# Patient Record
Sex: FEMALE | Race: BLACK OR AFRICAN AMERICAN | NOT HISPANIC OR LATINO
[De-identification: names, ages, dates, MRNs, and addresses within clinical notes are randomized per-mention and may not be internally consistent; named-entity substitution may affect disease eponyms.]

---

## 2017-01-24 ENCOUNTER — RECORD ABSTRACTING (OUTPATIENT)
Age: 72
End: 2017-01-24

## 2017-01-24 DIAGNOSIS — M17.9 OSTEOARTHRITIS OF KNEE, UNSPECIFIED: ICD-10-CM

## 2017-01-24 DIAGNOSIS — M17.0 BILATERAL PRIMARY OSTEOARTHRITIS OF KNEE: ICD-10-CM

## 2017-01-24 DIAGNOSIS — M25.469 EFFUSION, UNSPECIFIED KNEE: ICD-10-CM

## 2017-01-24 DIAGNOSIS — Z80.9 FAMILY HISTORY OF MALIGNANT NEOPLASM, UNSPECIFIED: ICD-10-CM

## 2017-01-24 DIAGNOSIS — Z96.653 PRESENCE OF ARTIFICIAL KNEE JOINT, BILATERAL: ICD-10-CM

## 2017-02-22 ENCOUNTER — INPATIENT (INPATIENT)
Facility: HOSPITAL | Age: 72
LOS: 40 days | Discharge: NURSING, REHAB OR LONG-TERM CARE | DRG: 463 | End: 2017-04-04
Attending: HOSPITALIST | Admitting: ORTHOPAEDIC SURGERY
Payer: MEDICARE

## 2017-02-22 VITALS
SYSTOLIC BLOOD PRESSURE: 143 MMHG | RESPIRATION RATE: 18 BRPM | HEART RATE: 73 BPM | DIASTOLIC BLOOD PRESSURE: 79 MMHG | OXYGEN SATURATION: 99 % | HEIGHT: 63 IN | WEIGHT: 139.99 LBS | TEMPERATURE: 98 F

## 2017-02-22 DIAGNOSIS — E66.9 OBESITY, UNSPECIFIED: ICD-10-CM

## 2017-02-22 DIAGNOSIS — E11.9 TYPE 2 DIABETES MELLITUS WITHOUT COMPLICATIONS: ICD-10-CM

## 2017-02-22 DIAGNOSIS — B99.9 UNSPECIFIED INFECTIOUS DISEASE: ICD-10-CM

## 2017-02-22 DIAGNOSIS — T81.4XXA INFECTION FOLLOWING A PROCEDURE, INITIAL ENCOUNTER: ICD-10-CM

## 2017-02-22 DIAGNOSIS — Z90.10 ACQUIRED ABSENCE OF UNSPECIFIED BREAST AND NIPPLE: Chronic | ICD-10-CM

## 2017-02-22 DIAGNOSIS — M00.9 PYOGENIC ARTHRITIS, UNSPECIFIED: ICD-10-CM

## 2017-02-22 DIAGNOSIS — Z96.653 PRESENCE OF ARTIFICIAL KNEE JOINT, BILATERAL: Chronic | ICD-10-CM

## 2017-02-22 DIAGNOSIS — T81.30XA DISRUPTION OF WOUND, UNSPECIFIED, INITIAL ENCOUNTER: ICD-10-CM

## 2017-02-22 DIAGNOSIS — Z98.890 OTHER SPECIFIED POSTPROCEDURAL STATES: Chronic | ICD-10-CM

## 2017-02-22 LAB
ALBUMIN SERPL ELPH-MCNC: 2.8 G/DL — LOW (ref 3.3–5.2)
ALP SERPL-CCNC: 79 U/L — SIGNIFICANT CHANGE UP (ref 40–120)
ALT FLD-CCNC: 9 U/L — SIGNIFICANT CHANGE UP
ANION GAP SERPL CALC-SCNC: 14 MMOL/L — SIGNIFICANT CHANGE UP (ref 5–17)
APTT BLD: 35.6 SEC — SIGNIFICANT CHANGE UP (ref 27.5–37.4)
AST SERPL-CCNC: 18 U/L — SIGNIFICANT CHANGE UP
B PERT IGG+IGM PNL SER: ABNORMAL
B PERT IGG+IGM PNL SER: ABNORMAL
BILIRUB SERPL-MCNC: 0.8 MG/DL — SIGNIFICANT CHANGE UP (ref 0.4–2)
BLD GP AB SCN SERPL QL: SIGNIFICANT CHANGE UP
BUN SERPL-MCNC: 73 MG/DL — HIGH (ref 8–20)
CALCIUM SERPL-MCNC: 9.1 MG/DL — SIGNIFICANT CHANGE UP (ref 8.6–10.2)
CHLORIDE SERPL-SCNC: 94 MMOL/L — LOW (ref 98–107)
CO2 SERPL-SCNC: 28 MMOL/L — SIGNIFICANT CHANGE UP (ref 22–29)
COLOR FLD: ABNORMAL
COLOR FLD: ABNORMAL
CREAT SERPL-MCNC: 4.14 MG/DL — HIGH (ref 0.5–1.3)
CRP SERPL-MCNC: 4.5 MG/DL — HIGH (ref 0–0.4)
CRYSTALS SNV QL MICRO: SIGNIFICANT CHANGE UP
EOSINOPHIL # FLD: 1 % — SIGNIFICANT CHANGE UP
ERYTHROCYTE [SEDIMENTATION RATE] IN BLOOD: 36 MM/HR — HIGH (ref 0–20)
FLUID INTAKE SUBSTANCE CLASS: SIGNIFICANT CHANGE UP
FLUID INTAKE SUBSTANCE CLASS: SIGNIFICANT CHANGE UP
FLUID SEGMENTED GRANULOCYTES: 86 % — SIGNIFICANT CHANGE UP
FLUID SEGMENTED GRANULOCYTES: 90 % — SIGNIFICANT CHANGE UP
GLUCOSE SERPL-MCNC: 100 MG/DL — SIGNIFICANT CHANGE UP (ref 70–115)
GRAM STN FLD: SIGNIFICANT CHANGE UP
GRAM STN FLD: SIGNIFICANT CHANGE UP
HCT VFR BLD CALC: 37.3 % — SIGNIFICANT CHANGE UP (ref 37–47)
HGB BLD-MCNC: 12 G/DL — SIGNIFICANT CHANGE UP (ref 12–16)
INR BLD: 1.37 RATIO — HIGH (ref 0.88–1.16)
LACTATE BLDV-MCNC: 1.3 MMOL/L — SIGNIFICANT CHANGE UP (ref 0.7–2)
LYMPHOCYTES # FLD: 2 % — SIGNIFICANT CHANGE UP
LYMPHOCYTES # FLD: 2 % — SIGNIFICANT CHANGE UP
MCHC RBC-ENTMCNC: 29.1 PG — SIGNIFICANT CHANGE UP (ref 27–31)
MCHC RBC-ENTMCNC: 32.2 G/DL — SIGNIFICANT CHANGE UP (ref 32–36)
MCV RBC AUTO: 90.5 FL — SIGNIFICANT CHANGE UP (ref 81–99)
MESOTHL CELL # FLD: 2 % — SIGNIFICANT CHANGE UP
MESOTHL CELL # FLD: 7 % — SIGNIFICANT CHANGE UP
MONOS+MACROS # FLD: 5 % — SIGNIFICANT CHANGE UP
MONOS+MACROS # FLD: 6 % — SIGNIFICANT CHANGE UP
PLATELET # BLD AUTO: 396 K/UL — SIGNIFICANT CHANGE UP (ref 150–400)
POTASSIUM SERPL-MCNC: 4.3 MMOL/L — SIGNIFICANT CHANGE UP (ref 3.5–5.3)
POTASSIUM SERPL-SCNC: 4.3 MMOL/L — SIGNIFICANT CHANGE UP (ref 3.5–5.3)
PROT SERPL-MCNC: 6.8 G/DL — SIGNIFICANT CHANGE UP (ref 6.6–8.7)
PROTHROM AB SERPL-ACNC: 15.1 SEC — HIGH (ref 10–13.1)
RBC # BLD: 4.12 M/UL — LOW (ref 4.4–5.2)
RBC # FLD: 20.8 % — HIGH (ref 11–15.6)
RCV VOL RI: HIGH /UL (ref 0–5)
RCV VOL RI: HIGH /UL (ref 0–5)
SODIUM SERPL-SCNC: 136 MMOL/L — SIGNIFICANT CHANGE UP (ref 135–145)
SPECIMEN SOURCE FLD: SIGNIFICANT CHANGE UP
SPECIMEN SOURCE: SIGNIFICANT CHANGE UP
SPECIMEN SOURCE: SIGNIFICANT CHANGE UP
TOTAL NUCLEATED CELL COUNT, BODY FLUID: HIGH /UL (ref 0–5)
TOTAL NUCLEATED CELL COUNT, BODY FLUID: HIGH /UL (ref 0–5)
TUBE TYPE: SIGNIFICANT CHANGE UP
TUBE TYPE: SIGNIFICANT CHANGE UP
TYPE + AB SCN PNL BLD: SIGNIFICANT CHANGE UP
WBC # BLD: 9 K/UL — SIGNIFICANT CHANGE UP (ref 4.8–10.8)
WBC # FLD AUTO: 9 K/UL — SIGNIFICANT CHANGE UP (ref 4.8–10.8)

## 2017-02-22 PROCEDURE — 93010 ELECTROCARDIOGRAM REPORT: CPT

## 2017-02-22 PROCEDURE — 71010: CPT | Mod: 26

## 2017-02-22 PROCEDURE — 99223 1ST HOSP IP/OBS HIGH 75: CPT

## 2017-02-22 PROCEDURE — 73562 X-RAY EXAM OF KNEE 3: CPT | Mod: 26,RT

## 2017-02-22 PROCEDURE — 99285 EMERGENCY DEPT VISIT HI MDM: CPT

## 2017-02-22 RX ORDER — ENOXAPARIN SODIUM 100 MG/ML
30 INJECTION SUBCUTANEOUS DAILY
Qty: 0 | Refills: 0 | Status: DISCONTINUED | OUTPATIENT
Start: 2017-02-22 | End: 2017-02-26

## 2017-02-22 RX ORDER — DEXTROSE 50 % IN WATER 50 %
25 SYRINGE (ML) INTRAVENOUS ONCE
Qty: 0 | Refills: 0 | Status: DISCONTINUED | OUTPATIENT
Start: 2017-02-22 | End: 2017-02-27

## 2017-02-22 RX ORDER — DEXTROSE 50 % IN WATER 50 %
12.5 SYRINGE (ML) INTRAVENOUS ONCE
Qty: 0 | Refills: 0 | Status: DISCONTINUED | OUTPATIENT
Start: 2017-02-22 | End: 2017-02-27

## 2017-02-22 RX ORDER — DOCUSATE SODIUM 100 MG
100 CAPSULE ORAL
Qty: 0 | Refills: 0 | Status: DISCONTINUED | OUTPATIENT
Start: 2017-02-22 | End: 2017-02-27

## 2017-02-22 RX ORDER — SODIUM CHLORIDE 9 MG/ML
1000 INJECTION, SOLUTION INTRAVENOUS
Qty: 0 | Refills: 0 | Status: DISCONTINUED | OUTPATIENT
Start: 2017-02-22 | End: 2017-02-26

## 2017-02-22 RX ORDER — METOPROLOL TARTRATE 50 MG
50 TABLET ORAL DAILY
Qty: 0 | Refills: 0 | Status: DISCONTINUED | OUTPATIENT
Start: 2017-02-22 | End: 2017-02-27

## 2017-02-22 RX ORDER — INSULIN LISPRO 100/ML
VIAL (ML) SUBCUTANEOUS
Qty: 0 | Refills: 0 | Status: DISCONTINUED | OUTPATIENT
Start: 2017-02-22 | End: 2017-02-27

## 2017-02-22 RX ORDER — DEXTROSE 50 % IN WATER 50 %
1 SYRINGE (ML) INTRAVENOUS ONCE
Qty: 0 | Refills: 0 | Status: DISCONTINUED | OUTPATIENT
Start: 2017-02-22 | End: 2017-02-27

## 2017-02-22 RX ORDER — MAGNESIUM OXIDE 400 MG ORAL TABLET 241.3 MG
400 TABLET ORAL
Qty: 0 | Refills: 0 | Status: DISCONTINUED | OUTPATIENT
Start: 2017-02-22 | End: 2017-02-27

## 2017-02-22 RX ORDER — SODIUM CHLORIDE 9 MG/ML
1000 INJECTION, SOLUTION INTRAVENOUS
Qty: 0 | Refills: 0 | Status: DISCONTINUED | OUTPATIENT
Start: 2017-02-22 | End: 2017-02-27

## 2017-02-22 RX ORDER — GLUCAGON INJECTION, SOLUTION 0.5 MG/.1ML
1 INJECTION, SOLUTION SUBCUTANEOUS ONCE
Qty: 0 | Refills: 0 | Status: DISCONTINUED | OUTPATIENT
Start: 2017-02-22 | End: 2017-02-27

## 2017-02-22 RX ORDER — SIMVASTATIN 20 MG/1
20 TABLET, FILM COATED ORAL AT BEDTIME
Qty: 0 | Refills: 0 | Status: DISCONTINUED | OUTPATIENT
Start: 2017-02-22 | End: 2017-02-27

## 2017-02-22 RX ORDER — HYDRALAZINE HCL 50 MG
50 TABLET ORAL EVERY 8 HOURS
Qty: 0 | Refills: 0 | Status: DISCONTINUED | OUTPATIENT
Start: 2017-02-22 | End: 2017-02-27

## 2017-02-22 RX ORDER — BUDESONIDE AND FORMOTEROL FUMARATE DIHYDRATE 160; 4.5 UG/1; UG/1
2 AEROSOL RESPIRATORY (INHALATION)
Qty: 0 | Refills: 0 | Status: DISCONTINUED | OUTPATIENT
Start: 2017-02-22 | End: 2017-02-27

## 2017-02-22 RX ADMIN — MAGNESIUM OXIDE 400 MG ORAL TABLET 400 MILLIGRAM(S): 241.3 TABLET ORAL at 18:45

## 2017-02-22 RX ADMIN — ENOXAPARIN SODIUM 30 MILLIGRAM(S): 100 INJECTION SUBCUTANEOUS at 18:52

## 2017-02-22 RX ADMIN — BUDESONIDE AND FORMOTEROL FUMARATE DIHYDRATE 2 PUFF(S): 160; 4.5 AEROSOL RESPIRATORY (INHALATION) at 20:53

## 2017-02-22 NOTE — ED PROVIDER NOTE - OBJECTIVE STATEMENT
70 y/o female states she is s/p rt knee replacement 9/17 and she noted a pustule last night that broke and drained pus today and she was sent by rehab for ortho eval no fever or chills

## 2017-02-22 NOTE — ED PROVIDER NOTE - PMH
Atrial fibrillation    Breast cancer    Chronic pain    Constipation    COPD (chronic obstructive pulmonary disease)    Deficiency of vitamin K    DM (diabetes mellitus)    GERD (gastroesophageal reflux disease)    HLD (hyperlipidemia)    HTN (hypertension)    Hypercholesterolemia    Obesity    LIN on CPAP    Pressure ulcer    Urine retention

## 2017-02-22 NOTE — ED ADULT NURSE NOTE - PSH
History of incision and drainage  rt knee  S/P hysterectomy    S/P knee replacement, right    S/P mastectomy  Left  S/p total knee replacement, bilateral

## 2017-02-22 NOTE — ED PROVIDER NOTE - MUSCULOSKELETAL, MLM
Spine appears normal, rt knee with anterior healed incision and in the middle small whole with small amt of drainage on a dressing

## 2017-02-22 NOTE — H&P ADULT. - ASSESSMENT
71 y F hx COPD, HTN, DM2, CKD, PAF, s/p R TKR adm for recurrent R knee wound dehiscence.     Wound dehiscence: s/p knee aspiration by ortho, gm stain pending. Per ID Dr Dela Cruz hold off abx for now, await cx results. Will need OR per ortho in next few days, f/u ortho recs. cardio eval for clearance  DM: monitor FS, sliding scale  PAF: currently in NSR, hold xarelto for now pending OR plans, cardio eval [facility].   HTN: cont home meds  COPD: stable, cont inhaler    Prophyl: lovenox renally dosed

## 2017-02-22 NOTE — ED ADULT NURSE REASSESSMENT NOTE - NS ED NURSE REASSESS COMMENT FT1
Care assumed from off-going RN at this time. Charting as noted. Pt is resting comfortably in stretcher and has been made aware of plan of care. Blood draw has been done and was just sent to lab. No further complaints at this time.

## 2017-02-22 NOTE — CONSULT NOTE ADULT - SUBJECTIVE AND OBJECTIVE BOX
70 y/o female presents to the emergency room today complaining of drainage to her right knee.  states she noticed a small "pimple" on her knee 1 day ago and when she woke this morning there was a hole in her knee and it was draining serosanguinous fluid.  denies any recent fevers, or pain.   non ambulator, resides in nursing facility.    over the past year she has had multiple surgical interventions regrding the right knee.    she was previously treated in December for  wound dehiscence,   Irrigation and wound closure by plastics.    PICC line & IVABX for previous infection was completed nov 24 2015

## 2017-02-22 NOTE — H&P ADULT. - HISTORY OF PRESENT ILLNESS
71 y F hx DM2, PAF, HTN, LIN, CKD, COPD, sent from rehab for R knee wound dehiscensce. Pt has had multiple interventions on the R knee following TKR last year, including hardware removal for infection, IV abx course and most recently adm for wound dehiscence in Dec req washout and wound closure. The patient is now transferred from rehab for persistent wound serosanguinous drainage and wound dehiscence. Pt denies CP, F/C, SOB, cough, N/V/D/C, dysuria. c/o L heel pain.

## 2017-02-22 NOTE — CONSULT NOTE ADULT - SUBJECTIVE AND OBJECTIVE BOX
[facility]  INFECTIOUS DISEASES AND INTERNAL MEDICINE OF [place]  =======================================================  Wilver Dela Cruz MD  Diplomates American Board of Internal Medicine and Infectious Diseases  =======================================================    [id number]  SHYAM LAL     Patient is a poor historian, information obtained from the chart and old records.   70y/o Female, with h/o HTN, DM, LIN, obesity, s/p Rt TKR 6/2013 s/p Rt knee explant 9/2016 and 6 weeks of Antibiotics with Vancomycin, Ceftriaxone, and Rifampin(end date 10/24/16). Per chart the infecting bacteria was E coli and CoNS. Patient returned on 11/2016 for non-healing surgical wound on the right knee and underwent a complex repair of the surgical wound by plastic surgery. Culture on 11/30/16 with Pseudomonas A, and VRE. Unknown if patient was ever treated for this. Patient now comes back with another open wound of the Rt knee surgical site. Patient does not recall any trauma, she reports no walking since september surgery. No reported fever or chills.     Past Medical & Surgical Hx:  Hypercholesterolemia  COPD (chronic obstructive pulmonary disease)  Urine retention  Pressure ulcer  Atrial fibrillation  Constipation  Breast cancer  HLD (hyperlipidemia)  HTN (hypertension)  GERD (gastroesophageal reflux disease)  DM (diabetes mellitus)  Obesity  LIN on CPAP  History of incision and drainage: rt knee  S/p total knee replacement, bilateral  S/P knee explant, right  S/P hysterectomy  S/P mastectomy: Left  Breast cancer    Social Hx:  Lives at [facility]    FAMILY HISTORY:  Family history of diabetes mellitus    Allergies  No Known Allergies    MEDICATIONS  (STANDING):  lactated ringers. 1000milliLiter(s) IV Continuous <Continuous>    MEDICATIONS  (PRN):  oxyCODONE  5 mG/acetaminophen 325 mG 1Tablet(s) Oral every 4 hours PRN Moderate Pain (4 - 6)    ANTIBIOTICS:   None     REVIEW OF SYSTEMS:  CONSTITUTIONAL:  No fevers or chills  HEENT:  No diplopia or blurred vision. No earache, sore throat or runny nose.  CARDIOVASCULAR:  No pressure, squeezing, strangling, tightness, heaviness or aching about the chest, neck, axilla or epigastrium.  RESPIRATORY:  No cough, shortness of breath  GASTROINTESTINAL:  No nausea, vomiting or diarrhea.  GENITOURINARY:  No dysuria, frequency or urgency.   MUSCULOSKELETAL:  no joint aches, no muscle pain  SKIN:  Rt Knee surgical site with open wound  NEUROLOGIC:  No paresthesias  PSYCHIATRIC:  No disorder of thought or mood.  ENDOCRINE:  No heat or cold intolerance, polyuria or polydipsia.  HEMATOLOGICAL:  No easy bruising or bleeding.     Physical Exam:  Vital Signs Last 24 Hrs  T(C): 36.7, Max: 36.7 (02-22 @ 08:49)  T(F): 98.1, Max: 98.1 (02-22 @ 08:49)  HR: 73 (73 - 73)  BP: 143/79 (143/79 - 143/79)  RR: 18 (18 - 18)  SpO2: 99% (99% - 99%)  Height (cm): 160 (02-22 @ 08:49)  Weight (kg): 63.5 (02-22 @ 08:49)  BMI (kg/m2): 24.8 (02-22 @ 08:49)  BSA (m2): 1.66 (02-22 @ 08:49)    GEN: NAD, pleasant  HEENT: normocephalic and atraumatic. EOMI. PERRL.    NECK: Supple.   LUNGS: Clear to auscultation.  HEART: Regular rate and rhythm   ABDOMEN: Soft, nontender, and nondistended.  Positive bowel sounds.    : No CVA tenderness  EXTREMITIES: Rt Knee in dressing, Open wound over patella, round circumferential - seen with orthopedics   NEUROLOGIC: forgetful   PSYCHIATRIC: Appropriate affect .  SKIN: Open wound over patella, round circumferential, + drainage - seen with orthopedics    Labs:                      12.0   9.0   )-----------( 396      ( 22 Feb 2017 11:20 )             37.3     RECENT CULTURES:  02-22 .Body Fluid Knee Fluid XXXX   Numerous White blood cells  No organisms seen XXXX    02-22 .Body Fluid Synovial Fluid XXXX   Numerous White blood cells  No organisms seen XXXX    Rt Knee Xray  Impression:   1. No discrete acute fracture-subluxation.  2. ?S/P prior withdrawal of portion of tibial prothesis,  3. Cylindrical cement densities in distal femur and proximal tibia as   above.  4. Productive bony changes in knee joint near tibial plateau as above -   of uncertain etiology.  5. Joint effusion and soft tissue swelling as above.

## 2017-02-22 NOTE — CONSULT NOTE ADULT - SUBJECTIVE AND OBJECTIVE BOX
Patient is a 71y old  Female who presents with a chief complaint of knee drainage (22 Feb 2017 16:00)      HPI:  71 y F hx DM2, PAF, HTN, LIN, CKD, COPD, sent from rehab for R knee wound dehiscensce. Pt has had multiple interventions on the R knee following TKR last year, including hardware removal for infection, IV abx course and most recently adm for wound dehiscence in Dec req washout and wound closure. The patient is now transferred from rehab for persistent wound serosanguinous drainage and wound dehiscence. Pt denies CP, F/C, SOB, cough, N/V/D/C, dysuria. c/o L heel pain. (22 Feb 2017 16:00). Patient denies current or recent chest pain. Had episodes of AF while inpatient in December.       PAST MEDICAL & SURGICAL HISTORY:  Hypercholesterolemia  Deficiency of vitamin K  Chronic pain  COPD (chronic obstructive pulmonary disease)  Urine retention  Pressure ulcer  Atrial fibrillation  Constipation  Breast cancer  HLD (hyperlipidemia)  HTN (hypertension)  GERD (gastroesophageal reflux disease)  DM (diabetes mellitus)  Obesity  LIN on CPAP  History of incision and drainage: rt knee  S/p total knee replacement, bilateral  S/P knee replacement, right  S/P hysterectomy  S/P mastectomy: Left  Breast cancer        Allergies    No Known Allergies          MEDICATIONS  (STANDING):  lactated ringers. 1000milliLiter(s) IV Continuous <Continuous>  insulin lispro (HumaLOG) corrective regimen sliding scale  SubCutaneous three times a day before meals  dextrose 5%. 1000milliLiter(s) IV Continuous <Continuous>  dextrose 50% Injectable 12.5Gram(s) IV Push once  dextrose 50% Injectable 25Gram(s) IV Push once  dextrose 50% Injectable 25Gram(s) IV Push once  simvastatin 20milliGRAM(s) Oral at bedtime  metoprolol succinate ER 50milliGRAM(s) Oral daily  buDESOnide 160 MICROgram(s)/formoterol 4.5 MICROgram(s) Inhaler 2Puff(s) Inhalation two times a day  torsemide 20milliGRAM(s) Oral daily  magnesium oxide 400milliGRAM(s) Oral two times a day with meals  hydrALAZINE 50milliGRAM(s) Oral every 8 hours  enoxaparin Injectable 30milliGRAM(s) SubCutaneous daily    MEDICATIONS  (PRN):  oxyCODONE  5 mG/acetaminophen 325 mG 1Tablet(s) Oral every 4 hours PRN Moderate Pain (4 - 6)  dextrose Gel 1Dose(s) Oral once PRN Blood Glucose LESS THAN 70 milliGRAM(s)/deciliter  glucagon  Injectable 1milliGRAM(s) IntraMuscular once PRN Glucose LESS THAN 70 milligrams/deciliter  docusate sodium 100milliGRAM(s) Oral two times a day PRN Constipation      FAMILY HISTORY:  Family history of diabetes mellitus (Mother)      SOCIAL HISTORY: denies tobacco use      REVIEW OF SYSTEMS:  Comprehensive and unremarkable except as in HPI	    Vital Signs Last 24 Hrs  T(C): 36.3, Max: 36.7 (02-22 @ 08:49)  T(F): 97.3, Max: 98.1 (02-22 @ 08:49)  HR: 66 (66 - 73)  BP: 124/64 (124/64 - 143/79)  BP(mean): --  RR: 18 (18 - 18)  SpO2: 91% (91% - 99%)    Daily Height in cm: 160.02 (22 Feb 2017 08:49)    Daily     I&O's Detail      PHYSICAL EXAM:  Appearance: Normal, obese	  HEENT:   Normal oral mucosa, PERRL, EOMI, sclera non-icteric	  Lymphatic: No cervical lymphadenopathy  Cardiovascular: Normal S1 S2, No JVD, No cardiac murmurs, No carotid bruits, No peripheral edema  Respiratory: Lungs clear to auscultation	  Psychiatry: A & O x 3, Mood & affect appropriate  Gastrointestinal:  Soft, Non-tender, + BS, no bruits	  Skin: No rashes, No ecchymoses, No cyanosis  Neurologic: Grossly non-focal with full strength in all four extremities  Extremities:  No clubbing, cyanosis or edema  Vascular: Peripheral pulses palpable 2+ bilaterally      LABS:                        12.0   9.0   )-----------( 396      ( 22 Feb 2017 11:20 )             37.3           I&O's Summary    BNP  RADIOLOGY & ADDITIONAL STUDIES:

## 2017-02-23 LAB
ANION GAP SERPL CALC-SCNC: 13 MMOL/L — SIGNIFICANT CHANGE UP (ref 5–17)
BUN SERPL-MCNC: 70 MG/DL — HIGH (ref 8–20)
CALCIUM SERPL-MCNC: 9.1 MG/DL — SIGNIFICANT CHANGE UP (ref 8.6–10.2)
CHLORIDE SERPL-SCNC: 98 MMOL/L — SIGNIFICANT CHANGE UP (ref 98–107)
CO2 SERPL-SCNC: 28 MMOL/L — SIGNIFICANT CHANGE UP (ref 22–29)
CREAT SERPL-MCNC: 4.23 MG/DL — HIGH (ref 0.5–1.3)
GLUCOSE SERPL-MCNC: 67 MG/DL — LOW (ref 70–115)
HCT VFR BLD CALC: 34.7 % — LOW (ref 37–47)
HGB BLD-MCNC: 11.1 G/DL — LOW (ref 12–16)
MCHC RBC-ENTMCNC: 29 PG — SIGNIFICANT CHANGE UP (ref 27–31)
MCHC RBC-ENTMCNC: 32 G/DL — SIGNIFICANT CHANGE UP (ref 32–36)
MCV RBC AUTO: 90.6 FL — SIGNIFICANT CHANGE UP (ref 81–99)
PLATELET # BLD AUTO: 318 K/UL — SIGNIFICANT CHANGE UP (ref 150–400)
POTASSIUM SERPL-MCNC: 4.1 MMOL/L — SIGNIFICANT CHANGE UP (ref 3.5–5.3)
POTASSIUM SERPL-SCNC: 4.1 MMOL/L — SIGNIFICANT CHANGE UP (ref 3.5–5.3)
RBC # BLD: 3.83 M/UL — LOW (ref 4.4–5.2)
RBC # FLD: 20.7 % — HIGH (ref 11–15.6)
SODIUM SERPL-SCNC: 139 MMOL/L — SIGNIFICANT CHANGE UP (ref 135–145)
WBC # BLD: 6.2 K/UL — SIGNIFICANT CHANGE UP (ref 4.8–10.8)
WBC # FLD AUTO: 6.2 K/UL — SIGNIFICANT CHANGE UP (ref 4.8–10.8)

## 2017-02-23 PROCEDURE — 99232 SBSQ HOSP IP/OBS MODERATE 35: CPT

## 2017-02-23 PROCEDURE — 99233 SBSQ HOSP IP/OBS HIGH 50: CPT

## 2017-02-23 RX ORDER — INSULIN LISPRO 100/ML
0 VIAL (ML) SUBCUTANEOUS
Qty: 0 | Refills: 0 | COMMUNITY

## 2017-02-23 RX ORDER — ONDANSETRON 8 MG/1
1 TABLET, FILM COATED ORAL
Qty: 0 | Refills: 0 | COMMUNITY

## 2017-02-23 RX ORDER — METOPROLOL TARTRATE 50 MG
1 TABLET ORAL
Qty: 0 | Refills: 0 | COMMUNITY

## 2017-02-23 RX ORDER — CASTOR OIL AND BALSAM, PERU 788; 87 MG/G; MG/G
1 OINTMENT TOPICAL
Qty: 0 | Refills: 0 | COMMUNITY

## 2017-02-23 RX ORDER — ONDANSETRON 8 MG/1
0 TABLET, FILM COATED ORAL
Qty: 0 | Refills: 0 | COMMUNITY

## 2017-02-23 RX ORDER — SODIUM CHLORIDE 9 MG/ML
1000 INJECTION INTRAMUSCULAR; INTRAVENOUS; SUBCUTANEOUS
Qty: 0 | Refills: 0 | Status: DISCONTINUED | OUTPATIENT
Start: 2017-02-23 | End: 2017-02-23

## 2017-02-23 RX ORDER — FONDAPARINUX SODIUM 2.5 MG/.5ML
1 INJECTION, SOLUTION SUBCUTANEOUS
Qty: 0 | Refills: 0 | COMMUNITY

## 2017-02-23 RX ORDER — ERYTHROPOIETIN 10000 [IU]/ML
0 INJECTION, SOLUTION INTRAVENOUS; SUBCUTANEOUS
Qty: 0 | Refills: 0 | COMMUNITY

## 2017-02-23 RX ORDER — ERYTHROPOIETIN 10000 [IU]/ML
20000 INJECTION, SOLUTION INTRAVENOUS; SUBCUTANEOUS
Qty: 0 | Refills: 0 | COMMUNITY

## 2017-02-23 RX ORDER — CEFEPIME 1 G/1
1000 INJECTION, POWDER, FOR SOLUTION INTRAMUSCULAR; INTRAVENOUS EVERY 24 HOURS
Qty: 0 | Refills: 0 | Status: DISCONTINUED | OUTPATIENT
Start: 2017-02-23 | End: 2017-02-25

## 2017-02-23 RX ORDER — OXYCODONE HYDROCHLORIDE 5 MG/1
0 TABLET ORAL
Qty: 0 | Refills: 0 | COMMUNITY

## 2017-02-23 RX ADMIN — ENOXAPARIN SODIUM 30 MILLIGRAM(S): 100 INJECTION SUBCUTANEOUS at 12:04

## 2017-02-23 RX ADMIN — BUDESONIDE AND FORMOTEROL FUMARATE DIHYDRATE 2 PUFF(S): 160; 4.5 AEROSOL RESPIRATORY (INHALATION) at 07:56

## 2017-02-23 RX ADMIN — Medication 50 MILLIGRAM(S): at 05:30

## 2017-02-23 RX ADMIN — SODIUM CHLORIDE 60 MILLILITER(S): 9 INJECTION, SOLUTION INTRAVENOUS at 21:37

## 2017-02-23 RX ADMIN — Medication 50 MILLIGRAM(S): at 17:51

## 2017-02-23 RX ADMIN — BUDESONIDE AND FORMOTEROL FUMARATE DIHYDRATE 2 PUFF(S): 160; 4.5 AEROSOL RESPIRATORY (INHALATION) at 20:36

## 2017-02-23 RX ADMIN — Medication 20 MILLIGRAM(S): at 05:30

## 2017-02-23 RX ADMIN — MAGNESIUM OXIDE 400 MG ORAL TABLET 400 MILLIGRAM(S): 241.3 TABLET ORAL at 17:51

## 2017-02-23 RX ADMIN — SODIUM CHLORIDE 60 MILLILITER(S): 9 INJECTION, SOLUTION INTRAVENOUS at 05:37

## 2017-02-23 RX ADMIN — SIMVASTATIN 20 MILLIGRAM(S): 20 TABLET, FILM COATED ORAL at 21:37

## 2017-02-23 RX ADMIN — CEFEPIME 100 MILLIGRAM(S): 1 INJECTION, POWDER, FOR SOLUTION INTRAMUSCULAR; INTRAVENOUS at 17:52

## 2017-02-23 RX ADMIN — MAGNESIUM OXIDE 400 MG ORAL TABLET 400 MILLIGRAM(S): 241.3 TABLET ORAL at 12:04

## 2017-02-23 NOTE — PROGRESS NOTE ADULT - SUBJECTIVE AND OBJECTIVE BOX
Patient is a 71y old  Female who presents with a chief complaint of knee drainage.        PAST MEDICAL & SURGICAL HISTORY:  Hypercholesterolemia  Deficiency of vitamin K  Chronic pain  COPD (chronic obstructive pulmonary disease)  Urine retention  Pressure ulcer  Atrial fibrillation  Constipation  Breast cancer  HLD (hyperlipidemia)  HTN (hypertension)  GERD (gastroesophageal reflux disease)  DM (diabetes mellitus)  Obesity  LIN on CPAP  History of incision and drainage: rt knee  S/p total knee replacement, bilateral  S/P knee replacement, right  S/P hysterectomy  S/P mastectomy: Left  Breast cancer      Summary of admission HPI:      71 y F hx DM2, PAF, HTN, LIN, CKD, COPD, sent from rehab for R knee wound dehiscensce. Pt has had multiple interventions on the R knee following TKR last year, including hardware removal for infection, IV abx course and most recently adm for wound dehiscence in Dec requiring washout and wound closure. The patient is now transferred from rehab for persistent wound serosanguinous drainage and wound dehiscence.           CHEST CT PULMONARY ANGIO with IV Contrast:  FINDINGS:  MEDICATIONS  (STANDING):  lactated ringers. 1000milliLiter(s) IV Continuous <Continuous>  insulin lispro (HumaLOG) corrective regimen sliding scale  SubCutaneous three times a day before meals  dextrose 5%. 1000milliLiter(s) IV Continuous <Continuous>  dextrose 50% Injectable 12.5Gram(s) IV Push once  dextrose 50% Injectable 25Gram(s) IV Push once  dextrose 50% Injectable 25Gram(s) IV Push once  simvastatin 20milliGRAM(s) Oral at bedtime  metoprolol succinate ER 50milliGRAM(s) Oral daily  buDESOnide 160 MICROgram(s)/formoterol 4.5 MICROgram(s) Inhaler 2Puff(s) Inhalation two times a day  torsemide 20milliGRAM(s) Oral daily  magnesium oxide 400milliGRAM(s) Oral two times a day with meals  hydrALAZINE 50milliGRAM(s) Oral every 8 hours  enoxaparin Injectable 30milliGRAM(s) SubCutaneous daily    MEDICATIONS  (PRN):  oxyCODONE  5 mG/acetaminophen 325 mG 1Tablet(s) Oral every 4 hours PRN Moderate Pain (4 - 6)  dextrose Gel 1Dose(s) Oral once PRN Blood Glucose LESS THAN 70 milliGRAM(s)/deciliter  glucagon  Injectable 1milliGRAM(s) IntraMuscular once PRN Glucose LESS THAN 70 milligrams/deciliter  docusate sodium 100milliGRAM(s) Oral two times a day PRN Constipation          Vital Signs Last 24 Hrs  T(C): 36.7, Max: 36.9 (02-23 @ 01:16)  T(F): 98.1, Max: 98.4 (02-23 @ 01:16)  HR: 68 (63 - 69)  BP: 135/68 (112/69 - 143/74)  BP(mean): --  RR: 18 (17 - 18)  SpO2: 97% (91% - 99%)    PHYSICAL EXAM:    Appearance: Normal, obese	  HEENT:   Normal oral mucosa, PERRL, EOMI, sclera non-icteric	  Lymphatic: No cervical lymphadenopathy  Cardiovascular: Normal S1 S2, No JVD, No cardiac murmurs, No carotid bruits, No peripheral edema  Respiratory: Lungs clear to auscultation	  Psychiatry: A & O x 3, Mood & affect appropriate  Gastrointestinal:  Soft, Non-tender, + BS, no bruits	  Skin: No rashes, No ecchymoses, No cyanosis  Neurologic: Grossly non-focal with full strength in all four extremities  Extremities:  No clubbing, cyanosis or edema  Vascular: Peripheral pulses palpable 2+ bilaterally          LABS:                        11.1   6.2   )-----------( 318      ( 23 Feb 2017 07:10 )             34.7     23 Feb 2017 07:10    139    |  98     |  70.0   ----------------------------<  67     4.1     |  28.0   |  4.23     Ca    9.1        23 Feb 2017 07:10    TPro  6.8    /  Alb  2.8    /  TBili  0.8    /  DBili  x      /  AST  18     /  ALT  9      /  AlkPhos  79     22 Feb 2017 20:10        PT/INR - ( 22 Feb 2017 20:11 )   PT: 15.1 sec;   INR: 1.37 ratio         PTT - ( 22 Feb 2017 20:11 )  PTT:35.6 sec    BNP  I&O's Detail    Daily     Daily     RADIOLOGY & ADDITIONAL STUDIES:

## 2017-02-23 NOTE — PROGRESS NOTE ADULT - SUBJECTIVE AND OBJECTIVE BOX
SHYAM WONGMORE    [id number]    History: 71y Female with Right knee wound s/p placement of antibiotic spacer for infected tka. She has no new complaints and pain is controlled. No fever, chills, CP or SOB.                          11.1   6.2   )-----------( 318      ( 23 Feb 2017 07:10 )             34.7     23 Feb 2017 07:10    139    |  98     |  70.0   ----------------------------<  67     4.1     |  28.0   |  4.23     Ca    9.1        23 Feb 2017 07:10    TPro  6.8    /  Alb  2.8    /  TBili  0.8    /  DBili  x      /  AST  18     /  ALT  9      /  AlkPhos  79     22 Feb 2017 20:10    Culture - Body Fluid with Gram Stain (02.22.17 @ 12:46)    Gram Stain:   Numerous White blood cells  No organisms seen    Specimen Source: .Body Fluid Knee Fluid    Culture - Body Fluid with Gram Stain (02.22.17 @ 12:44)    Gram Stain:   Numerous White blood cells  No organisms seen    Specimen Source: .Body Fluid Synovial Fluid    Culture Results:   Rare Gram Negative Rods Identification and susceptibility to follow.  Culture in progress      MEDICATIONS  (STANDING):  lactated ringers. 1000milliLiter(s) IV Continuous <Continuous>  insulin lispro (HumaLOG) corrective regimen sliding scale  SubCutaneous three times a day before meals  dextrose 5%. 1000milliLiter(s) IV Continuous <Continuous>  dextrose 50% Injectable 12.5Gram(s) IV Push once  dextrose 50% Injectable 25Gram(s) IV Push once  dextrose 50% Injectable 25Gram(s) IV Push once  simvastatin 20milliGRAM(s) Oral at bedtime  metoprolol succinate ER 50milliGRAM(s) Oral daily  buDESOnide 160 MICROgram(s)/formoterol 4.5 MICROgram(s) Inhaler 2Puff(s) Inhalation two times a day  torsemide 20milliGRAM(s) Oral daily  magnesium oxide 400milliGRAM(s) Oral two times a day with meals  hydrALAZINE 50milliGRAM(s) Oral every 8 hours  enoxaparin Injectable 30milliGRAM(s) SubCutaneous daily    MEDICATIONS  (PRN):  oxyCODONE  5 mG/acetaminophen 325 mG 1Tablet(s) Oral every 4 hours PRN Moderate Pain (4 - 6)  dextrose Gel 1Dose(s) Oral once PRN Blood Glucose LESS THAN 70 milliGRAM(s)/deciliter  glucagon  Injectable 1milliGRAM(s) IntraMuscular once PRN Glucose LESS THAN 70 milligrams/deciliter  docusate sodium 100milliGRAM(s) Oral two times a day PRN Constipation    Vital Signs Last 24 Hrs  T(C): 36.7, Max: 36.9 (02-23 @ 01:16)  T(F): 98.1, Max: 98.4 (02-23 @ 01:16)  HR: 68 (63 - 69)  BP: 135/68 (112/69 - 143/74)  BP(mean): --  RR: 18 (17 - 18)  SpO2: 97% (91% - 99%)    Physical exam: The right knee dressing is intact with bloody dc proximally. 2 small open wounds anterior knee in mid incision site. No erythema is noted. No blistering. No ecchymosis. The calf is supple nontender. Sensation to light touch is grossly intact distally. Motor function distally is 5/5. Extensor hallucis longus and flexor hallucis longus are intact. No foot drop. 2+ dorsalis pedis pulse. Capillary refill is less than 2 seconds. No cyanosis.    Primary Orthopedic Assessment:  -Right knee wound infection, s/p right knee abx apacer for infected tka     Secondary  Medical Assessment(s):   • DM, obesity    Plan:   • Dressing changed  -Medical clearance for OR- will need medical optimization with nutritional assessment due to poor wound healing  -OR Monday for I&D with removal of implant and abx spacer block with coordination with plastics Dr Rey availability   -F/U cultures  -D/W Dr Ritchie  -Will D/W medicine SHYAM WONGMORE    [id number]    History: 71y Female with Right knee wound s/p placement of antibiotic spacer for infected tka. She has no new complaints and pain is controlled. No fever, chills, CP or SOB.                          11.1   6.2   )-----------( 318      ( 23 Feb 2017 07:10 )             34.7     23 Feb 2017 07:10    139    |  98     |  70.0   ----------------------------<  67     4.1     |  28.0   |  4.23     Ca    9.1        23 Feb 2017 07:10    TPro  6.8    /  Alb  2.8    /  TBili  0.8    /  DBili  x      /  AST  18     /  ALT  9      /  AlkPhos  79     22 Feb 2017 20:10    Culture - Body Fluid with Gram Stain (02.22.17 @ 12:46)    Gram Stain:   Numerous White blood cells  No organisms seen    Specimen Source: .Body Fluid Knee Fluid    Culture - Body Fluid with Gram Stain (02.22.17 @ 12:44)    Gram Stain:   Numerous White blood cells  No organisms seen    Specimen Source: .Body Fluid Synovial Fluid    Culture Results:   Rare Gram Negative Rods Identification and susceptibility to follow.  Culture in progress      MEDICATIONS  (STANDING):  lactated ringers. 1000milliLiter(s) IV Continuous <Continuous>  insulin lispro (HumaLOG) corrective regimen sliding scale  SubCutaneous three times a day before meals  dextrose 5%. 1000milliLiter(s) IV Continuous <Continuous>  dextrose 50% Injectable 12.5Gram(s) IV Push once  dextrose 50% Injectable 25Gram(s) IV Push once  dextrose 50% Injectable 25Gram(s) IV Push once  simvastatin 20milliGRAM(s) Oral at bedtime  metoprolol succinate ER 50milliGRAM(s) Oral daily  buDESOnide 160 MICROgram(s)/formoterol 4.5 MICROgram(s) Inhaler 2Puff(s) Inhalation two times a day  torsemide 20milliGRAM(s) Oral daily  magnesium oxide 400milliGRAM(s) Oral two times a day with meals  hydrALAZINE 50milliGRAM(s) Oral every 8 hours  enoxaparin Injectable 30milliGRAM(s) SubCutaneous daily    MEDICATIONS  (PRN):  oxyCODONE  5 mG/acetaminophen 325 mG 1Tablet(s) Oral every 4 hours PRN Moderate Pain (4 - 6)  dextrose Gel 1Dose(s) Oral once PRN Blood Glucose LESS THAN 70 milliGRAM(s)/deciliter  glucagon  Injectable 1milliGRAM(s) IntraMuscular once PRN Glucose LESS THAN 70 milligrams/deciliter  docusate sodium 100milliGRAM(s) Oral two times a day PRN Constipation    Vital Signs Last 24 Hrs  T(C): 36.7, Max: 36.9 (02-23 @ 01:16)  T(F): 98.1, Max: 98.4 (02-23 @ 01:16)  HR: 68 (63 - 69)  BP: 135/68 (112/69 - 143/74)  BP(mean): --  RR: 18 (17 - 18)  SpO2: 97% (91% - 99%)    Physical exam: The right knee dressing is intact with bloody dc proximally. 2 small open wounds anterior knee in mid incision site. No erythema is noted. No blistering. No ecchymosis. The calf is supple nontender. Sensation to light touch is grossly intact distally. Motor function distally is 5/5. Extensor hallucis longus and flexor hallucis longus are intact. No foot drop. 2+ dorsalis pedis pulse. Capillary refill is less than 2 seconds. No cyanosis.    Primary Orthopedic Assessment:  -Right knee wound infection, s/p right knee abx apacer for infected tka     Secondary  Medical Assessment(s):   • DM, obesity    Plan:   • Dressing changed  -Medical clearance for OR- will need medical optimization with nutritional assessment due to poor wound healing  -OR Monday for I&D with removal of implant and abx spacer block with coordination with plastics Dr Rey availability   -F/U cultures  -DVTP- Lovenox  -D/W Dr Ritchie  -D/W medicine Dr Serra

## 2017-02-23 NOTE — PROGRESS NOTE ADULT - PROBLEM SELECTOR PLAN 1
Patient s/p 6 weeks IV abx in September 2016  Culture on 11/30/16 with Pseudomonas A, and VRE, d/w orthopedics this was a superficial culture and was not treated.   Patient has synovial joint aspiration by orthopedics with 19,000 nucleated cells  Gram stain with no organisms  Culture with GNR  Will start Cefepime 1gm [id number] (adjusted to renal function) and adjust with culture results

## 2017-02-23 NOTE — PROGRESS NOTE ADULT - ASSESSMENT
72 y/o F with Rt prosthetic knee infection s/p Explant 9/2016 s/p 6 weeks of abx now with new Rt knee infection

## 2017-02-23 NOTE — PROGRESS NOTE ADULT - ASSESSMENT
71 y F hx DM2, PAF, HTN, LIN, CKD, COPD, sent from rehab for R knee wound dehiscence and is s/p R knee arthrocentesis and drainage and is planned for operative intervention for wound dehiscence. No recent ACS or CHF symptoms. From a cardiovascular perspective, there is no contraindication to proceed with surgical treatment of right knee infection.

## 2017-02-23 NOTE — PROGRESS NOTE ADULT - SUBJECTIVE AND OBJECTIVE BOX
[facility]  INFECTIOUS DISEASES AND INTERNAL MEDICINE OF [place]  =======================================================  Wilver Dela Cruz MD  Diplomates American Board of Internal Medicine and Infectious Diseases  =======================================================    LAL, SHYAM     No complaints  No fevers or chills    Allergies:  No Known Allergies    REVIEW OF SYSTEMS:  CONSTITUTIONAL:  No fevers or chills  HEENT:  No diplopia or blurred vision. No earache, sore throat or runny nose.  CARDIOVASCULAR:  No pressure, squeezing, strangling, tightness, heaviness or aching about the chest, neck, axilla or epigastrium.  RESPIRATORY:  No cough, shortness of breath  GASTROINTESTINAL:  No nausea, vomiting or diarrhea.  GENITOURINARY:  No dysuria, frequency or urgency.   MUSCULOSKELETAL:  no muscle pain  SKIN:  Rt Knee surgical site with open wound  NEUROLOGIC:  No paresthesias  PSYCHIATRIC:  No disorder of thought or mood.  ENDOCRINE:  No heat or cold intolerance, polyuria or polydipsia.  HEMATOLOGICAL:  No easy bruising or bleeding.     Physical Exam:  Vital Signs Last 24 Hrs  T(C): 36.7, Max: 36.9 (02-23 @ 01:16)  T(F): 98.1, Max: 98.4 (02-23 @ 01:16)  HR: 68 (63 - 69)  BP: 135/68 (112/69 - 143/74)  RR: 18 (17 - 18)  SpO2: 97% (91% - 99%)    GEN: NAD, pleasant  HEENT: normocephalic and atraumatic. EOMI. PERRL.    NECK: Supple.   LUNGS: Clear to auscultation.  HEART: Regular rate and rhythm   ABDOMEN: Soft, nontender, and nondistended.  Positive bowel sounds.    : No CVA tenderness  EXTREMITIES: Rt Knee in dressing, Open wound over patella, round circumferential - seen with orthopedics   NEUROLOGIC: forgetful   PSYCHIATRIC: Appropriate affect .  SKIN: Open wound over patella, round circumferential    Labs:  23 Feb 2017 07:10    139    |  98     |  70.0   ----------------------------<  67     4.1     |  28.0   |  4.23     Ca    9.1        23 Feb 2017 07:10    TPro  6.8    /  Alb  2.8    /  TBili  0.8    /  DBili  x      /  AST  18     /  ALT  9      /  AlkPhos  79     22 Feb 2017 20:10                       11.1   6.2   )-----------( 318      ( 23 Feb 2017 07:10 )             34.7     PT/INR - ( 22 Feb 2017 20:11 )   PT: 15.1 sec;   INR: 1.37 ratio      PTT - ( 22 Feb 2017 20:11 )  PTT:35.6 sec    LIVER FUNCTIONS - ( 22 Feb 2017 20:10 )  Alb: 2.8 g/dL / Pro: 6.8 g/dL / ALK PHOS: 79 U/L / ALT: 9 U/L / AST: 18 U/L / GGT: x             RECENT CULTURES:  02-22 .Body Fluid Knee Fluid XXXX   Numerous White blood cells  No organisms seen   Rare Gram Negative Rods Identification and susceptibility to follow.  Culture in progress    02-22 .Body Fluid Synovial Fluid XXXX   Numerous White blood cells  No organisms seen   Rare Gram Negative Rods Identification and susceptibility to follow.  Culture in progress

## 2017-02-23 NOTE — PROGRESS NOTE ADULT - ASSESSMENT
71 y F hx COPD, HTN, DM2, CKD, PAF, s/p R TKR adm for recurrent R knee wound dehiscence.     Wound dehiscence: s/p knee aspiration by ortho, wound culture is growing GNR, Per ID Dr Dela Cruz started on cefepime OR per ortho on Monday seen by cardio for clearance, will continue monitor closely.   DM: monitor FS, sliding scale  PAF: currently in NSR, holding xarelto as surgery is planed on Monday. .   HTN: cont home meds  COPD: stable, cont inhaler  Acute on chronic renal failure: Patient creatinine is 4.23, will give gental hydration, will monitor BMP,I/Os, will get US of kidney, previous US showed medical renal disease.   Prophyl: lovenox renally dosed 71 y F hx COPD, HTN, DM2, CKD, PAF, s/p R TKR adm for recurrent R knee wound dehiscence.     Wound dehiscence: s/p knee aspiration by ortho, wound culture is growing GNR, Per ID Dr Dela Cruz started on cefepime OR per ortho on Monday seen by cardio for clearance, will continue monitor closely.   DM: monitor FS, sliding scale  PAF: currently in NSR, holding xarelto as surgery is planed on Monday. .   HTN: cont home meds  COPD: stable, cont inhaler  Acute on chronic renal failure stage 4: Patient creatinine is 4.23, will give gental hydration, will monitor BMP,I/Os, will get US of kidney, previous US showed medical renal disease, if kidney function is not improving will call nephrology.    Prophyl: lovenox renally dosed

## 2017-02-23 NOTE — PROGRESS NOTE ADULT - SUBJECTIVE AND OBJECTIVE BOX
SHYAM LAL    [id number]    71y      Female    Chief Complain:   Wound infection.     INTERVAL HPI/OVERNIGHT EVENTS:    Patient pain in the the knee is controlled with pain meds, she denies fever, chills, chest pain, sob, nausea and vomiting.     REVIEW OF SYSTEMS:    CONSTITUTIONAL: No fever, some fatigue  RESPIRATORY: No cough,  No shortness of breath  CARDIOVASCULAR: No chest pain, palpitations  GASTROINTESTINAL: No abdominal or epigastric pain. No nausea, vomiting  NEUROLOGICAL: No headaches, memory loss, loss of strength.  MISCELLANEOUS: right knee swelling and pain.       Vital Signs Last 24 Hrs  T(C): 37.2, Max: 37.2 (02-23 @ 15:10)  T(F): 98.9, Max: 98.9 (02-23 @ 15:10)  HR: 60 (60 - 69)  BP: 108/69 (108/69 - 143/74)  BP(mean): --  RR: 18 (17 - 18)  SpO2: 95% (95% - 99%)    PHYSICAL EXAM:    GENERAL: Obese elderly female lying on the bed not in acute distress.    HEENT: PERRL  NECK: soft, Supple, No JVD   CHEST/LUNG: Clear to auscultate bilaterally; No wheezing  HEART: S1S2+, Regular rate and rhythm; No murmurs.   ABDOMEN: Soft, Nontender, Nondistended; Bowel sounds present  EXTREMITIES:  2+ Peripheral Pulses, No clubbing, cyanosis, or edema  SKIN: No rashes or lesions  NEURO: AAOX3, no focal deficits, no motor r sensory loss  PSYCH: normal mood  Musculoskeletal: right knee is swelling and have some tenderness, dressing on with no soaking or bleeding.       LABS:                        11.1   6.2   )-----------( 318      ( 23 Feb 2017 07:10 )             34.7     23 Feb 2017 07:10    139    |  98     |  70.0   ----------------------------<  67     4.1     |  28.0   |  4.23     Ca    9.1        23 Feb 2017 07:10    TPro  6.8    /  Alb  2.8    /  TBili  0.8    /  DBili  x      /  AST  18     /  ALT  9      /  AlkPhos  79     22 Feb 2017 20:10    PT/INR - ( 22 Feb 2017 20:11 )   PT: 15.1 sec;   INR: 1.37 ratio         PTT - ( 22 Feb 2017 20:11 )  PTT:35.6 sec      MEDICATIONS  (STANDING):  lactated ringers. 1000milliLiter(s) IV Continuous <Continuous>  insulin lispro (HumaLOG) corrective regimen sliding scale  SubCutaneous three times a day before meals  dextrose 5%. 1000milliLiter(s) IV Continuous <Continuous>  dextrose 50% Injectable 12.5Gram(s) IV Push once  dextrose 50% Injectable 25Gram(s) IV Push once  dextrose 50% Injectable 25Gram(s) IV Push once  simvastatin 20milliGRAM(s) Oral at bedtime  metoprolol succinate ER 50milliGRAM(s) Oral daily  buDESOnide 160 MICROgram(s)/formoterol 4.5 MICROgram(s) Inhaler 2Puff(s) Inhalation two times a day  torsemide 20milliGRAM(s) Oral daily  magnesium oxide 400milliGRAM(s) Oral two times a day with meals  hydrALAZINE 50milliGRAM(s) Oral every 8 hours  enoxaparin Injectable 30milliGRAM(s) SubCutaneous daily  cefepime  IVPB 1000milliGRAM(s) IV Intermittent every 24 hours    MEDICATIONS  (PRN):  oxyCODONE  5 mG/acetaminophen 325 mG 1Tablet(s) Oral every 4 hours PRN Moderate Pain (4 - 6)  dextrose Gel 1Dose(s) Oral once PRN Blood Glucose LESS THAN 70 milliGRAM(s)/deciliter  glucagon  Injectable 1milliGRAM(s) IntraMuscular once PRN Glucose LESS THAN 70 milligrams/deciliter  docusate sodium 100milliGRAM(s) Oral two times a day PRN Constipation      RADIOLOGY & ADDITIONAL TESTS:    XR KNEE 3 VIEWS RT   1. No discrete acute fracture-subluxation.  2. ?S/P prior withdrawal of portion of tibial prothesis,  3. Cylindrical cement densities in distal femur and proximal tibia as   above.  4. Productive bony changes in knee joint near tibial plateau as above -   of uncertain etiology.  5. Joint effusion and soft tissue swelling

## 2017-02-24 LAB
-  AMIKACIN: SIGNIFICANT CHANGE UP
-  AMIKACIN: SIGNIFICANT CHANGE UP
-  AMPICILLIN: SIGNIFICANT CHANGE UP
-  AZTREONAM: SIGNIFICANT CHANGE UP
-  AZTREONAM: SIGNIFICANT CHANGE UP
-  CEFEPIME: SIGNIFICANT CHANGE UP
-  CEFEPIME: SIGNIFICANT CHANGE UP
-  CEFTAZIDIME: SIGNIFICANT CHANGE UP
-  CEFTAZIDIME: SIGNIFICANT CHANGE UP
-  CIPROFLOXACIN: SIGNIFICANT CHANGE UP
-  CIPROFLOXACIN: SIGNIFICANT CHANGE UP
-  ERYTHROMYCIN: SIGNIFICANT CHANGE UP
-  GENTAMICIN: SIGNIFICANT CHANGE UP
-  GENTAMICIN: SIGNIFICANT CHANGE UP
-  IMIPENEM: SIGNIFICANT CHANGE UP
-  IMIPENEM: SIGNIFICANT CHANGE UP
-  LEVOFLOXACIN: SIGNIFICANT CHANGE UP
-  LEVOFLOXACIN: SIGNIFICANT CHANGE UP
-  LINEZOLID: SIGNIFICANT CHANGE UP
-  MEROPENEM: SIGNIFICANT CHANGE UP
-  MEROPENEM: SIGNIFICANT CHANGE UP
-  PIPERACILLIN/TAZOBACTAM: SIGNIFICANT CHANGE UP
-  PIPERACILLIN/TAZOBACTAM: SIGNIFICANT CHANGE UP
-  TETRACYCLINE: SIGNIFICANT CHANGE UP
-  TOBRAMYCIN: SIGNIFICANT CHANGE UP
-  TOBRAMYCIN: SIGNIFICANT CHANGE UP
-  VANCOMYCIN: SIGNIFICANT CHANGE UP
ANION GAP SERPL CALC-SCNC: 10 MMOL/L — SIGNIFICANT CHANGE UP (ref 5–17)
BUN SERPL-MCNC: 65 MG/DL — HIGH (ref 8–20)
CALCIUM SERPL-MCNC: 8.7 MG/DL — SIGNIFICANT CHANGE UP (ref 8.6–10.2)
CHLORIDE SERPL-SCNC: 99 MMOL/L — SIGNIFICANT CHANGE UP (ref 98–107)
CO2 SERPL-SCNC: 31 MMOL/L — HIGH (ref 22–29)
CREAT SERPL-MCNC: 3.89 MG/DL — HIGH (ref 0.5–1.3)
GLUCOSE SERPL-MCNC: 85 MG/DL — SIGNIFICANT CHANGE UP (ref 70–115)
HCT VFR BLD CALC: 33.9 % — LOW (ref 37–47)
HGB BLD-MCNC: 11.1 G/DL — LOW (ref 12–16)
MCHC RBC-ENTMCNC: 29.4 PG — SIGNIFICANT CHANGE UP (ref 27–31)
MCHC RBC-ENTMCNC: 32.7 G/DL — SIGNIFICANT CHANGE UP (ref 32–36)
MCV RBC AUTO: 89.7 FL — SIGNIFICANT CHANGE UP (ref 81–99)
METHOD TYPE: SIGNIFICANT CHANGE UP
PLATELET # BLD AUTO: 310 K/UL — SIGNIFICANT CHANGE UP (ref 150–400)
POTASSIUM SERPL-MCNC: 3.8 MMOL/L — SIGNIFICANT CHANGE UP (ref 3.5–5.3)
POTASSIUM SERPL-SCNC: 3.8 MMOL/L — SIGNIFICANT CHANGE UP (ref 3.5–5.3)
RBC # BLD: 3.78 M/UL — LOW (ref 4.4–5.2)
RBC # FLD: 20.5 % — HIGH (ref 11–15.6)
SODIUM SERPL-SCNC: 140 MMOL/L — SIGNIFICANT CHANGE UP (ref 135–145)
WBC # BLD: 5.3 K/UL — SIGNIFICANT CHANGE UP (ref 4.8–10.8)
WBC # FLD AUTO: 5.3 K/UL — SIGNIFICANT CHANGE UP (ref 4.8–10.8)

## 2017-02-24 PROCEDURE — 99232 SBSQ HOSP IP/OBS MODERATE 35: CPT

## 2017-02-24 PROCEDURE — 76770 US EXAM ABDO BACK WALL COMP: CPT | Mod: 26

## 2017-02-24 RX ADMIN — Medication 50 MILLIGRAM(S): at 18:08

## 2017-02-24 RX ADMIN — BUDESONIDE AND FORMOTEROL FUMARATE DIHYDRATE 2 PUFF(S): 160; 4.5 AEROSOL RESPIRATORY (INHALATION) at 08:17

## 2017-02-24 RX ADMIN — MAGNESIUM OXIDE 400 MG ORAL TABLET 400 MILLIGRAM(S): 241.3 TABLET ORAL at 18:08

## 2017-02-24 RX ADMIN — BUDESONIDE AND FORMOTEROL FUMARATE DIHYDRATE 2 PUFF(S): 160; 4.5 AEROSOL RESPIRATORY (INHALATION) at 20:36

## 2017-02-24 RX ADMIN — MAGNESIUM OXIDE 400 MG ORAL TABLET 400 MILLIGRAM(S): 241.3 TABLET ORAL at 08:35

## 2017-02-24 RX ADMIN — Medication 50 MILLIGRAM(S): at 22:45

## 2017-02-24 RX ADMIN — CEFEPIME 100 MILLIGRAM(S): 1 INJECTION, POWDER, FOR SOLUTION INTRAMUSCULAR; INTRAVENOUS at 18:09

## 2017-02-24 RX ADMIN — ENOXAPARIN SODIUM 30 MILLIGRAM(S): 100 INJECTION SUBCUTANEOUS at 12:25

## 2017-02-24 RX ADMIN — Medication 20 MILLIGRAM(S): at 06:26

## 2017-02-24 RX ADMIN — SIMVASTATIN 20 MILLIGRAM(S): 20 TABLET, FILM COATED ORAL at 22:45

## 2017-02-24 NOTE — PROGRESS NOTE ADULT - SUBJECTIVE AND OBJECTIVE BOX
Patient is a 71y old  Female who presents with complaint of R knee pain.       PAST MEDICAL & SURGICAL HISTORY:  Hypercholesterolemia  Deficiency of vitamin K  Chronic pain  COPD (chronic obstructive pulmonary disease)  Urine retention  Pressure ulcer  Atrial fibrillation  Constipation  Breast cancer  HLD (hyperlipidemia)  HTN (hypertension)  GERD (gastroesophageal reflux disease)  DM (diabetes mellitus)  Obesity  LIN on CPAP  History of incision and drainage: rt knee  S/p total knee replacement, bilateral  S/P knee replacement, right  S/P hysterectomy  S/P mastectomy: Left  Breast cancer      Summary of admission HPI:        71 y F hx DM2, PAF, HTN, LIN, CKD, COPD, sent from rehab for R knee wound dehiscence and is s/p R knee arthrocentesis and drainage and is planned for operative intervention for wound dehiscence. No recent ACS or CHF symptoms. From a cardiovascular perspective, there is no contraindication to proceed with surgical treatment of right knee infection.           CHEST CT PULMONARY ANGIO with IV Contrast:  FINDINGS:  MEDICATIONS  (STANDING):  lactated ringers. 1000milliLiter(s) IV Continuous <Continuous>  insulin lispro (HumaLOG) corrective regimen sliding scale  SubCutaneous three times a day before meals  dextrose 5%. 1000milliLiter(s) IV Continuous <Continuous>  dextrose 50% Injectable 12.5Gram(s) IV Push once  dextrose 50% Injectable 25Gram(s) IV Push once  dextrose 50% Injectable 25Gram(s) IV Push once  simvastatin 20milliGRAM(s) Oral at bedtime  metoprolol succinate ER 50milliGRAM(s) Oral daily  buDESOnide 160 MICROgram(s)/formoterol 4.5 MICROgram(s) Inhaler 2Puff(s) Inhalation two times a day  torsemide 20milliGRAM(s) Oral daily  magnesium oxide 400milliGRAM(s) Oral two times a day with meals  hydrALAZINE 50milliGRAM(s) Oral every 8 hours  enoxaparin Injectable 30milliGRAM(s) SubCutaneous daily  cefepime  IVPB 1000milliGRAM(s) IV Intermittent every 24 hours    MEDICATIONS  (PRN):  oxyCODONE  5 mG/acetaminophen 325 mG 1Tablet(s) Oral every 4 hours PRN Moderate Pain (4 - 6)  dextrose Gel 1Dose(s) Oral once PRN Blood Glucose LESS THAN 70 milliGRAM(s)/deciliter  glucagon  Injectable 1milliGRAM(s) IntraMuscular once PRN Glucose LESS THAN 70 milligrams/deciliter  docusate sodium 100milliGRAM(s) Oral two times a day PRN Constipation        Vital Signs Last 24 Hrs  T(C): 37, Max: 37.2 (02-23 @ 15:10)  T(F): 98.6, Max: 98.9 (02-23 @ 15:10)  HR: 59 (59 - 69)  BP: 127/64 (108/69 - 127/64)  BP(mean): --  RR: 18 (18 - 18)  SpO2: 96% (95% - 98%)    PHYSICAL EXAM:      Appearance: Normal, obese	  HEENT:   Normal oral mucosa, PERRL, EOMI, sclera non-icteric	  Lymphatic: No cervical lymphadenopathy  Cardiovascular: Normal S1 S2, No JVD, No cardiac murmurs, No carotid bruits, No peripheral edema  Respiratory: Lungs clear to auscultation	  Psychiatry: A & O x 3, Mood & affect appropriate  Gastrointestinal:  Soft, Non-tender, + BS, no bruits	  Skin: No rashes, No ecchymoses, No cyanosis  Neurologic: Grossly non-focal with full strength in all four extremities  Extremities:  No clubbing, cyanosis or edema; + RLE wrapped in ACE bandage   Vascular: Peripheral pulses palpable 2+ bilaterally        I & Os for current day (as of 24 Feb 2017 10:49)  =============================================  IN:    lactated ringers.: 360 ml    Total IN: 360 ml  ---------------------------------------------  OUT:    Total OUT: 0 ml  ---------------------------------------------  Total NET: 360 ml      LABS:                        11.1   5.3   )-----------( 310      ( 24 Feb 2017 08:00 )             33.9     24 Feb 2017 08:00    140    |  99     |  65.0   ----------------------------<  85     3.8     |  31.0   |  3.89     Ca    8.7        24 Feb 2017 08:00    TPro  6.8    /  Alb  2.8    /  TBili  0.8    /  DBili  x      /  AST  18     /  ALT  9      /  AlkPhos  79     22 Feb 2017 20:10        PT/INR - ( 22 Feb 2017 20:11 )   PT: 15.1 sec;   INR: 1.37 ratio         PTT - ( 22 Feb 2017 20:11 )  PTT:35.6 sec    BNP  I&O's Detail    I & Os for current day (as of 24 Feb 2017 10:49)  =============================================  IN:    lactated ringers.: 360 ml    Total IN: 360 ml  ---------------------------------------------  OUT:    Total OUT: 0 ml  ---------------------------------------------  Total NET: 360 ml    Daily     Daily     RADIOLOGY & ADDITIONAL STUDIES:

## 2017-02-24 NOTE — PROGRESS NOTE ADULT - PROBLEM SELECTOR PLAN 1
Patient s/p 6 weeks IV abx in September 2016  Culture on 11/30/16 with Pseudomonas A, and VRE, d/w orthopedics this was a superficial culture and was not treated.   Patient has synovial joint aspiration by orthopedics with 19,000 nucleated cells  Gram stain with no organisms  Culture with GNR  Continue Cefepime 1gm [id number] (adjusted to renal function) and adjust with culture results

## 2017-02-24 NOTE — PROGRESS NOTE ADULT - ASSESSMENT
70 y/o F with Rt prosthetic knee infection s/p Explant 9/2016 s/p 6 weeks of abx now with new Rt knee infection

## 2017-02-24 NOTE — PROGRESS NOTE ADULT - SUBJECTIVE AND OBJECTIVE BOX
[facility]  INFECTIOUS DISEASES AND INTERNAL MEDICINE OF [place]  =======================================================  Wilver Dela Cruz MD  Diplomates American Board of Internal Medicine and Infectious Diseases  =======================================================    LAL, SHYAM     No complaints  No fevers or chills    Allergies:  No Known Allergies    Antibiotics:  cefepime  IVPB 1000milliGRAM(s) IV Intermittent every 24 hours    REVIEW OF SYSTEMS:  CONSTITUTIONAL:  No fevers or chills  HEENT:  No diplopia or blurred vision. No earache, sore throat or runny nose.  CARDIOVASCULAR:  No pressure, squeezing, strangling, tightness, heaviness or aching about the chest, neck, axilla or epigastrium.  RESPIRATORY:  No cough, shortness of breath  GASTROINTESTINAL:  No nausea, vomiting or diarrhea.  GENITOURINARY:  No dysuria, frequency or urgency.   MUSCULOSKELETAL:  no muscle pain  SKIN:  Rt Knee surgical site with open wound  NEUROLOGIC:  No paresthesias  PSYCHIATRIC:  No disorder of thought or mood.  ENDOCRINE:  No heat or cold intolerance, polyuria or polydipsia.  HEMATOLOGICAL:  No easy bruising or bleeding.     Physical Exam:  Vital Signs Last 24 Hrs  T(C): 37, Max: 37.2 (02-23 @ 15:10)  T(F): 98.6, Max: 98.9 (02-23 @ 15:10)  HR: 59 (59 - 69)  BP: 127/64 (108/69 - 127/64)  RR: 18 (18 - 18)  SpO2: 96% (95% - 98%)    GEN: NAD, pleasant  HEENT: normocephalic and atraumatic. EOMI. PERRL.    NECK: Supple.   LUNGS: Clear to auscultation.  HEART: Regular rate and rhythm   ABDOMEN: Soft, nontender, and nondistended.  Positive bowel sounds.    : No CVA tenderness  EXTREMITIES: Rt Knee in dressing, Open wound over patella, round circumferential - seen with orthopedics   NEUROLOGIC: forgetful   PSYCHIATRIC: Appropriate affect .  SKIN: Open wound over patella, round circumferential    Labs:   24 Feb 2017 08:00    140    |  99     |  65.0   ----------------------------<  85     3.8     |  31.0   |  3.89     Ca    8.7        24 Feb 2017 08:00    TPro  6.8    /  Alb  2.8    /  TBili  0.8    /  DBili  x      /  AST  18     /  ALT  9      /  AlkPhos  79     22 Feb 2017 20:10                   11.1   5.3   )-----------( 310      ( 24 Feb 2017 08:00 )             33.9     PT/INR - ( 22 Feb 2017 20:11 )   PT: 15.1 sec;   INR: 1.37 ratio      PTT - ( 22 Feb 2017 20:11 )  PTT:35.6 sec    LIVER FUNCTIONS - ( 22 Feb 2017 20:10 )  Alb: 2.8 g/dL / Pro: 6.8 g/dL / ALK PHOS: 79 U/L / ALT: 9 U/L / AST: 18 U/L / GGT: x           RECENT CULTURES:  02-22 .Body Fluid Knee Fluid XXXX   Numerous White blood cells  No organisms seen   Rare Gram Negative Rods Identification and susceptibility to follow.  Culture in progress    02-22 .Body Fluid Synovial Fluid XXXX   Numerous White blood cells  No organisms seen   Rare Gram Negative Rods Identification and susceptibility to follow.  Culture in progress

## 2017-02-24 NOTE — PROGRESS NOTE ADULT - ASSESSMENT
71 y F hx COPD, HTN, DM2, CKD, PAF, s/p R TKR adm for recurrent R knee wound dehiscence.     Wound dehiscence: s/p knee aspiration by ortho, superficial wound culture is growing Pseudomonas and VRE,  Per ID Dr Dela Cruz started on cefepime, OR per ortho on Monday seen by cardio for clearance, will continue monitor closely, will send cultures from the deep wound on moday.    DM: monitor FS, sliding scale  PAF: currently in NSR, holding xarelto as surgery is planed on Monday. .   HTN: cont home meds  COPD: stable, cont inhaler  Acute on chronic renal failure stage 4: Patient creatinine was 4.23, getting gentle hydration, will monitor BMP,I/Os, will get US of kidney, previous US showed medical renal disease, if kidney function is not improving will call nephrology.    Prophyl: lovenox renally dosed

## 2017-02-24 NOTE — PROGRESS NOTE ADULT - SUBJECTIVE AND OBJECTIVE BOX
Patient is a 71y old  Female who presents with a chief complaint of right knee drainage (2017 16:00)  of blood and pus in the early hours of Wednesday the .  On Monday the  she went to  her podiatrist who worked on a painful ingrown toenail to relieve the pain it caused in her left foot.    PAST MEDICAL HISTORY:  Hypercholesterolemia  Deficiency of vitamin K  Chronic pain  COPD   Urine retention  Pressure ulcer  Atrial fibrillation  Constipation  Left Breast cancer tx with chemo  Hyperlipidemia  Hypertension  GERD (gastroesophageal reflux disease)  Diabetes mellitus  LIN on CPAP    PAST SURGICAL HISTORY:  Left Hand surgery ? trigger finger release  S/p right total knee replacement on 2026  S/P left total knee replacement   S/P hysterectomy  S/P Left mastectomy -     MEDICATIONS  (STANDING):  lactated ringers. 1000milliLiter(s) IV Continuous <Continuous>  insulin lispro (HumaLOG) corrective regimen sliding scale  SubCutaneous three times a day before meals  dextrose 5%. 1000milliLiter(s) IV Continuous <Continuous>  dextrose 50% Injectable 12.5Gram(s) IV Push once  dextrose 50% Injectable 25Gram(s) IV Push once  dextrose 50% Injectable 25Gram(s) IV Push once  simvastatin 20milliGRAM(s) Oral at bedtime  metoprolol succinate ER 50milliGRAM(s) Oral daily  buDESOnide 160 MICROgram(s)/formoterol 4.5 MICROgram(s) Inhaler 2Puff(s) Inhalation two times a day  torsemide 20milliGRAM(s) Oral daily  magnesium oxide 400milliGRAM(s) Oral two times a day with meals  hydrALAZINE 50milliGRAM(s) Oral every 8 hours  enoxaparin Injectable 30milliGRAM(s) SubCutaneous daily  cefepime  IVPB 1000milliGRAM(s) IV Intermittent every 24 hours    MEDICATIONS  (PRN):  oxyCODONE  5 mG/acetaminophen 325 mG 1Tablet(s) Oral every 4 hours PRN Moderate Pain (4 - 6)  dextrose Gel 1Dose(s) Oral once PRN Blood Glucose LESS THAN 70 milliGRAM(s)/deciliter  glucagon  Injectable 1milliGRAM(s) IntraMuscular once PRN Glucose LESS THAN 70 milligrams/deciliter  docusate sodium 100milliGRAM(s) Oral two times a day PRN Constipation    Allergies:  No Known Drug Allergies    SOCIAL HISTORY:  The patient describes herself as a light drinker.  She quit smoking 17 years ago after                                smoking 1/2 ppd x 36 years and she never used illicit drugs.                     11.1   5.3   )-----------( 310      ( 2017 08:00 )             33.9     PT/INR - ( 2017 20:11 )   PT: 15.1 sec;   INR: 1.37 ratio         PTT - ( 2017 20:11 )  PTT:35.6 sec    2017 08:00    140    |  99     |  65.0   ----------------------------<  85     3.8     |  31.0   |  3.89     Ca    8.7        2017 08:00    TPro  6.8    /  Alb  2.8    /  TBili  0.8    /  DBili  x      /  AST  18     /  ALT  9      /  AlkPhos  79     2017 20:10      EK2017  Sinus rhythm with occasional Premature ventricular complexes  Left axis deviation  Nonspecific ST and T wave abnormality  Abnormal ECG         ECHO TRANSTHORACIC   Dec  6 2016    Summary:   1. Left ventricular ejection fraction, by visual estimation, is 55 to        60%.   2. Normal global left ventricular systolic function.   3. The mitral in-flow pattern reveals no discernable A-wave, therefore        no comment on diastolic function can be made.   4. Normal left ventricular internal cavity size.   5. There is mild concentric left ventricular hypertrophy.   6. The left atrium is normal in size.   7. Mildly dilated right atrium.   8. Mild to moderate mitral annular calcification.   9. Thickening and calcification of the anterior and posterior mitral         valve leaflets.  10. Mild mitral valve regurgitation.  11. Sclerotic aortic valve with normal opening.  12. Estimated pulmonary artery systolic pressure is 38.8 mmHg          which is consistent with borderline pulmonary hypertension.    CHEST SINGLE VIEW FRONTAL   2017    No radiographic evidence of active cardiopulmonary disease.    ASA # =  3    Mallampati # =  3  (She has no top teeth only teeth in the jaw.)

## 2017-02-24 NOTE — PROGRESS NOTE ADULT - SUBJECTIVE AND OBJECTIVE BOX
SHYAM LAL    [id number]    71y      Female    Chief Complain:   Wound infection.     INTERVAL HPI/OVERNIGHT EVENTS:    Patient's pain in the the knee is controlled with pain meds, she denies fever, chills, chest pain, sob, nausea and vomiting, seen by orthopedics and ID     REVIEW OF SYSTEMS:    CONSTITUTIONAL: No fever, some fatigue  RESPIRATORY: No cough,  No shortness of breath  CARDIOVASCULAR: No chest pain, palpitations  GASTROINTESTINAL: No abdominal or epigastric pain. No nausea, vomiting  NEUROLOGICAL: No headaches, memory loss, loss of strength.  MISCELLANEOUS: right knee swelling and pain.       Vital Signs Last 24 Hrs  T(C): 37, Max: 37.2 (02-23 @ 15:10)  T(F): 98.6, Max: 98.9 (02-23 @ 15:10)  HR: 59 (59 - 69)  BP: 127/64 (108/69 - 127/64)  BP(mean): --  RR: 18 (18 - 18)  SpO2: 96% (95% - 98%)    PHYSICAL EXAM:    GENERAL: Obese elderly female lying on the bed not in acute distress.    HEENT: PERRL  NECK: soft, Supple, No JVD   CHEST/LUNG: Clear to auscultate bilaterally; No wheezing  HEART: S1S2+, Regular rate and rhythm; No murmurs.   ABDOMEN: Soft, Nontender, Nondistended; Bowel sounds present  EXTREMITIES:  2+ Peripheral Pulses, No clubbing, cyanosis, or edema  SKIN: No rashes or lesions  NEURO: AAOX3, no focal deficits, no motor r sensory loss  PSYCH: normal mood  Musculoskeletal: right knee is swelling and have some tenderness, dressing on with no soaking or bleeding.      LABS:                        11.1   5.3   )-----------( 310      ( 24 Feb 2017 08:00 )             33.9     24 Feb 2017 08:00    140    |  99     |  65.0   ----------------------------<  85     3.8     |  31.0   |  3.89     Ca    8.7        24 Feb 2017 08:00    TPro  6.8    /  Alb  2.8    /  TBili  0.8    /  DBili  x      /  AST  18     /  ALT  9      /  AlkPhos  79     22 Feb 2017 20:10    PT/INR - ( 22 Feb 2017 20:11 )   PT: 15.1 sec;   INR: 1.37 ratio         PTT - ( 22 Feb 2017 20:11 )  PTT:35.6 sec        MEDICATIONS  (STANDING):  lactated ringers. 1000milliLiter(s) IV Continuous <Continuous>  insulin lispro (HumaLOG) corrective regimen sliding scale  SubCutaneous three times a day before meals  dextrose 5%. 1000milliLiter(s) IV Continuous <Continuous>  dextrose 50% Injectable 12.5Gram(s) IV Push once  dextrose 50% Injectable 25Gram(s) IV Push once  dextrose 50% Injectable 25Gram(s) IV Push once  simvastatin 20milliGRAM(s) Oral at bedtime  metoprolol succinate ER 50milliGRAM(s) Oral daily  buDESOnide 160 MICROgram(s)/formoterol 4.5 MICROgram(s) Inhaler 2Puff(s) Inhalation two times a day  torsemide 20milliGRAM(s) Oral daily  magnesium oxide 400milliGRAM(s) Oral two times a day with meals  hydrALAZINE 50milliGRAM(s) Oral every 8 hours  enoxaparin Injectable 30milliGRAM(s) SubCutaneous daily  cefepime  IVPB 1000milliGRAM(s) IV Intermittent every 24 hours    MEDICATIONS  (PRN):  oxyCODONE  5 mG/acetaminophen 325 mG 1Tablet(s) Oral every 4 hours PRN Moderate Pain (4 - 6)  dextrose Gel 1Dose(s) Oral once PRN Blood Glucose LESS THAN 70 milliGRAM(s)/deciliter  glucagon  Injectable 1milliGRAM(s) IntraMuscular once PRN Glucose LESS THAN 70 milligrams/deciliter  docusate sodium 100milliGRAM(s) Oral two times a day PRN Constipation

## 2017-02-24 NOTE — PROGRESS NOTE ADULT - SUBJECTIVE AND OBJECTIVE BOX
Patient being followed for Right Knee Infection with Wound Dehiscence.  Admitted to the hospital on 2/23/2017.  Scheduled for OR on Monday 2/27/2017.  Found patient asleep.  Easily awakable.  Presently comfortable and pain being controlled.  No issues overnight.  Infectious Disease Consult Appreciated.      ICU Vital Signs Last 24 Hrs  T(C): 37.1, Max: 37.2 (02-23 @ 15:10)  T(F): 98.7, Max: 98.9 (02-23 @ 15:10)  HR: 64 (60 - 69)  BP: 112/61 (108/69 - 135/68)  BP(mean): --  ABP: --  ABP(mean): --  RR: 18 (18 - 18)  SpO2: 98% (95% - 98%)    Physical Exam:  Right Lower Extremity:  ACE Bandage and Dressing C/D/I, no drainage noted  + ROM of toes  + Sensation  Brisk Capillary Refill  Calf Soft, non-tender                          11.1   6.2   )-----------( 318      ( 23 Feb 2017 07:10 )             34.7     Culture - Body Fluid with Gram Stain (02.22.17 @ 12:46)    Gram Stain:   Numerous White blood cells  No organisms seen    Specimen Source: .Body Fluid Knee Fluid    Culture Results:   Rare Gram Negative Rods Identification and susceptibility to follow.  Culture in progress    Culture - Body Fluid with Gram Stain (02.22.17 @ 12:44)    Gram Stain:   Numerous White blood cells  No organisms seen    Specimen Source: .Body Fluid Synovial Fluid    Culture Results:   Rare Gram Negative Rods Identification and susceptibility to follow.  Culture in progress    A/P Right Knee Infections with Wound Dishiscence  *  Continue NWB of Right Lower Extremity  *  Pain medication as needed for comfort  *  OR for Monday 2/27/2017 with Dr. Ritchie  *  DVT Prophylaxis (Lovenox), will hold prior to surgery on Sunday night  *  IV antibiotics per ID recommendations

## 2017-02-25 DIAGNOSIS — N17.9 ACUTE KIDNEY FAILURE, UNSPECIFIED: ICD-10-CM

## 2017-02-25 DIAGNOSIS — A49.8 OTHER BACTERIAL INFECTIONS OF UNSPECIFIED SITE: ICD-10-CM

## 2017-02-25 DIAGNOSIS — T84.59XS INFECTION AND INFLAMMATORY REACTION DUE TO OTHER INTERNAL JOINT PROSTHESIS, SEQUELA: ICD-10-CM

## 2017-02-25 DIAGNOSIS — A49.1 STREPTOCOCCAL INFECTION, UNSPECIFIED SITE: ICD-10-CM

## 2017-02-25 DIAGNOSIS — T81.4XXS INFECTION FOLLOWING A PROCEDURE, SEQUELA: ICD-10-CM

## 2017-02-25 LAB
ANION GAP SERPL CALC-SCNC: 11 MMOL/L — SIGNIFICANT CHANGE UP (ref 5–17)
BUN SERPL-MCNC: 55 MG/DL — HIGH (ref 8–20)
CALCIUM SERPL-MCNC: 9.1 MG/DL — SIGNIFICANT CHANGE UP (ref 8.6–10.2)
CHLORIDE SERPL-SCNC: 97 MMOL/L — LOW (ref 98–107)
CO2 SERPL-SCNC: 30 MMOL/L — HIGH (ref 22–29)
CREAT SERPL-MCNC: 3.24 MG/DL — HIGH (ref 0.5–1.3)
GLUCOSE SERPL-MCNC: 108 MG/DL — SIGNIFICANT CHANGE UP (ref 70–115)
POTASSIUM SERPL-MCNC: 4 MMOL/L — SIGNIFICANT CHANGE UP (ref 3.5–5.3)
POTASSIUM SERPL-SCNC: 4 MMOL/L — SIGNIFICANT CHANGE UP (ref 3.5–5.3)
SODIUM SERPL-SCNC: 138 MMOL/L — SIGNIFICANT CHANGE UP (ref 135–145)

## 2017-02-25 PROCEDURE — 99233 SBSQ HOSP IP/OBS HIGH 50: CPT

## 2017-02-25 PROCEDURE — 99232 SBSQ HOSP IP/OBS MODERATE 35: CPT

## 2017-02-25 RX ORDER — LINEZOLID 600 MG/300ML
600 INJECTION, SOLUTION INTRAVENOUS EVERY 12 HOURS
Qty: 0 | Refills: 0 | Status: DISCONTINUED | OUTPATIENT
Start: 2017-02-26 | End: 2017-02-27

## 2017-02-25 RX ORDER — ONDANSETRON 8 MG/1
4 TABLET, FILM COATED ORAL EVERY 8 HOURS
Qty: 0 | Refills: 0 | Status: DISCONTINUED | OUTPATIENT
Start: 2017-02-25 | End: 2017-02-27

## 2017-02-25 RX ORDER — MEROPENEM 1 G/30ML
500 INJECTION INTRAVENOUS ONCE
Qty: 0 | Refills: 0 | Status: COMPLETED | OUTPATIENT
Start: 2017-02-25 | End: 2017-02-25

## 2017-02-25 RX ORDER — MEROPENEM 1 G/30ML
INJECTION INTRAVENOUS
Qty: 0 | Refills: 0 | Status: DISCONTINUED | OUTPATIENT
Start: 2017-02-25 | End: 2017-02-27

## 2017-02-25 RX ORDER — ONDANSETRON 8 MG/1
4 TABLET, FILM COATED ORAL ONCE
Qty: 0 | Refills: 0 | Status: DISCONTINUED | OUTPATIENT
Start: 2017-02-25 | End: 2017-02-25

## 2017-02-25 RX ORDER — MEROPENEM 1 G/30ML
500 INJECTION INTRAVENOUS EVERY 12 HOURS
Qty: 0 | Refills: 0 | Status: DISCONTINUED | OUTPATIENT
Start: 2017-02-26 | End: 2017-02-27

## 2017-02-25 RX ORDER — LINEZOLID 600 MG/300ML
INJECTION, SOLUTION INTRAVENOUS
Qty: 0 | Refills: 0 | Status: DISCONTINUED | OUTPATIENT
Start: 2017-02-25 | End: 2017-02-27

## 2017-02-25 RX ORDER — LINEZOLID 600 MG/300ML
600 INJECTION, SOLUTION INTRAVENOUS ONCE
Qty: 0 | Refills: 0 | Status: COMPLETED | OUTPATIENT
Start: 2017-02-25 | End: 2017-02-25

## 2017-02-25 RX ADMIN — MAGNESIUM OXIDE 400 MG ORAL TABLET 400 MILLIGRAM(S): 241.3 TABLET ORAL at 08:35

## 2017-02-25 RX ADMIN — MEROPENEM 200 MILLIGRAM(S): 1 INJECTION INTRAVENOUS at 13:11

## 2017-02-25 RX ADMIN — ONDANSETRON 4 MILLIGRAM(S): 8 TABLET, FILM COATED ORAL at 17:56

## 2017-02-25 RX ADMIN — Medication 50 MILLIGRAM(S): at 21:45

## 2017-02-25 RX ADMIN — ENOXAPARIN SODIUM 30 MILLIGRAM(S): 100 INJECTION SUBCUTANEOUS at 11:26

## 2017-02-25 RX ADMIN — Medication 20 MILLIGRAM(S): at 05:48

## 2017-02-25 RX ADMIN — Medication 50 MILLIGRAM(S): at 05:48

## 2017-02-25 RX ADMIN — LINEZOLID 300 MILLIGRAM(S): 600 INJECTION, SOLUTION INTRAVENOUS at 14:22

## 2017-02-25 RX ADMIN — BUDESONIDE AND FORMOTEROL FUMARATE DIHYDRATE 2 PUFF(S): 160; 4.5 AEROSOL RESPIRATORY (INHALATION) at 20:47

## 2017-02-25 RX ADMIN — SODIUM CHLORIDE 60 MILLILITER(S): 9 INJECTION, SOLUTION INTRAVENOUS at 05:54

## 2017-02-25 RX ADMIN — BUDESONIDE AND FORMOTEROL FUMARATE DIHYDRATE 2 PUFF(S): 160; 4.5 AEROSOL RESPIRATORY (INHALATION) at 10:19

## 2017-02-25 RX ADMIN — MAGNESIUM OXIDE 400 MG ORAL TABLET 400 MILLIGRAM(S): 241.3 TABLET ORAL at 16:44

## 2017-02-25 RX ADMIN — SIMVASTATIN 20 MILLIGRAM(S): 20 TABLET, FILM COATED ORAL at 21:45

## 2017-02-25 RX ADMIN — Medication 50 MILLIGRAM(S): at 13:11

## 2017-02-25 NOTE — PROGRESS NOTE ADULT - SUBJECTIVE AND OBJECTIVE BOX
Patient seen and eval at bedside. Patient with right TKR infection. No active complaints other than right knee pain. Denies CP, SOB.    Vital Signs Last 24 Hrs  T(C): 36.9, Max: 37.2 (02-24 @ 15:50)  T(F): 98.4, Max: 98.9 (02-24 @ 15:50)  HR: 80 (70 - 80)  BP: 109/46 (94/56 - 126/74)  RR: 18 (16 - 18)  SpO2: 98% (97% - 98%)    PE: NAD, awake and alert  Right knee incisional dehiscence noted 2 areas anteriorly with drainage on dressing serosangenous with black tinge and foul odor.  No active drainage noted during dressing change. New dressing applied.  plantar/dorsiflexion intacT, DP pulse 1+, pedal edema noted  calf soft, NT.                          11.1   5.3   )-----------( 310      ( 24 Feb 2017 08:00 )             33.9     Right knee + psuedomonas a. cx     A/P: 72 yo F with right TKR infection    ·	OR monday 2/28 with Dr. Ritchie and Dr. Rey for I&D and removal of spacer, placement of abx spacer block, wound closure  ·	pain control   ·	bedrest, NWB right LE  ·	DVT propx: Lov  ·	Cont care as per primary team  ·	ID following, cont abx

## 2017-02-25 NOTE — PROGRESS NOTE ADULT - SUBJECTIVE AND OBJECTIVE BOX
SHYAM LAL    [id number]    71y      Female      Chief Complain:   Wound infection.     INTERVAL HPI/OVERNIGHT EVENTS:    Patient's pain in the knee is controlled with pain meds, she denies fever, chills, chest pain, sob, nausea and vomiting, has been seen by orthopedics and ID.     REVIEW OF SYSTEMS:    CONSTITUTIONAL: No fever, some fatigue  RESPIRATORY: No cough,  No shortness of breath  CARDIOVASCULAR: No chest pain, palpitations  GASTROINTESTINAL: No abdominal or epigastric pain. No nausea, vomiting  NEUROLOGICAL: No headaches, memory loss, loss of strength.  MISCELLANEOUS: right knee swelling and pain.       Vital Signs Last 24 Hrs  T(C): 36.9, Max: 37.2 (02-24 @ 15:50)  T(F): 98.4, Max: 98.9 (02-24 @ 15:50)  HR: 80 (70 - 80)  BP: 109/46 (94/56 - 126/74)  RR: 18 (16 - 18)  SpO2: 98% (97% - 98%)    PHYSICAL EXAM:    GENERAL: Obese elderly female lying on the bed not in acute distress.    HEENT: PERRL  NECK: soft, Supple, No JVD   CHEST/LUNG: Clear to auscultate bilaterally; No wheezing  HEART: S1S2+, Regular rate and rhythm; No murmurs.   ABDOMEN: Soft, Nontender, Nondistended; Bowel sounds present  EXTREMITIES:  2+ Peripheral Pulses, No clubbing, cyanosis, or edema  SKIN: No rashes or lesions  NEURO: AAOX3, no focal deficits, no motor r sensory loss  PSYCH: normal mood  Musculoskeletal: right knee is swelling and have some tenderness, dressing on with no soaking or bleeding.    LABS:                        11.1   5.3   )-----------( 310      ( 24 Feb 2017 08:00 )             33.9     25 Feb 2017 10:00    138    |  97     |  55.0   ----------------------------<  108    4.0     |  30.0   |  3.24     Ca    9.1        25 Feb 2017 10:00      MEDICATIONS  (STANDING):  lactated ringers. 1000milliLiter(s) IV Continuous <Continuous>  insulin lispro (HumaLOG) corrective regimen sliding scale  SubCutaneous three times a day before meals  dextrose 5%. 1000milliLiter(s) IV Continuous <Continuous>  dextrose 50% Injectable 12.5Gram(s) IV Push once  dextrose 50% Injectable 25Gram(s) IV Push once  dextrose 50% Injectable 25Gram(s) IV Push once  simvastatin 20milliGRAM(s) Oral at bedtime  metoprolol succinate ER 50milliGRAM(s) Oral daily  buDESOnide 160 MICROgram(s)/formoterol 4.5 MICROgram(s) Inhaler 2Puff(s) Inhalation two times a day  torsemide 20milliGRAM(s) Oral daily  magnesium oxide 400milliGRAM(s) Oral two times a day with meals  hydrALAZINE 50milliGRAM(s) Oral every 8 hours  enoxaparin Injectable 30milliGRAM(s) SubCutaneous daily  cefepime  IVPB 1000milliGRAM(s) IV Intermittent every 24 hours    MEDICATIONS  (PRN):  oxyCODONE  5 mG/acetaminophen 325 mG 1Tablet(s) Oral every 4 hours PRN Moderate Pain (4 - 6)  dextrose Gel 1Dose(s) Oral once PRN Blood Glucose LESS THAN 70 milliGRAM(s)/deciliter  glucagon  Injectable 1milliGRAM(s) IntraMuscular once PRN Glucose LESS THAN 70 milligrams/deciliter  docusate sodium 100milliGRAM(s) Oral two times a day PRN Constipation.

## 2017-02-25 NOTE — PROGRESS NOTE ADULT - SUBJECTIVE AND OBJECTIVE BOX
INTERVAL HISTORY:  Feels well and denies any chest pain or shortness of breath  	  MEDICATIONS:  metoprolol succinate ER 50milliGRAM(s) Oral daily  hydrALAZINE 50milliGRAM(s) Oral every 8 hours    meropenem IVPB  IV Intermittent   linezolid  IVPB  IV Intermittent     buDESOnide 160 MICROgram(s)/formoterol 4.5 MICROgram(s) Inhaler 2Puff(s) Inhalation two times a day    oxyCODONE  5 mG/acetaminophen 325 mG 1Tablet(s) Oral every 4 hours PRN    docusate sodium 100milliGRAM(s) Oral two times a day PRN    insulin lispro (HumaLOG) corrective regimen sliding scale  SubCutaneous three times a day before meals  dextrose Gel 1Dose(s) Oral once PRN  dextrose 50% Injectable 12.5Gram(s) IV Push once  dextrose 50% Injectable 25Gram(s) IV Push once  dextrose 50% Injectable 25Gram(s) IV Push once  glucagon  Injectable 1milliGRAM(s) IntraMuscular once PRN  simvastatin 20milliGRAM(s) Oral at bedtime    lactated ringers. 1000milliLiter(s) IV Continuous <Continuous>  dextrose 5%. 1000milliLiter(s) IV Continuous <Continuous>  magnesium oxide 400milliGRAM(s) Oral two times a day with meals  enoxaparin Injectable 30milliGRAM(s) SubCutaneous daily        PHYSICAL EXAM:    T(C): 36.9, Max: 37.2 (02-24 @ 15:50)  HR: 76 (70 - 80)  BP: 137/51 (94/56 - 137/51)  RR: 18 (16 - 18)  SpO2: 96% (96% - 98%)  Wt(kg): --    I&O's Summary    I & Os for current day (as of 25 Feb 2017 15:46)  =============================================  IN: 480 ml / OUT: 0 ml / NET: 480 ml      Daily     Daily     Appearance: Normal	  HEENT:   Normal oral mucosa, PERRL, EOMI	  Lymphatic: No lymphadenopathy  Cardiovascular: Normal S1 S2, No JVD, No murmurs, No edema  Respiratory: Lungs clear to auscultation	  Psychiatry: A & O x 3, Mood & affect appropriate  Gastrointestinal:  Soft, Non-tender, + BS	  Skin: No rashes, No ecchymoses, No cyanosis  Neurologic: Non-focal  Extremities: Normal range of motion, No clubbing, cyanosis or edema  Vascular: Peripheral pulses palpable 2+ bilaterally                          11.1   5.3   )-----------( 310      ( 24 Feb 2017 08:00 )             33.9     25 Feb 2017 10:00    138    |  97     |  55.0   ----------------------------<  108    4.0     |  30.0   |  3.24     Ca    9.1        25 Feb 2017 10:00        ASSESSMENT/PLAN: 	    Patient is scheduled for surgery for wound dehiscence  Cardiac stats is stable and no cardiac contraindications to proceed.

## 2017-02-25 NOTE — PROGRESS NOTE ADULT - ASSESSMENT
71 y F hx COPD, HTN, DM2, CKD, PAF, s/p R TKR adm for recurrent R knee wound dehiscence.     Wound dehiscence: s/p knee aspiration by ortho, superficial wound culture is growing Pseudomonas and VRE,  Per ID Dr Dela Cruz started on cefepime, OR per ortho on Monday seen by cardio for clearance, will continue monitor closely, will send cultures from the deep wound on moday.    DM: monitor FS, sliding scale  PAF: currently in NSR, holding xarelto as surgery is planed on Monday.  HTN: cont home meds  COPD: stable, cont inhaler  Acute on chronic renal failure stage 4: Patient creatinine was 4.23, now is 3.24, baseline is around 2, getting gentle hydration, will monitor BMP,I/Os, US of kidney showed No hydronephrosis, unchanged appearance of echogenic kidneys which may be related to medical   renal disease, if kidney function is not improving will call nephrology.    Prophyl: lovenox renally dosed.

## 2017-02-25 NOTE — PROGRESS NOTE ADULT - SUBJECTIVE AND OBJECTIVE BOX
[facility] [place] CARMENZA HARRIS MD FACP   DESIRE METZGER MD  Diplomates American Board of Internal Medicine and Infectious Diseases      SHYAM LAL  [id number]  71y    INTERVAL HPI/OVERNIGHT EVENTS:  PT KNOWN TO ME FROM PRIOR INFECTION  ASPIRATE NOW WITH MDR PSEUDOMONAS  FOR OR  ALSO KARMEN ON TOP OF CRF - RENAL CALLED    Vital Signs Last 24 Hrs  T(C): 36.9, Max: 37.2 (02-24 @ 15:50)  T(F): 98.4, Max: 98.9 (02-24 @ 15:50)  HR: 80 (70 - 80)  BP: 109/46 (94/56 - 126/74)  BP(mean): --  RR: 18 (16 - 18)  SpO2: 98% (97% - 98%)    ANTIBIOTICS  meropenem IVPB  IV Intermittent 500 Q12      Allergies    No Known Allergies    Intolerances        REVIEW OF SYSTEMS:PAIN     PHYSICAL EXAM:  Vital Signs Last 24 Hrs  T(C): 36.9, Max: 37.2 (02-24 @ 15:50)  T(F): 98.4, Max: 98.9 (02-24 @ 15:50)  HR: 80 (70 - 80)  BP: 109/46 (94/56 - 126/74)  BP(mean): --  RR: 18 (16 - 18)  SpO2: 98% (97% - 98%)      GEN: NAD, pleasant  HEENT: normocephalic and atraumatic. EOMI. CHIDI. Moist mucosa. Clear Posterior pharynx.  NECK: Supple. No carotid bruits.  No lymphadenopathy or thyromegaly.  LUNGS: Clear to auscultation.  HEART: Regular rate and rhythm without murmur.  ABDOMEN: Soft, nontender, and nondistended.  Positive bowel sounds.  No hepatosplenomegaly was noted.  EXTREMITIES: Without any cyanosis, clubbing, rash, lesions or edema.  NEUROLOGIC: Cranial nerves II through XII are grossly intact.  MUSCULOSKELETAL:KNEE RIGHT ASPIRATE SITE - PAINFUL KNEE - SWOLLEN  SKIN: No ulceration or induration present    LABS:                        11.1   5.3   )-----------( 310      ( 24 Feb 2017 08:00 )             33.9       25 Feb 2017 10:00    138    |  97     |  55.0   ----------------------------<  108    4.0     |  30.0   |  3.24     Ca    9.1        25 Feb 2017 10:00          02-24 @ 08:00  hct 33.9 % hgb 11.1 g/dL    02-24 @ 08:00  plat 310 K/uL wbc 5.3 K/uL    02-23 @ 07:10  hct 34.7 % hgb 11.1 g/dL    02-23 @ 07:10  plat 318 K/uL wbc 6.2 K/uL      02-25-17  creat 3.24 mg/dL gfr 16 mL/min/1.73M2 bun 55.0 mg/dL k 4.0 mmol/L  02-24-17  creat 3.89 mg/dL gfr 13 mL/min/1.73M2 bun 65.0 mg/dL k 3.8 mmol/L  02-23-17  creat 4.23 mg/dL gfr 11 mL/min/1.73M2 bun 70.0 mg/dL k 4.1 mmol/L  02-22-17  creat 4.14 mg/dL gfr 12 mL/min/1.73M2 bun 73.0 mg/dL k 4.3 mmol/L      MICROBIOLOGY:  Culture Results:   Rare Pseudomonas aeruginosa  Culture in progress (02-22 @ 12:46)  Culture Results:   Rare Pseudomonas aeruginosa  Rare Enterococcus faecalis (vancomycin resistant)  Culture in progress  .  TYPE: (C=Critical, N=Notification, A=Abnormal) C  TESTS:  _ VRE  DATE/TIME CALLED: _ 02/24/2017 10:22:02  CALLED TO: _ Baljeet Oates RN  READ BACK (02-22 @ 12:44)  Culture Results:   No growth at 48 hours (02-22 @ 11:38)        RADIOLOGY & ADDITIONAL STUDIES:      ASSESSMENT AND PLAN:      RAÚL HARRIS MD FACP

## 2017-02-26 ENCOUNTER — RESULT REVIEW (OUTPATIENT)
Age: 72
End: 2017-02-26

## 2017-02-26 DIAGNOSIS — I10 ESSENTIAL (PRIMARY) HYPERTENSION: ICD-10-CM

## 2017-02-26 DIAGNOSIS — I48.91 UNSPECIFIED ATRIAL FIBRILLATION: ICD-10-CM

## 2017-02-26 LAB
ANION GAP SERPL CALC-SCNC: 12 MMOL/L — SIGNIFICANT CHANGE UP (ref 5–17)
BUN SERPL-MCNC: 51 MG/DL — HIGH (ref 8–20)
CALCIUM SERPL-MCNC: 9.2 MG/DL — SIGNIFICANT CHANGE UP (ref 8.6–10.2)
CHLORIDE SERPL-SCNC: 97 MMOL/L — LOW (ref 98–107)
CO2 SERPL-SCNC: 30 MMOL/L — HIGH (ref 22–29)
CREAT SERPL-MCNC: 3.18 MG/DL — HIGH (ref 0.5–1.3)
GLUCOSE SERPL-MCNC: 91 MG/DL — SIGNIFICANT CHANGE UP (ref 70–115)
HCT VFR BLD CALC: 36.4 % — LOW (ref 37–47)
HGB BLD-MCNC: 12.3 G/DL — SIGNIFICANT CHANGE UP (ref 12–16)
MCHC RBC-ENTMCNC: 29.9 PG — SIGNIFICANT CHANGE UP (ref 27–31)
MCHC RBC-ENTMCNC: 33.8 G/DL — SIGNIFICANT CHANGE UP (ref 32–36)
MCV RBC AUTO: 88.6 FL — SIGNIFICANT CHANGE UP (ref 81–99)
PLATELET # BLD AUTO: 321 K/UL — SIGNIFICANT CHANGE UP (ref 150–400)
POTASSIUM SERPL-MCNC: 4.6 MMOL/L — SIGNIFICANT CHANGE UP (ref 3.5–5.3)
POTASSIUM SERPL-SCNC: 4.6 MMOL/L — SIGNIFICANT CHANGE UP (ref 3.5–5.3)
RBC # BLD: 4.11 M/UL — LOW (ref 4.4–5.2)
RBC # FLD: 21.1 % — HIGH (ref 11–15.6)
SODIUM SERPL-SCNC: 139 MMOL/L — SIGNIFICANT CHANGE UP (ref 135–145)
WBC # BLD: 5.9 K/UL — SIGNIFICANT CHANGE UP (ref 4.8–10.8)
WBC # FLD AUTO: 5.9 K/UL — SIGNIFICANT CHANGE UP (ref 4.8–10.8)

## 2017-02-26 PROCEDURE — 99233 SBSQ HOSP IP/OBS HIGH 50: CPT

## 2017-02-26 RX ORDER — SODIUM CHLORIDE 9 MG/ML
1000 INJECTION INTRAMUSCULAR; INTRAVENOUS; SUBCUTANEOUS
Qty: 0 | Refills: 0 | Status: DISCONTINUED | OUTPATIENT
Start: 2017-02-26 | End: 2017-02-27

## 2017-02-26 RX ORDER — SODIUM CHLORIDE 9 MG/ML
1000 INJECTION INTRAMUSCULAR; INTRAVENOUS; SUBCUTANEOUS
Qty: 0 | Refills: 0 | Status: DISCONTINUED | OUTPATIENT
Start: 2017-02-26 | End: 2017-02-26

## 2017-02-26 RX ADMIN — Medication 50 MILLIGRAM(S): at 16:58

## 2017-02-26 RX ADMIN — BUDESONIDE AND FORMOTEROL FUMARATE DIHYDRATE 2 PUFF(S): 160; 4.5 AEROSOL RESPIRATORY (INHALATION) at 07:45

## 2017-02-26 RX ADMIN — Medication 50 MILLIGRAM(S): at 22:04

## 2017-02-26 RX ADMIN — MAGNESIUM OXIDE 400 MG ORAL TABLET 400 MILLIGRAM(S): 241.3 TABLET ORAL at 09:23

## 2017-02-26 RX ADMIN — LINEZOLID 300 MILLIGRAM(S): 600 INJECTION, SOLUTION INTRAVENOUS at 17:01

## 2017-02-26 RX ADMIN — BUDESONIDE AND FORMOTEROL FUMARATE DIHYDRATE 2 PUFF(S): 160; 4.5 AEROSOL RESPIRATORY (INHALATION) at 21:53

## 2017-02-26 RX ADMIN — MAGNESIUM OXIDE 400 MG ORAL TABLET 400 MILLIGRAM(S): 241.3 TABLET ORAL at 16:58

## 2017-02-26 RX ADMIN — MEROPENEM 200 MILLIGRAM(S): 1 INJECTION INTRAVENOUS at 17:01

## 2017-02-26 RX ADMIN — MEROPENEM 200 MILLIGRAM(S): 1 INJECTION INTRAVENOUS at 05:17

## 2017-02-26 RX ADMIN — SODIUM CHLORIDE 60 MILLILITER(S): 9 INJECTION, SOLUTION INTRAVENOUS at 03:14

## 2017-02-26 RX ADMIN — LINEZOLID 300 MILLIGRAM(S): 600 INJECTION, SOLUTION INTRAVENOUS at 05:52

## 2017-02-26 RX ADMIN — ENOXAPARIN SODIUM 30 MILLIGRAM(S): 100 INJECTION SUBCUTANEOUS at 12:06

## 2017-02-26 RX ADMIN — SIMVASTATIN 20 MILLIGRAM(S): 20 TABLET, FILM COATED ORAL at 22:04

## 2017-02-26 NOTE — PROVIDER CONTACT NOTE (OTHER) - SITUATION
Pt pending PICC insertion, as per WILLIAMS Escobedo, PICC's don't get placed non emergently on the weekends.

## 2017-02-26 NOTE — PROGRESS NOTE ADULT - ASSESSMENT
71 yr old female with hypertension, hyperlipidemia, LIN, CKD, COPD, paroxysmal A fib sent from rehab for right knee wound dehiscence. She was seen by orthopedics, ID. 71 yr old female with hypertension, hyperlipidemia, LIN, CKD, COPD, paroxysmal A fib sent from rehab for right knee wound dehiscence. She was seen by orthopedics, ID. Local wound cultures growing resistant and Pseudomonas and VRE. She was started on Meropenem and Zyvox. Her renal function was slightly worse from baseline and hence started on IVF. No obstructive disease on renal ultrasound. Cardiology consulted for clearance. She is scheduled for OR I&D on 2/26/17.

## 2017-02-26 NOTE — PROGRESS NOTE ADULT - SUBJECTIVE AND OBJECTIVE BOX
[facility]  INFECTIOUS DISEASES AND INTERNAL MEDICINE OF [place]  =======================================================  Wilver Dela Cruz MD  Diplomates American Board of Internal Medicine and Infectious Diseases  =======================================================    LAL, SHYAM     No complaints  No fevers or chills    Allergies:  No Known Allergies    Antibiotics:  MERREM/ZYVOX    REVIEW OF SYSTEMS:  CONSTITUTIONAL:  No fevers or chills  HEENT:  No diplopia or blurred vision. No earache, sore throat or runny nose.  CARDIOVASCULAR:  No pressure, squeezing, strangling, tightness, heaviness or aching about the chest, neck, axilla or epigastrium.  RESPIRATORY:  No cough, shortness of breath  GASTROINTESTINAL:  No nausea, vomiting or diarrhea.  GENITOURINARY:  No dysuria, frequency or urgency.   MUSCULOSKELETAL:  no muscle pain  SKIN:  Rt Knee surgical site with open wound  NEUROLOGIC:  No paresthesias  PSYCHIATRIC:  No disorder of thought or mood.  ENDOCRINE:  No heat or cold intolerance, polyuria or polydipsia.  HEMATOLOGICAL:  No easy bruising or bleeding.     Physical Exam:  Vital Signs Last 24 Hrs  T(C): 37, Max: 37.2 (02-23 @ 15:10)  T(F): 98.6, Max: 98.9 (02-23 @ 15:10)  HR: 59 (59 - 69)  BP: 127/64 (108/69 - 127/64)  RR: 18 (18 - 18)  SpO2: 96% (95% - 98%)    GEN: NAD, pleasant  HEENT: normocephalic and atraumatic. EOMI. PERRL.    NECK: Supple.   LUNGS: Clear to auscultation.  HEART: Regular rate and rhythm   ABDOMEN: Soft, nontender, and nondistended.  Positive bowel sounds.    : No CVA tenderness  EXTREMITIES: RT KNEE CLEAN DRESSING, NO CELLULITIS  NEUROLOGIC: forgetful   PSYCHIATRIC: Appropriate affect .  SKIN: Open wound over patella, round circumferential    Labs:   24 Feb 2017 08:00    140    |  99     |  65.0   ----------------------------<  85     3.8     |  31.0   |  3.89     Ca    8.7        24 Feb 2017 08:00    TPro  6.8    /  Alb  2.8    /  TBili  0.8    /  DBili  x      /  AST  18     /  ALT  9      /  AlkPhos  79     22 Feb 2017 20:10                   11.1   5.3   )-----------( 310      ( 24 Feb 2017 08:00 )             33.9     PT/INR - ( 22 Feb 2017 20:11 )   PT: 15.1 sec;   INR: 1.37 ratio      PTT - ( 22 Feb 2017 20:11 )  PTT:35.6 sec    LIVER FUNCTIONS - ( 22 Feb 2017 20:10 )  Alb: 2.8 g/dL / Pro: 6.8 g/dL / ALK PHOS: 79 U/L / ALT: 9 U/L / AST: 18 U/L / GGT: x           RECENT CULTURES:  Culture - Body Fluid with Gram Stain (02.22.17 @ 12:46)    -  Ceftazidime: R >16    -  Gentamicin: S <=4    -  Piperacillin/Tazobactam: R >64    -  Tobramycin: S <=4    -  Amikacin: S <=16    -  Cefepime: R >16    -  Imipenem: S <=1    -  Levofloxacin: S <=2    Gram Stain:   Numerous White blood cells  No organisms seen    -  Aztreonam: R >16    -  Ciprofloxacin: S <=1    -  Meropenem: S <=1    Specimen Source: .Body Fluid Knee Fluid    Culture Results:   Rare Pseudomonas aeruginosa  Culture in progress    Organism Identification: Pseudomonas aeruginosa    Organism: Pseudomonas aeruginosa    Method Type: MIGUEL    02-22 .Body Fluid Knee Fluid XXXX   Numerous White blood cells  No organisms seen   Rare Gram Negative Rods Identification and susceptibility to follow.  Culture in progress    02-22 .Body Fluid Synovial Fluid XXXX   Numerous White blood cells  No organisms seen   Rare Gram Negative Rods Identification and susceptibility to follow.  Culture in progress

## 2017-02-26 NOTE — CONSULT NOTE ADULT - SUBJECTIVE AND OBJECTIVE BOX
Patient is a 71y old  Female who presents with a chief complaint of knee drainage (22 Feb 2017 16:00)   Acute  renal failure.    HPI:  71 y F hx DM2, PAF, HTN, LIN, CKD, COPD, sent from rehab for R knee wound dehiscensce. Pt has had multiple interventions on the R knee following TKR last year, including hardware removal for infection, IV abx course and most recently adm for wound dehiscence in Dec req washout and wound closure. The patient is now transferred from rehab for persistent wound serosanguinous drainage and wound dehiscence. Pt denies CP, F/C, SOB, cough, N/V/D/C, dysuria. c/o L heel pain. (22 Feb 2017 16:00)   Hx renal stones x1 not aware of type, CRF  by HX; elevated creatinine on admission.    PAST MEDICAL & SURGICAL HISTORY:  Hypercholesterolemia  Deficiency of vitamin K  Chronic pain  COPD (chronic obstructive pulmonary disease)  Urine retention  Pressure ulcer  Atrial fibrillation  Constipation  Breast cancer  HLD (hyperlipidemia)  HTN (hypertension)  GERD (gastroesophageal reflux disease)  DM (diabetes mellitus)  Obesity  LIN on CPAP  History of incision and drainage: rt knee  S/p total knee replacement, bilateral  S/P knee replacement, right  S/P hysterectomy  S/P mastectomy: Left  Breast cancer      FAMILY HISTORY:  Family history of diabetes mellitus (Mother)  NC    Social History:Non smoker    MEDICATIONS  (STANDING):  insulin lispro (HumaLOG) corrective regimen sliding scale  SubCutaneous three times a day before meals  dextrose 5%. 1000milliLiter(s) IV Continuous <Continuous>  dextrose 50% Injectable 12.5Gram(s) IV Push once  dextrose 50% Injectable 25Gram(s) IV Push once  dextrose 50% Injectable 25Gram(s) IV Push once  simvastatin 20milliGRAM(s) Oral at bedtime  metoprolol succinate ER 50milliGRAM(s) Oral daily  buDESOnide 160 MICROgram(s)/formoterol 4.5 MICROgram(s) Inhaler 2Puff(s) Inhalation two times a day  magnesium oxide 400milliGRAM(s) Oral two times a day with meals  hydrALAZINE 50milliGRAM(s) Oral every 8 hours  enoxaparin Injectable 30milliGRAM(s) SubCutaneous daily  meropenem IVPB  IV Intermittent   linezolid  IVPB  IV Intermittent   linezolid  IVPB 600milliGRAM(s) IV Intermittent every 12 hours  meropenem IVPB 500milliGRAM(s) IV Intermittent every 12 hours  sodium chloride 0.9%. 1000milliLiter(s) IV Continuous <Continuous>    MEDICATIONS  (PRN):  oxyCODONE  5 mG/acetaminophen 325 mG 1Tablet(s) Oral every 4 hours PRN Moderate Pain (4 - 6)  dextrose Gel 1Dose(s) Oral once PRN Blood Glucose LESS THAN 70 milliGRAM(s)/deciliter  glucagon  Injectable 1milliGRAM(s) IntraMuscular once PRN Glucose LESS THAN 70 milligrams/deciliter  docusate sodium 100milliGRAM(s) Oral two times a day PRN Constipation  ondansetron Injectable 4milliGRAM(s) IV Push every 8 hours PRN Nausea and/or Vomiting  bismuth subsalicylate Liquid 30milliLiter(s) Oral three times a day PRN nausea   Meds reviewed    Allergies    No Known Allergies    Intolerances       REVIEW OF SYSTEMS:    CONSTITUTIONAL:  sob, weight gain, feels weak  EYES: No eye pain, visual disturbances, or discharge  ENMT:  No difficulty hearing, tinnitus, vertigo; No sinus or throat pain  NECK: No pain or stiffness  BREASTS: No pain, masses, or nipple discharge  RESPIRATORY: sob  CARDIOVASCULAR: No chest pain, palpitations, dizziness,   GASTROINTESTINAL: No abdominal or epigastric pain. No nausea, vomiting, or hematemesis; No diarrhea or constipation. No melena or hematochezia.Trunckal obesity  GENITOURINARY: No dysuria, frequency, hematuria, or incontinence  NEUROLOGICAL: No headaches, memory loss, loss of strength, numbness, or tremors  SKIN: Diffuse erythema, no blisters  LYMPH NODES: No enlarged glands  ENDOCRINE: No heat or cold intolerance; No hair loss  MUSCULOSKELETAL: Chronic lymphedema legs with scars  PSYCHIATRIC: No depression, anxiety, mood swings, or difficulty sleeping  HEME/LYMPH: No easy bruising, or bleeding gums  ALLERY AND IMMUNOLOGIC: No hives or eczema      Vital Signs Last 24 Hrs  T(C): 36.5, Max: 36.9 (02-26 @ 01:09)  T(F): 97.7, Max: 98.5 (02-26 @ 01:09)  HR: 72 (72 - 72)  BP: 143/67 (100/56 - 143/67)  BP(mean): --  RR: 20 (20 - 20)  SpO2: 99% (99% - 99%)  Daily     Daily     PHYSICAL EXAM:    GENERAL: appears chronically ill, oriented.  HEAD:  Atraumatic, Normocephalic  EYES: EOMI, PERRLA,   ENMT: No tonsillar erythema, exudates, or enlargement;   NECK: Supple, neck  veins full  NERVOUS SYSTEM:  Alert & Oriented X3, Good concentration; Motor Strength wnl  HEART: Regular rate and rhythm; No murmurs, rubs, or gallops, mastectomy scar   ABDOMEN: Soft, Nontender, Nondistended; Bowel sounds present, body wall and flank edema  EXTREMITIES:  leg edema trace, right knee with large dressing, left knee scar  LYMPH: No lymphadenopathy noted  SKIN: No rashes or lesions, pale      LABS:                        12.3   5.9   )-----------( 321      ( 26 Feb 2017 09:14 )             36.4     26 Feb 2017 09:14    139    |  97     |  51.0   ----------------------------<  91     4.6     |  30.0   |  3.18     Ca    9.2        26 Feb 2017 09:14                  RADIOLOGY & ADDITIONAL TESTS:

## 2017-02-26 NOTE — PROGRESS NOTE ADULT - SUBJECTIVE AND OBJECTIVE BOX
INTERVAL HPI/OVERNIGHT EVENTS:    CC: right knee wound dehiscencse and infection Pseudomonas and VRE, CKD, diabetes, hypertension    Chart and course reviewed. No overnight events. Denies pain.     Vital Signs Last 24 Hrs  T(C): 36.5, Max: 36.9 (02-26 @ 01:09)  T(F): 97.7, Max: 98.5 (02-26 @ 01:09)  HR: 72 (72 - 80)  BP: 143/67 (100/56 - 143/67)  BP(mean): --  RR: 20 (18 - 20)  SpO2: 99% (96% - 99%)    PHYSICAL EXAM:    GENERAL: Not in distress  CHEST/LUNG: b/l air entry  HEART: Regular   ABDOMEN: Soft, BS+  EXTREMITIES:  2+ edema    MEDICATIONS  (STANDING):  lactated ringers. 1000milliLiter(s) IV Continuous <Continuous>  insulin lispro (HumaLOG) corrective regimen sliding scale  SubCutaneous three times a day before meals  dextrose 5%. 1000milliLiter(s) IV Continuous <Continuous>  dextrose 50% Injectable 12.5Gram(s) IV Push once  dextrose 50% Injectable 25Gram(s) IV Push once  dextrose 50% Injectable 25Gram(s) IV Push once  simvastatin 20milliGRAM(s) Oral at bedtime  metoprolol succinate ER 50milliGRAM(s) Oral daily  buDESOnide 160 MICROgram(s)/formoterol 4.5 MICROgram(s) Inhaler 2Puff(s) Inhalation two times a day  magnesium oxide 400milliGRAM(s) Oral two times a day with meals  hydrALAZINE 50milliGRAM(s) Oral every 8 hours  enoxaparin Injectable 30milliGRAM(s) SubCutaneous daily  meropenem IVPB  IV Intermittent   linezolid  IVPB  IV Intermittent   linezolid  IVPB 600milliGRAM(s) IV Intermittent every 12 hours  meropenem IVPB 500milliGRAM(s) IV Intermittent every 12 hours    MEDICATIONS  (PRN):  oxyCODONE  5 mG/acetaminophen 325 mG 1Tablet(s) Oral every 4 hours PRN Moderate Pain (4 - 6)  dextrose Gel 1Dose(s) Oral once PRN Blood Glucose LESS THAN 70 milliGRAM(s)/deciliter  glucagon  Injectable 1milliGRAM(s) IntraMuscular once PRN Glucose LESS THAN 70 milligrams/deciliter  docusate sodium 100milliGRAM(s) Oral two times a day PRN Constipation  ondansetron Injectable 4milliGRAM(s) IV Push every 8 hours PRN Nausea and/or Vomiting  bismuth subsalicylate Liquid 30milliLiter(s) Oral three times a day PRN nausea      Allergies    No Known Allergies    Intolerances          LABS:      25 Feb 2017 10:00    138    |  97     |  55.0   ----------------------------<  108    4.0     |  30.0   |  3.24     Ca    9.1        25 Feb 2017 10:00            RADIOLOGY & ADDITIONAL TESTS:

## 2017-02-26 NOTE — PROVIDER CONTACT NOTE (OTHER) - BACKGROUND
PA aware pt is refusing peripheral access as to she has stated " I am not a pin cushion" & ill wait for my PICC. Patient educated she needs peripheral access for OR, patient continues to refuse.

## 2017-02-26 NOTE — PROGRESS NOTE ADULT - PROBLEM SELECTOR PLAN 1
For OR tomorrow, ortho following. For OR tomorrow, ortho following. Cardiology eval noted. Patient is at moderate risk for cardiovascular complications. No additional testing needed prior to procedure. Will need to monitor renal function closely post procedure. Nephrology consulted.

## 2017-02-26 NOTE — PROGRESS NOTE ADULT - ASSESSMENT
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

## 2017-02-26 NOTE — PROGRESS NOTE ADULT - SUBJECTIVE AND OBJECTIVE BOX
Orthopedic Pa Follow up.    Pt scheduled for Operative Intervention tomorrow.    Medical optimized and cardiology cleared.    -NPO at midnight  -Hold [facility].

## 2017-02-27 ENCOUNTER — APPOINTMENT (OUTPATIENT)
Dept: ORTHOPEDIC SURGERY | Facility: HOSPITAL | Age: 72
End: 2017-02-27

## 2017-02-27 LAB
ALBUMIN SERPL ELPH-MCNC: 2.3 G/DL — LOW (ref 3.3–5.2)
ALP SERPL-CCNC: 60 U/L — SIGNIFICANT CHANGE UP (ref 40–120)
ALT FLD-CCNC: 7 U/L — SIGNIFICANT CHANGE UP
ANION GAP SERPL CALC-SCNC: 10 MMOL/L — SIGNIFICANT CHANGE UP (ref 5–17)
AST SERPL-CCNC: 15 U/L — SIGNIFICANT CHANGE UP
BILIRUB SERPL-MCNC: 0.5 MG/DL — SIGNIFICANT CHANGE UP (ref 0.4–2)
BLD GP AB SCN SERPL QL: SIGNIFICANT CHANGE UP
BUN SERPL-MCNC: 46 MG/DL — HIGH (ref 8–20)
CALCIUM SERPL-MCNC: 8.5 MG/DL — LOW (ref 8.6–10.2)
CHLORIDE SERPL-SCNC: 96 MMOL/L — LOW (ref 98–107)
CO2 SERPL-SCNC: 31 MMOL/L — HIGH (ref 22–29)
CREAT SERPL-MCNC: 3.04 MG/DL — HIGH (ref 0.5–1.3)
CULTURE RESULTS: SIGNIFICANT CHANGE UP
GLUCOSE SERPL-MCNC: 97 MG/DL — SIGNIFICANT CHANGE UP (ref 70–115)
GRAM STN FLD: SIGNIFICANT CHANGE UP
HCT VFR BLD CALC: 32.9 % — LOW (ref 37–47)
HGB BLD-MCNC: 10.6 G/DL — LOW (ref 12–16)
MAGNESIUM SERPL-MCNC: 2.9 MG/DL — HIGH (ref 1.8–2.5)
MCHC RBC-ENTMCNC: 28.7 PG — SIGNIFICANT CHANGE UP (ref 27–31)
MCHC RBC-ENTMCNC: 32.2 G/DL — SIGNIFICANT CHANGE UP (ref 32–36)
MCV RBC AUTO: 89.2 FL — SIGNIFICANT CHANGE UP (ref 81–99)
ORGANISM # SPEC MICROSCOPIC CNT: SIGNIFICANT CHANGE UP
PHOSPHATE SERPL-MCNC: 3.1 MG/DL — SIGNIFICANT CHANGE UP (ref 2.4–4.7)
PLATELET # BLD AUTO: 312 K/UL — SIGNIFICANT CHANGE UP (ref 150–400)
POTASSIUM SERPL-MCNC: 3.2 MMOL/L — LOW (ref 3.5–5.3)
POTASSIUM SERPL-SCNC: 3.2 MMOL/L — LOW (ref 3.5–5.3)
PROT SERPL-MCNC: 5.4 G/DL — LOW (ref 6.6–8.7)
RBC # BLD: 3.69 M/UL — LOW (ref 4.4–5.2)
RBC # FLD: 20.5 % — HIGH (ref 11–15.6)
SODIUM SERPL-SCNC: 137 MMOL/L — SIGNIFICANT CHANGE UP (ref 135–145)
SPECIMEN SOURCE: SIGNIFICANT CHANGE UP
TYPE + AB SCN PNL BLD: SIGNIFICANT CHANGE UP
WBC # BLD: 5.3 K/UL — SIGNIFICANT CHANGE UP (ref 4.8–10.8)
WBC # FLD AUTO: 5.3 K/UL — SIGNIFICANT CHANGE UP (ref 4.8–10.8)

## 2017-02-27 PROCEDURE — 99233 SBSQ HOSP IP/OBS HIGH 50: CPT

## 2017-02-27 PROCEDURE — 99232 SBSQ HOSP IP/OBS MODERATE 35: CPT

## 2017-02-27 PROCEDURE — 88302 TISSUE EXAM BY PATHOLOGIST: CPT | Mod: 26

## 2017-02-27 RX ORDER — INSULIN LISPRO 100/ML
VIAL (ML) SUBCUTANEOUS
Qty: 0 | Refills: 0 | Status: DISCONTINUED | OUTPATIENT
Start: 2017-02-28 | End: 2017-03-25

## 2017-02-27 RX ORDER — DEXTROSE 50 % IN WATER 50 %
12.5 SYRINGE (ML) INTRAVENOUS ONCE
Qty: 0 | Refills: 0 | Status: DISCONTINUED | OUTPATIENT
Start: 2017-02-28 | End: 2017-04-04

## 2017-02-27 RX ORDER — DEXTROSE MONOHYDRATE, SODIUM CHLORIDE, AND POTASSIUM CHLORIDE 50; .745; 4.5 G/1000ML; G/1000ML; G/1000ML
1000 INJECTION, SOLUTION INTRAVENOUS
Qty: 0 | Refills: 0 | Status: DISCONTINUED | OUTPATIENT
Start: 2017-02-27 | End: 2017-02-27

## 2017-02-27 RX ORDER — FOLIC ACID 0.8 MG
1 TABLET ORAL DAILY
Qty: 0 | Refills: 0 | Status: DISCONTINUED | OUTPATIENT
Start: 2017-02-28 | End: 2017-04-04

## 2017-02-27 RX ORDER — ONDANSETRON 8 MG/1
4 TABLET, FILM COATED ORAL ONCE
Qty: 0 | Refills: 0 | Status: DISCONTINUED | OUTPATIENT
Start: 2017-02-27 | End: 2017-02-27

## 2017-02-27 RX ORDER — POTASSIUM CHLORIDE 20 MEQ
10 PACKET (EA) ORAL
Qty: 0 | Refills: 0 | Status: COMPLETED | OUTPATIENT
Start: 2017-02-27 | End: 2017-02-27

## 2017-02-27 RX ORDER — SODIUM CHLORIDE 9 MG/ML
1000 INJECTION, SOLUTION INTRAVENOUS
Qty: 0 | Refills: 0 | Status: DISCONTINUED | OUTPATIENT
Start: 2017-02-28 | End: 2017-03-02

## 2017-02-27 RX ORDER — OXYCODONE HYDROCHLORIDE 5 MG/1
10 TABLET ORAL EVERY 4 HOURS
Qty: 0 | Refills: 0 | Status: DISCONTINUED | OUTPATIENT
Start: 2017-02-28 | End: 2017-03-02

## 2017-02-27 RX ORDER — OXYCODONE HYDROCHLORIDE 5 MG/1
10 TABLET ORAL EVERY 12 HOURS
Qty: 0 | Refills: 0 | Status: DISCONTINUED | OUTPATIENT
Start: 2017-02-28 | End: 2017-03-02

## 2017-02-27 RX ORDER — DOCUSATE SODIUM 100 MG
100 CAPSULE ORAL THREE TIMES A DAY
Qty: 0 | Refills: 0 | Status: DISCONTINUED | OUTPATIENT
Start: 2017-02-28 | End: 2017-03-29

## 2017-02-27 RX ORDER — LINEZOLID 600 MG/300ML
600 INJECTION, SOLUTION INTRAVENOUS EVERY 12 HOURS
Qty: 0 | Refills: 0 | Status: DISCONTINUED | OUTPATIENT
Start: 2017-02-27 | End: 2017-02-27

## 2017-02-27 RX ORDER — SODIUM CHLORIDE 9 MG/ML
1000 INJECTION, SOLUTION INTRAVENOUS
Qty: 0 | Refills: 0 | Status: DISCONTINUED | OUTPATIENT
Start: 2017-02-27 | End: 2017-02-27

## 2017-02-27 RX ORDER — DEXTROSE 50 % IN WATER 50 %
25 SYRINGE (ML) INTRAVENOUS ONCE
Qty: 0 | Refills: 0 | Status: DISCONTINUED | OUTPATIENT
Start: 2017-02-28 | End: 2017-04-04

## 2017-02-27 RX ORDER — HYDROMORPHONE HYDROCHLORIDE 2 MG/ML
0.5 INJECTION INTRAMUSCULAR; INTRAVENOUS; SUBCUTANEOUS EVERY 4 HOURS
Qty: 0 | Refills: 0 | Status: DISCONTINUED | OUTPATIENT
Start: 2017-02-28 | End: 2017-03-02

## 2017-02-27 RX ORDER — GLUCAGON INJECTION, SOLUTION 0.5 MG/.1ML
1 INJECTION, SOLUTION SUBCUTANEOUS ONCE
Qty: 0 | Refills: 0 | Status: DISCONTINUED | OUTPATIENT
Start: 2017-02-28 | End: 2017-04-04

## 2017-02-27 RX ORDER — ONDANSETRON 8 MG/1
4 TABLET, FILM COATED ORAL EVERY 6 HOURS
Qty: 0 | Refills: 0 | Status: DISCONTINUED | OUTPATIENT
Start: 2017-02-28 | End: 2017-04-04

## 2017-02-27 RX ORDER — POTASSIUM CHLORIDE 20 MEQ
10 PACKET (EA) ORAL
Qty: 0 | Refills: 0 | Status: DISCONTINUED | OUTPATIENT
Start: 2017-02-27 | End: 2017-02-27

## 2017-02-27 RX ORDER — MAGNESIUM HYDROXIDE 400 MG/1
30 TABLET, CHEWABLE ORAL DAILY
Qty: 0 | Refills: 0 | Status: DISCONTINUED | OUTPATIENT
Start: 2017-02-28 | End: 2017-03-02

## 2017-02-27 RX ORDER — HYDROMORPHONE HYDROCHLORIDE 2 MG/ML
0.5 INJECTION INTRAMUSCULAR; INTRAVENOUS; SUBCUTANEOUS
Qty: 0 | Refills: 0 | Status: DISCONTINUED | OUTPATIENT
Start: 2017-02-27 | End: 2017-02-27

## 2017-02-27 RX ORDER — LINEZOLID 600 MG/300ML
600 INJECTION, SOLUTION INTRAVENOUS EVERY 12 HOURS
Qty: 0 | Refills: 0 | Status: DISCONTINUED | OUTPATIENT
Start: 2017-02-27 | End: 2017-03-02

## 2017-02-27 RX ORDER — SODIUM CHLORIDE 9 MG/ML
1000 INJECTION, SOLUTION INTRAVENOUS
Qty: 0 | Refills: 0 | Status: DISCONTINUED | OUTPATIENT
Start: 2017-02-28 | End: 2017-03-01

## 2017-02-27 RX ORDER — MEROPENEM 1 G/30ML
500 INJECTION INTRAVENOUS EVERY 12 HOURS
Qty: 0 | Refills: 0 | Status: DISCONTINUED | OUTPATIENT
Start: 2017-02-27 | End: 2017-04-04

## 2017-02-27 RX ORDER — OXYCODONE HYDROCHLORIDE 5 MG/1
5 TABLET ORAL EVERY 4 HOURS
Qty: 0 | Refills: 0 | Status: DISCONTINUED | OUTPATIENT
Start: 2017-02-28 | End: 2017-03-02

## 2017-02-27 RX ORDER — SENNA PLUS 8.6 MG/1
2 TABLET ORAL AT BEDTIME
Qty: 0 | Refills: 0 | Status: DISCONTINUED | OUTPATIENT
Start: 2017-02-28 | End: 2017-03-02

## 2017-02-27 RX ORDER — HYDRALAZINE HCL 50 MG
25 TABLET ORAL EVERY 8 HOURS
Qty: 0 | Refills: 0 | Status: DISCONTINUED | OUTPATIENT
Start: 2017-02-27 | End: 2017-02-27

## 2017-02-27 RX ORDER — FERROUS SULFATE 325(65) MG
325 TABLET ORAL
Qty: 0 | Refills: 0 | Status: DISCONTINUED | OUTPATIENT
Start: 2017-02-28 | End: 2017-04-04

## 2017-02-27 RX ORDER — FENTANYL CITRATE 50 UG/ML
50 INJECTION INTRAVENOUS
Qty: 0 | Refills: 0 | Status: DISCONTINUED | OUTPATIENT
Start: 2017-02-27 | End: 2017-02-27

## 2017-02-27 RX ORDER — DEXTROSE 50 % IN WATER 50 %
1 SYRINGE (ML) INTRAVENOUS ONCE
Qty: 0 | Refills: 0 | Status: DISCONTINUED | OUTPATIENT
Start: 2017-02-28 | End: 2017-04-04

## 2017-02-27 RX ORDER — ACETAMINOPHEN 500 MG
650 TABLET ORAL EVERY 6 HOURS
Qty: 0 | Refills: 0 | Status: DISCONTINUED | OUTPATIENT
Start: 2017-02-28 | End: 2017-04-04

## 2017-02-27 RX ORDER — ENOXAPARIN SODIUM 100 MG/ML
30 INJECTION SUBCUTANEOUS EVERY 24 HOURS
Qty: 0 | Refills: 0 | Status: DISCONTINUED | OUTPATIENT
Start: 2017-02-28 | End: 2017-03-02

## 2017-02-27 RX ADMIN — FENTANYL CITRATE 50 MICROGRAM(S): 50 INJECTION INTRAVENOUS at 20:15

## 2017-02-27 RX ADMIN — FENTANYL CITRATE 50 MICROGRAM(S): 50 INJECTION INTRAVENOUS at 20:03

## 2017-02-27 RX ADMIN — Medication 100 MILLIEQUIVALENT(S): at 10:38

## 2017-02-27 RX ADMIN — Medication 50 MILLIGRAM(S): at 06:56

## 2017-02-27 RX ADMIN — FENTANYL CITRATE 50 MICROGRAM(S): 50 INJECTION INTRAVENOUS at 20:45

## 2017-02-27 RX ADMIN — MEROPENEM 200 MILLIGRAM(S): 1 INJECTION INTRAVENOUS at 16:10

## 2017-02-27 RX ADMIN — FENTANYL CITRATE 50 MICROGRAM(S): 50 INJECTION INTRAVENOUS at 22:04

## 2017-02-27 RX ADMIN — BUDESONIDE AND FORMOTEROL FUMARATE DIHYDRATE 2 PUFF(S): 160; 4.5 AEROSOL RESPIRATORY (INHALATION) at 08:33

## 2017-02-27 RX ADMIN — MEROPENEM 200 MILLIGRAM(S): 1 INJECTION INTRAVENOUS at 06:55

## 2017-02-27 RX ADMIN — Medication 50 MILLIGRAM(S): at 06:55

## 2017-02-27 RX ADMIN — FENTANYL CITRATE 50 MICROGRAM(S): 50 INJECTION INTRAVENOUS at 20:39

## 2017-02-27 RX ADMIN — FENTANYL CITRATE 50 MICROGRAM(S): 50 INJECTION INTRAVENOUS at 22:15

## 2017-02-27 RX ADMIN — FENTANYL CITRATE 50 MICROGRAM(S): 50 INJECTION INTRAVENOUS at 21:20

## 2017-02-27 RX ADMIN — LINEZOLID 300 MILLIGRAM(S): 600 INJECTION, SOLUTION INTRAVENOUS at 05:04

## 2017-02-27 RX ADMIN — Medication 100 MILLIEQUIVALENT(S): at 11:51

## 2017-02-27 NOTE — CHART NOTE - NSCHARTNOTEFT_GEN_A_CORE
s/w Dr. dyer and Dr. Gomes  Patient does not need PICC at this time.  Cancelled picc order, please request PICC when patient needs it,  please clarify reason for double lumen if that is what is needed  any questions please call [phone]

## 2017-02-27 NOTE — PROGRESS NOTE ADULT - PROBLEM SELECTOR PLAN 1
For OR today, ortho following. Cardiology eval noted. Patient is at moderate risk for cardiovascular complications. No additional testing needed prior to procedure. Will need to monitor renal function closely post procedure. Nephrology consult appreciated

## 2017-02-27 NOTE — PROGRESS NOTE ADULT - ASSESSMENT
71 yr old female with hypertension, hyperlipidemia, LIN, CKD, COPD, paroxysmal A fib sent from rehab for right knee wound dehiscence. She was seen by orthopedics, ID. Local wound cultures growing  Pseudomonas and VRE. She was started on Meropenem and Zyvox. Her renal function was slightly worse from baseline and hence started on IVF. No obstructive disease on renal ultrasound. Cardiology consulted for clearance. She is scheduled for OR today

## 2017-02-27 NOTE — PROGRESS NOTE ADULT - ASSESSMENT
A/P S/P right knee abx spacer   1. No ROM to knee  2. ATC ABX   3. Pain Control   4. DVTP lovenox 30 mg daily

## 2017-02-27 NOTE — PROGRESS NOTE ADULT - SUBJECTIVE AND OBJECTIVE BOX
NEPHROLOGY INTERVAL HPI/OVERNIGHT EVENTS:    MEDICATIONS  (STANDING):  insulin lispro (HumaLOG) corrective regimen sliding scale  SubCutaneous three times a day before meals  dextrose 5%. 1000milliLiter(s) IV Continuous <Continuous>  dextrose 50% Injectable 12.5Gram(s) IV Push once  dextrose 50% Injectable 25Gram(s) IV Push once  dextrose 50% Injectable 25Gram(s) IV Push once  simvastatin 20milliGRAM(s) Oral at bedtime  metoprolol succinate ER 50milliGRAM(s) Oral daily  buDESOnide 160 MICROgram(s)/formoterol 4.5 MICROgram(s) Inhaler 2Puff(s) Inhalation two times a day  magnesium oxide 400milliGRAM(s) Oral two times a day with meals  hydrALAZINE 50milliGRAM(s) Oral every 8 hours  meropenem IVPB  IV Intermittent   linezolid  IVPB  IV Intermittent   linezolid  IVPB 600milliGRAM(s) IV Intermittent every 12 hours  meropenem IVPB 500milliGRAM(s) IV Intermittent every 12 hours  sodium chloride 0.9% with potassium chloride 20 mEq/L 1000milliLiter(s) IV Continuous <Continuous>  potassium chloride  10 mEq/100 mL IVPB 10milliEquivalent(s) IV Intermittent every 1 hour  potassium chloride  10 mEq/100 mL IVPB 10milliEquivalent(s) IV Intermittent every 2 hours    MEDICATIONS  (PRN):  oxyCODONE  5 mG/acetaminophen 325 mG 1Tablet(s) Oral every 4 hours PRN Moderate Pain (4 - 6)  dextrose Gel 1Dose(s) Oral once PRN Blood Glucose LESS THAN 70 milliGRAM(s)/deciliter  glucagon  Injectable 1milliGRAM(s) IntraMuscular once PRN Glucose LESS THAN 70 milligrams/deciliter  docusate sodium 100milliGRAM(s) Oral two times a day PRN Constipation  ondansetron Injectable 4milliGRAM(s) IV Push every 8 hours PRN Nausea and/or Vomiting  bismuth subsalicylate Liquid 30milliLiter(s) Oral three times a day PRN nausea      Allergies    No Known Allergies    Intolerances        Vital Signs Last 24 Hrs  T(C): 31.4, Max: 36.9 (02-26 @ 23:49)  T(F): 88.5, Max: 98.4 (02-26 @ 23:49)  HR: 69 (69 - 78)  BP: 106/55 (102/55 - 140/62)  BP(mean): --  RR: 18 (16 - 18)  SpO2: 100% (98% - 100%)  Daily     Daily     PHYSICAL EXAM:    GENERAL: same  HEAD:  wnl  EYES:   ENMT:   NECK: veins not full  NERVOUS SYSTEM:  same  CHEST/LUNG: no wheezes  HEART: no gallop  ABDOMEN: soft, not tender  EXTREMITIES:  right knee with large dressing  LYMPH:   SKIN: no rash    LABS:                        10.6   5.3   )-----------( 312      ( 27 Feb 2017 05:37 )             32.9     27 Feb 2017 05:37    137    |  96     |  46.0   ----------------------------<  97     3.2     |  31.0   |  3.04     Ca    8.5        27 Feb 2017 05:37  Phos  3.1       27 Feb 2017 05:37  Mg     2.9       27 Feb 2017 05:37    TPro  5.4    /  Alb  2.3    /  TBili  0.5    /  DBili  x      /  AST  15     /  ALT  7      /  AlkPhos  60     27 Feb 2017 05:37        Magnesium, Serum: 2.9 mg/dL (02-27 @ 05:37)  Phosphorus Level, Serum: 3.1 mg/dL (02-27 @ 05:37)          RADIOLOGY & ADDITIONAL TESTS:

## 2017-02-27 NOTE — PROGRESS NOTE ADULT - SUBJECTIVE AND OBJECTIVE BOX
Internal Medicine Hospitalist - Dr. Gerardo LAL    [id number]    71y      Female    Patient is a 71y old  Female who presents with a chief complaint of knee drainage (22 Feb 2017 16:00)    HPI:  71 y F hx DM2, PAF, HTN, LIN, CKD, COPD, sent from rehab for R knee wound dehiscensce. Pt has had multiple interventions on the R knee following TKR last year, including hardware removal for infection, IV abx course and most recently adm for wound dehiscence in Dec req washout and wound closure. The patient is now transferred from rehab for persistent wound serosanguinous drainage and wound dehiscence. Pt denies CP, F/C, SOB, cough, N/V/D/C, dysuria. c/o L heel pain. (22 Feb 2017 16:00)      INTERVAL HPI/ OVERNIGHT EVENTS: Patient is seen and examined, pt denied chest pain, SOB, right knee pain, NPO schedule for OR today     REVIEW OF SYSTEMS:    Denied fever, chills, abd. pain, nausea, vomiting, chest pain, SOB, headache, dizziness    PHYSICAL EXAM:    Vital Signs Last 24 Hrs  T(C): 31.4, Max: 36.9 (02-26 @ 23:49)  T(F): 88.5, Max: 98.4 (02-26 @ 23:49)  HR: 69 (69 - 78)  BP: 106/55 (102/55 - 140/62)  BP(mean): --  RR: 18 (16 - 18)  SpO2: 100% (98% - 100%)    GENERAL: NAD  HEENT: EOMI  Neck: supple  CHEST/LUNG: Clear to percussion bilaterally; No wheezing  HEART: S1S2+ audible  ABDOMEN: Soft, Nontender, Nondistended; Bowel sounds present  EXTREMITIES:  dressing over right knee, swollen both extremities   CNS: AAO X 3  Psychiatry: normal mood    LABS:                        10.6   5.3   )-----------( 312      ( 27 Feb 2017 05:37 )             32.9     27 Feb 2017 05:37    137    |  96     |  46.0   ----------------------------<  97     3.2     |  31.0   |  3.04     Ca    8.5        27 Feb 2017 05:37  Phos  3.1       27 Feb 2017 05:37  Mg     2.9       27 Feb 2017 05:37    TPro  5.4    /  Alb  2.3    /  TBili  0.5    /  DBili  x      /  AST  15     /  ALT  7      /  AlkPhos  60     27 Feb 2017 05:37            MEDICATIONS  (STANDING):  insulin lispro (HumaLOG) corrective regimen sliding scale  SubCutaneous three times a day before meals  dextrose 5%. 1000milliLiter(s) IV Continuous <Continuous>  dextrose 50% Injectable 12.5Gram(s) IV Push once  dextrose 50% Injectable 25Gram(s) IV Push once  dextrose 50% Injectable 25Gram(s) IV Push once  simvastatin 20milliGRAM(s) Oral at bedtime  metoprolol succinate ER 50milliGRAM(s) Oral daily  buDESOnide 160 MICROgram(s)/formoterol 4.5 MICROgram(s) Inhaler 2Puff(s) Inhalation two times a day  magnesium oxide 400milliGRAM(s) Oral two times a day with meals  hydrALAZINE 50milliGRAM(s) Oral every 8 hours  meropenem IVPB  IV Intermittent   linezolid  IVPB  IV Intermittent   linezolid  IVPB 600milliGRAM(s) IV Intermittent every 12 hours  meropenem IVPB 500milliGRAM(s) IV Intermittent every 12 hours  sodium chloride 0.9%. 1000milliLiter(s) IV Continuous <Continuous>    MEDICATIONS  (PRN):  oxyCODONE  5 mG/acetaminophen 325 mG 1Tablet(s) Oral every 4 hours PRN Moderate Pain (4 - 6)  dextrose Gel 1Dose(s) Oral once PRN Blood Glucose LESS THAN 70 milliGRAM(s)/deciliter  glucagon  Injectable 1milliGRAM(s) IntraMuscular once PRN Glucose LESS THAN 70 milligrams/deciliter  docusate sodium 100milliGRAM(s) Oral two times a day PRN Constipation  ondansetron Injectable 4milliGRAM(s) IV Push every 8 hours PRN Nausea and/or Vomiting  bismuth subsalicylate Liquid 30milliLiter(s) Oral three times a day PRN nausea      RADIOLOGY & ADDITIONAL TEST

## 2017-02-27 NOTE — PROGRESS NOTE ADULT - PROBLEM SELECTOR PLAN 1
Patient s/p 6 weeks IV abx in September 2016  Patient has synovial joint aspiration by orthopedics with 19,000 nucleated cells  Gram stain with no organisms  Culture with Pseudomonas and VRE  Now on Meropenem and Linezolid based on sensitivities  Will not be able to treat long term with Linezolid due to side effects

## 2017-02-27 NOTE — PROGRESS NOTE ADULT - SUBJECTIVE AND OBJECTIVE BOX
Orthopedic PA Post op Note  Patient S/P Right knee removal of abx spacer, with reimplantation of spacer  Patient in bed comfortable  Knee immobilizer in place   Drains intact   Dorsi/Plantar flexion intact  pulse intact     Vital Signs Last 24 Hrs  T(C): 36, Max: 36.9 (02-26 @ 23:49)  T(F): 96.8, Max: 98.4 (02-26 @ 23:49)  HR: 65 (62 - 78)  BP: 82/37 (81/35 - 139/90)  BP(mean): --  RR: 12 (10 - 18)  SpO2: 100% (98% - 100%)

## 2017-02-27 NOTE — PROGRESS NOTE ADULT - ASSESSMENT
Pt is s/p a right tka infection.  Patient was seen in holding and once again the plan was reviewed with her family in attendance.  Pt understands that the mobile spacer placed previously will be removed and a static spacer placed.  I and D will also be performed as an attempt for patient to clear theinfection  Patient understands that despite i and d as well as iv antibiotics there is a chance that the infection will not be irradicated.  Patient also understands that infection needs to be cleared before considering a revision tka  Questions answered and risks and benefits discussed at length.

## 2017-02-27 NOTE — PROGRESS NOTE ADULT - SUBJECTIVE AND OBJECTIVE BOX
Pt admitted last week with acute drainage form right knee incision.  She presented with wound dehiscence 3 months ago and underwent debridement and closure.  She did well from that procedure and was discharged from plastic surgery care.  She decribes a sudden onset of drainage without pain or fever.  Culture from drainage and knee aspirate growing out pseudomonas.  Pt planned for washout and antibiotic spacer change today with Dr. Ritchie.    Pt is nonambulatory.  She is transferred with mechanical lift.   She stands supported ana limited fashion for transfers only.    Plastic surgery available for assistance today if needed.  Will d/w Dr. Ritchie.

## 2017-02-27 NOTE — PROGRESS NOTE ADULT - SUBJECTIVE AND OBJECTIVE BOX
[facility]  INFECTIOUS DISEASES AND INTERNAL MEDICINE OF [place]  =======================================================  Wilver Dela Cruz MD  Diplomates American Board of Internal Medicine and Infectious Diseases  =======================================================    SHYAM LAL     Plan for OR today    Allergies:  No Known Allergies    Antibiotics:  linezolid  IVPB 600milliGRAM(s) IV Intermittent every 12 hours  meropenem IVPB 500milliGRAM(s) IV Intermittent every 12 hours     REVIEW OF SYSTEMS:  CONSTITUTIONAL:  No fevers or chills  HEENT:  No diplopia or blurred vision. No earache, sore throat or runny nose.  CARDIOVASCULAR:  No pressure, squeezing, strangling, tightness, heaviness or aching about the chest, neck, axilla or epigastrium.  RESPIRATORY:  No cough, shortness of breath  GASTROINTESTINAL:  No nausea, vomiting or diarrhea.  GENITOURINARY:  No dysuria, frequency or urgency.   MUSCULOSKELETAL:  no muscle pain  SKIN:  Rt Knee surgical site with open wound  NEUROLOGIC:  No paresthesias  PSYCHIATRIC:  No disorder of thought or mood.  ENDOCRINE:  No heat or cold intolerance, polyuria or polydipsia.  HEMATOLOGICAL:  No easy bruising or bleeding.     Physical Exam:  Vital Signs Last 24 Hrs  T(C): 36.6, Max: 36.9 (02-26 @ 23:49)  T(F): 97.9, Max: 98.4 (02-26 @ 23:49)  HR: 73 (69 - 78)  BP: 137/70 (102/55 - 140/62)  RR: 18 (16 - 18)  SpO2: 98% (98% - 100%)    GEN: NAD, pleasant  HEENT: normocephalic and atraumatic. EOMI. PERRL.    NECK: Supple.   LUNGS: Clear to auscultation.  HEART: Regular rate and rhythm   ABDOMEN: Soft, nontender, and nondistended.  Positive bowel sounds.    : No CVA tenderness  EXTREMITIES: Rt Knee in dressing, Open wound over patella, round circumferential - seen with orthopedics   NEUROLOGIC: forgetful   PSYCHIATRIC: Appropriate affect .  SKIN: Open wound over patella, round circumferential    Labs:  27 Feb 2017 05:37    137    |  96     |  46.0   ----------------------------<  97     3.2     |  31.0   |  3.04     Ca    8.5        27 Feb 2017 05:37  Phos  3.1       27 Feb 2017 05:37  Mg     2.9       27 Feb 2017 05:37    TPro  5.4    /  Alb  2.3    /  TBili  0.5    /  DBili  x      /  AST  15     /  ALT  7      /  AlkPhos  60     27 Feb 2017 05:37                      10.6   5.3   )-----------( 312      ( 27 Feb 2017 05:37 )             32.9     LIVER FUNCTIONS - ( 27 Feb 2017 05:37 )  Alb: 2.3 g/dL / Pro: 5.4 g/dL / ALK PHOS: 60 U/L / ALT: 7 U/L / AST: 15 U/L / GGT: x           RECENT CULTURES:  02-22 .Body Fluid Knee Fluid Pseudomonas aeruginosa   Numerous White blood cells  No organisms seen   Rare Pseudomonas aeruginosa    02-22 .Body Fluid Synovial Fluid Pseudomonas aeruginosa  Enterococcus faecalis (vancomycin resistant)   Numerous White blood cells  No organisms seen   Rare Pseudomonas aeruginosa  Rare Enterococcus faecalis (vancomycin resistant)  .  TYPE: (C=Critical, N=Notification, A=Abnormal) C  TESTS:  _ VRE  DATE/TIME CALLED: _ 02/24/2017 10:22:02  CALLED TO: _ Baljeet Oates RN  READ BACK (2 Patient Identifier    02-22 .Blood Blood-Arterial XXXX XXXX   No growth at 5 days.

## 2017-02-28 ENCOUNTER — TRANSCRIPTION ENCOUNTER (OUTPATIENT)
Age: 72
End: 2017-02-28

## 2017-02-28 LAB
ALBUMIN SERPL ELPH-MCNC: 2.3 G/DL — LOW (ref 3.3–5.2)
ALP SERPL-CCNC: 58 U/L — SIGNIFICANT CHANGE UP (ref 40–120)
ALT FLD-CCNC: 8 U/L — SIGNIFICANT CHANGE UP
ANION GAP SERPL CALC-SCNC: 14 MMOL/L — SIGNIFICANT CHANGE UP (ref 5–17)
AST SERPL-CCNC: 20 U/L — SIGNIFICANT CHANGE UP
BILIRUB SERPL-MCNC: 0.7 MG/DL — SIGNIFICANT CHANGE UP (ref 0.4–2)
BUN SERPL-MCNC: 41 MG/DL — HIGH (ref 8–20)
C3 SERPL-MCNC: 59 MG/DL — LOW (ref 80–180)
CALCIUM SERPL-MCNC: 8.4 MG/DL — LOW (ref 8.6–10.2)
CHLORIDE SERPL-SCNC: 97 MMOL/L — LOW (ref 98–107)
CO2 SERPL-SCNC: 25 MMOL/L — SIGNIFICANT CHANGE UP (ref 22–29)
CREAT SERPL-MCNC: 2.78 MG/DL — HIGH (ref 0.5–1.3)
GLUCOSE SERPL-MCNC: 208 MG/DL — HIGH (ref 70–115)
HCT VFR BLD CALC: 32.9 % — LOW (ref 37–47)
HGB BLD-MCNC: 10.6 G/DL — LOW (ref 12–16)
MAGNESIUM SERPL-MCNC: 2.8 MG/DL — HIGH (ref 1.8–2.5)
MCHC RBC-ENTMCNC: 29.7 PG — SIGNIFICANT CHANGE UP (ref 27–31)
MCHC RBC-ENTMCNC: 32.2 G/DL — SIGNIFICANT CHANGE UP (ref 32–36)
MCV RBC AUTO: 92.2 FL — SIGNIFICANT CHANGE UP (ref 81–99)
PLATELET # BLD AUTO: 280 K/UL — SIGNIFICANT CHANGE UP (ref 150–400)
POTASSIUM SERPL-MCNC: 4.6 MMOL/L — SIGNIFICANT CHANGE UP (ref 3.5–5.3)
POTASSIUM SERPL-SCNC: 4.6 MMOL/L — SIGNIFICANT CHANGE UP (ref 3.5–5.3)
PROT SERPL-MCNC: 5.7 G/DL — LOW (ref 6.6–8.7)
RBC # BLD: 3.57 M/UL — LOW (ref 4.4–5.2)
RBC # FLD: 21 % — HIGH (ref 11–15.6)
SODIUM SERPL-SCNC: 136 MMOL/L — SIGNIFICANT CHANGE UP (ref 135–145)
WBC # BLD: 10.8 K/UL — SIGNIFICANT CHANGE UP (ref 4.8–10.8)
WBC # FLD AUTO: 10.8 K/UL — SIGNIFICANT CHANGE UP (ref 4.8–10.8)

## 2017-02-28 PROCEDURE — 99232 SBSQ HOSP IP/OBS MODERATE 35: CPT

## 2017-02-28 PROCEDURE — 99233 SBSQ HOSP IP/OBS HIGH 50: CPT

## 2017-02-28 RX ADMIN — OXYCODONE HYDROCHLORIDE 10 MILLIGRAM(S): 5 TABLET ORAL at 17:28

## 2017-02-28 RX ADMIN — SODIUM CHLORIDE 80 MILLILITER(S): 9 INJECTION, SOLUTION INTRAVENOUS at 22:01

## 2017-02-28 RX ADMIN — Medication 325 MILLIGRAM(S): at 09:01

## 2017-02-28 RX ADMIN — MEROPENEM 200 MILLIGRAM(S): 1 INJECTION INTRAVENOUS at 17:29

## 2017-02-28 RX ADMIN — Medication 2: at 11:17

## 2017-02-28 RX ADMIN — OXYCODONE HYDROCHLORIDE 10 MILLIGRAM(S): 5 TABLET ORAL at 22:30

## 2017-02-28 RX ADMIN — LINEZOLID 300 MILLIGRAM(S): 600 INJECTION, SOLUTION INTRAVENOUS at 17:30

## 2017-02-28 RX ADMIN — Medication 100 MILLIGRAM(S): at 05:17

## 2017-02-28 RX ADMIN — ONDANSETRON 4 MILLIGRAM(S): 8 TABLET, FILM COATED ORAL at 15:22

## 2017-02-28 RX ADMIN — Medication 100 MILLIGRAM(S): at 11:34

## 2017-02-28 RX ADMIN — Medication 325 MILLIGRAM(S): at 17:29

## 2017-02-28 RX ADMIN — Medication 1 MILLIGRAM(S): at 11:34

## 2017-02-28 RX ADMIN — ENOXAPARIN SODIUM 30 MILLIGRAM(S): 100 INJECTION SUBCUTANEOUS at 05:17

## 2017-02-28 RX ADMIN — Medication 4: at 22:00

## 2017-02-28 RX ADMIN — Medication 100 MILLIGRAM(S): at 22:00

## 2017-02-28 RX ADMIN — MEROPENEM 200 MILLIGRAM(S): 1 INJECTION INTRAVENOUS at 05:17

## 2017-02-28 RX ADMIN — OXYCODONE HYDROCHLORIDE 10 MILLIGRAM(S): 5 TABLET ORAL at 22:00

## 2017-02-28 RX ADMIN — OXYCODONE HYDROCHLORIDE 10 MILLIGRAM(S): 5 TABLET ORAL at 00:28

## 2017-02-28 RX ADMIN — LINEZOLID 300 MILLIGRAM(S): 600 INJECTION, SOLUTION INTRAVENOUS at 05:17

## 2017-02-28 RX ADMIN — OXYCODONE HYDROCHLORIDE 10 MILLIGRAM(S): 5 TABLET ORAL at 05:16

## 2017-02-28 RX ADMIN — OXYCODONE HYDROCHLORIDE 10 MILLIGRAM(S): 5 TABLET ORAL at 06:05

## 2017-02-28 RX ADMIN — Medication 1 TABLET(S): at 11:33

## 2017-02-28 RX ADMIN — OXYCODONE HYDROCHLORIDE 10 MILLIGRAM(S): 5 TABLET ORAL at 01:20

## 2017-02-28 RX ADMIN — Medication 325 MILLIGRAM(S): at 11:34

## 2017-02-28 NOTE — PROGRESS NOTE ADULT - PROBLEM SELECTOR PLAN 1
appreciate ortho input, s/p Insertion of antibiotic spacer in right knee joint  02/27/2017   cont. Abx as per ID  PT and DVT PPX as per ortho

## 2017-02-28 NOTE — PROGRESS NOTE ADULT - SUBJECTIVE AND OBJECTIVE BOX
SHYAM LAL    [id number]    71y Female is status post right total knee revision with application of antibiotic cement spacer on 2/27/17, POD # 1. Patient is doing well and is comfortable. The patient's pain is controlled using the prescribed pain medications. Denies nausea, vomiting, chest pain, shortness of breath, abdominal pain or fever. No new complaints.    MEDICATIONS  (STANDING):  lactated ringers. 1000milliLiter(s) IV Continuous <Continuous>  enoxaparin Injectable 30milliGRAM(s) SubCutaneous every 24 hours  docusate sodium 100milliGRAM(s) Oral three times a day  ferrous    sulfate 325milliGRAM(s) Oral three times a day with meals  folic acid 1milliGRAM(s) Oral daily  multivitamin 1Tablet(s) Oral daily  insulin lispro (HumaLOG) corrective regimen sliding scale  SubCutaneous Before meals and at bedtime  dextrose 5%. 1000milliLiter(s) IV Continuous <Continuous>  dextrose 50% Injectable 12.5Gram(s) IV Push once  dextrose 50% Injectable 25Gram(s) IV Push once  dextrose 50% Injectable 25Gram(s) IV Push once  oxyCODONE ER Tablet 10milliGRAM(s) Oral every 12 hours  meropenem IVPB 500milliGRAM(s) IV Intermittent every 12 hours  linezolid  IVPB 600milliGRAM(s) IV Intermittent every 12 hours    MEDICATIONS  (PRN):  acetaminophen   Tablet 650milliGRAM(s) Oral every 6 hours PRN For Temp over 38.3 C (100.94 F)  oxyCODONE IR 5milliGRAM(s) Oral every 4 hours PRN Mild Pain  oxyCODONE IR 10milliGRAM(s) Oral every 4 hours PRN Moderate Pain  HYDROmorphone  Injectable 0.5milliGRAM(s) IV Push every 4 hours PRN Severe Pain/breakthrough pain  aluminum hydroxide/magnesium hydroxide/simethicone Suspension 30milliLiter(s) Oral four times a day PRN Indigestion  ondansetron Injectable 4milliGRAM(s) IV Push every 6 hours PRN Nausea and/or Vomiting  magnesium hydroxide Suspension 30milliLiter(s) Oral daily PRN Constipation  senna 2Tablet(s) Oral at bedtime PRN Constipation  dextrose Gel 1Dose(s) Oral once PRN Blood Glucose LESS THAN 70 milliGRAM(s)/deciliter  glucagon  Injectable 1milliGRAM(s) IntraMuscular once PRN Glucose LESS THAN 70 milligrams/deciliter    Physical exam: The right knee dressing is clean, dry and intact. Knee immobilizer in place, SIGRID drain x 2 intact and functioning. Sensation to light touch is grossly intact distally. Plantar/dorsiflexion intact. Extensor hallucis longus and flexor hallucis longus are intact. No foot drop. 2+ dorsalis pedis pulse. Capillary refill is less than 2 seconds. No cyanosis.    Primary Orthopedic Assessment:  • s/p RIGHT total knee revision with application of antibiotic cement spacer    Plan:   • DVT prophylaxis as prescribed, including use of compression devices and ankle pumps  • Continuous IV Abx  • Weight bearing as tolerated in knee immobilizer of the right lower extremity, no ROM right knee  • F/U OR Cultures  • Pain control as clinically indicated

## 2017-02-28 NOTE — PROGRESS NOTE ADULT - ASSESSMENT
71 yr old female with hypertension, hyperlipidemia, LIN, CKD, COPD, paroxysmal A fib sent from rehab for right knee wound dehiscence. She was seen by orthopedics, ID. Local wound cultures growing  Pseudomonas and VRE. She was started on Meropenem and Zyvox. Her renal function was slightly worse from baseline and hence started on IVF. No obstructive disease on renal ultrasound. Cardiology consulted for clearance. She is s/p Insertion of antibiotic spacer in right knee joint  02/27/2017

## 2017-02-28 NOTE — DIETITIAN INITIAL EVALUATION ADULT. - OTHER INFO
Pt s/p closure of right knee, abx placement for infection. Pt tolerating consistent carbohydrate diet with varying intake % PO. Good glycemic control noted.

## 2017-02-28 NOTE — PROGRESS NOTE ADULT - SUBJECTIVE AND OBJECTIVE BOX
[facility]  INFECTIOUS DISEASES AND INTERNAL MEDICINE OF [place]  =======================================================  Wilver Dela Cruz MD  Diplomates American Board of Internal Medicine and Infectious Diseases  =======================================================    LUKASZ SHYAM     Patient reports no fevers or chills    s/p  right total knee revision with application of antibiotic cement spacer 2/27/17    Allergies:  No Known Allergies    Antibiotics:  linezolid  IVPB 600milliGRAM(s) IV Intermittent every 12 hours  meropenem IVPB 500milliGRAM(s) IV Intermittent every 12 hours     REVIEW OF SYSTEMS:  CONSTITUTIONAL:  No fevers or chills  HEENT:  No diplopia or blurred vision. No earache, sore throat or runny nose.  CARDIOVASCULAR:  No pressure, squeezing, strangling, tightness, heaviness or aching about the chest, neck, axilla or epigastrium.  RESPIRATORY:  No cough, shortness of breath  GASTROINTESTINAL:  No nausea, vomiting or diarrhea.  GENITOURINARY:  No dysuria, frequency or urgency.   MUSCULOSKELETAL:  no muscle pain  SKIN:  Rt Knee surgical bandage  NEUROLOGIC:  No paresthesias  PSYCHIATRIC:  No disorder of thought or mood.  ENDOCRINE:  No heat or cold intolerance, polyuria or polydipsia.  HEMATOLOGICAL:  No easy bruising or bleeding.     Physical Exam:  Vital Signs Last 24 Hrs  T(C): 36.4, Max: 36.6 (02-27 @ 11:59)  T(F): 97.5, Max: 97.9 (02-27 @ 11:59)  HR: 74 (62 - 74)  BP: 130/74 (81/35 - 139/90)  RR: 18 (10 - 18)  SpO2: 99% (98% - 100%)    GEN: NAD, pleasant  HEENT: normocephalic and atraumatic. EOMI. PERRL.    NECK: Supple.   LUNGS: Clear to auscultation.  HEART: Regular rate and rhythm   ABDOMEN: Soft, nontender, and nondistended.  Positive bowel sounds.    : No CVA tenderness  EXTREMITIES: Rt Knee in dressing, Open wound over patella, round circumferential - seen with orthopedics   NEUROLOGIC: forgetful   PSYCHIATRIC: Appropriate affect .  SKIN: Open wound over patella, round circumferential    Labs:  28 Feb 2017 06:35    136    |  97     |  41.0   ----------------------------<  208    4.6     |  25.0   |  2.78     Ca    8.4        28 Feb 2017 06:35  Phos  3.1       27 Feb 2017 05:37  Mg     2.8       28 Feb 2017 06:35    TPro  5.7    /  Alb  2.3    /  TBili  0.7    /  DBili  x      /  AST  20     /  ALT  8      /  AlkPhos  58     28 Feb 2017 06:35                      10.6   10.8  )-----------( 280      ( 28 Feb 2017 06:35 )             32.9     LIVER FUNCTIONS - ( 28 Feb 2017 06:35 )  Alb: 2.3 g/dL / Pro: 5.7 g/dL / ALK PHOS: 58 U/L / ALT: 8 U/L / AST: 20 U/L / GGT: x           RECENT CULTURES:  02-27 .Surgical Swab right tibia (swab) XXXX   no gram stain perform only one swab received XXXX    02-27 .Surgical Swab right femur (swabs) XXXX   no gram stain perform only one swab received XXXX    02-22 .Body Fluid Knee Fluid Pseudomonas aeruginosa   Numerous White blood cells  No organisms seen   Rare Pseudomonas aeruginosa    02-22 .Body Fluid Synovial Fluid Pseudomonas aeruginosa  Enterococcus faecalis (vancomycin resistant)   Numerous White blood cells  No organisms seen   Rare Pseudomonas aeruginosa  Rare Enterococcus faecalis (vancomycin resistant)  .  TYPE: (C=Critical, N=Notification, A=Abnormal) C  TESTS:  _ VRE  DATE/TIME CALLED: _ 02/24/2017 10:22:02  CALLED TO: Roberta Oates RN  READ BACK (2 Patient Identifier    02-22 .Blood Blood-Arterial XXXX XXXX   No growth at 5 days.

## 2017-02-28 NOTE — PROGRESS NOTE ADULT - PROBLEM SELECTOR PLAN 1
Patient s/p 6 weeks IV abx in September 2016  Patient has synovial joint aspiration by orthopedics with 19,000 nucleated cells  Culture with Pseudomonas and VRE  Now on Meropenem and Linezolid based on sensitivities  Will not be able to treat long term with Linezolid due to side effects  S/P OR 2/27  Will await OR cultures

## 2017-02-28 NOTE — PROGRESS NOTE ADULT - SUBJECTIVE AND OBJECTIVE BOX
Internal Medicine Hospitalist - Dr. Gerardo LAL    [id number]    71y      Female    Patient is a 71y old  Female who presents with a chief complaint of knee drainage (22 Feb 2017 16:00)    HPI:  71 y F hx DM2, PAF, HTN, LIN, CKD, COPD, sent from rehab for R knee wound dehiscensce. Pt has had multiple interventions on the R knee following TKR last year, including hardware removal for infection, IV abx course and most recently adm for wound dehiscence in Dec req washout and wound closure. The patient is now transferred from rehab for persistent wound serosanguinous drainage and wound dehiscence. Pt denies CP, F/C, SOB, cough, N/V/D/C, dysuria. c/o L heel pain. (22 Feb 2017 16:00)      INTERVAL HPI/ OVERNIGHT EVENTS: Patient is seen and examined, pt is s/p OR yesterday, denied knee pain, chest pain, SOB, sleepy    REVIEW OF SYSTEMS:    Denied fever, chills, abd. pain, nausea, vomiting, chest pain, SOB, headache, dizziness    PHYSICAL EXAM:    Vital Signs Last 24 Hrs  T(C): 36.4, Max: 36.6 (02-27 @ 11:59)  T(F): 97.5, Max: 97.9 (02-27 @ 11:59)  HR: 74 (62 - 74)  BP: 130/74 (81/35 - 139/90)  BP(mean): --  RR: 18 (10 - 18)  SpO2: 99% (98% - 100%)    GENERAL: NAD  HEENT: EOMI  Neck: supple  CHEST/LUNG: Clear to percussion bilaterally; No wheezing  HEART: S1S2+ audible  ABDOMEN: Soft, Nontender, Nondistended; Bowel sounds present  EXTREMITIES:  right knee - dressing with 2 SIGRID drain and wound vac   CNS: sleepy with arousable     LABS:                        10.6   10.8  )-----------( 280      ( 28 Feb 2017 06:35 )             32.9     28 Feb 2017 06:35    136    |  97     |  41.0   ----------------------------<  208    4.6     |  25.0   |  2.78     Ca    8.4        28 Feb 2017 06:35  Phos  3.1       27 Feb 2017 05:37  Mg     2.8       28 Feb 2017 06:35    TPro  5.7    /  Alb  2.3    /  TBili  0.7    /  DBili  x      /  AST  20     /  ALT  8      /  AlkPhos  58     28 Feb 2017 06:35            MEDICATIONS  (STANDING):  lactated ringers. 1000milliLiter(s) IV Continuous <Continuous>  enoxaparin Injectable 30milliGRAM(s) SubCutaneous every 24 hours  docusate sodium 100milliGRAM(s) Oral three times a day  ferrous    sulfate 325milliGRAM(s) Oral three times a day with meals  folic acid 1milliGRAM(s) Oral daily  multivitamin 1Tablet(s) Oral daily  insulin lispro (HumaLOG) corrective regimen sliding scale  SubCutaneous Before meals and at bedtime  dextrose 5%. 1000milliLiter(s) IV Continuous <Continuous>  dextrose 50% Injectable 12.5Gram(s) IV Push once  dextrose 50% Injectable 25Gram(s) IV Push once  dextrose 50% Injectable 25Gram(s) IV Push once  oxyCODONE ER Tablet 10milliGRAM(s) Oral every 12 hours  meropenem IVPB 500milliGRAM(s) IV Intermittent every 12 hours  linezolid  IVPB 600milliGRAM(s) IV Intermittent every 12 hours    MEDICATIONS  (PRN):  acetaminophen   Tablet 650milliGRAM(s) Oral every 6 hours PRN For Temp over 38.3 C (100.94 F)  oxyCODONE IR 5milliGRAM(s) Oral every 4 hours PRN Mild Pain  oxyCODONE IR 10milliGRAM(s) Oral every 4 hours PRN Moderate Pain  HYDROmorphone  Injectable 0.5milliGRAM(s) IV Push every 4 hours PRN Severe Pain/breakthrough pain  aluminum hydroxide/magnesium hydroxide/simethicone Suspension 30milliLiter(s) Oral four times a day PRN Indigestion  ondansetron Injectable 4milliGRAM(s) IV Push every 6 hours PRN Nausea and/or Vomiting  magnesium hydroxide Suspension 30milliLiter(s) Oral daily PRN Constipation  senna 2Tablet(s) Oral at bedtime PRN Constipation  dextrose Gel 1Dose(s) Oral once PRN Blood Glucose LESS THAN 70 milliGRAM(s)/deciliter  glucagon  Injectable 1milliGRAM(s) IntraMuscular once PRN Glucose LESS THAN 70 milligrams/deciliter      RADIOLOGY & ADDITIONAL TEST  Insertion of antibiotic spacer in right knee joint  02/27/2017    Active  CCOONS1.

## 2017-02-28 NOTE — PROGRESS NOTE ADULT - SUBJECTIVE AND OBJECTIVE BOX
NEPHROLOGY INTERVAL HPI/OVERNIGHT EVENTS:    MEDICATIONS  (STANDING):  lactated ringers. 1000milliLiter(s) IV Continuous <Continuous>  enoxaparin Injectable 30milliGRAM(s) SubCutaneous every 24 hours  docusate sodium 100milliGRAM(s) Oral three times a day  ferrous    sulfate 325milliGRAM(s) Oral three times a day with meals  folic acid 1milliGRAM(s) Oral daily  multivitamin 1Tablet(s) Oral daily  insulin lispro (HumaLOG) corrective regimen sliding scale  SubCutaneous Before meals and at bedtime  dextrose 5%. 1000milliLiter(s) IV Continuous <Continuous>  dextrose 50% Injectable 12.5Gram(s) IV Push once  dextrose 50% Injectable 25Gram(s) IV Push once  dextrose 50% Injectable 25Gram(s) IV Push once  oxyCODONE ER Tablet 10milliGRAM(s) Oral every 12 hours  meropenem IVPB 500milliGRAM(s) IV Intermittent every 12 hours  linezolid  IVPB 600milliGRAM(s) IV Intermittent every 12 hours    MEDICATIONS  (PRN):  acetaminophen   Tablet 650milliGRAM(s) Oral every 6 hours PRN For Temp over 38.3 C (100.94 F)  oxyCODONE IR 5milliGRAM(s) Oral every 4 hours PRN Mild Pain  oxyCODONE IR 10milliGRAM(s) Oral every 4 hours PRN Moderate Pain  HYDROmorphone  Injectable 0.5milliGRAM(s) IV Push every 4 hours PRN Severe Pain/breakthrough pain  aluminum hydroxide/magnesium hydroxide/simethicone Suspension 30milliLiter(s) Oral four times a day PRN Indigestion  ondansetron Injectable 4milliGRAM(s) IV Push every 6 hours PRN Nausea and/or Vomiting  magnesium hydroxide Suspension 30milliLiter(s) Oral daily PRN Constipation  senna 2Tablet(s) Oral at bedtime PRN Constipation  dextrose Gel 1Dose(s) Oral once PRN Blood Glucose LESS THAN 70 milliGRAM(s)/deciliter  glucagon  Injectable 1milliGRAM(s) IntraMuscular once PRN Glucose LESS THAN 70 milligrams/deciliter      Allergies    No Known Allergies    Intolerances        Vital Signs Last 24 Hrs  T(C): 36.4, Max: 36.4 (02-27 @ 22:51)  T(F): 97.5, Max: 97.6 (02-27 @ 22:51)  HR: 74 (62 - 74)  BP: 130/74 (81/35 - 139/90)  BP(mean): --  RR: 18 (10 - 18)  SpO2: 99% (99% - 100%)  Daily     Daily     PHYSICAL EXAM:    GENERAL: same  HEAD:    EYES: wnl  ENMT:   NECK: veins flat  NERVOUS SYSTEM:  same  CHEST/LUNG: decreased bs bases  HEART: no rub or gallop  ABDOMEN: soft, nottender  EXTREMITIES:  right knee with drains, vac. noted as well  LYMPH:   SKIN:    neg  LABS:                        10.6   10.8  )-----------( 280      ( 28 Feb 2017 06:35 )             32.9     28 Feb 2017 06:35    136    |  97     |  41.0   ----------------------------<  208    4.6     |  25.0   |  2.78     Ca    8.4        28 Feb 2017 06:35  Phos  3.1       27 Feb 2017 05:37  Mg     2.8       28 Feb 2017 06:35    TPro  5.7    /  Alb  2.3    /  TBili  0.7    /  DBili  x      /  AST  20     /  ALT  8      /  AlkPhos  58     28 Feb 2017 06:35        Magnesium, Serum: 2.8 mg/dL (02-28 @ 06:35)  C3 Complement, Serum: 59 mg/dL (02-27 @ 16:37)          RADIOLOGY & ADDITIONAL TESTS:

## 2017-02-28 NOTE — PROGRESS NOTE ADULT - PROBLEM SELECTOR PLAN 3
FS low likely due to poor oral intake, encourage po intake   Monitor fingersticks, sliding scale coverage.

## 2017-02-28 NOTE — PROGRESS NOTE ADULT - SUBJECTIVE AND OBJECTIVE BOX
pt pod 1 sp right TK revision under GETA. Tolerated well. Denies any A/c.   pt with no n/v @ this time. using pain meds as ordered/needed with some pain relief. vss. spont vent. no issues with anesthesia at this time. pt resting comfortably @ this time.

## 2017-03-01 ENCOUNTER — APPOINTMENT (OUTPATIENT)
Dept: ORTHOPEDIC SURGERY | Facility: CLINIC | Age: 72
End: 2017-03-01

## 2017-03-01 LAB
-  AMPICILLIN: SIGNIFICANT CHANGE UP
-  CIPROFLOXACIN: SIGNIFICANT CHANGE UP
-  CIPROFLOXACIN: SIGNIFICANT CHANGE UP
-  ERYTHROMYCIN: SIGNIFICANT CHANGE UP
-  LINEZOLID: SIGNIFICANT CHANGE UP
-  TETRACYCLINE: SIGNIFICANT CHANGE UP
-  VANCOMYCIN: SIGNIFICANT CHANGE UP
METHOD TYPE: SIGNIFICANT CHANGE UP
SURGICAL PATHOLOGY FINAL REPORT - CH: SIGNIFICANT CHANGE UP

## 2017-03-01 PROCEDURE — 99232 SBSQ HOSP IP/OBS MODERATE 35: CPT

## 2017-03-01 PROCEDURE — 99233 SBSQ HOSP IP/OBS HIGH 50: CPT

## 2017-03-01 RX ORDER — WARFARIN SODIUM 2.5 MG/1
5 TABLET ORAL ONCE
Qty: 0 | Refills: 0 | Status: COMPLETED | OUTPATIENT
Start: 2017-03-01 | End: 2017-03-01

## 2017-03-01 RX ORDER — SODIUM CHLORIDE 9 MG/ML
1000 INJECTION INTRAMUSCULAR; INTRAVENOUS; SUBCUTANEOUS
Qty: 0 | Refills: 0 | Status: DISCONTINUED | OUTPATIENT
Start: 2017-03-01 | End: 2017-03-02

## 2017-03-01 RX ORDER — SODIUM CHLORIDE 9 MG/ML
1000 INJECTION, SOLUTION INTRAVENOUS
Qty: 0 | Refills: 0 | Status: DISCONTINUED | OUTPATIENT
Start: 2017-03-01 | End: 2017-03-01

## 2017-03-01 RX ORDER — POLYETHYLENE GLYCOL 3350 17 G/17G
17 POWDER, FOR SOLUTION ORAL DAILY
Qty: 0 | Refills: 0 | Status: DISCONTINUED | OUTPATIENT
Start: 2017-03-01 | End: 2017-04-04

## 2017-03-01 RX ADMIN — MEROPENEM 200 MILLIGRAM(S): 1 INJECTION INTRAVENOUS at 18:40

## 2017-03-01 RX ADMIN — SODIUM CHLORIDE 80 MILLILITER(S): 9 INJECTION, SOLUTION INTRAVENOUS at 09:10

## 2017-03-01 RX ADMIN — ENOXAPARIN SODIUM 30 MILLIGRAM(S): 100 INJECTION SUBCUTANEOUS at 05:35

## 2017-03-01 RX ADMIN — OXYCODONE HYDROCHLORIDE 10 MILLIGRAM(S): 5 TABLET ORAL at 03:17

## 2017-03-01 RX ADMIN — LINEZOLID 300 MILLIGRAM(S): 600 INJECTION, SOLUTION INTRAVENOUS at 15:49

## 2017-03-01 RX ADMIN — LINEZOLID 300 MILLIGRAM(S): 600 INJECTION, SOLUTION INTRAVENOUS at 05:35

## 2017-03-01 RX ADMIN — Medication 325 MILLIGRAM(S): at 09:09

## 2017-03-01 RX ADMIN — OXYCODONE HYDROCHLORIDE 10 MILLIGRAM(S): 5 TABLET ORAL at 02:37

## 2017-03-01 RX ADMIN — SODIUM CHLORIDE 100 MILLILITER(S): 9 INJECTION INTRAMUSCULAR; INTRAVENOUS; SUBCUTANEOUS at 18:40

## 2017-03-01 RX ADMIN — OXYCODONE HYDROCHLORIDE 10 MILLIGRAM(S): 5 TABLET ORAL at 06:05

## 2017-03-01 RX ADMIN — WARFARIN SODIUM 5 MILLIGRAM(S): 2.5 TABLET ORAL at 21:40

## 2017-03-01 RX ADMIN — Medication 650 MILLIGRAM(S): at 21:40

## 2017-03-01 RX ADMIN — MEROPENEM 200 MILLIGRAM(S): 1 INJECTION INTRAVENOUS at 05:35

## 2017-03-01 RX ADMIN — SENNA PLUS 2 TABLET(S): 8.6 TABLET ORAL at 18:41

## 2017-03-01 RX ADMIN — Medication 100 MILLIGRAM(S): at 05:35

## 2017-03-01 RX ADMIN — OXYCODONE HYDROCHLORIDE 10 MILLIGRAM(S): 5 TABLET ORAL at 05:35

## 2017-03-01 NOTE — PROGRESS NOTE ADULT - SUBJECTIVE AND OBJECTIVE BOX
Internal Medicine Hospitalist - Dr. Gerardo LAL    [id number]    71y      Female    Patient is a 71y old  Female who presents with a chief complaint of knee drainage (22 Feb 2017 16:00)    HPI:  71 y F hx DM2, PAF, HTN, LIN, CKD, COPD, sent from rehab for R knee wound dehiscensce. Pt has had multiple interventions on the R knee following TKR last year, including hardware removal for infection, IV abx course and most recently adm for wound dehiscence in Dec req washout and wound closure. The patient is now transferred from rehab for persistent wound serosanguinous drainage and wound dehiscence. Pt denies CP, F/C, SOB, cough, N/V/D/C, dysuria. c/o L heel pain. (22 Feb 2017 16:00)        INTERVAL HPI/ OVERNIGHT EVENTS: Patient is seen and examined, report mild abd. pain, denied fever, chills, nausea, vomiting    REVIEW OF SYSTEMS:    Denied fever, chills, nausea, vomiting, chest pain, SOB, headache, dizziness    PHYSICAL EXAM:    Vital Signs Last 24 Hrs  T(C): 36.7, Max: 36.7 (03-01 @ 00:07)  T(F): 98.1, Max: 98.1 (03-01 @ 00:07)  HR: 75 (72 - 75)  BP: 133/66 (133/66 - 137/73)  BP(mean): --  RR: 18 (18 - 18)  SpO2: 98% (98% - 100%)    GENERAL: sleepy   HEENT: EOMI  Neck: supple  CHEST/LUNG: positive air entry   HEART: S1S2+ audible  ABDOMEN: Soft, Nontender, Nondistended; Bowel sounds present  EXTREMITIES:  R knee dressing with 2 SIGRID drain and wound vac   CNS: AAO X 3  Psychiatry: flat affect     LABS:                        10.6   10.8  )-----------( 280      ( 28 Feb 2017 06:35 )             32.9     28 Feb 2017 06:35    136    |  97     |  41.0   ----------------------------<  208    4.6     |  25.0   |  2.78     Ca    8.4        28 Feb 2017 06:35  Mg     2.8       28 Feb 2017 06:35    TPro  5.7    /  Alb  2.3    /  TBili  0.7    /  DBili  x      /  AST  20     /  ALT  8      /  AlkPhos  58     28 Feb 2017 06:35            MEDICATIONS  (STANDING):  lactated ringers. 1000milliLiter(s) IV Continuous <Continuous>  enoxaparin Injectable 30milliGRAM(s) SubCutaneous every 24 hours  docusate sodium 100milliGRAM(s) Oral three times a day  ferrous    sulfate 325milliGRAM(s) Oral three times a day with meals  folic acid 1milliGRAM(s) Oral daily  multivitamin 1Tablet(s) Oral daily  insulin lispro (HumaLOG) corrective regimen sliding scale  SubCutaneous Before meals and at bedtime  dextrose 5%. 1000milliLiter(s) IV Continuous <Continuous>  dextrose 50% Injectable 12.5Gram(s) IV Push once  dextrose 50% Injectable 25Gram(s) IV Push once  dextrose 50% Injectable 25Gram(s) IV Push once  oxyCODONE ER Tablet 10milliGRAM(s) Oral every 12 hours  meropenem IVPB 500milliGRAM(s) IV Intermittent every 12 hours  linezolid  IVPB 600milliGRAM(s) IV Intermittent every 12 hours    MEDICATIONS  (PRN):  acetaminophen   Tablet 650milliGRAM(s) Oral every 6 hours PRN For Temp over 38.3 C (100.94 F)  oxyCODONE IR 5milliGRAM(s) Oral every 4 hours PRN Mild Pain  oxyCODONE IR 10milliGRAM(s) Oral every 4 hours PRN Moderate Pain  HYDROmorphone  Injectable 0.5milliGRAM(s) IV Push every 4 hours PRN Severe Pain/breakthrough pain  aluminum hydroxide/magnesium hydroxide/simethicone Suspension 30milliLiter(s) Oral four times a day PRN Indigestion  ondansetron Injectable 4milliGRAM(s) IV Push every 6 hours PRN Nausea and/or Vomiting  magnesium hydroxide Suspension 30milliLiter(s) Oral daily PRN Constipation  senna 2Tablet(s) Oral at bedtime PRN Constipation  dextrose Gel 1Dose(s) Oral once PRN Blood Glucose LESS THAN 70 milliGRAM(s)/deciliter  glucagon  Injectable 1milliGRAM(s) IntraMuscular once PRN Glucose LESS THAN 70 milligrams/deciliter  polyethylene glycol 3350 17Gram(s) Oral daily PRN Constipation      RADIOLOGY & ADDITIONAL TEST

## 2017-03-01 NOTE — PROGRESS NOTE ADULT - SUBJECTIVE AND OBJECTIVE BOX
POD 2 s/p closure of right knee wound after TKA removal.  Pt c/o pain, afebrile.  SIGRID 110/35 serosang  VAC minimal    Right knee dressing c/d/i  Moderate edema, no sign of infection    A/P;  Continue knee immobilizer  Continue SIGRID drains  D/c incisional VAC Friday  Change outer dressing tomorrow  Plan as per orthopedic surgery

## 2017-03-01 NOTE — PROGRESS NOTE ADULT - PROBLEM SELECTOR PLAN 1
appreciate ortho input, s/p Insertion of antibiotic spacer in right knee joint  02/27/2017   cont. Abx as per ID  PT as per ortho

## 2017-03-01 NOTE — PROGRESS NOTE ADULT - SUBJECTIVE AND OBJECTIVE BOX
SHYAM LAL    [id number]    71y Female is status post right total knee revision with application of antibiotic cement spacer on 2/27/17, POD # 2. Patient is doing well and is comfortable. The patient's pain is controlled using the prescribed pain medications. No new complaints.    MEDICATIONS  (STANDING):  lactated ringers. 1000milliLiter(s) IV Continuous <Continuous>  enoxaparin Injectable 30milliGRAM(s) SubCutaneous every 24 hours  docusate sodium 100milliGRAM(s) Oral three times a day  ferrous    sulfate 325milliGRAM(s) Oral three times a day with meals  folic acid 1milliGRAM(s) Oral daily  multivitamin 1Tablet(s) Oral daily  insulin lispro (HumaLOG) corrective regimen sliding scale  SubCutaneous Before meals and at bedtime  dextrose 5%. 1000milliLiter(s) IV Continuous <Continuous>  dextrose 50% Injectable 12.5Gram(s) IV Push once  dextrose 50% Injectable 25Gram(s) IV Push once  dextrose 50% Injectable 25Gram(s) IV Push once  oxyCODONE ER Tablet 10milliGRAM(s) Oral every 12 hours  meropenem IVPB 500milliGRAM(s) IV Intermittent every 12 hours  linezolid  IVPB 600milliGRAM(s) IV Intermittent every 12 hours    MEDICATIONS  (PRN):  acetaminophen   Tablet 650milliGRAM(s) Oral every 6 hours PRN For Temp over 38.3 C (100.94 F)  oxyCODONE IR 5milliGRAM(s) Oral every 4 hours PRN Mild Pain  oxyCODONE IR 10milliGRAM(s) Oral every 4 hours PRN Moderate Pain  HYDROmorphone  Injectable 0.5milliGRAM(s) IV Push every 4 hours PRN Severe Pain/breakthrough pain  aluminum hydroxide/magnesium hydroxide/simethicone Suspension 30milliLiter(s) Oral four times a day PRN Indigestion  ondansetron Injectable 4milliGRAM(s) IV Push every 6 hours PRN Nausea and/or Vomiting  magnesium hydroxide Suspension 30milliLiter(s) Oral daily PRN Constipation  senna 2Tablet(s) Oral at bedtime PRN Constipation  dextrose Gel 1Dose(s) Oral once PRN Blood Glucose LESS THAN 70 milliGRAM(s)/deciliter  glucagon  Injectable 1milliGRAM(s) IntraMuscular once PRN Glucose LESS THAN 70 milligrams/deciliter    Physical exam: The right knee dressing is clean, dry and intact. Knee immobilizer in place, SIGRID drain x 2 intact and functioning. Sensation to light touch is grossly intact distally. Plantar/dorsiflexion intact. Extensor hallucis longus and flexor hallucis longus are intact. No foot drop. 2+ dorsalis pedis pulse. Capillary refill is less than 2 seconds. No cyanosis.    Primary Orthopedic Assessment:  • s/p RIGHT total knee revision with application of antibiotic cement spacer    Plan:   • DVT prophylaxis as prescribed, including use of compression devices and ankle pumps  • Continuous IV Abx  • F/U drain output  • Weight bearing as tolerated in knee immobilizer of the right lower extremity, no ROM right knee  • F/U OR Cultures- Enterococcus  • Pain control as clinically indicated  • Wound vac change friday

## 2017-03-01 NOTE — PROGRESS NOTE ADULT - SUBJECTIVE AND OBJECTIVE BOX
NEPHROLOGY INTERVAL HPI/OVERNIGHT EVENTS:    Examined earlier Lethargic  Oliguric    MEDICATIONS  (STANDING):  enoxaparin Injectable 30milliGRAM(s) SubCutaneous every 24 hours  docusate sodium 100milliGRAM(s) Oral three times a day  ferrous    sulfate 325milliGRAM(s) Oral three times a day with meals  folic acid 1milliGRAM(s) Oral daily  multivitamin 1Tablet(s) Oral daily  insulin lispro (HumaLOG) corrective regimen sliding scale  SubCutaneous Before meals and at bedtime  dextrose 5%. 1000milliLiter(s) IV Continuous <Continuous>  dextrose 50% Injectable 12.5Gram(s) IV Push once  dextrose 50% Injectable 25Gram(s) IV Push once  dextrose 50% Injectable 25Gram(s) IV Push once  oxyCODONE ER Tablet 10milliGRAM(s) Oral every 12 hours  meropenem IVPB 500milliGRAM(s) IV Intermittent every 12 hours  linezolid  IVPB 600milliGRAM(s) IV Intermittent every 12 hours  warfarin 5milliGRAM(s) Oral once  lactated ringers. 1000milliLiter(s) IV Continuous <Continuous>    MEDICATIONS  (PRN):  acetaminophen   Tablet 650milliGRAM(s) Oral every 6 hours PRN For Temp over 38.3 C (100.94 F)  oxyCODONE IR 5milliGRAM(s) Oral every 4 hours PRN Mild Pain  oxyCODONE IR 10milliGRAM(s) Oral every 4 hours PRN Moderate Pain  HYDROmorphone  Injectable 0.5milliGRAM(s) IV Push every 4 hours PRN Severe Pain/breakthrough pain  aluminum hydroxide/magnesium hydroxide/simethicone Suspension 30milliLiter(s) Oral four times a day PRN Indigestion  ondansetron Injectable 4milliGRAM(s) IV Push every 6 hours PRN Nausea and/or Vomiting  magnesium hydroxide Suspension 30milliLiter(s) Oral daily PRN Constipation  senna 2Tablet(s) Oral at bedtime PRN Constipation  dextrose Gel 1Dose(s) Oral once PRN Blood Glucose LESS THAN 70 milliGRAM(s)/deciliter  glucagon  Injectable 1milliGRAM(s) IntraMuscular once PRN Glucose LESS THAN 70 milligrams/deciliter  polyethylene glycol 3350 17Gram(s) Oral daily PRN Constipation      Allergies    No Known Allergies    Intolerances        Vital Signs Last 24 Hrs  T(C): 36.3, Max: 36.7 ( @ 00:07)  T(F): 97.3, Max: 98.1 ( @ 00:07)  HR: 76 (72 - 76)  BP: 133/68 (133/66 - 137/73)  BP(mean): --  RR: 18 (18 - 18)  SpO2: 100% (98% - 100%)  Daily     Daily Weight in k.3 (01 Mar 2017 07:35)    PHYSICAL EXAM:  HEENT: EOMI  Neck: supple  CHEST/LUNG: Clear to percussion bilaterally; No wheezing  HEART: S1S2+ audible  ABDOMEN: Soft, Nontender, Nondistended; Bowel sounds present  EXTREMITIES:  dressing over right knee, swollen both extremities             LABS:                        10.6   10.8  )-----------( 280      ( 2017 06:35 )             32.9     2017 06:35    136    |  97     |  41.0   ----------------------------<  208    4.6     |  25.0   |  2.78     Ca    8.4        2017 06:35  Mg     2.8       2017 06:35    TPro  5.7    /  Alb  2.3    /  TBili  0.7    /  DBili  x      /  AST  20     /  ALT  8      /  AlkPhos  58     2017 06:35                RADIOLOGY & ADDITIONAL TESTS:

## 2017-03-01 NOTE — PROGRESS NOTE ADULT - SUBJECTIVE AND OBJECTIVE BOX
[facility]  INFECTIOUS DISEASES AND INTERNAL MEDICINE OF [place]  =======================================================  Wilver Dela Cruz MD  Diplomates American Board of Internal Medicine and Infectious Diseases  =======================================================    LAL, SHYAM     Patient more lethargic, answering very simple questions    s/p  right total knee revision with application of antibiotic cement spacer 2/27/17    Allergies:  No Known Allergies    Antibiotics:  linezolid  IVPB 600milliGRAM(s) IV Intermittent every 12 hours  meropenem IVPB 500milliGRAM(s) IV Intermittent every 12 hours     REVIEW OF SYSTEMS:  CONSTITUTIONAL:  No fevers or chills  HEENT:  No diplopia or blurred vision. No earache, sore throat or runny nose.  CARDIOVASCULAR:  No pressure, squeezing, strangling, tightness, heaviness or aching about the chest, neck, axilla or epigastrium.  RESPIRATORY:  No cough, shortness of breath  GASTROINTESTINAL:  No nausea, vomiting or diarrhea.  GENITOURINARY:  No dysuria, frequency or urgency.   MUSCULOSKELETAL:  no muscle pain  SKIN:  Rt Knee surgical bandage  NEUROLOGIC:  No paresthesias  PSYCHIATRIC:  No disorder of thought or mood.  ENDOCRINE:  No heat or cold intolerance, polyuria or polydipsia.  HEMATOLOGICAL:  No easy bruising or bleeding.     Physical Exam:  Vital Signs Last 24 Hrs  T(C): 36.3, Max: 36.7 (03-01 @ 00:07)  T(F): 97.3, Max: 98.1 (03-01 @ 00:07)  HR: 76 (72 - 76)  BP: 133/68 (133/66 - 137/73)  RR: 18 (18 - 18)  SpO2: 100% (98% - 100%)    GEN: NAD, pleasant  HEENT: normocephalic and atraumatic. EOMI. PERRL.    NECK: Supple.   LUNGS: Clear to auscultation.  HEART: Regular rate and rhythm   ABDOMEN: Soft, nontender, and nondistended.  Positive bowel sounds.    : No CVA tenderness  EXTREMITIES: Rt Knee in surgical dressing, 2 SIGRID drains  NEUROLOGIC: forgetful   PSYCHIATRIC: Appropriate affect .  SKIN: Rt Knee in surgical dressing    Labs:  28 Feb 2017 06:35    136    |  97     |  41.0   ----------------------------<  208    4.6     |  25.0   |  2.78     Ca    8.4        28 Feb 2017 06:35  Mg     2.8       28 Feb 2017 06:35    TPro  5.7    /  Alb  2.3    /  TBili  0.7    /  DBili  x      /  AST  20     /  ALT  8      /  AlkPhos  58     28 Feb 2017 06:35                      10.6   10.8  )-----------( 280      ( 28 Feb 2017 06:35 )             32.9     LIVER FUNCTIONS - ( 28 Feb 2017 06:35 )  Alb: 2.3 g/dL / Pro: 5.7 g/dL / ALK PHOS: 58 U/L / ALT: 8 U/L / AST: 20 U/L / GGT: x           RECENT CULTURES:  02-27 .Surgical Swab right tibia (swab) XXXX   no gram stain perform only one swab received   Moderate Enterococcus species Identification and susceptibility to follow.  Culture in progress    02-27 .Surgical Swab right femur (swabs) XXXX   no gram stain perform only one swab received   Moderate Enterococcus species Identification and susceptibility to follow.  Culture in progress    02-22 .Body Fluid Knee Fluid Pseudomonas aeruginosa   Numerous White blood cells  No organisms seen   Rare Pseudomonas aeruginosa    02-22 .Body Fluid Synovial Fluid Pseudomonas aeruginosa  Enterococcus faecalis (vancomycin resistant)   Numerous White blood cells  No organisms seen   Rare Pseudomonas aeruginosa  Rare Enterococcus faecalis (vancomycin resistant)  .  TYPE: (C=Critical, N=Notification, A=Abnormal) C  TESTS:  _ VRE  DATE/TIME CALLED: _ 02/24/2017 10:22:02  CALLED TO: Roberta Oates RN  READ BACK (2 Patient Identifier    02-22 .Blood Blood-Arterial XXXX XXXX   No growth at 5 days.

## 2017-03-01 NOTE — PROGRESS NOTE ADULT - ASSESSMENT
KARMEN on CKD no hydro on recent renal US  Cr trending down but now UOP on low side oliguric  will change IVF to NS and inc rate to 100 cc/hr  [place]ley for strict I&Os

## 2017-03-01 NOTE — PROGRESS NOTE ADULT - PROBLEM SELECTOR PLAN 1
Patient s/p 6 weeks IV abx in September 2016  Patient has synovial joint aspiration by orthopedics with 19,000 nucleated cells  Culture with Pseudomonas and VRE  Now on Meropenem and Linezolid based on sensitivities  Will not be able to treat long term with Linezolid due to side effects  S/P OR 2/27  Will await OR cultures - to be finalized

## 2017-03-01 NOTE — PROGRESS NOTE ADULT - PROBLEM SELECTOR PLAN 5
Continue Metoprolol. discussed with Ortho, ok to restart AC Continue Metoprolol. discussed with Ortho, ok to restart AC, pt was on Xarelto, due to KARMEN, will switch her to coumdin for now, start coumadin 5mg, F/U INR

## 2017-03-01 NOTE — PROGRESS NOTE ADULT - ASSESSMENT
71 yr old female with hypertension, hyperlipidemia, LIN, CKD, COPD, paroxysmal A fib sent from rehab for right knee wound dehiscence. She was seen by orthopedics, ID. Local wound cultures growing  Pseudomonas and VRE. She was started on Meropenem and Zyvox. Her renal function was slightly worse from baseline and hence started on IVF. No obstructive disease on renal ultrasound. She is s/p Insertion of antibiotic spacer in right knee joint  02/27/2017

## 2017-03-02 DIAGNOSIS — I95.9 HYPOTENSION, UNSPECIFIED: ICD-10-CM

## 2017-03-02 DIAGNOSIS — R71.0 PRECIPITOUS DROP IN HEMATOCRIT: ICD-10-CM

## 2017-03-02 DIAGNOSIS — E11.9 TYPE 2 DIABETES MELLITUS WITHOUT COMPLICATIONS: ICD-10-CM

## 2017-03-02 DIAGNOSIS — I10 ESSENTIAL (PRIMARY) HYPERTENSION: ICD-10-CM

## 2017-03-02 DIAGNOSIS — I48.0 PAROXYSMAL ATRIAL FIBRILLATION: ICD-10-CM

## 2017-03-02 LAB
-  DAPTOMYCIN: SIGNIFICANT CHANGE UP
ALBUMIN SERPL ELPH-MCNC: 2.4 G/DL — LOW (ref 3.3–5.2)
ALP SERPL-CCNC: 77 U/L — SIGNIFICANT CHANGE UP (ref 40–120)
ALT FLD-CCNC: 10 U/L — SIGNIFICANT CHANGE UP
ANION GAP SERPL CALC-SCNC: 10 MMOL/L — SIGNIFICANT CHANGE UP (ref 5–17)
ANION GAP SERPL CALC-SCNC: 15 MMOL/L — SIGNIFICANT CHANGE UP (ref 5–17)
APPEARANCE UR: ABNORMAL
AST SERPL-CCNC: 26 U/L — SIGNIFICANT CHANGE UP
BACTERIA # UR AUTO: ABNORMAL
BILIRUB DIRECT SERPL-MCNC: 0.4 MG/DL — HIGH (ref 0–0.3)
BILIRUB INDIRECT FLD-MCNC: 0.4 MG/DL — SIGNIFICANT CHANGE UP (ref 0.2–1)
BILIRUB SERPL-MCNC: 0.8 MG/DL — SIGNIFICANT CHANGE UP (ref 0.4–2)
BILIRUB UR-MCNC: NEGATIVE — SIGNIFICANT CHANGE UP
BLD GP AB SCN SERPL QL: SIGNIFICANT CHANGE UP
BUN SERPL-MCNC: 47 MG/DL — HIGH (ref 8–20)
BUN SERPL-MCNC: 47 MG/DL — HIGH (ref 8–20)
CALCIUM SERPL-MCNC: 7.8 MG/DL — LOW (ref 8.6–10.2)
CALCIUM SERPL-MCNC: 7.9 MG/DL — LOW (ref 8.6–10.2)
CHLORIDE SERPL-SCNC: 96 MMOL/L — LOW (ref 98–107)
CHLORIDE SERPL-SCNC: 97 MMOL/L — LOW (ref 98–107)
CO2 SERPL-SCNC: 24 MMOL/L — SIGNIFICANT CHANGE UP (ref 22–29)
CO2 SERPL-SCNC: 25 MMOL/L — SIGNIFICANT CHANGE UP (ref 22–29)
COLOR SPEC: YELLOW — SIGNIFICANT CHANGE UP
CREAT SERPL-MCNC: 3.67 MG/DL — HIGH (ref 0.5–1.3)
CREAT SERPL-MCNC: 3.98 MG/DL — HIGH (ref 0.5–1.3)
DIFF PNL FLD: ABNORMAL
EPI CELLS # UR: SIGNIFICANT CHANGE UP
GLUCOSE SERPL-MCNC: 112 MG/DL — SIGNIFICANT CHANGE UP (ref 70–115)
GLUCOSE SERPL-MCNC: 157 MG/DL — HIGH (ref 70–115)
GLUCOSE UR QL: NEGATIVE MG/DL — SIGNIFICANT CHANGE UP
HCT VFR BLD CALC: 19.2 % — CRITICAL LOW (ref 37–47)
HCT VFR BLD CALC: 20.4 % — CRITICAL LOW (ref 37–47)
HCT VFR BLD CALC: 26.1 % — LOW (ref 37–47)
HGB BLD-MCNC: 6.5 G/DL — CRITICAL LOW (ref 12–16)
HGB BLD-MCNC: 6.9 G/DL — CRITICAL LOW (ref 12–16)
HGB BLD-MCNC: 8.8 G/DL — LOW (ref 12–16)
INR BLD: 1.04 RATIO — SIGNIFICANT CHANGE UP (ref 0.88–1.16)
KETONES UR-MCNC: ABNORMAL
LACTATE BLDV-MCNC: 1.2 MMOL/L — SIGNIFICANT CHANGE UP (ref 0.5–1.6)
LACTATE BLDV-MCNC: 3 MMOL/L — HIGH (ref 0.5–1.6)
LDH SERPL L TO P-CCNC: 273 U/L — HIGH (ref 98–192)
LDH SERPL L TO P-CCNC: 568 U/L — HIGH (ref 98–192)
LEUKOCYTE ESTERASE UR-ACNC: ABNORMAL
MCHC RBC-ENTMCNC: 29.4 PG — SIGNIFICANT CHANGE UP (ref 27–31)
MCHC RBC-ENTMCNC: 29.7 PG — SIGNIFICANT CHANGE UP (ref 27–31)
MCHC RBC-ENTMCNC: 30.4 PG — SIGNIFICANT CHANGE UP (ref 27–31)
MCHC RBC-ENTMCNC: 33.7 G/DL — SIGNIFICANT CHANGE UP (ref 32–36)
MCHC RBC-ENTMCNC: 33.8 G/DL — SIGNIFICANT CHANGE UP (ref 32–36)
MCHC RBC-ENTMCNC: 33.9 G/DL — SIGNIFICANT CHANGE UP (ref 32–36)
MCV RBC AUTO: 87.3 FL — SIGNIFICANT CHANGE UP (ref 81–99)
MCV RBC AUTO: 87.9 FL — SIGNIFICANT CHANGE UP (ref 81–99)
MCV RBC AUTO: 89.7 FL — SIGNIFICANT CHANGE UP (ref 81–99)
NITRITE UR-MCNC: NEGATIVE — SIGNIFICANT CHANGE UP
PH UR: 5 — SIGNIFICANT CHANGE UP (ref 4.8–8)
PLATELET # BLD AUTO: 214 K/UL — SIGNIFICANT CHANGE UP (ref 150–400)
PLATELET # BLD AUTO: 235 K/UL — SIGNIFICANT CHANGE UP (ref 150–400)
PLATELET # BLD AUTO: 245 K/UL — SIGNIFICANT CHANGE UP (ref 150–400)
POTASSIUM SERPL-MCNC: 4.1 MMOL/L — SIGNIFICANT CHANGE UP (ref 3.5–5.3)
POTASSIUM SERPL-MCNC: 4.4 MMOL/L — SIGNIFICANT CHANGE UP (ref 3.5–5.3)
POTASSIUM SERPL-MCNC: 6.4 MMOL/L — CRITICAL HIGH (ref 3.5–5.3)
POTASSIUM SERPL-SCNC: 4.1 MMOL/L — SIGNIFICANT CHANGE UP (ref 3.5–5.3)
POTASSIUM SERPL-SCNC: 4.4 MMOL/L — SIGNIFICANT CHANGE UP (ref 3.5–5.3)
POTASSIUM SERPL-SCNC: 6.4 MMOL/L — CRITICAL HIGH (ref 3.5–5.3)
PROT SERPL-MCNC: 5.3 G/DL — LOW (ref 6.6–8.7)
PROT UR-MCNC: 100 MG/DL
PROTHROM AB SERPL-ACNC: 11.4 SEC — SIGNIFICANT CHANGE UP (ref 10–13.1)
RBC # BLD: 2.14 M/UL — LOW (ref 4.4–5.2)
RBC # BLD: 2.32 M/UL — LOW (ref 4.4–5.2)
RBC # BLD: 2.35 M/UL — LOW (ref 4.4–5.2)
RBC # BLD: 2.99 M/UL — LOW (ref 4.4–5.2)
RBC # BLD: 2.99 M/UL — LOW (ref 4.4–5.2)
RBC # FLD: 19.6 % — HIGH (ref 11–15.6)
RBC # FLD: 21.1 % — HIGH (ref 11–15.6)
RBC # FLD: 21.5 % — HIGH (ref 11–15.6)
RBC CASTS # UR COMP ASSIST: ABNORMAL /HPF (ref 0–4)
RETICS #: 82.5 K/UL — SIGNIFICANT CHANGE UP (ref 25–125)
RETICS #: 95.4 K/UL — SIGNIFICANT CHANGE UP (ref 25–125)
RETICS/RBC NFR: 2.8 % — HIGH (ref 0.5–2.6)
RETICS/RBC NFR: 4.1 % — HIGH (ref 0.5–2.6)
SODIUM SERPL-SCNC: 132 MMOL/L — LOW (ref 135–145)
SODIUM SERPL-SCNC: 135 MMOL/L — SIGNIFICANT CHANGE UP (ref 135–145)
SP GR SPEC: 1.01 — SIGNIFICANT CHANGE UP (ref 1.01–1.02)
TYPE + AB SCN PNL BLD: SIGNIFICANT CHANGE UP
UROBILINOGEN FLD QL: 1 MG/DL
WBC # BLD: 10.6 K/UL — SIGNIFICANT CHANGE UP (ref 4.8–10.8)
WBC # BLD: 10.9 K/UL — HIGH (ref 4.8–10.8)
WBC # BLD: 14.5 K/UL — HIGH (ref 4.8–10.8)
WBC # FLD AUTO: 10.6 K/UL — SIGNIFICANT CHANGE UP (ref 4.8–10.8)
WBC # FLD AUTO: 10.9 K/UL — HIGH (ref 4.8–10.8)
WBC # FLD AUTO: 14.5 K/UL — HIGH (ref 4.8–10.8)
WBC UR QL: >50

## 2017-03-02 PROCEDURE — 74176 CT ABD & PELVIS W/O CONTRAST: CPT | Mod: 26

## 2017-03-02 PROCEDURE — 99233 SBSQ HOSP IP/OBS HIGH 50: CPT

## 2017-03-02 RX ORDER — SODIUM CHLORIDE 9 MG/ML
1000 INJECTION, SOLUTION INTRAVENOUS ONCE
Qty: 0 | Refills: 0 | Status: COMPLETED | OUTPATIENT
Start: 2017-03-02 | End: 2017-03-02

## 2017-03-02 RX ORDER — NALOXONE HYDROCHLORIDE 4 MG/.1ML
0.4 SPRAY NASAL ONCE
Qty: 0 | Refills: 0 | Status: COMPLETED | OUTPATIENT
Start: 2017-03-02 | End: 2017-03-02

## 2017-03-02 RX ORDER — HEPARIN SODIUM 5000 [USP'U]/ML
5000 INJECTION INTRAVENOUS; SUBCUTANEOUS EVERY 8 HOURS
Qty: 0 | Refills: 0 | Status: DISCONTINUED | OUTPATIENT
Start: 2017-03-02 | End: 2017-03-02

## 2017-03-02 RX ORDER — DAPTOMYCIN 500 MG/10ML
750 INJECTION, POWDER, LYOPHILIZED, FOR SOLUTION INTRAVENOUS
Qty: 0 | Refills: 0 | Status: DISCONTINUED | OUTPATIENT
Start: 2017-03-02 | End: 2017-03-06

## 2017-03-02 RX ORDER — HYDROMORPHONE HYDROCHLORIDE 2 MG/ML
0.5 INJECTION INTRAMUSCULAR; INTRAVENOUS; SUBCUTANEOUS EVERY 6 HOURS
Qty: 0 | Refills: 0 | Status: DISCONTINUED | OUTPATIENT
Start: 2017-03-02 | End: 2017-03-06

## 2017-03-02 RX ORDER — SODIUM CHLORIDE 9 MG/ML
500 INJECTION, SOLUTION INTRAVENOUS
Qty: 0 | Refills: 0 | Status: DISCONTINUED | OUTPATIENT
Start: 2017-03-02 | End: 2017-03-04

## 2017-03-02 RX ORDER — SENNA PLUS 8.6 MG/1
2 TABLET ORAL AT BEDTIME
Qty: 0 | Refills: 0 | Status: DISCONTINUED | OUTPATIENT
Start: 2017-03-02 | End: 2017-03-29

## 2017-03-02 RX ORDER — SODIUM CHLORIDE 9 MG/ML
1000 INJECTION, SOLUTION INTRAVENOUS
Qty: 0 | Refills: 0 | Status: DISCONTINUED | OUTPATIENT
Start: 2017-03-02 | End: 2017-03-04

## 2017-03-02 RX ORDER — SODIUM CHLORIDE 9 MG/ML
1000 INJECTION, SOLUTION INTRAVENOUS
Qty: 0 | Refills: 0 | Status: DISCONTINUED | OUTPATIENT
Start: 2017-03-02 | End: 2017-03-02

## 2017-03-02 RX ORDER — SODIUM CHLORIDE 9 MG/ML
1000 INJECTION INTRAMUSCULAR; INTRAVENOUS; SUBCUTANEOUS ONCE
Qty: 0 | Refills: 0 | Status: COMPLETED | OUTPATIENT
Start: 2017-03-02 | End: 2017-03-02

## 2017-03-02 RX ADMIN — Medication 1 TABLET(S): at 11:47

## 2017-03-02 RX ADMIN — SODIUM CHLORIDE 1000 MILLILITER(S): 9 INJECTION INTRAMUSCULAR; INTRAVENOUS; SUBCUTANEOUS at 01:43

## 2017-03-02 RX ADMIN — HEPARIN SODIUM 5000 UNIT(S): 5000 INJECTION INTRAVENOUS; SUBCUTANEOUS at 06:46

## 2017-03-02 RX ADMIN — NALOXONE HYDROCHLORIDE 0.4 MILLIGRAM(S): 4 SPRAY NASAL at 01:43

## 2017-03-02 RX ADMIN — LINEZOLID 300 MILLIGRAM(S): 600 INJECTION, SOLUTION INTRAVENOUS at 06:46

## 2017-03-02 RX ADMIN — Medication 325 MILLIGRAM(S): at 08:04

## 2017-03-02 RX ADMIN — MEROPENEM 200 MILLIGRAM(S): 1 INJECTION INTRAVENOUS at 18:15

## 2017-03-02 RX ADMIN — SODIUM CHLORIDE 2000 MILLILITER(S): 9 INJECTION, SOLUTION INTRAVENOUS at 03:46

## 2017-03-02 RX ADMIN — Medication 325 MILLIGRAM(S): at 17:00

## 2017-03-02 RX ADMIN — Medication 100 MILLIGRAM(S): at 14:54

## 2017-03-02 RX ADMIN — DAPTOMYCIN 130 MILLIGRAM(S): 500 INJECTION, POWDER, LYOPHILIZED, FOR SOLUTION INTRAVENOUS at 14:53

## 2017-03-02 RX ADMIN — SODIUM CHLORIDE 60 MILLILITER(S): 9 INJECTION, SOLUTION INTRAVENOUS at 06:47

## 2017-03-02 RX ADMIN — MEROPENEM 200 MILLIGRAM(S): 1 INJECTION INTRAVENOUS at 06:46

## 2017-03-02 RX ADMIN — SODIUM CHLORIDE 500 MILLILITER(S): 9 INJECTION, SOLUTION INTRAVENOUS at 11:05

## 2017-03-02 RX ADMIN — Medication 100 MILLIGRAM(S): at 23:23

## 2017-03-02 RX ADMIN — Medication 2: at 08:14

## 2017-03-02 RX ADMIN — Medication 325 MILLIGRAM(S): at 11:47

## 2017-03-02 RX ADMIN — Medication 1 MILLIGRAM(S): at 11:47

## 2017-03-02 RX ADMIN — SODIUM CHLORIDE 125 MILLILITER(S): 9 INJECTION, SOLUTION INTRAVENOUS at 11:05

## 2017-03-02 RX ADMIN — SENNA PLUS 2 TABLET(S): 8.6 TABLET ORAL at 23:23

## 2017-03-02 RX ADMIN — Medication 100 MILLIGRAM(S): at 07:01

## 2017-03-02 NOTE — PROGRESS NOTE ADULT - ASSESSMENT
Pt is a 70 y/o F with PMHx of DM, HTN, LIN, KARMEN on CKD, COPD, PAF, and breast CA with diagnosis of septic arthritis s/p ABX cement spacer insertion on 2/27/17. Rapid response was called last night 3/1/17 for increasing lethargy and hypotension. Lactate level 3.0 resulted in pt transfer to ICU. Pt remains lethargic and weak with minimal appetite. H&H: 6.9/20.4. Decreased urine output. Decreased appetite with no recent bowel movement.

## 2017-03-02 NOTE — CHART NOTE - NSCHARTNOTEFT_GEN_A_CORE
71 y F hx DM2, PAF, HTN, LIN, CKD, COPD, sent from rehab for R knee wound dehiscensce. Pt has had multiple interventions on the R knee following TKR last year, including hardware removal for infection, IV abx course and most recently adm for wound dehiscence in Dec req washout and wound closure. The patient is now transferred from rehab for persistent wound serosanguinous drainage and wound dehiscence. Pt denies CP, F/C, SOB, cough, N/V/D/C, dysuria. c/o L heel pain.     s/p  right total knee revision with application of antibiotic cement spacer 2/27/17  on Broad ABX.    Has developed KARMEN and was started on IVF anna placed.    Rapid response called unable to get a BP  (massively large arms, and legs even with obesity large cuffs unable to fit around thigh and arm.  temp 96F     Patient has been lethargic all day.  I was able to get a palp BP with forearm cuff and it was in mid 60's???  She had 2mm pupils and narcan was given with clear effect her last dose was 23hrs ago or long acting narcotic in setting of KARMEN .  BP repeated and was 110 by palp, and more awake.      Lactate and cultures were sent.    She has VRE and pseudomonas in wound  on linezolid and meropenum.     As she is more awake and HD stable and not tachy will ask primary team to follow lactate and vital signs.    Addendum as I write this note, I am informed of a lactate of 3.  Moving patient to ICU  Will reasses with POCUS for volume assess and ?? need for pressor if BP drops again.   Dr Cool asked to contact ID to see if  they want to enhance ABX.  Will discuss with E-ICU.

## 2017-03-02 NOTE — CHART NOTE - NSCHARTNOTEFT_GEN_A_CORE
CCM: Pt endorsed to hospitalist earlier this morning by CCPA. Alerted to SBP by RN, and noted Hbg. Pt with no change in mental status, warm, cap refill < 2, and tolerating being out of bed and eating lunch; No acute signs of bleeding, did receive IV fluids over night which could account for some change in H&H; may require blood; will defer to hospitalist service.

## 2017-03-02 NOTE — PROGRESS NOTE ADULT - SUBJECTIVE AND OBJECTIVE BOX
NEPHROLOGY INTERVAL HPI/OVERNIGHT EVENTS:    Examined earlier Lethargic  Narcan yest more alert today   HyperKalemia severe hemolysis   ordered repeat BMP      MEDICATIONS  (STANDING):  docusate sodium 100milliGRAM(s) Oral three times a day  ferrous    sulfate 325milliGRAM(s) Oral three times a day with meals  folic acid 1milliGRAM(s) Oral daily  multivitamin 1Tablet(s) Oral daily  insulin lispro (HumaLOG) corrective regimen sliding scale  SubCutaneous Before meals and at bedtime  dextrose 50% Injectable 12.5Gram(s) IV Push once  dextrose 50% Injectable 25Gram(s) IV Push once  dextrose 50% Injectable 25Gram(s) IV Push once  meropenem IVPB 500milliGRAM(s) IV Intermittent every 12 hours  senna 2Tablet(s) Oral at bedtime  multiple electrolytes Injection Type 1 500milliLiter(s) IV Continuous <Continuous>  multiple electrolytes Injection Type 1 1000milliLiter(s) IV Continuous <Continuous>  DAPTOmycin IVPB 750milliGRAM(s) IV Intermittent every 48 hours    MEDICATIONS  (PRN):  acetaminophen   Tablet 650milliGRAM(s) Oral every 6 hours PRN For Temp over 38.3 C (100.94 F)  aluminum hydroxide/magnesium hydroxide/simethicone Suspension 30milliLiter(s) Oral four times a day PRN Indigestion  ondansetron Injectable 4milliGRAM(s) IV Push every 6 hours PRN Nausea and/or Vomiting  dextrose Gel 1Dose(s) Oral once PRN Blood Glucose LESS THAN 70 milliGRAM(s)/deciliter  glucagon  Injectable 1milliGRAM(s) IntraMuscular once PRN Glucose LESS THAN 70 milligrams/deciliter  polyethylene glycol 3350 17Gram(s) Oral daily PRN Constipation  HYDROmorphone  Injectable 0.5milliGRAM(s) IV Push every 6 hours PRN Severe Pain (7 - 10)      Allergies    No Known Allergies    Intolerances        Vital Signs Last 24 Hrs  T(C): 36.4, Max: 36.6 (-02 @ 03:00)  T(F): 97.6, Max: 97.9 (- @ 03:00)  HR: 60 (58 - 91)  BP: 122/84 (81/53 - 122/84)  BP(mean): 74 (62 - 87)  RR: 16 (12 - 22)  SpO2: 96% (96% - 100%)  Daily     Daily Weight in k (02 Mar 2017 08:00)    PHYSICAL EXAM:  HEENT: EOMI  Neck: supple  CHEST/LUNG: Clear to percussion bilaterally; No wheezing  HEART: S1S2+ audible  ABDOMEN: Soft, Nontender, Nondistended; Bowel sounds present  EXTREMITIES:  dressing over right knee, swollen both extremities           LABS:                        6.5    10.6  )-----------( 214      ( 02 Mar 2017 11:13 )             19.2     02 Mar 2017 08:23    x      |  x      |  x      ----------------------------<  x      4.1     |  x      |  x        Ca    7.9        02 Mar 2017 06:52      PT/INR - ( 02 Mar 2017 06:52 )   PT: 11.4 sec;   INR: 1.04 ratio           Urinalysis Basic - ( 02 Mar 2017 08:31 )    Color: Yellow / Appearance: Slightly Turbid / S.015 / pH: x  Gluc: x / Ketone: Trace  / Bili: Negative / Urobili: 1 mg/dL   Blood: x / Protein: 100 mg/dL / Nitrite: Negative   Leuk Esterase: Moderate / RBC: 11-25 /HPF / WBC >50   Sq Epi: x / Non Sq Epi: Occasional / Bacteria: Occasional              RADIOLOGY & ADDITIONAL TESTS:

## 2017-03-02 NOTE — PROGRESS NOTE ADULT - SUBJECTIVE AND OBJECTIVE BOX
[facility]  INFECTIOUS DISEASES AND INTERNAL MEDICINE OF [place]  =======================================================  Wilver Dela Cruz MD  Diplomates American Board of Internal Medicine and Infectious Diseases  =======================================================    SHYAM LAL     Patient became more lethargic last night and was a rapid response  She was given Narcan and improved rapidly     s/p  right total knee revision with application of antibiotic cement spacer 17    Allergies:  No Known Allergies    Antibiotics:  linezolid  IVPB 600milliGRAM(s) IV Intermittent every 12 hours  meropenem IVPB 500milliGRAM(s) IV Intermittent every 12 hours     REVIEW OF SYSTEMS:  CONSTITUTIONAL:  No fevers or chills  HEENT:  No diplopia or blurred vision. No earache, sore throat or runny nose.  CARDIOVASCULAR:  No pressure, squeezing, strangling, tightness, heaviness or aching about the chest, neck, axilla or epigastrium.  RESPIRATORY:  No cough, shortness of breath  GASTROINTESTINAL:  No nausea, vomiting or diarrhea.  GENITOURINARY:  No dysuria, frequency or urgency.   MUSCULOSKELETAL:  no muscle pain  SKIN:  Rt Knee surgical bandage  NEUROLOGIC:  No paresthesias  PSYCHIATRIC:  No disorder of thought or mood.  ENDOCRINE:  No heat or cold intolerance, polyuria or polydipsia.  HEMATOLOGICAL:  No easy bruising or bleeding.     Physical Exam:  Vital Signs Last 24 Hrs  T(C): 36.4, Max: 36.6 (- @ 03:00)  T(F): 97.6, Max: 97.9 (- @ 03:00)  HR: 85 (76 - 91)  BP: 81/53 (81/53 - 128/64)  BP(mean): 62 (62 - 87)  RR: 19 (13 - 22)  SpO2: 100% (98% - 100%)    GEN: NAD, pleasant  HEENT: normocephalic and atraumatic. EOMI. PERRL.    NECK: Supple.   LUNGS: Clear to auscultation.  HEART: Regular rate and rhythm   ABDOMEN: Soft, nontender, and nondistended.  Positive bowel sounds.    : No CVA tenderness  EXTREMITIES: Rt Knee in surgical dressing, 2 SIGRID drains  NEUROLOGIC: forgetful   PSYCHIATRIC: Appropriate affect .  SKIN: Rt Knee in surgical dressing    Labs:  02 Mar 2017 08:23    x      |  x      |  x      ----------------------------<  x      4.1     |  x      |  x        Ca    7.9        02 Mar 2017 06:52                            6.9    10.9  )-----------( 245      ( 02 Mar 2017 06:52 )             20.4       PT/INR - ( 02 Mar 2017 06:52 )   PT: 11.4 sec;   INR: 1.04 ratio           Urinalysis Basic - ( 02 Mar 2017 08:31 )    Color: Yellow / Appearance: Slightly Turbid / S.015 / pH: x  Gluc: x / Ketone: Trace  / Bili: Negative / Urobili: 1 mg/dL   Blood: x / Protein: 100 mg/dL / Nitrite: Negative   Leuk Esterase: Moderate / RBC: 11-25 /HPF / WBC >50   Sq Epi: x / Non Sq Epi: Occasional / Bacteria: Occasional    RECENT CULTURES:   .Surgical Swab right tibia (swab) Enterococcus faecalis (vancomycin resistant)   no gram stain perform only one swab received   Moderate Enterococcus faecalis (vancomycin resistant)  .  TYPE: (C=Critical, N=Notification, A=Abnormal) c  TESTS:  _ VRE  DATE/TIME CALLED: _ 2017 16:54:36  CALLED TO: Roberta segura  READ BACK (2 Patient Identifiers)(Y/N): _ y  READ DAMION     .Surgical Swab right femur (swabs) Enterococcus faecalis (vancomycin resistant)   no gram stain perform only one swab received   Moderate Enterococcus faecalis (vancomycin resistant)  Culture in progress  .  TYPE: (C=Critical, N=Notification, A=Abnormal) c  TESTS:  _ VRE  DATE/TIME CALLED: _ 2017 16:40:40  CALLED TO: Roberta griggs  READ BACK (2 Patient Identifiers)     .Body Fluid Knee Fluid Pseudomonas aeruginosa   Numerous White blood cells  No organisms seen   Rare Pseudomonas aeruginosa     .Body Fluid Synovial Fluid Pseudomonas aeruginosa  Enterococcus faecalis (vancomycin resistant)   Numerous White blood cells  No organisms seen   Rare Pseudomonas aeruginosa  Rare Enterococcus faecalis (vancomycin resistant)  .  TYPE: (C=Critical, N=Notification, A=Abnormal) C  TESTS:  _ VRE  DATE/TIME CALLED: _ 2017 10:22:02  CALLED TO: _ Baljeet Oates RN  READ BACK (2 Patient Identifier     .Blood Blood-Arterial XXXX XXXX   No growth at 5 days.

## 2017-03-02 NOTE — PROGRESS NOTE ADULT - PROBLEM SELECTOR PLAN 3
Continue IV ABX Meropenem and Zyvox. Consult with ID for antibiotic choice. Continue consult with Ortho for progress.

## 2017-03-02 NOTE — PHARMACY COMMUNICATION NOTE - COMMENTS
Spoke with PA regarding coumadin being given and no INR listed. PA said he is going to order an INR for the morning but wants to keep the heparin on for now.

## 2017-03-02 NOTE — CHART NOTE - NSCHARTNOTEFT_GEN_A_CORE
Rapid Response / Code Sepsis    71 y F hx DM2, PAF, HTN, LIN, CKD, COPD, sent from rehab for R knee wound dehiscence. She is s/p insertion of antibiotic spacer in right knee joint  02/27/2017. Rapid Response called for hypotension. As per patients nurse, patient has been increasingly lethargic. Patient seen and examined by rapid response team. Chart and labs reviewed. Manual BP via doppler of right forearm - SBP of 60 noted. Rapid Response / Code Sepsis    71 y F hx DM2, PAF, HTN, LIN, CKD, COPD, sent from rehab for R knee wound dehiscence. She is s/p insertion of antibiotic spacer in right knee joint  02/27/2017. Rapid Response called for hypotension. As per patients nurse, patient has been increasingly lethargic. Patient seen and examined by rapid response team and ICU PA. Chart and labs reviewed. She was given Oxycodone IR and ER on 2/28AM.  Patient is arousable and is AAO x 3. Denies any discomfort. Manual BP via doppler of right forearm - SBP of 60 noted. Narcan 0.4mg IV x 1 push and 1L bolus given. Repeat SBP via doppler of right forearm noted to be 110.     Vitals:  General - Lethargic but arousable  HEENT - NC/AT, PERRLA, EOMI  Cardio - RRR, +S1S2  Resp - CTABL  Abdomen - + BS, ND, NT   - Gaytan in place - 30ml noted  Extremities - right knee immobilizer noted         A/P 70 y/o female w/  1) Hypotension possibly secondary to sepsis   - 1L NS bolus given  - cbc, cmp, mg, phos, lactate, blood cultures x 2  - Continue Zyvox and Meropenem  - will consider transfer to ICU if lactate is elevated     2) KARMEN on CKD  - Continue IVF NS at 100ml/hr    PGY 3 Pablo Rapid Response / Code Sepsis    71 y F hx DM2, PAF, HTN, LIN, CKD, COPD, sent from rehab for R knee wound dehiscence. She is s/p insertion of antibiotic spacer in right knee joint  02/27/2017. Rapid Response called for hypotension. As per patients nurse, patient has been increasingly lethargic. Patient seen and examined by rapid response team and ICU PA. Chart and labs reviewed. She was given Oxycodone IR and ER on 2/28AM.  Patient is arousable and is AAO x 3. Denies any discomfort. Manual BP via doppler of right forearm - SBP of 60 noted. Narcan 0.4mg IV x 1 push and 1L bolus given. Repeat SBP via doppler of right forearm noted to be 110.     Vitals: T: 96F (rectal) BP: Approx 60SBP (doppler) HR: 88 RR: 17 O2sat: 98%  General - Lethargic but arousable  HEENT - NC/AT, PERRLA, EOMI  Cardio - RRR, +S1S2  Resp - CTABL  Abdomen - + BS, ND, NT   - Gaytan in place - 30ml noted  Extremities - right knee immobilizer noted         A/P 72 y/o female w/  1) Hypotension possibly secondary to sepsis   - 1L NS bolus given  - cbc, cmp, mg, phos, lactate, blood cultures x 2  - Continue Zyvox and Meropenem  - will consider transfer to ICU if lactate is elevated     2) KARMEN on CKD  - Continue IVF NS at 100ml/hr    PGY 3 Pablo    Addendum - Lactate is 3.0. ICU PA made aware of results. Patient to be transferred to the ICU. Discussed above plan with Dr Cool. Contacted ID Dr Dela Cruz - for further recommendations about ABX - awaiting call back.

## 2017-03-02 NOTE — PROGRESS NOTE ADULT - SUBJECTIVE AND OBJECTIVE BOX
SHYAM LAL    [id number]    History: 71y Female is status post rright Knee revision with abx spacer placement POD # 3. being tx in MICU, likeley being transferred to T today. Patient is doing well and is comfortable. The patient's pain is controlled using the prescribed pain medications. Denies constitutional sx, nausea, vomiting, chest pain, shortness of breath, abdominal pain, LE N/T. No new complaints.   Afebrile. vac with minimal output. SIGRID #1: 80CC/12HRS,45CC/12HRS.   SIGRID#2: 140CC/24HRS,30CC/12HRS    Vital Signs Last 24 Hrs  T(C): 36.4, Max: 36.6 (03-02 @ 03:00)  T(F): 97.6, Max: 97.9 (03-02 @ 03:00)  HR: 85 (76 - 91)  BP: 106/51 (90/54 - 128/64)  BP(mean): 74 (68 - 87)  RR: 18 (13 - 20)  SpO2: 100% (98% - 100%)          02 Mar 2017 08:23    x      |  x      |  x      ----------------------------<  x      4.1     |  x      |  x        Ca    7.9        02 Mar 2017 06:52        MEDICATIONS  (STANDING):  docusate sodium 100milliGRAM(s) Oral three times a day  ferrous    sulfate 325milliGRAM(s) Oral three times a day with meals  folic acid 1milliGRAM(s) Oral daily  multivitamin 1Tablet(s) Oral daily  insulin lispro (HumaLOG) corrective regimen sliding scale  SubCutaneous Before meals and at bedtime  dextrose 50% Injectable 12.5Gram(s) IV Push once  dextrose 50% Injectable 25Gram(s) IV Push once  dextrose 50% Injectable 25Gram(s) IV Push once  meropenem IVPB 500milliGRAM(s) IV Intermittent every 12 hours  linezolid  IVPB 600milliGRAM(s) IV Intermittent every 12 hours  heparin  Injectable 5000Unit(s) SubCutaneous every 8 hours  multiple electrolytes Injection Type 1 1000milliLiter(s) IV Continuous <Continuous>  senna 2Tablet(s) Oral at bedtime    MEDICATIONS  (PRN):  acetaminophen   Tablet 650milliGRAM(s) Oral every 6 hours PRN For Temp over 38.3 C (100.94 F)  aluminum hydroxide/magnesium hydroxide/simethicone Suspension 30milliLiter(s) Oral four times a day PRN Indigestion  ondansetron Injectable 4milliGRAM(s) IV Push every 6 hours PRN Nausea and/or Vomiting  dextrose Gel 1Dose(s) Oral once PRN Blood Glucose LESS THAN 70 milliGRAM(s)/deciliter  glucagon  Injectable 1milliGRAM(s) IntraMuscular once PRN Glucose LESS THAN 70 milligrams/deciliter  polyethylene glycol 3350 17Gram(s) Oral daily PRN Constipation  HYDROmorphone  Injectable 0.5milliGRAM(s) IV Push every 6 hours PRN Severe Pain (7 - 10)      Physical exam:   ace/webril changed. wound vac in place, functioning well, no surrounding erythema or discharge. drain sites C/D/I.  No drainage or discharge. No skin blistering .positive swelling. No ecchymosis. compartments compressible, non tender.  Sensation to light touch is grossly intact distally. Gross motor intact.: postive dorsi/plantar. Pulses appreciated. Capillary refill is less than 2 seconds. No cyanosis.    Primary Orthopedic Assessment:  • s/p RIGHT total knee revision with placement of abx spacer, closure by plastics    Secondary  Orthopedic Assessment(s):   •     Secondary  Medical Assessment(s):   •     Plan:   • DVT prophylaxis as prescribed, including use of compression devices and ankle pumps  • PT/OT    Continue IV abx as per ID  • Weightbearing as tolerated of the right lower extremity in Knee immobilizer with assistance of a walker  • Incentive spirometry encouraged  • Pain control as clinically indicated   remove vac dressing tomorrow   maintain drains, monitor output  • ortho to follow  continue care w primary team

## 2017-03-02 NOTE — PROGRESS NOTE ADULT - PROBLEM SELECTOR PLAN 7
Discontinue Metoprolol until BP stable. Discontinue Coumadin until H&H improve. Discontinue Metoprolol until BP stable. Discontinue Coumadin due to severe anemia

## 2017-03-02 NOTE — CHART NOTE - NSCHARTNOTEFT_GEN_A_CORE
Rapid Response    71 y F hx DM2, PAF, HTN, LIN, CKD, COPD, sent from rehab for R knee wound dehiscence. She is s/p insertion of antibiotic spacer in right knee joint  02/27/2017. Rapid Response called for hypotension. As per patients nurse, aide performed BP on the right forearm with a manual BP cuff and SBP was noted to be approx 80. Patient seen and examined by rapid response team. Chart and labs reviewed. Hb/Hct noted to be 6.5/19.2 this morning. Three units of PRBCs ordered. Patient has received one unit so far. Source of bleeding unknown. CT abdomen was inconclusive. Nurse denies any episodes of dark or bloody stools. Repeat BP on right forearm via doppler noted to be 102/72. Patient denies any complaints.    Vitals:  General Rapid Response    71 y F hx DM2, PAF, HTN, LIN, CKD, COPD, sent from rehab for R knee wound dehiscence. She is s/p insertion of antibiotic spacer in right knee joint  02/27/2017. Rapid Response called for hypotension. As per patients nurse, aide performed BP on the right forearm with a manual BP cuff and SBP was noted to be approx 80. Patient seen and examined by rapid response team. Chart and labs reviewed. Hb/Hct noted to be 6.5/19.2 this morning. Three units of PRBCs ordered. Patient has received one unit so far. Source of bleeding unknown. CT abdomen was inconclusive. Nurse denies any episodes of dark or bloody stools. Repeat BP on right forearm via doppler noted to be 102/72. Patient denies any complaints.    Vitals: T: 98.1 BP : 102/72 HR: 56 RR:16 O2Sat - 96% on 3l NC  General: NAD  HEENT: NC/AT, PERRLA, EOMI  Resp : CTABL  Cardio: RRR+S1S2  Abdomen: +BS, Soft, ND,NT  Extremities - right knee immobilizer noted  Neuro - AAO x 3, CN [id number]      A/P 72 y/o with  1) Hypotension likely secondary to Anemia  - improving  - CBC, BMP, Fecal Occult to be sent  - 1 Unit of PRBC given, 2 more units to be given, f/u up CBC  - CT Abdomen - Neg    2) Infection of Prosthetic Knee (right)  - Continue Daptomycin and Meropenem    Case discussed with Dr Raya. Agrees with current plan and management.  PGY3 Pablo

## 2017-03-02 NOTE — PROGRESS NOTE ADULT - PROBLEM SELECTOR PLAN 2
Repeat CBC. Discontinue Coumadin. Plan for Blood transfusion if persistently decreased. Discontinue Coumadin. will transfuse 2 units of PRBC, f/u H&H  occult blood  CT abdomen to r/o RAP  f/u cbc

## 2017-03-02 NOTE — CHART NOTE - NSCHARTNOTEFT_GEN_A_CORE
3/2/17 07:20hrs:  case signed out to dr. dyer after correction  of altered mental status secondary to long acting narcotic.  short acting narcotic added only for severe pain.  medical floor team to follow up on lactate trend, repeat labs being sent   now.

## 2017-03-02 NOTE — PROGRESS NOTE ADULT - PROBLEM SELECTOR PLAN 1
Patient s/p 6 weeks IV abx in September 2016  Patient has synovial joint aspiration by orthopedics with 19,000 nucleated cells  Culture with Pseudomonas and VRE  OR from 2/27 culture with VRE  Will D/C Linezolid and start Daptomycin 750mg q 48hours  Continue Meropenem based on sensitivities

## 2017-03-02 NOTE — PROGRESS NOTE ADULT - PROBLEM SELECTOR PLAN 1
Monitor Blood pressure. Assess lactate trend. Discontinue Oxycodone. likely due to dehydration and narcotics, d/c'd oxycontin   increase IVD, monitor BP   d/c'd antihypertensive   transfuse 2 units of blood, CT abdomen to r/o RPB

## 2017-03-02 NOTE — PROGRESS NOTE ADULT - ASSESSMENT
KARMEN on CKD no hydro on recent renal US  Cr trending down but now UOP on low side oliguric  Cont IVF plasmalyte 125 cc/hr  Folley for strict I&Os  Anemia severe agree w blood transfusion  will check iron studies, should check stool for occult blood  No retroperitoneal hematoma on CT abd pelvis

## 2017-03-02 NOTE — PROGRESS NOTE ADULT - SUBJECTIVE AND OBJECTIVE BOX
Internal Medicine Hospitalist - Dr. Gerardo LAL    [id number]    71y      Female    Patient is a 71y old  Female who presents with a chief complaint of knee drainage (2017 16:00)    HPI:  71 y F hx DM2, PAF, HTN, LIN, CKD, COPD, sent from rehab for R knee wound dehiscensce. Pt has had multiple interventions on the R knee following TKR last year, including hardware removal for infection, IV abx course and most recently adm for wound dehiscence in Dec req washout and wound closure. The patient is now transferred from rehab for persistent wound serosanguinous drainage and wound dehiscence. Pt denies CP, F/C, SOB, cough, N/V/D/C, dysuria. c/o L heel pain. (2017 16:00) Pt was called for rapid response (3/1/17) for hypotension and worsening lethargy, likely due to Oxycodone IR and ER. Pt was administered Narcan 0.4 IV x 1 push with 1 L bolus. Pt was transferred to ICU for Lactate 3.0. (3/2/17 @1:52 AM). Pt placed on contact precautions for sepsis.         INTERVAL HPI/ OVERNIGHT EVENTS: Patient is seen and examined, lethargy is persistent with slight improvement, patient remains hypotensive.   Pt has no appetite for breakfast, but requests water due to dry mouth and thirst. Denies recent bowel movement.          REVIEW OF SYSTEMS:  Pt reports 6/10 pain in the L foot located in the plantar region   Denied fever, chills, abd. pain, nausea, vomiting, chest pain, SOB, headache, dizziness    PHYSICAL EXAM:    Vital Signs Last 24 Hrs  T(C): 36.4, Max: 36.6 (- @ 03:00)  T(F): 97.6, Max: 97.9 (- @ 03:00)  HR: 85 (76 - 91)  BP: 81/53 (81/53 - 128/64)  BP(mean): 62 (62 - 87)  RR: 19 (13 - 22)  SpO2: 100% (98% - 100%)    GENERAL: NAD, appears weak  HEENT: EOMI  Neck: supple, no lymphadenopathy  CHEST/LUNG: Clear to percussion bilaterally; No wheezing, rales, or ronchi  HEART: S1S2+ audible, no murmurs, rubs, gallops  ABDOMEN: Soft, Nontender, Nondistended; Bowel sounds present  EXTREMITIES: Edema of b/l LE. Strength 1/5 LLE, 0/5 RLE 3/3 b/l UE   CNS: Normal memory, patient significantly lethargic, trouble remaining awake.   Psychiatry: normal mood    LABS:                        6.9    10.9  )-----------( 245      ( 02 Mar 2017 06:52 )             20.4     02 Mar 2017 08:23    132      |  97      |  47      ----------------------------<     157      4.1     |  25.0      |  3.67        Ca    7.9        02 Mar 2017 06:52      PT/INR - ( 02 Mar 2017 06:52 )   PT: 11.4 sec;   INR: 1.04 ratio    Lactate: 1.2 ( 02 Mar 2017 6:52 )        Urinalysis Basic - ( 02 Mar 2017 08:31 )    Color: Yellow / Appearance: Slightly Turbid / S.015 / pH: x  Gluc: x / Ketone: Trace  / Bili: Negative / Urobili: 1 mg/dL   Blood: x / Protein: 100 mg/dL / Nitrite: Negative   Leuk Esterase: Moderate / RBC: 11-25 /HPF / WBC >50   Sq Epi: x / Non Sq Epi: Occasional / Bacteria: Occasional          MEDICATIONS  (STANDING):  docusate sodium 100milliGRAM(s) Oral three times a day  ferrous    sulfate 325milliGRAM(s) Oral three times a day with meals  folic acid 1milliGRAM(s) Oral daily  multivitamin 1Tablet(s) Oral daily  insulin lispro (HumaLOG) corrective regimen sliding scale  SubCutaneous Before meals and at bedtime  dextrose 50% Injectable 12.5Gram(s) IV Push once  dextrose 50% Injectable 25Gram(s) IV Push once  dextrose 50% Injectable 25Gram(s) IV Push once  meropenem IVPB 500milliGRAM(s) IV Intermittent every 12 hours  linezolid  IVPB 600milliGRAM(s) IV Intermittent every 12 hours  heparin  Injectable 5000Unit(s) SubCutaneous every 8 hours  senna 2Tablet(s) Oral at bedtime  multiple electrolytes Injection Type 1 500milliLiter(s) IV Continuous <Continuous>  multiple electrolytes Injection Type 1 1000milliLiter(s) IV Continuous <Continuous>    MEDICATIONS  (PRN):  acetaminophen   Tablet 650milliGRAM(s) Oral every 6 hours PRN For Temp over 38.3 C (100.94 F)  aluminum hydroxide/magnesium hydroxide/simethicone Suspension 30milliLiter(s) Oral four times a day PRN Indigestion  ondansetron Injectable 4milliGRAM(s) IV Push every 6 hours PRN Nausea and/or Vomiting  dextrose Gel 1Dose(s) Oral once PRN Blood Glucose LESS THAN 70 milliGRAM(s)/deciliter  glucagon  Injectable 1milliGRAM(s) IntraMuscular once PRN Glucose LESS THAN 70 milligrams/deciliter  polyethylene glycol 3350 17Gram(s) Oral daily PRN Constipation  HYDROmorphone  Injectable 0.5milliGRAM(s) IV Push every 6 hours PRN Severe Pain (7 - 10)      RADIOLOGY & ADDITIONAL TEST Internal Medicine Hospitalist - Dr. Gerardo LAL    [id number]    71y      Female    Patient is a 71y old  Female who presents with a chief complaint of knee drainage (2017 16:00)    HPI:  71 y F hx DM2, PAF, HTN, LIN, CKD, COPD, sent from rehab for R knee wound dehiscensce. Pt has had multiple interventions on the R knee following TKR last year, including hardware removal for infection, IV abx course and most recently adm for wound dehiscence in Dec req washout and wound closure. The patient is now transferred from rehab for persistent wound serosanguinous drainage and wound dehiscence. Pt denies CP, F/C, SOB, cough, N/V/D/C, dysuria. c/o L heel pain. (2017 16:00)     INTERVAL HPI/ OVERNIGHT EVENTS: Patient is seen and examined, Pt was called for rapid response (3/1/17) for hypotension and worsening lethargy, likely due to Oxycodone IR and ER. Pt was administered Narcan 0.4 IV x 1 push with 1 L bolus. Pt was transferred to ICU. Pt is lethargy but responsive, able answer question, Pt has no appetite for breakfast, but requests water due to dry mouth and thirst. Denies recent bowel movement.        REVIEW OF SYSTEMS:  Pt reports 6/10 pain in the L foot located in the plantar region   Denied fever, chills, abd. pain, nausea, vomiting, chest pain, SOB, headache, dizziness    PHYSICAL EXAM:    Vital Signs Last 24 Hrs  T(C): 36.4, Max: 36.6 (- @ 03:00)  T(F): 97.6, Max: 97.9 (- @ 03:00)  HR: 85 (76 - 91)  BP: 81/53 (81/53 - 128/64)  BP(mean): 62 (62 - 87)  RR: 19 (13 - 22)  SpO2: 100% (98% - 100%)    GENERAL: NAD, appears weak  HEENT: EOMI  Neck: supple, no lymphadenopathy  CHEST/LUNG: positive air entry, decrease breath sounds over bases   HEART: S1S2+ audible,   ABDOMEN: Soft, Nontender, Nondistended; Bowel sounds present  EXTREMITIES: Edema of b/l LE. Strength 1/5 LLE, 0/5 RLE 3/3 b/l UE   CNS: Normal memory, lethragic but responsive, able to answer questions    Psychiatry: normal mood    LABS:                        6.9    10.9  )-----------( 245      ( 02 Mar 2017 06:52 )             20.4     02 Mar 2017 08:23    132      |  97      |  47      ----------------------------<     157      4.1     |  25.0      |  3.67        Ca    7.9        02 Mar 2017 06:52      PT/INR - ( 02 Mar 2017 06:52 )   PT: 11.4 sec;   INR: 1.04 ratio    Lactate: 1.2 ( 02 Mar 2017 6:52 )        Urinalysis Basic - ( 02 Mar 2017 08:31 )    Color: Yellow / Appearance: Slightly Turbid / S.015 / pH: x  Gluc: x / Ketone: Trace  / Bili: Negative / Urobili: 1 mg/dL   Blood: x / Protein: 100 mg/dL / Nitrite: Negative   Leuk Esterase: Moderate / RBC: 11-25 /HPF / WBC >50   Sq Epi: x / Non Sq Epi: Occasional / Bacteria: Occasional          MEDICATIONS  (STANDING):  docusate sodium 100milliGRAM(s) Oral three times a day  ferrous    sulfate 325milliGRAM(s) Oral three times a day with meals  folic acid 1milliGRAM(s) Oral daily  multivitamin 1Tablet(s) Oral daily  insulin lispro (HumaLOG) corrective regimen sliding scale  SubCutaneous Before meals and at bedtime  dextrose 50% Injectable 12.5Gram(s) IV Push once  dextrose 50% Injectable 25Gram(s) IV Push once  dextrose 50% Injectable 25Gram(s) IV Push once  meropenem IVPB 500milliGRAM(s) IV Intermittent every 12 hours  linezolid  IVPB 600milliGRAM(s) IV Intermittent every 12 hours  heparin  Injectable 5000Unit(s) SubCutaneous every 8 hours  senna 2Tablet(s) Oral at bedtime  multiple electrolytes Injection Type 1 500milliLiter(s) IV Continuous <Continuous>  multiple electrolytes Injection Type 1 1000milliLiter(s) IV Continuous <Continuous>    MEDICATIONS  (PRN):  acetaminophen   Tablet 650milliGRAM(s) Oral every 6 hours PRN For Temp over 38.3 C (100.94 F)  aluminum hydroxide/magnesium hydroxide/simethicone Suspension 30milliLiter(s) Oral four times a day PRN Indigestion  ondansetron Injectable 4milliGRAM(s) IV Push every 6 hours PRN Nausea and/or Vomiting  dextrose Gel 1Dose(s) Oral once PRN Blood Glucose LESS THAN 70 milliGRAM(s)/deciliter  glucagon  Injectable 1milliGRAM(s) IntraMuscular once PRN Glucose LESS THAN 70 milligrams/deciliter  polyethylene glycol 3350 17Gram(s) Oral daily PRN Constipation  HYDROmorphone  Injectable 0.5milliGRAM(s) IV Push every 6 hours PRN Severe Pain (7 - 10)      RADIOLOGY & ADDITIONAL TEST

## 2017-03-03 DIAGNOSIS — N18.9 CHRONIC KIDNEY DISEASE, UNSPECIFIED: ICD-10-CM

## 2017-03-03 LAB
HAPTOGLOB SERPL-MCNC: 128 MG/DL — SIGNIFICANT CHANGE UP (ref 34–200)
HCT VFR BLD CALC: 30.4 % — LOW (ref 37–47)
HGB BLD-MCNC: 10.7 G/DL — LOW (ref 12–16)
IRON SATN MFR SERPL: 101 UG/DL — SIGNIFICANT CHANGE UP (ref 37–145)
IRON SATN MFR SERPL: 88 % — HIGH (ref 14–50)
MAGNESIUM SERPL-MCNC: 3 MG/DL — HIGH (ref 1.8–2.5)
MCHC RBC-ENTMCNC: 30.4 PG — SIGNIFICANT CHANGE UP (ref 27–31)
MCHC RBC-ENTMCNC: 35.2 G/DL — SIGNIFICANT CHANGE UP (ref 32–36)
MCV RBC AUTO: 86.4 FL — SIGNIFICANT CHANGE UP (ref 81–99)
PHOSPHATE SERPL-MCNC: 4.9 MG/DL — HIGH (ref 2.4–4.7)
PLATELET # BLD AUTO: 208 K/UL — SIGNIFICANT CHANGE UP (ref 150–400)
RBC # BLD: 3.52 M/UL — LOW (ref 4.4–5.2)
RBC # FLD: 19.1 % — HIGH (ref 11–15.6)
TIBC SERPL-MCNC: 114 UG/DL — LOW (ref 220–430)
WBC # BLD: 14.1 K/UL — HIGH (ref 4.8–10.8)
WBC # FLD AUTO: 14.1 K/UL — HIGH (ref 4.8–10.8)

## 2017-03-03 PROCEDURE — 99233 SBSQ HOSP IP/OBS HIGH 50: CPT

## 2017-03-03 PROCEDURE — 99232 SBSQ HOSP IP/OBS MODERATE 35: CPT

## 2017-03-03 RX ORDER — PANTOPRAZOLE SODIUM 20 MG/1
40 TABLET, DELAYED RELEASE ORAL
Qty: 0 | Refills: 0 | Status: DISCONTINUED | OUTPATIENT
Start: 2017-03-03 | End: 2017-03-06

## 2017-03-03 RX ADMIN — Medication 1 MILLIGRAM(S): at 11:16

## 2017-03-03 RX ADMIN — SODIUM CHLORIDE 500 MILLILITER(S): 9 INJECTION, SOLUTION INTRAVENOUS at 09:17

## 2017-03-03 RX ADMIN — PANTOPRAZOLE SODIUM 40 MILLIGRAM(S): 20 TABLET, DELAYED RELEASE ORAL at 18:10

## 2017-03-03 RX ADMIN — Medication 1 TABLET(S): at 11:16

## 2017-03-03 RX ADMIN — Medication 325 MILLIGRAM(S): at 11:17

## 2017-03-03 RX ADMIN — Medication 100 MILLIGRAM(S): at 23:20

## 2017-03-03 RX ADMIN — SENNA PLUS 2 TABLET(S): 8.6 TABLET ORAL at 23:20

## 2017-03-03 RX ADMIN — SODIUM CHLORIDE 125 MILLILITER(S): 9 INJECTION, SOLUTION INTRAVENOUS at 16:51

## 2017-03-03 RX ADMIN — Medication 325 MILLIGRAM(S): at 08:54

## 2017-03-03 RX ADMIN — MEROPENEM 200 MILLIGRAM(S): 1 INJECTION INTRAVENOUS at 18:12

## 2017-03-03 RX ADMIN — Medication 325 MILLIGRAM(S): at 18:08

## 2017-03-03 RX ADMIN — Medication 100 MILLIGRAM(S): at 06:03

## 2017-03-03 RX ADMIN — MEROPENEM 200 MILLIGRAM(S): 1 INJECTION INTRAVENOUS at 06:03

## 2017-03-03 NOTE — PROGRESS NOTE ADULT - PROBLEM SELECTOR PLAN 4
Continue IVF. Encourage dehydration. Monitor BMP. Continue Gaytan catheter, monitor urine output. Continue IVF. Encourage po intake. Monitor BMP. Continue Gaytan catheter, monitor urine output.

## 2017-03-03 NOTE — PROGRESS NOTE ADULT - ASSESSMENT
Pt is a 72 y/o F with PMHx of DM2, HTN, LIN, KARMEN on CKD, COPD, PAF, and breast CA who presented from rehab for R knee wound dehiscence. Pt is s/p R knee antibiotic cement spacer on 2/27/17 for septic arthritis of R knee. Pt was transferred from ICU onto floor where rapid response was called during the night (3/2/17) for hypotension. Pt had received 3 units of PRBCs for anemia. Hgb/Hct: 6.9/19.2. CT of abdomen ordered to r/o RPB, findings inconclusive. Denies recent BM. Pt is A&Ox3 this morning, lethargy is improving. 71 yr old female with hypertension, hyperlipidemia, LIN, CKD, COPD, paroxysmal A fib sent from rehab for right knee wound dehiscence. She was seen by orthopedics, ID. Local wound cultures growing  Pseudomonas and VRE. She was started on Meropenem and Zyvox. Her renal function was slightly worse from baseline and hence started on IVF. No obstructive disease on renal ultrasound. She is s/p Insertion of antibiotic spacer in right knee joint  02/27/2017 . Pt was s/p  Rapid response on 3/1/17 for increasing lethargy and hypotension. Lactate level 3.0, pt transfer to ICU,  noted to have H&H: 6.9/20.4, no obvious source of bleeding, CT scan negative for RPB, s/p total of 3 units of PRBC, h&h improved, GI consult requested.         Pt  Pt was transferred from ICU onto floor where rapid response was called during the night (3/2/17) for hypotension. Pt had received 3 units of PRBCs for anemia. Hgb/Hct: 6.9/19.2. CT of abdomen negative for RPB. Denies recent BM. Pt is A&Ox3 this morning, lethargy is improving.

## 2017-03-03 NOTE — PROGRESS NOTE ADULT - SUBJECTIVE AND OBJECTIVE BOX
Internal Medicine Hospitalist - Dr. Gerardo LAL    [id number]    71y      Female    Patient is a 71y old  Female who presents with a chief complaint of knee drainage (2017 16:00)    HPI:  71 y F hx DM2, PAF, HTN, LIN, CKD, COPD, sent from rehab for R knee wound dehiscensce. Pt has had multiple interventions on the R knee following TKR last year, including hardware removal for infection, IV abx course and most recently adm for wound dehiscence in Dec req washout and wound closure. The patient is now transferred from rehab for persistent wound serosanguinous drainage and wound dehiscence. Pt denies CP, F/C, SOB, cough, N/V/D/C, dysuria. (2017 16:00) Pt is s/p antibiotic cement spacer in R knee. (2017)        INTERVAL HPI/ OVERNIGHT EVENTS: Rapid response was called overnight on floor for evaluation of hypotension. Hgb/Hct noted to be 6.5/19.2 as of yesterday. 3 units of PRBCs were ordered and administered to patient. CT scan of abdomen was done to r/o RPB, WNL. Source of bleeding remains unknown. Pt has not had a recent BM. Patient is seen and examined, she is pleasant, alert, and oriented x3 this morning and has no current complaints.     REVIEW OF SYSTEMS:    Denied knee or leg pain, fever, chills, abd. pain, nausea, vomiting, diarrhea, chest pain, SOB, palpitations, headache, dizziness    PHYSICAL EXAM:    Vital Signs Last 24 Hrs  T(C): 36.6, Max: 36.9 (- @ 23:59)  T(F): 97.8, Max: 98.5 (03- @ 23:59)  HR: 89 (56 - 89)  BP: 122/74 (81/53 - 123/63)  BP(mean): 74 (62 - 74)  RR: 18 (12 - 22)  SpO2: 100% (96% - 100%)    GENERAL: NAD  HEENT: EOMI  Neck: supple  CHEST/LUNG: Clear to percussion bilaterally; CTA, No wheezing or rales  HEART: S1S2+ audible  ABDOMEN: Soft, Nontender, Nondistended; Bowel sounds present  EXTREMITIES:  mild 2+ nonpitting edema of Lower extremities, Knee immobilizer placement on R leg with 2 SIGRID drains.   CNS: AAO X 3  Psychiatry: normal mood    LABS:                        8.8    14.5  )-----------( 235      ( 02 Mar 2017 20:17 )             26.1     02 Mar 2017 20:17    135    |  96     |  47.0   ----------------------------<  112    4.4     |  24.0   |  3.98     Ca    7.8        02 Mar 2017 20:17    TPro  5.3    /  Alb  2.4    /  TBili  0.8    /  DBili  0.4    /  AST  26     /  ALT  10     /  AlkPhos  77     02 Mar 2017 20:17    PT/INR - ( 02 Mar 2017 06:52 )   PT: 11.4 sec;   INR: 1.04 ratio           Urinalysis Basic - ( 02 Mar 2017 08:31 )    Color: Yellow / Appearance: Slightly Turbid / S.015 / pH: x  Gluc: x / Ketone: Trace  / Bili: Negative / Urobili: 1 mg/dL   Blood: x / Protein: 100 mg/dL / Nitrite: Negative   Leuk Esterase: Moderate / RBC: 11-25 /HPF / WBC >50   Sq Epi: x / Non Sq Epi: Occasional / Bacteria: Occasional          MEDICATIONS  (STANDING):  docusate sodium 100milliGRAM(s) Oral three times a day  ferrous    sulfate 325milliGRAM(s) Oral three times a day with meals  folic acid 1milliGRAM(s) Oral daily  multivitamin 1Tablet(s) Oral daily  insulin lispro (HumaLOG) corrective regimen sliding scale  SubCutaneous Before meals and at bedtime  dextrose 50% Injectable 12.5Gram(s) IV Push once  dextrose 50% Injectable 25Gram(s) IV Push once  dextrose 50% Injectable 25Gram(s) IV Push once  meropenem IVPB 500milliGRAM(s) IV Intermittent every 12 hours  senna 2Tablet(s) Oral at bedtime  multiple electrolytes Injection Type 1 500milliLiter(s) IV Continuous <Continuous>  multiple electrolytes Injection Type 1 1000milliLiter(s) IV Continuous <Continuous>  DAPTOmycin IVPB 750milliGRAM(s) IV Intermittent every 48 hours    MEDICATIONS  (PRN):  acetaminophen   Tablet 650milliGRAM(s) Oral every 6 hours PRN For Temp over 38.3 C (100.94 F)  aluminum hydroxide/magnesium hydroxide/simethicone Suspension 30milliLiter(s) Oral four times a day PRN Indigestion  ondansetron Injectable 4milliGRAM(s) IV Push every 6 hours PRN Nausea and/or Vomiting  dextrose Gel 1Dose(s) Oral once PRN Blood Glucose LESS THAN 70 milliGRAM(s)/deciliter  glucagon  Injectable 1milliGRAM(s) IntraMuscular once PRN Glucose LESS THAN 70 milligrams/deciliter  polyethylene glycol 3350 17Gram(s) Oral daily PRN Constipation  HYDROmorphone  Injectable 0.5milliGRAM(s) IV Push every 6 hours PRN Severe Pain (7 - 10)      RADIOLOGY & ADDITIONAL TEST  EXAM:  CT ABDOMEN AND PELVIS                        PROCEDURE DATE:  2017      IMPRESSION: No retroperitoneal hematoma. Internal Medicine Hospitalist - Dr. Gerardo LAL    [id number]    71y      Female    Patient is a 71y old  Female who presents with a chief complaint of knee drainage (2017 16:00)    HPI:  71 y F hx DM2, PAF, HTN, LIN, CKD, COPD, sent from rehab for R knee wound dehiscensce. Pt has had multiple interventions on the R knee following TKR last year, including hardware removal for infection, IV abx course and most recently adm for wound dehiscence in Dec req washout and wound closure. The patient is now transferred from rehab for persistent wound serosanguinous drainage and wound dehiscence. Pt denies CP, F/C, SOB, cough, N/V/D/C, dysuria. (2017 16:00) Pt is s/p antibiotic cement spacer in R knee. (2017)        INTERVAL HPI/ OVERNIGHT EVENTS: Rapid response was called overnight on floor for evaluation of hypotension. Hgb/Hct noted to be 6.5/19.2 as of yesterday. 3 units of PRBCs were ordered and administered to patient. CT scan of abdomen was done to r/o RPB, WNL. Source of bleeding remains unknown. Pt has not had a recent BM. Patient is seen and examined, she is pleasant, alert, and oriented x3 this morning and has no current complaints.     REVIEW OF SYSTEMS:    Denied knee or leg pain, fever, chills, abd. pain, nausea, vomiting, diarrhea, chest pain, SOB, palpitations, headache, dizziness    PHYSICAL EXAM:    Vital Signs Last 24 Hrs  T(C): 36.6, Max: 36.9 (- @ 23:59)  T(F): 97.8, Max: 98.5 (03- @ 23:59)  HR: 89 (56 - 89)  BP: 122/74 (81/53 - 123/63)  BP(mean): 74 (62 - 74)  RR: 18 (12 - 22)  SpO2: 100% (96% - 100%)    GENERAL: NAD, more awake today   HEENT: EOMI  Neck: supple  CHEST/LUNG: positive air entry   HEART: S1S2+ audible  ABDOMEN: Soft, Nontender, Nondistended; Bowel sounds present  EXTREMITIES:  mild 2+ nonpitting edema of Lower extremities, Knee immobilizer placement on R leg with 2 SIGRID drains.   CNS: AAO X 3  Psychiatry: normal mood    LABS:                        8.8    14.5  )-----------( 235      ( 02 Mar 2017 20:17 )             26.1     02 Mar 2017 20:17    135    |  96     |  47.0   ----------------------------<  112    4.4     |  24.0   |  3.98     Ca    7.8        02 Mar 2017 20:17    TPro  5.3    /  Alb  2.4    /  TBili  0.8    /  DBili  0.4    /  AST  26     /  ALT  10     /  AlkPhos  77     02 Mar 2017 20:17    PT/INR - ( 02 Mar 2017 06:52 )   PT: 11.4 sec;   INR: 1.04 ratio           Urinalysis Basic - ( 02 Mar 2017 08:31 )    Color: Yellow / Appearance: Slightly Turbid / S.015 / pH: x  Gluc: x / Ketone: Trace  / Bili: Negative / Urobili: 1 mg/dL   Blood: x / Protein: 100 mg/dL / Nitrite: Negative   Leuk Esterase: Moderate / RBC: 11-25 /HPF / WBC >50   Sq Epi: x / Non Sq Epi: Occasional / Bacteria: Occasional          MEDICATIONS  (STANDING):  docusate sodium 100milliGRAM(s) Oral three times a day  ferrous    sulfate 325milliGRAM(s) Oral three times a day with meals  folic acid 1milliGRAM(s) Oral daily  multivitamin 1Tablet(s) Oral daily  insulin lispro (HumaLOG) corrective regimen sliding scale  SubCutaneous Before meals and at bedtime  dextrose 50% Injectable 12.5Gram(s) IV Push once  dextrose 50% Injectable 25Gram(s) IV Push once  dextrose 50% Injectable 25Gram(s) IV Push once  meropenem IVPB 500milliGRAM(s) IV Intermittent every 12 hours  senna 2Tablet(s) Oral at bedtime  multiple electrolytes Injection Type 1 500milliLiter(s) IV Continuous <Continuous>  multiple electrolytes Injection Type 1 1000milliLiter(s) IV Continuous <Continuous>  DAPTOmycin IVPB 750milliGRAM(s) IV Intermittent every 48 hours    MEDICATIONS  (PRN):  acetaminophen   Tablet 650milliGRAM(s) Oral every 6 hours PRN For Temp over 38.3 C (100.94 F)  aluminum hydroxide/magnesium hydroxide/simethicone Suspension 30milliLiter(s) Oral four times a day PRN Indigestion  ondansetron Injectable 4milliGRAM(s) IV Push every 6 hours PRN Nausea and/or Vomiting  dextrose Gel 1Dose(s) Oral once PRN Blood Glucose LESS THAN 70 milliGRAM(s)/deciliter  glucagon  Injectable 1milliGRAM(s) IntraMuscular once PRN Glucose LESS THAN 70 milligrams/deciliter  polyethylene glycol 3350 17Gram(s) Oral daily PRN Constipation  HYDROmorphone  Injectable 0.5milliGRAM(s) IV Push every 6 hours PRN Severe Pain (7 - 10)      RADIOLOGY & ADDITIONAL TEST  EXAM:  CT ABDOMEN AND PELVIS                        PROCEDURE DATE:  2017      IMPRESSION: No retroperitoneal hematoma.

## 2017-03-03 NOTE — PROGRESS NOTE ADULT - SUBJECTIVE AND OBJECTIVE BOX
NEPHROLOGY INTERVAL HPI/OVERNIGHT EVENTS:    Interim noted   Remains on IVF   ID follow up noted     MEDICATIONS  (STANDING):  docusate sodium 100milliGRAM(s) Oral three times a day  ferrous    sulfate 325milliGRAM(s) Oral three times a day with meals  folic acid 1milliGRAM(s) Oral daily  multivitamin 1Tablet(s) Oral daily  insulin lispro (HumaLOG) corrective regimen sliding scale  SubCutaneous Before meals and at bedtime  dextrose 50% Injectable 12.5Gram(s) IV Push once  dextrose 50% Injectable 25Gram(s) IV Push once  dextrose 50% Injectable 25Gram(s) IV Push once  meropenem IVPB 500milliGRAM(s) IV Intermittent every 12 hours  senna 2Tablet(s) Oral at bedtime  multiple electrolytes Injection Type 1 500milliLiter(s) IV Continuous <Continuous>  multiple electrolytes Injection Type 1 1000milliLiter(s) IV Continuous <Continuous>  DAPTOmycin IVPB 750milliGRAM(s) IV Intermittent every 48 hours  pantoprazole  Injectable 40milliGRAM(s) IV Push two times a day    MEDICATIONS  (PRN):  acetaminophen   Tablet 650milliGRAM(s) Oral every 6 hours PRN For Temp over 38.3 C (100.94 F)  aluminum hydroxide/magnesium hydroxide/simethicone Suspension 30milliLiter(s) Oral four times a day PRN Indigestion  ondansetron Injectable 4milliGRAM(s) IV Push every 6 hours PRN Nausea and/or Vomiting  dextrose Gel 1Dose(s) Oral once PRN Blood Glucose LESS THAN 70 milliGRAM(s)/deciliter  glucagon  Injectable 1milliGRAM(s) IntraMuscular once PRN Glucose LESS THAN 70 milligrams/deciliter  polyethylene glycol 3350 17Gram(s) Oral daily PRN Constipation  HYDROmorphone  Injectable 0.5milliGRAM(s) IV Push every 6 hours PRN Severe Pain (7 - 10)      Allergies    No Known Allergies    Intolerances          Vital Signs Last 24 Hrs  T(C): 36.6, Max: 36.9 (03-02 @ 23:59)  T(F): 97.8, Max: 98.5 (03-02 @ 23:59)  HR: 89 (56 - 89)  BP: 95/61 (86/42 - 123/63)  BP(mean): --  RR: 18 (16 - 18)  SpO2: 100% (96% - 100%)  Daily     Daily   I&O's Detail    I & Os for current day (as of 03 Mar 2017 15:18)  =============================================  IN:    multiple electrolytes Injection Type 1: 750 ml    Packed Red Blood Cells: 598 ml    Solution: 500 ml    Oral Fluid: 320 ml    multiple electrolytes Injection Type 1multiple electrolytes Injection Type 1: 120 ml    Solution: 100 ml    Solution: 50 ml    Total IN: 2438 ml  ---------------------------------------------  OUT:    Bulb: 290 ml    Indwelling Catheter - Urethral: 165 ml    Bulb: 60 ml    Total OUT: 515 ml  ---------------------------------------------  Total NET: 1923 ml    I&O's Summary    I & Os for current day (as of 03 Mar 2017 15:18)  =============================================  IN: 2438 ml / OUT: 515 ml / NET: 1923 ml      PHYSICAL EXAM:    PHYSICAL EXAM:  HEENT: EOMI  Neck: supple  CHEST/LUNG: Clear to percussion bilaterally; No wheezing  HEART: S1S2+ audible  ABDOMEN: Soft, Nontender, Nondistended; Bowel sounds present  EXTREMITIES:  dressing over right knee, swollen both extremities       LABS:                        10.7   14.1  )-----------( 208      ( 03 Mar 2017 10:03 )             30.4     02 Mar 2017 20:17    135    |  96     |  47.0   ----------------------------<  112    4.4     |  24.0   |  3.98     Ca    7.8        02 Mar 2017 20:17  Phos  4.9       03 Mar 2017 10:03  Mg     3.0       03 Mar 2017 10:03    TPro  5.3    /  Alb  2.4    /  TBili  0.8    /  DBili  0.4    /  AST  26     /  ALT  10     /  AlkPhos  77     02 Mar 2017 20:17    PT/INR - ( 02 Mar 2017 06:52 )   PT: 11.4 sec;   INR: 1.04 ratio           Urinalysis Basic - ( 02 Mar 2017 08:31 )    Color: Yellow / Appearance: Slightly Turbid / S.015 / pH: x  Gluc: x / Ketone: Trace  / Bili: Negative / Urobili: 1 mg/dL   Blood: x / Protein: 100 mg/dL / Nitrite: Negative   Leuk Esterase: Moderate / RBC: 11-25 /HPF / WBC >50   Sq Epi: x / Non Sq Epi: Occasional / Bacteria: Occasional      Magnesium, Serum: 3.0 mg/dL ( @ 10:03)  Phosphorus Level, Serum: 4.9 mg/dL ( @ 10:03)          RADIOLOGY & ADDITIONAL TESTS:

## 2017-03-03 NOTE — PROGRESS NOTE ADULT - PROBLEM SELECTOR PLAN 2
3 units of PRBCs transfused.   Follow up CBC, BMP.   CT abdomen inconclusive: no RPB.   Occult blood ordered.  Consult with ortho to r/o bleed from wound no obvious source of bleeding, CT abdomen negative for RP bleeding   s/p total of 3 units of PRBCs, H&H improved .   Follow up CBC, stool for occult blood   Gi consult appreciated   Consult with ortho to r/o bleed from wound

## 2017-03-03 NOTE — CONSULT NOTE ADULT - SUBJECTIVE AND OBJECTIVE BOX
HPI:  71 y F hx DM2, PAF, HTN, LIN, CKD, COPD, sent from rehab for R knee wound dehiscensce. Pt has had multiple interventions on the R knee following TKR last year, including hardware removal for infection, IV abx course and most recently adm for wound dehiscence in Dec req washout and wound closure. The patient is now transferred from rehab for persistent wound serosanguinous drainage and wound dehiscence. Pt denies CP, F/C, SOB, cough, N/V/D/C, dysuria. c/o L heel pain. (2017 16:00)    She  is status post right Knee revision with abx spacer placement POD # 4. GI consult for drop in h/h from 10 to 6. CT abdomen/pelvis showed no RP bleed. no gross gi bleed noted. s/p 2 units PRBC transfusion. Hb back to 10 now. hemodynamically stable.      PAST MEDICAL & SURGICAL HISTORY:  Hypercholesterolemia  Deficiency of vitamin K  Chronic pain  COPD (chronic obstructive pulmonary disease)  Urine retention  Pressure ulcer  Atrial fibrillation  Constipation  Breast cancer  HLD (hyperlipidemia)  HTN (hypertension)  GERD (gastroesophageal reflux disease)  DM (diabetes mellitus)  Obesity  LIN on CPAP  History of incision and drainage: rt knee  S/p total knee replacement, bilateral  S/P knee replacement, right  S/P hysterectomy  S/P mastectomy: Left  Breast cancer      REVIEW OF SYSTEMS    General: unremarkable, no fever    Skin/Breast: unremarkable  	  Ophthalmologic: unremarkable  	  ENMT:	unremarkable    Respiratory and Thorax: unremarkable  	  Cardiovascular:	unremarkable    Gastrointestinal:	 as above    Genitourinary:	unremarkable    Musculoskeletal:	unremarkable    Neurological:	unremarkable    Psychiatric:	unremarkable    Hematology/Lymphatics:	unremarkable    Endocrine:	unremarkable    Allergic/Immunologic:	unremarkable      MEDICATIONS  (STANDING):  docusate sodium 100milliGRAM(s) Oral three times a day  ferrous    sulfate 325milliGRAM(s) Oral three times a day with meals  folic acid 1milliGRAM(s) Oral daily  multivitamin 1Tablet(s) Oral daily  insulin lispro (HumaLOG) corrective regimen sliding scale  SubCutaneous Before meals and at bedtime  dextrose 50% Injectable 12.5Gram(s) IV Push once  dextrose 50% Injectable 25Gram(s) IV Push once  dextrose 50% Injectable 25Gram(s) IV Push once  meropenem IVPB 500milliGRAM(s) IV Intermittent every 12 hours  senna 2Tablet(s) Oral at bedtime  multiple electrolytes Injection Type 1 500milliLiter(s) IV Continuous <Continuous>  multiple electrolytes Injection Type 1 1000milliLiter(s) IV Continuous <Continuous>  DAPTOmycin IVPB 750milliGRAM(s) IV Intermittent every 48 hours    MEDICATIONS  (PRN):  acetaminophen   Tablet 650milliGRAM(s) Oral every 6 hours PRN For Temp over 38.3 C (100.94 F)  aluminum hydroxide/magnesium hydroxide/simethicone Suspension 30milliLiter(s) Oral four times a day PRN Indigestion  ondansetron Injectable 4milliGRAM(s) IV Push every 6 hours PRN Nausea and/or Vomiting  dextrose Gel 1Dose(s) Oral once PRN Blood Glucose LESS THAN 70 milliGRAM(s)/deciliter  glucagon  Injectable 1milliGRAM(s) IntraMuscular once PRN Glucose LESS THAN 70 milligrams/deciliter  polyethylene glycol 3350 17Gram(s) Oral daily PRN Constipation  HYDROmorphone  Injectable 0.5milliGRAM(s) IV Push every 6 hours PRN Severe Pain (7 - 10)      Allergies    No Known Allergies    Intolerances        SOCIAL HISTORY:    FAMILY HISTORY:  Family history of diabetes mellitus (Mother)      Vital Signs Last 24 Hrs  T(C): 36.6, Max: 36.9 ( @ 23:59)  T(F): 97.8, Max: 98.5 ( @ 23:59)  HR: 89 (56 - 89)  BP: 95/61 (86/42 - 123/63)  BP(mean): 74 (67 - 74)  RR: 18 (16 - 18)  SpO2: 100% (96% - 100%)      PHYSICAL EXAM:    Constitutional: no fever, hemodynamically stable    Eyes: unremarkable    ENMT: unremarkable    Neck: unremarkable    Breasts: deferred    Back: unremarkable    Respiratory: unremarkable    Cardiovascular: unremarkable    Gastrointestinal: bowel sounds present, soft, non-tender, no organomegaly    Genitourinary: deferred    Rectal: deferred    Extremities: unremarkable    Vascular: unremarkable    Neurological: Awake, alert, oriented x3, no focal neurological deficit    Skin: unremarkable    Lymph Nodes: deferred    Musculoskeletal: unremarkable    Psychiatric: unremarkable    LABS:                        10.7   14.1  )-----------( 208      ( 03 Mar 2017 10:03 )             30.4     02 Mar 2017 20:17    135    |  96     |  47.0   ----------------------------<  112    4.4     |  24.0   |  3.98     Ca    7.8        02 Mar 2017 20:17  Phos  4.9       03 Mar 2017 10:03  Mg     3.0       03 Mar 2017 10:03    TPro  5.3    /  Alb  2.4    /  TBili  0.8    /  DBili  0.4    /  AST  26     /  ALT  10     /  AlkPhos  77     02 Mar 2017 20:17    PT/INR - ( 02 Mar 2017 06:52 )   PT: 11.4 sec;   INR: 1.04 ratio           Urinalysis Basic - ( 02 Mar 2017 08:31 )    Color: Yellow / Appearance: Slightly Turbid / S.015 / pH: x  Gluc: x / Ketone: Trace  / Bili: Negative / Urobili: 1 mg/dL   Blood: x / Protein: 100 mg/dL / Nitrite: Negative   Leuk Esterase: Moderate / RBC: 11-25 /HPF / WBC >50   Sq Epi: x / Non Sq Epi: Occasional / Bacteria: Occasional        RADIOLOGY & ADDITIONAL STUDIES:

## 2017-03-03 NOTE — PROGRESS NOTE ADULT - ASSESSMENT
KARMEN on CKD , DM ---> baseline creat 1.8   Normal kidneys on NCCT   Debridement / dehis ---> Abx as per ID   Ortho follow up noted   Continue IVF   follow labs   ? Antecedent AIN , ATN ?     Anemia - S/P Transfusion , CBC in am

## 2017-03-03 NOTE — PROGRESS NOTE ADULT - PROBLEM SELECTOR PLAN 1
Probable due to anemia and dehydration. Monitor BP, IVF increased.  3 units PRBCs administered. CT abdomen inconclusive: No retroperitoneal hematoma Probable due to anemia and dehydration, BP is luis fernando today, cont. IVF,   s/p total of 3 units PRBCs administered. CT abdomen inconclusive: No retroperitoneal hematoma  d/c'd antihypertensive medication

## 2017-03-03 NOTE — PROGRESS NOTE ADULT - SUBJECTIVE AND OBJECTIVE BOX
71y Female is status post right Knee revision with abx spacer placement POD # 4. Was transferred to  from MICU yesterday.. Patient is doing well and is comfortable. The patient's pain is controlled using the prescribed pain medications. Denies constitutional sx, nausea, vomiting, chest pain, shortness of breath, abdominal pain, LE N/T. No new complaints.     Afebrile. vac with with very minimal output. SIGRID #1: 75CC/12HRS,290CC/24HRS.   SIGRID#2: 60CC/24HRS    Vital Signs Last 24 Hrs  T(C): 36.6, Max: 36.9 (03-02 @ 23:59)  T(F): 97.8, Max: 98.5 (03-02 @ 23:59)  HR: 89 (56 - 89)  BP: 122/74 (81/53 - 123/63)  BP(mean): 74 (62 - 74)  RR: 18 (12 - 19)  SpO2: 100% (96% - 100%)      Physical exam:   Dressing changed, Vac removed, incision is C/D/I. Skin intact, no breakdown or blistering noted. No erythema drains functioning well, sites are C/D/I.  positive swelling/edema. No ecchymosis. compartments compressible, non tender.  Sensation to light touch is grossly intact distally. Gross motor intact.: postive dorsi/plantar. Pulses appreciated. No cyanosis.    Primary Orthopedic Assessment:  • 4 days s/p RIGHT total knee revision with placement of abx spacer, closure by plastics    Secondary  Orthopedic Assessment(s):   •     Secondary  Medical Assessment(s):   •     Plan:   • DVT prophylaxis: Lovenox, use of compression devices and ankle pumps  • PT/OT: WBAT in knee immobilizer    Continue IV abx as per ID  • Weightbearing as tolerated of the right lower extremity in Knee immobilizer with assistance of a walker  • Incentive spirometry encouraged  • Pain control as clinically indicated   DC vac today   maintain drains, monitor output  • ortho to follow  continue care w primary team

## 2017-03-03 NOTE — PROGRESS NOTE ADULT - PROBLEM SELECTOR PLAN 3
Continue IV ABX Meropenem and Zyvox. Continue consult with ortho for progress. Continue IV ABX Meropenem and Zyvox, wound vac and SIGRID drain

## 2017-03-03 NOTE — PROGRESS NOTE ADULT - SUBJECTIVE AND OBJECTIVE BOX
[facility]  INFECTIOUS DISEASES AND INTERNAL MEDICINE OF [place]  =======================================================  Wilver Dela Cruz MD  Diplomates American Board of Internal Medicine and Infectious Diseases  =======================================================    LAL, SHYAM     Patient clinically improved. No fevers or chills  No pain    s/p  right total knee revision with application of antibiotic cement spacer 17    Allergies:  No Known Allergies    Antibiotics:  meropenem IVPB 500milliGRAM(s) IV Intermittent every 12 hours  DAPTOmycin IVPB 750milliGRAM(s) IV Intermittent every 48 hours     REVIEW OF SYSTEMS:  CONSTITUTIONAL:  No fevers or chills  HEENT:  No diplopia or blurred vision. No earache, sore throat or runny nose.  CARDIOVASCULAR:  No pressure, squeezing, strangling, tightness, heaviness or aching about the chest, neck, axilla or epigastrium.  RESPIRATORY:  No cough, shortness of breath  GASTROINTESTINAL:  No nausea, vomiting or diarrhea.  GENITOURINARY:  No dysuria, frequency or urgency.   MUSCULOSKELETAL:  no muscle pain  SKIN:  Rt Knee surgical bandage  NEUROLOGIC:  No paresthesias  PSYCHIATRIC:  No disorder of thought or mood.  ENDOCRINE:  No heat or cold intolerance, polyuria or polydipsia.  HEMATOLOGICAL:  No easy bruising or bleeding.     Physical Exam:  Vital Signs Last 24 Hrs  T(C): 36.6, Max: 36.9 (03- @ 23:59)  T(F): 97.8, Max: 98.5 (03- @ 23:59)  HR: 89 (56 - 89)  BP: 122/74 (81/53 - 123/63)  BP(mean): 74 (62 - 74)  RR: 18 (12 - 19)  SpO2: 100% (96% - 100%)    GEN: NAD, pleasant  HEENT: normocephalic and atraumatic. EOMI. PERRL.    NECK: Supple.   LUNGS: Clear to auscultation.  HEART: Regular rate and rhythm   ABDOMEN: Soft, nontender, and nondistended.  Positive bowel sounds.    : No CVA tenderness  EXTREMITIES: Rt Knee in surgical dressing, 2 SIGRID drains  NEUROLOGIC: forgetful   PSYCHIATRIC: Appropriate affect .  SKIN: Rt Knee in surgical dressing    Labs:  02 Mar 2017 20:17    135    |  96     |  47.0   ----------------------------<  112    4.4     |  24.0   |  3.98     Ca    7.8        02 Mar 2017 20:17    TPro  5.3    /  Alb  2.4    /  TBili  0.8    /  DBili  0.4    /  AST  26     /  ALT  10     /  AlkPhos  77     02 Mar 2017 20:17                  8.8    14.5  )-----------( 235      ( 02 Mar 2017 20:17 )             26.1     PT/INR - ( 02 Mar 2017 06:52 )   PT: 11.4 sec;   INR: 1.04 ratio      Urinalysis Basic - ( 02 Mar 2017 08:31 )    Color: Yellow / Appearance: Slightly Turbid / S.015 / pH: x  Gluc: x / Ketone: Trace  / Bili: Negative / Urobili: 1 mg/dL   Blood: x / Protein: 100 mg/dL / Nitrite: Negative   Leuk Esterase: Moderate / RBC: 11-25 /HPF / WBC >50   Sq Epi: x / Non Sq Epi: Occasional / Bacteria: Occasional    LIVER FUNCTIONS - ( 02 Mar 2017 20:17 )  Alb: 2.4 g/dL / Pro: 5.3 g/dL / ALK PHOS: 77 U/L / ALT: 10 U/L / AST: 26 U/L / GGT: x             RECENT CULTURES:   .Surgical Swab right tibia (swab) Enterococcus faecalis (vancomycin resistant)   no gram stain perform only one swab received   Moderate Enterococcus faecalis (vancomycin resistant)  .  TYPE: (C=Critical, N=Notification, A=Abnormal) c  TESTS:  _ VRE  DATE/TIME CALLED: _ 2017 16:54:36  CALLED TO: _ cesilia segura  READ BACK (2 Patient Identifiers)(Y/N): _ y  READ DAMION     .Surgical Swab right femur (swabs) Enterococcus faecalis (vancomycin resistant)   no gram stain perform only one swab received   Moderate Enterococcus faecalis (vancomycin resistant)  Culture in progress  .  TYPE: (C=Critical, N=Notification, A=Abnormal) c  TESTS:  _ VRE  DATE/TIME CALLED: _ 2017 16:40:40  CALLED TO: _ cesilia griggs  READ BACK (2 Patient Identifiers)     .Body Fluid Knee Fluid Pseudomonas aeruginosa   Numerous White blood cells  No organisms seen   Rare Pseudomonas aeruginosa     .Body Fluid Synovial Fluid Pseudomonas aeruginosa  Enterococcus faecalis (vancomycin resistant)   Numerous White blood cells  No organisms seen   Rare Pseudomonas aeruginosa  Rare Enterococcus faecalis (vancomycin resistant)  .  TYPE: (C=Critical, N=Notification, A=Abnormal) C  TESTS:  _ VRE  DATE/TIME CALLED: _ 2017 10:22:02  CALLED TO: _ Baljeet Oates RN  READ BACK (2 Patient Identifier     .Blood Blood-Arterial XXXX XXXX   No growth at 5 days.

## 2017-03-04 DIAGNOSIS — D64.9 ANEMIA, UNSPECIFIED: ICD-10-CM

## 2017-03-04 LAB
ANION GAP SERPL CALC-SCNC: 14 MMOL/L — SIGNIFICANT CHANGE UP (ref 5–17)
BUN SERPL-MCNC: 42 MG/DL — HIGH (ref 8–20)
CALCIUM SERPL-MCNC: 7.4 MG/DL — LOW (ref 8.6–10.2)
CHLORIDE SERPL-SCNC: 97 MMOL/L — LOW (ref 98–107)
CK SERPL-CCNC: 191 U/L — HIGH (ref 25–170)
CO2 SERPL-SCNC: 23 MMOL/L — SIGNIFICANT CHANGE UP (ref 22–29)
CREAT SERPL-MCNC: 3.55 MG/DL — HIGH (ref 0.5–1.3)
CULTURE RESULTS: SIGNIFICANT CHANGE UP
GLUCOSE SERPL-MCNC: 79 MG/DL — SIGNIFICANT CHANGE UP (ref 70–115)
HCT VFR BLD CALC: 25.9 % — LOW (ref 37–47)
HGB BLD-MCNC: 8.9 G/DL — LOW (ref 12–16)
MCHC RBC-ENTMCNC: 30.3 PG — SIGNIFICANT CHANGE UP (ref 27–31)
MCHC RBC-ENTMCNC: 34.4 G/DL — SIGNIFICANT CHANGE UP (ref 32–36)
MCV RBC AUTO: 88.1 FL — SIGNIFICANT CHANGE UP (ref 81–99)
ORGANISM # SPEC MICROSCOPIC CNT: SIGNIFICANT CHANGE UP
PLATELET # BLD AUTO: 172 K/UL — SIGNIFICANT CHANGE UP (ref 150–400)
POTASSIUM SERPL-MCNC: 4.4 MMOL/L — SIGNIFICANT CHANGE UP (ref 3.5–5.3)
POTASSIUM SERPL-SCNC: 4.4 MMOL/L — SIGNIFICANT CHANGE UP (ref 3.5–5.3)
RBC # BLD: 2.94 M/UL — LOW (ref 4.4–5.2)
RBC # FLD: 19 % — HIGH (ref 11–15.6)
SODIUM SERPL-SCNC: 134 MMOL/L — LOW (ref 135–145)
SPECIMEN SOURCE: SIGNIFICANT CHANGE UP
WBC # BLD: 11.6 K/UL — HIGH (ref 4.8–10.8)
WBC # FLD AUTO: 11.6 K/UL — HIGH (ref 4.8–10.8)

## 2017-03-04 PROCEDURE — 99233 SBSQ HOSP IP/OBS HIGH 50: CPT

## 2017-03-04 RX ORDER — HEPARIN SODIUM 5000 [USP'U]/ML
5000 INJECTION INTRAVENOUS; SUBCUTANEOUS EVERY 8 HOURS
Qty: 0 | Refills: 0 | Status: DISCONTINUED | OUTPATIENT
Start: 2017-03-04 | End: 2017-03-06

## 2017-03-04 RX ORDER — SODIUM CHLORIDE 9 MG/ML
1000 INJECTION, SOLUTION INTRAVENOUS
Qty: 0 | Refills: 0 | Status: DISCONTINUED | OUTPATIENT
Start: 2017-03-04 | End: 2017-03-11

## 2017-03-04 RX ADMIN — SODIUM CHLORIDE 100 MILLILITER(S): 9 INJECTION, SOLUTION INTRAVENOUS at 22:41

## 2017-03-04 RX ADMIN — SENNA PLUS 2 TABLET(S): 8.6 TABLET ORAL at 22:32

## 2017-03-04 RX ADMIN — HEPARIN SODIUM 5000 UNIT(S): 5000 INJECTION INTRAVENOUS; SUBCUTANEOUS at 22:32

## 2017-03-04 RX ADMIN — Medication 100 MILLIGRAM(S): at 05:48

## 2017-03-04 RX ADMIN — SODIUM CHLORIDE 125 MILLILITER(S): 9 INJECTION, SOLUTION INTRAVENOUS at 07:39

## 2017-03-04 RX ADMIN — MEROPENEM 200 MILLIGRAM(S): 1 INJECTION INTRAVENOUS at 05:48

## 2017-03-04 RX ADMIN — Medication 325 MILLIGRAM(S): at 11:49

## 2017-03-04 RX ADMIN — SODIUM CHLORIDE 100 MILLILITER(S): 9 INJECTION, SOLUTION INTRAVENOUS at 09:08

## 2017-03-04 RX ADMIN — Medication 100 MILLIGRAM(S): at 22:32

## 2017-03-04 RX ADMIN — PANTOPRAZOLE SODIUM 40 MILLIGRAM(S): 20 TABLET, DELAYED RELEASE ORAL at 05:48

## 2017-03-04 RX ADMIN — DAPTOMYCIN 130 MILLIGRAM(S): 500 INJECTION, POWDER, LYOPHILIZED, FOR SOLUTION INTRAVENOUS at 14:37

## 2017-03-04 RX ADMIN — HEPARIN SODIUM 5000 UNIT(S): 5000 INJECTION INTRAVENOUS; SUBCUTANEOUS at 14:37

## 2017-03-04 RX ADMIN — SODIUM CHLORIDE 125 MILLILITER(S): 9 INJECTION, SOLUTION INTRAVENOUS at 01:57

## 2017-03-04 RX ADMIN — Medication 325 MILLIGRAM(S): at 17:47

## 2017-03-04 RX ADMIN — Medication 325 MILLIGRAM(S): at 07:58

## 2017-03-04 RX ADMIN — MEROPENEM 200 MILLIGRAM(S): 1 INJECTION INTRAVENOUS at 17:47

## 2017-03-04 RX ADMIN — Medication 100 MILLIGRAM(S): at 11:49

## 2017-03-04 RX ADMIN — Medication 1 MILLIGRAM(S): at 11:49

## 2017-03-04 RX ADMIN — Medication 1 TABLET(S): at 11:49

## 2017-03-04 RX ADMIN — PANTOPRAZOLE SODIUM 40 MILLIGRAM(S): 20 TABLET, DELAYED RELEASE ORAL at 17:47

## 2017-03-04 NOTE — PROGRESS NOTE ADULT - PROBLEM SELECTOR PLAN 1
Probable due to anemia and dehydration and Narcotics, resolved.   s/p total of 3 units PRBCs administered. CT abdomen inconclusive: No retroperitoneal hematoma  d/c'd antihypertensive medication  cont. IVF, monitor BP

## 2017-03-04 NOTE — PROGRESS NOTE ADULT - PROBLEM SELECTOR PLAN 3
Continue IV ABX Meropenem and daptomycin an per ID   wound vac and SIRGID drain  local wound care as per ortho

## 2017-03-04 NOTE — PROGRESS NOTE ADULT - PROBLEM SELECTOR PLAN 2
no obvious source of bleeding, CT abdomen negative for RP bleeding   s/p total of 3 units of PRBCs, H&H improved .   Follow up CBC- pending,  stool for occult blood pending   Gi consult appreciated

## 2017-03-04 NOTE — PROGRESS NOTE ADULT - SUBJECTIVE AND OBJECTIVE BOX
Ortho Post Op Check    Name: SHYAM LAL    MR #: [id number]    Procedure: dynamic antibiotic knee spacer conversion to a static antibiotic knee spacer on 2/27/2017  Surgeon: Erma / Candido    Pt comfortable without complaints, pain controlled. Denies CP, SOB, N/V, numbness/tingling. Continues to be on bedrest.     MEDICATIONS  (STANDING):  docusate sodium 100milliGRAM(s) Oral three times a day  ferrous    sulfate 325milliGRAM(s) Oral three times a day with meals  folic acid 1milliGRAM(s) Oral daily  multivitamin 1Tablet(s) Oral daily  insulin lispro (HumaLOG) corrective regimen sliding scale  SubCutaneous Before meals and at bedtime  dextrose 50% Injectable 12.5Gram(s) IV Push once  dextrose 50% Injectable 25Gram(s) IV Push once  dextrose 50% Injectable 25Gram(s) IV Push once  meropenem IVPB 500milliGRAM(s) IV Intermittent every 12 hours  senna 2Tablet(s) Oral at bedtime  DAPTOmycin IVPB 750milliGRAM(s) IV Intermittent every 48 hours  pantoprazole  Injectable 40milliGRAM(s) IV Push two times a day  multiple electrolytes Injection Type 1 1000milliLiter(s) IV Continuous <Continuous>  heparin  Injectable 5000Unit(s) SubCutaneous every 8 hours    MEDICATIONS  (PRN):  acetaminophen   Tablet 650milliGRAM(s) Oral every 6 hours PRN For Temp over 38.3 C (100.94 F)  aluminum hydroxide/magnesium hydroxide/simethicone Suspension 30milliLiter(s) Oral four times a day PRN Indigestion  ondansetron Injectable 4milliGRAM(s) IV Push every 6 hours PRN Nausea and/or Vomiting  dextrose Gel 1Dose(s) Oral once PRN Blood Glucose LESS THAN 70 milliGRAM(s)/deciliter  glucagon  Injectable 1milliGRAM(s) IntraMuscular once PRN Glucose LESS THAN 70 milligrams/deciliter  polyethylene glycol 3350 17Gram(s) Oral daily PRN Constipation  HYDROmorphone  Injectable 0.5milliGRAM(s) IV Push every 6 hours PRN Severe Pain (7 - 10)      General Exam:  Vital Signs Last 24 Hrs  T(C): 37.3, Max: 37.3 (03-04 @ 08:04)  T(F): 99.2, Max: 99.2 (03-04 @ 08:04)  HR: 89 (89 - 89)  BP: 123/69 (123/69 - 123/69)  BP(mean): --  RR: 17 (17 - 17)  SpO2: 100% (100% - 100%)    I&O's Detail    I & Os for current day (as of 04 Mar 2017 11:49)  =============================================  IN:    multiple electrolytes Injection Type 1multiple electrolytes Injection Type 1: 1950 ml    Solution: 100 ml    Total IN: 2050 ml  ---------------------------------------------  OUT:    Indwelling Catheter - Urethral: 800 ml    Bulb: 110 ml    Bulb: 70 ml    Total OUT: 980 ml  ---------------------------------------------  Total NET: 1070 ml        General: Pt Alert and oriented, NAD, controlled pain.  Dressings C/D/I. Minimal staining of distal bandage No active bleeding. 2 drains in placed  Pulses: 2+ dorsalis pedis pulse. Cap refill < 2 sec.  Sensation: Grossly intact to light touch without deficit.  Motor: + intact motor at 3/5 of ankle and toes (hestitation due to pain).                          8.9    11.6  )-----------( 172      ( 04 Mar 2017 08:16 )             25.9   04 Mar 2017 08:16    134    |  97     |  42.0   ----------------------------<  79     4.4     |  23.0   |  3.55     Ca    7.4        04 Mar 2017 08:16  Phos  4.9       03 Mar 2017 10:03  Mg     3.0       03 Mar 2017 10:03    TPro  5.3    /  Alb  2.4    /  TBili  0.8    /  DBili  0.4    /  AST  26     /  ALT  10     /  AlkPhos  77     02 Mar 2017 20:17      A/P: 71yFemale POD#0 s/p   - Stable  - Pain Control  - DVT ppx: will resume heparin with improved H&H  - ABX: Daptomycin  - continue drains  - New bulky dressing applied  - Weight bearing status: Full in knee immobilizer Ortho Post Op Check    Name: SHYAM LAL    MR #: [id number]    Procedure: dynamic antibiotic knee spacer conversion to a static antibiotic knee spacer on 2/27/2017  Surgeon: Erma / Candido    Pt comfortable without complaints, pain controlled. Denies CP, SOB, N/V, numbness/tingling. Continues to be on bedrest.     MEDICATIONS  (STANDING):  docusate sodium 100milliGRAM(s) Oral three times a day  ferrous    sulfate 325milliGRAM(s) Oral three times a day with meals  folic acid 1milliGRAM(s) Oral daily  multivitamin 1Tablet(s) Oral daily  insulin lispro (HumaLOG) corrective regimen sliding scale  SubCutaneous Before meals and at bedtime  dextrose 50% Injectable 12.5Gram(s) IV Push once  dextrose 50% Injectable 25Gram(s) IV Push once  dextrose 50% Injectable 25Gram(s) IV Push once  meropenem IVPB 500milliGRAM(s) IV Intermittent every 12 hours  senna 2Tablet(s) Oral at bedtime  DAPTOmycin IVPB 750milliGRAM(s) IV Intermittent every 48 hours  pantoprazole  Injectable 40milliGRAM(s) IV Push two times a day  multiple electrolytes Injection Type 1 1000milliLiter(s) IV Continuous <Continuous>  heparin  Injectable 5000Unit(s) SubCutaneous every 8 hours    MEDICATIONS  (PRN):  acetaminophen   Tablet 650milliGRAM(s) Oral every 6 hours PRN For Temp over 38.3 C (100.94 F)  aluminum hydroxide/magnesium hydroxide/simethicone Suspension 30milliLiter(s) Oral four times a day PRN Indigestion  ondansetron Injectable 4milliGRAM(s) IV Push every 6 hours PRN Nausea and/or Vomiting  dextrose Gel 1Dose(s) Oral once PRN Blood Glucose LESS THAN 70 milliGRAM(s)/deciliter  glucagon  Injectable 1milliGRAM(s) IntraMuscular once PRN Glucose LESS THAN 70 milligrams/deciliter  polyethylene glycol 3350 17Gram(s) Oral daily PRN Constipation  HYDROmorphone  Injectable 0.5milliGRAM(s) IV Push every 6 hours PRN Severe Pain (7 - 10)      General Exam:  Vital Signs Last 24 Hrs  T(C): 37.3, Max: 37.3 (03-04 @ 08:04)  T(F): 99.2, Max: 99.2 (03-04 @ 08:04)  HR: 89 (89 - 89)  BP: 123/69 (123/69 - 123/69)  BP(mean): --  RR: 17 (17 - 17)  SpO2: 100% (100% - 100%)    I&O's Detail    I & Os for current day (as of 04 Mar 2017 11:49)  =============================================  IN:    multiple electrolytes Injection Type 1multiple electrolytes Injection Type 1: 1950 ml    Solution: 100 ml    Total IN: 2050 ml  ---------------------------------------------  OUT:    Indwelling Catheter - Urethral: 800 ml    Bulb: 110 ml    Bulb: 70 ml    Total OUT: 980 ml  ---------------------------------------------  Total NET: 1070 ml        General: Pt Alert and oriented, NAD, controlled pain.  Dressings C/D/I. Minimal staining of distal bandage No active bleeding. 2 drains in placed  Pulses: 2+ dorsalis pedis pulse. Cap refill < 2 sec.  Sensation: Grossly intact to light touch without deficit.  Motor: + intact motor at 3/5 of ankle and toes (hestitation due to pain).           Culture - Body Fluid with Gram Stain (02.27.17 @ 20:46)    -  Ampicillin: S <=2    -  Linezolid: S <=1    Gram Stain:   No WBC's or organisms seen    -  Daptomycin: S <=0.5    -  Vancomycin: R >16    -  Ciprofloxacin: R >2    -  Tetra/Doxy: R >8    Specimen Source: .Body Fluid right knee fluid (swabs)    Culture Results:   Few Enterococcus faecalis (vancomycin resistant)   Organism Identification: Enterococcus faecalis (vancomycin resistant)    Organism: Enterococcus faecalis (vancomycin resistant)    Method Type: MIGUEL                       8.9    11.6  )-----------( 172      ( 04 Mar 2017 08:16 )             25.9   04 Mar 2017 08:16    134    |  97     |  42.0   ----------------------------<  79     4.4     |  23.0   |  3.55     Ca    7.4        04 Mar 2017 08:16  Phos  4.9       03 Mar 2017 10:03  Mg     3.0       03 Mar 2017 10:03    TPro  5.3    /  Alb  2.4    /  TBili  0.8    /  DBili  0.4    /  AST  26     /  ALT  10     /  AlkPhos  77     02 Mar 2017 20:17      A/P: 71yFemale POD#0 s/p   - Stable  - Pain Control  - DVT ppx: will resume heparin with improved H&H  - ABX: Daptomycin  - continue drains  - New bulky dressing applied  - Weight bearing status: Full in knee immobilizer

## 2017-03-04 NOTE — PROGRESS NOTE ADULT - ASSESSMENT
KARMEN on CKD , DM ---> baseline creat 1.8   Normal kidneys on NCCT   Debridement / dehis ---> Abx as per ID   Ortho follow up noted   Continue IVF   follow labs   ? Antecedent AIN , ATN ?     Anemia - S/P Transfusion , CBC stable

## 2017-03-04 NOTE — PROGRESS NOTE ADULT - SUBJECTIVE AND OBJECTIVE BOX
Internal Medicine Hospitalist - Dr. Gerardo LAL    [id number]    71y      Female    Patient is a 71y old  Female who presents with a chief complaint of knee drainage (22 Feb 2017 16:00)    HPI:  71 y F hx DM2, PAF, HTN, LIN, CKD, COPD, sent from rehab for R knee wound dehiscensce. Pt has had multiple interventions on the R knee following TKR last year, including hardware removal for infection, IV abx course and most recently adm for wound dehiscence in Dec req washout and wound closure. The patient is now transferred from rehab for persistent wound serosanguinous drainage and wound dehiscence. Pt denies CP, F/C, SOB, cough, N/V/D/C, dysuria. c/o L heel pain. (22 Feb 2017 16:00)      INTERVAL HPI/ OVERNIGHT EVENTS: Patient is seen and examined, pt is much more awake, BP is stable, denied chest pain, SOB, abd. pain, nausea and vomiting, she didn't have BM, appetite is poor.     REVIEW OF SYSTEMS:    Denied fever, chills, abd. pain, nausea, vomiting, chest pain, SOB, headache, dizziness    PHYSICAL EXAM:    Vital Signs Last 24 Hrs  T(C): 37.3, Max: 37.3 (03-04 @ 08:04)  T(F): 99.2, Max: 99.2 (03-04 @ 08:04)  HR: 89 (89 - 97)  BP: 123/69 (95/61 - 123/69)  BP(mean): --  RR: 17 (17 - 18)  SpO2: 100% (100% - 100%)    GENERAL: NAD  HEENT: EOMI  Neck: supple  CHEST/LUNG: positive air entry   HEART: S1S2+ audible  ABDOMEN: Soft, Nontender, Nondistended; Bowel sounds present  EXTREMITIES:  dressing over right leg, with 2 SIGRID drain , swollen legs   CNS: AAO X 3  Psychiatry: normal mood    LABS:                        10.7   14.1  )-----------( 208      ( 03 Mar 2017 10:03 )             30.4     02 Mar 2017 20:17    135    |  96     |  47.0   ----------------------------<  112    4.4     |  24.0   |  3.98     Ca    7.8        02 Mar 2017 20:17  Phos  4.9       03 Mar 2017 10:03  Mg     3.0       03 Mar 2017 10:03    TPro  5.3    /  Alb  2.4    /  TBili  0.8    /  DBili  0.4    /  AST  26     /  ALT  10     /  AlkPhos  77     02 Mar 2017 20:17            MEDICATIONS  (STANDING):  docusate sodium 100milliGRAM(s) Oral three times a day  ferrous    sulfate 325milliGRAM(s) Oral three times a day with meals  folic acid 1milliGRAM(s) Oral daily  multivitamin 1Tablet(s) Oral daily  insulin lispro (HumaLOG) corrective regimen sliding scale  SubCutaneous Before meals and at bedtime  dextrose 50% Injectable 12.5Gram(s) IV Push once  dextrose 50% Injectable 25Gram(s) IV Push once  dextrose 50% Injectable 25Gram(s) IV Push once  meropenem IVPB 500milliGRAM(s) IV Intermittent every 12 hours  senna 2Tablet(s) Oral at bedtime  multiple electrolytes Injection Type 1 1000milliLiter(s) IV Continuous <Continuous>  DAPTOmycin IVPB 750milliGRAM(s) IV Intermittent every 48 hours  pantoprazole  Injectable 40milliGRAM(s) IV Push two times a day    MEDICATIONS  (PRN):  acetaminophen   Tablet 650milliGRAM(s) Oral every 6 hours PRN For Temp over 38.3 C (100.94 F)  aluminum hydroxide/magnesium hydroxide/simethicone Suspension 30milliLiter(s) Oral four times a day PRN Indigestion  ondansetron Injectable 4milliGRAM(s) IV Push every 6 hours PRN Nausea and/or Vomiting  dextrose Gel 1Dose(s) Oral once PRN Blood Glucose LESS THAN 70 milliGRAM(s)/deciliter  glucagon  Injectable 1milliGRAM(s) IntraMuscular once PRN Glucose LESS THAN 70 milligrams/deciliter  polyethylene glycol 3350 17Gram(s) Oral daily PRN Constipation  HYDROmorphone  Injectable 0.5milliGRAM(s) IV Push every 6 hours PRN Severe Pain (7 - 10)      RADIOLOGY & ADDITIONAL TEST

## 2017-03-04 NOTE — PROGRESS NOTE ADULT - SUBJECTIVE AND OBJECTIVE BOX
NEPHROLOGY INTERVAL HPI/OVERNIGHT EVENTS:      Feels the same    ml with anna   Remains on IVF   Orthd follow up noted       MEDICATIONS  (STANDING):  docusate sodium 100milliGRAM(s) Oral three times a day  ferrous    sulfate 325milliGRAM(s) Oral three times a day with meals  folic acid 1milliGRAM(s) Oral daily  multivitamin 1Tablet(s) Oral daily  insulin lispro (HumaLOG) corrective regimen sliding scale  SubCutaneous Before meals and at bedtime  dextrose 50% Injectable 12.5Gram(s) IV Push once  dextrose 50% Injectable 25Gram(s) IV Push once  dextrose 50% Injectable 25Gram(s) IV Push once  meropenem IVPB 500milliGRAM(s) IV Intermittent every 12 hours  senna 2Tablet(s) Oral at bedtime  DAPTOmycin IVPB 750milliGRAM(s) IV Intermittent every 48 hours  pantoprazole  Injectable 40milliGRAM(s) IV Push two times a day  multiple electrolytes Injection Type 1 1000milliLiter(s) IV Continuous <Continuous>  heparin  Injectable 5000Unit(s) SubCutaneous every 8 hours    MEDICATIONS  (PRN):  acetaminophen   Tablet 650milliGRAM(s) Oral every 6 hours PRN For Temp over 38.3 C (100.94 F)  aluminum hydroxide/magnesium hydroxide/simethicone Suspension 30milliLiter(s) Oral four times a day PRN Indigestion  ondansetron Injectable 4milliGRAM(s) IV Push every 6 hours PRN Nausea and/or Vomiting  dextrose Gel 1Dose(s) Oral once PRN Blood Glucose LESS THAN 70 milliGRAM(s)/deciliter  glucagon  Injectable 1milliGRAM(s) IntraMuscular once PRN Glucose LESS THAN 70 milligrams/deciliter  polyethylene glycol 3350 17Gram(s) Oral daily PRN Constipation  HYDROmorphone  Injectable 0.5milliGRAM(s) IV Push every 6 hours PRN Severe Pain (7 - 10)      Allergies    No Known Allergies    Intolerances            Vital Signs Last 24 Hrs  T(C): 37.3, Max: 37.3 (03-04 @ 08:04)  T(F): 99.2, Max: 99.2 (03-04 @ 08:04)  HR: 89 (89 - 97)  BP: 123/69 (118/73 - 123/69)  BP(mean): --  RR: 17 (17 - 18)  SpO2: 100% (100% - 100%)  Daily     Daily   I&O's Detail    I & Os for current day (as of 04 Mar 2017 17:32)  =============================================  IN:    multiple electrolytes Injection Type 1multiple electrolytes Injection Type 1: 1950 ml    Solution: 100 ml    Total IN: 2050 ml  ---------------------------------------------  OUT:    Indwelling Catheter - Urethral: 800 ml    Bulb: 110 ml    Bulb: 70 ml    Total OUT: 980 ml  ---------------------------------------------  Total NET: 1070 ml    I&O's Summary    I & Os for current day (as of 04 Mar 2017 17:32)  =============================================  IN: 2050 ml / OUT: 980 ml / NET: 1070 ml      PHYSICAL EXAM:    PHYSICAL EXAM:  HEENT: EOMI  Neck: supple  CHEST/LUNG: Clear to percussion bilaterally; No wheezing  HEART: S1S2+ audible  ABDOMEN: Soft, Nontender, Nondistended; Bowel sounds present  EXTREMITIES:  dressing over right knee, swollen both extremities       LABS:                        8.9    11.6  )-----------( 172      ( 04 Mar 2017 08:16 )             25.9     04 Mar 2017 08:16    134    |  97     |  42.0   ----------------------------<  79     4.4     |  23.0   |  3.55     Ca    7.4        04 Mar 2017 08:16  Phos  4.9       03 Mar 2017 10:03  Mg     3.0       03 Mar 2017 10:03    TPro  5.3    /  Alb  2.4    /  TBili  0.8    /  DBili  0.4    /  AST  26     /  ALT  10     /  AlkPhos  77     02 Mar 2017 20:17                RADIOLOGY & ADDITIONAL TESTS:

## 2017-03-04 NOTE — PROGRESS NOTE ADULT - ASSESSMENT
71 yr old female with hypertension, hyperlipidemia, LIN, CKD, COPD, paroxysmal A fib sent from rehab for right knee wound dehiscence. She was seen by orthopedics, ID. Local wound cultures growing  Pseudomonas and VRE. She was started on Meropenem and Zyvox. Her renal function was  worse from baseline and hence started on IVF. No obstructive disease on renal ultrasound, seen by renal. She is s/p Insertion of antibiotic spacer in right knee joint  02/27/2017 . Pt was s/p  Rapid response on 3/1/17 for increasing lethargy and hypotension, likely due to Narcotics d/c'd. Lactate level 3.0, pt transfer to ICU, also noted to have H&H: 6.9/20.4, no obvious source of bleeding, CT scan negative for RPB, s/p total of 3 units of PRBC, h&h improved, GI consult requested, no BM, occult blood pending. Pt is much more awake now, BP is stable.

## 2017-03-04 NOTE — PROGRESS NOTE ADULT - PROBLEM SELECTOR PLAN 4
appreciate renal input, baseline creatinine is 1.8  Continue IVF. Encourage po intake. Monitor BMP. Continue Gaytan catheter, monitor urine output.  BMP pending

## 2017-03-05 LAB
ANION GAP SERPL CALC-SCNC: 13 MMOL/L — SIGNIFICANT CHANGE UP (ref 5–17)
BUN SERPL-MCNC: 42 MG/DL — HIGH (ref 8–20)
CALCIUM SERPL-MCNC: 7.6 MG/DL — LOW (ref 8.6–10.2)
CHLORIDE SERPL-SCNC: 99 MMOL/L — SIGNIFICANT CHANGE UP (ref 98–107)
CO2 SERPL-SCNC: 25 MMOL/L — SIGNIFICANT CHANGE UP (ref 22–29)
CREAT SERPL-MCNC: 3.13 MG/DL — HIGH (ref 0.5–1.3)
GLUCOSE SERPL-MCNC: 81 MG/DL — SIGNIFICANT CHANGE UP (ref 70–115)
HCT VFR BLD CALC: 26.7 % — LOW (ref 37–47)
HGB BLD-MCNC: 9 G/DL — LOW (ref 12–16)
MCHC RBC-ENTMCNC: 30.5 PG — SIGNIFICANT CHANGE UP (ref 27–31)
MCHC RBC-ENTMCNC: 33.7 G/DL — SIGNIFICANT CHANGE UP (ref 32–36)
MCV RBC AUTO: 90.5 FL — SIGNIFICANT CHANGE UP (ref 81–99)
PLATELET # BLD AUTO: 155 K/UL — SIGNIFICANT CHANGE UP (ref 150–400)
POTASSIUM SERPL-MCNC: 4.8 MMOL/L — SIGNIFICANT CHANGE UP (ref 3.5–5.3)
POTASSIUM SERPL-SCNC: 4.8 MMOL/L — SIGNIFICANT CHANGE UP (ref 3.5–5.3)
RBC # BLD: 2.95 M/UL — LOW (ref 4.4–5.2)
RBC # FLD: 18.9 % — HIGH (ref 11–15.6)
SODIUM SERPL-SCNC: 137 MMOL/L — SIGNIFICANT CHANGE UP (ref 135–145)
WBC # BLD: 9.2 K/UL — SIGNIFICANT CHANGE UP (ref 4.8–10.8)
WBC # FLD AUTO: 9.2 K/UL — SIGNIFICANT CHANGE UP (ref 4.8–10.8)

## 2017-03-05 PROCEDURE — 99233 SBSQ HOSP IP/OBS HIGH 50: CPT

## 2017-03-05 RX ADMIN — PANTOPRAZOLE SODIUM 40 MILLIGRAM(S): 20 TABLET, DELAYED RELEASE ORAL at 17:10

## 2017-03-05 RX ADMIN — Medication 100 MILLIGRAM(S): at 13:08

## 2017-03-05 RX ADMIN — SODIUM CHLORIDE 100 MILLILITER(S): 9 INJECTION, SOLUTION INTRAVENOUS at 21:59

## 2017-03-05 RX ADMIN — HEPARIN SODIUM 5000 UNIT(S): 5000 INJECTION INTRAVENOUS; SUBCUTANEOUS at 05:36

## 2017-03-05 RX ADMIN — Medication 325 MILLIGRAM(S): at 17:09

## 2017-03-05 RX ADMIN — PANTOPRAZOLE SODIUM 40 MILLIGRAM(S): 20 TABLET, DELAYED RELEASE ORAL at 05:36

## 2017-03-05 RX ADMIN — Medication 1 TABLET(S): at 13:08

## 2017-03-05 RX ADMIN — HEPARIN SODIUM 5000 UNIT(S): 5000 INJECTION INTRAVENOUS; SUBCUTANEOUS at 13:08

## 2017-03-05 RX ADMIN — Medication 325 MILLIGRAM(S): at 07:49

## 2017-03-05 RX ADMIN — Medication 100 MILLIGRAM(S): at 21:58

## 2017-03-05 RX ADMIN — HEPARIN SODIUM 5000 UNIT(S): 5000 INJECTION INTRAVENOUS; SUBCUTANEOUS at 21:58

## 2017-03-05 RX ADMIN — Medication 1 MILLIGRAM(S): at 13:08

## 2017-03-05 RX ADMIN — Medication 325 MILLIGRAM(S): at 13:08

## 2017-03-05 RX ADMIN — Medication 100 MILLIGRAM(S): at 05:36

## 2017-03-05 RX ADMIN — MEROPENEM 200 MILLIGRAM(S): 1 INJECTION INTRAVENOUS at 17:09

## 2017-03-05 RX ADMIN — SENNA PLUS 2 TABLET(S): 8.6 TABLET ORAL at 21:58

## 2017-03-05 RX ADMIN — MEROPENEM 200 MILLIGRAM(S): 1 INJECTION INTRAVENOUS at 05:36

## 2017-03-05 NOTE — PROGRESS NOTE ADULT - SUBJECTIVE AND OBJECTIVE BOX
SHYAM LAL    [id number]    History:  The patient is status post right knee revison, , POD 6. Patient is doing well. The patient's pain is controlled using the prescribed pain medications. Denies nausea, vomiting, chest pain, shortness of breath, abdominal pain or fever. No new complaints. No acute motor or sensory changes are reported.    Vital Signs Last 24 Hrs  T(C): 37.2, Max: 37.2 (03-04 @ 23:30)  T(F): 98.9, Max: 98.9 (03-04 @ 23:30)  HR: 100 (68 - 100)  BP: 145/79 (124/74 - 145/79)  BP(mean): --  RR: 18 (18 - 18)  SpO2: 100% (100% - 100%)  I&O's Summary    I & Os for current day (as of 05 Mar 2017 10:42)  =============================================  IN: 0 ml / OUT: 990 ml / NET: -990 ml                            9.0    9.2   )-----------( 155      ( 05 Mar 2017 09:57 )             26.7     05 Mar 2017 09:57    137    |  99     |  42.0   ----------------------------<  81     4.8     |  25.0   |  3.13     Ca    7.6        05 Mar 2017 09:57        MEDICATIONS  (STANDING):  docusate sodium 100milliGRAM(s) Oral three times a day  ferrous    sulfate 325milliGRAM(s) Oral three times a day with meals  folic acid 1milliGRAM(s) Oral daily  multivitamin 1Tablet(s) Oral daily  insulin lispro (HumaLOG) corrective regimen sliding scale  SubCutaneous Before meals and at bedtime  dextrose 50% Injectable 12.5Gram(s) IV Push once  dextrose 50% Injectable 25Gram(s) IV Push once  dextrose 50% Injectable 25Gram(s) IV Push once  meropenem IVPB 500milliGRAM(s) IV Intermittent every 12 hours  senna 2Tablet(s) Oral at bedtime  DAPTOmycin IVPB 750milliGRAM(s) IV Intermittent every 48 hours  pantoprazole  Injectable 40milliGRAM(s) IV Push two times a day  multiple electrolytes Injection Type 1 1000milliLiter(s) IV Continuous <Continuous>  heparin  Injectable 5000Unit(s) SubCutaneous every 8 hours    MEDICATIONS  (PRN):  acetaminophen   Tablet 650milliGRAM(s) Oral every 6 hours PRN For Temp over 38.3 C (100.94 F)  aluminum hydroxide/magnesium hydroxide/simethicone Suspension 30milliLiter(s) Oral four times a day PRN Indigestion  ondansetron Injectable 4milliGRAM(s) IV Push every 6 hours PRN Nausea and/or Vomiting  dextrose Gel 1Dose(s) Oral once PRN Blood Glucose LESS THAN 70 milliGRAM(s)/deciliter  glucagon  Injectable 1milliGRAM(s) IntraMuscular once PRN Glucose LESS THAN 70 milligrams/deciliter  polyethylene glycol 3350 17Gram(s) Oral daily PRN Constipation  HYDROmorphone  Injectable 0.5milliGRAM(s) IV Push every 6 hours PRN Severe Pain (7 - 10)      Physical exam: Well padded KI is in good position. The dressing is clean, dry and intact, new dressing placed. No wound erythema, discharge, drainage is noted. Sensation to light touch is grossly intact without focal deficit and is symmetric bilaterally. No calf tenderness. Sensation to light touch is grossly intact distally. Motor function distally is grossly intact. No foot drop. + dorsalis pedis pulse. No cyanosis.  SIGRID drains x 2 in place    Primary Orthopedic Assessment:  • Ortho stable  Secondary  Orthopedic Assessment(s):   •   Secondary  Medical Assessment(s):   •   Plan:   • DVT prophylaxis as prescribed, including use of compression devices and ankle pumps  • WBAT in KI  • Pain control as clinically indicated  • Incentive spirometry encouraged  • daily dressing changes

## 2017-03-05 NOTE — PROGRESS NOTE ADULT - SUBJECTIVE AND OBJECTIVE BOX
NEPHROLOGY INTERVAL HPI/OVERNIGHT EVENTS:    Fees better   No New complaints   No SOB or CP   IVF noted       MEDICATIONS  (STANDING):  docusate sodium 100milliGRAM(s) Oral three times a day  ferrous    sulfate 325milliGRAM(s) Oral three times a day with meals  folic acid 1milliGRAM(s) Oral daily  multivitamin 1Tablet(s) Oral daily  insulin lispro (HumaLOG) corrective regimen sliding scale  SubCutaneous Before meals and at bedtime  dextrose 50% Injectable 12.5Gram(s) IV Push once  dextrose 50% Injectable 25Gram(s) IV Push once  dextrose 50% Injectable 25Gram(s) IV Push once  meropenem IVPB 500milliGRAM(s) IV Intermittent every 12 hours  senna 2Tablet(s) Oral at bedtime  DAPTOmycin IVPB 750milliGRAM(s) IV Intermittent every 48 hours  pantoprazole  Injectable 40milliGRAM(s) IV Push two times a day  multiple electrolytes Injection Type 1 1000milliLiter(s) IV Continuous <Continuous>  heparin  Injectable 5000Unit(s) SubCutaneous every 8 hours    MEDICATIONS  (PRN):  acetaminophen   Tablet 650milliGRAM(s) Oral every 6 hours PRN For Temp over 38.3 C (100.94 F)  aluminum hydroxide/magnesium hydroxide/simethicone Suspension 30milliLiter(s) Oral four times a day PRN Indigestion  ondansetron Injectable 4milliGRAM(s) IV Push every 6 hours PRN Nausea and/or Vomiting  dextrose Gel 1Dose(s) Oral once PRN Blood Glucose LESS THAN 70 milliGRAM(s)/deciliter  glucagon  Injectable 1milliGRAM(s) IntraMuscular once PRN Glucose LESS THAN 70 milligrams/deciliter  polyethylene glycol 3350 17Gram(s) Oral daily PRN Constipation  HYDROmorphone  Injectable 0.5milliGRAM(s) IV Push every 6 hours PRN Severe Pain (7 - 10)      Allergies    No Known Allergies    Intolerances            Vital Signs Last 24 Hrs  T(C): 37.2, Max: 37.2 (03-04 @ 23:30)  T(F): 98.9, Max: 98.9 (03-04 @ 23:30)  HR: 100 (68 - 100)  BP: 145/79 (124/74 - 145/79)  BP(mean): --  RR: 18 (18 - 18)  SpO2: 100% (100% - 100%)  Daily     Daily   I&O's Detail    I & Os for current day (as of 05 Mar 2017 14:15)  =============================================  IN:    Total IN: 0 ml  ---------------------------------------------  OUT:    Indwelling Catheter - Urethral: 900 ml    Bulb: 75 ml    Bulb: 15 ml    Total OUT: 990 ml  ---------------------------------------------  Total NET: -990 ml    I&O's Summary    I & Os for current day (as of 05 Mar 2017 14:15)  =============================================  IN: 0 ml / OUT: 990 ml / NET: -990 ml      PHYSICAL EXAM:  PHYSICAL EXAM:  HEENT: EOMI  Neck: supple  CHEST/LUNG: Clear to percussion bilaterally; No wheezing  HEART: S1S2+ audible  ABDOMEN: Soft, Nontender, Nondistended; Bowel sounds present  EXTREMITIES:  dressing over right knee, swollen both extremities       LABS:                        9.0    9.2   )-----------( 155      ( 05 Mar 2017 09:57 )             26.7     05 Mar 2017 09:57    137    |  99     |  42.0   ----------------------------<  81     4.8     |  25.0   |  3.13     Ca    7.6        05 Mar 2017 09:57                  RADIOLOGY & ADDITIONAL TESTS:

## 2017-03-05 NOTE — PROGRESS NOTE ADULT - ASSESSMENT
71 yr old female with hypertension, hyperlipidemia, LIN, CKD, COPD, paroxysmal A fib sent from rehab for right knee wound dehiscence. She was seen by orthopedics, ID. Local wound cultures growing resistant and Pseudomonas and VRE. She was started on Meropenem and Zyvox. Her renal function was slightly worse from baseline and hence started on IVF. No obstructive disease on renal ultrasound. Cardiology consulted for clearance. Renal was consulted.  She is scheduled for OR I&D, she underwent wound closure and insertion of antibiotic spacer. RRT was called on 3/2 for hypotension and lethargy. Labs revealed elevated lactate. She was transferred to ICU for fluid resuscitation and monitoring. Narcotics were discontinued. She was noted to be anemic 6.9, she was ordered for PRBC transfusion and transferred out of ICU. ID follow up requested for medication adjustment, advised to stop Linezolid and start Daptomycin. GI consulted for anemia, no endoscopic intervention advised at this time.

## 2017-03-05 NOTE — PROGRESS NOTE ADULT - SUBJECTIVE AND OBJECTIVE BOX
INTERVAL HPI/OVERNIGHT EVENTS:    CC: anemia, right knee wound dehiscencse and infection Pseudomonas and VRE, CKD, diabetes, hypertension.    Chart and course reviewed. No overnight events, eating breakfast. Denies complaints.       Vital Signs Last 24 Hrs  T(C): 37.2, Max: 37.2 (03-04 @ 23:30)  T(F): 98.9, Max: 98.9 (03-04 @ 23:30)  HR: 100 (68 - 100)  BP: 145/79 (124/74 - 145/79)  BP(mean): --  RR: 18 (18 - 18)  SpO2: 100% (100% - 100%)    PHYSICAL EXAM:    GENERAL: Not in distress  CHEST/LUNG: b/l air entry  HEART: Regular  ABDOMEN: Soft, BS+  EXTREMITIES:  dressing over right knee    MEDICATIONS  (STANDING):  docusate sodium 100milliGRAM(s) Oral three times a day  ferrous    sulfate 325milliGRAM(s) Oral three times a day with meals  folic acid 1milliGRAM(s) Oral daily  multivitamin 1Tablet(s) Oral daily  insulin lispro (HumaLOG) corrective regimen sliding scale  SubCutaneous Before meals and at bedtime  dextrose 50% Injectable 12.5Gram(s) IV Push once  dextrose 50% Injectable 25Gram(s) IV Push once  dextrose 50% Injectable 25Gram(s) IV Push once  meropenem IVPB 500milliGRAM(s) IV Intermittent every 12 hours  senna 2Tablet(s) Oral at bedtime  DAPTOmycin IVPB 750milliGRAM(s) IV Intermittent every 48 hours  pantoprazole  Injectable 40milliGRAM(s) IV Push two times a day  multiple electrolytes Injection Type 1 1000milliLiter(s) IV Continuous <Continuous>  heparin  Injectable 5000Unit(s) SubCutaneous every 8 hours    MEDICATIONS  (PRN):  acetaminophen   Tablet 650milliGRAM(s) Oral every 6 hours PRN For Temp over 38.3 C (100.94 F)  aluminum hydroxide/magnesium hydroxide/simethicone Suspension 30milliLiter(s) Oral four times a day PRN Indigestion  ondansetron Injectable 4milliGRAM(s) IV Push every 6 hours PRN Nausea and/or Vomiting  dextrose Gel 1Dose(s) Oral once PRN Blood Glucose LESS THAN 70 milliGRAM(s)/deciliter  glucagon  Injectable 1milliGRAM(s) IntraMuscular once PRN Glucose LESS THAN 70 milligrams/deciliter  polyethylene glycol 3350 17Gram(s) Oral daily PRN Constipation  HYDROmorphone  Injectable 0.5milliGRAM(s) IV Push every 6 hours PRN Severe Pain (7 - 10)      Allergies    No Known Allergies    Intolerances          LABS:                          8.9    11.6  )-----------( 172      ( 04 Mar 2017 08:16 )             25.9     04 Mar 2017 08:16    134    |  97     |  42.0   ----------------------------<  79     4.4     |  23.0   |  3.55     Ca    7.4        04 Mar 2017 08:16  Phos  4.9       03 Mar 2017 10:03  Mg     3.0       03 Mar 2017 10:03            RADIOLOGY & ADDITIONAL TESTS:

## 2017-03-06 DIAGNOSIS — G93.40 ENCEPHALOPATHY, UNSPECIFIED: ICD-10-CM

## 2017-03-06 LAB
AMMONIA BLD-MCNC: 34 UMOL/L — SIGNIFICANT CHANGE UP (ref 11–55)
ANION GAP SERPL CALC-SCNC: 11 MMOL/L — SIGNIFICANT CHANGE UP (ref 5–17)
BASE EXCESS BLDA CALC-SCNC: 1.4 MMOL/L — SIGNIFICANT CHANGE UP (ref -3–3)
BUN SERPL-MCNC: 39 MG/DL — HIGH (ref 8–20)
CALCIUM SERPL-MCNC: 8.1 MG/DL — LOW (ref 8.6–10.2)
CHLORIDE SERPL-SCNC: 102 MMOL/L — SIGNIFICANT CHANGE UP (ref 98–107)
CO2 SERPL-SCNC: 26 MMOL/L — SIGNIFICANT CHANGE UP (ref 22–29)
CREAT SERPL-MCNC: 2.89 MG/DL — HIGH (ref 0.5–1.3)
GAS PNL BLDA: SIGNIFICANT CHANGE UP
GLUCOSE SERPL-MCNC: 113 MG/DL — SIGNIFICANT CHANGE UP (ref 70–115)
HCO3 BLDA-SCNC: 26 MMOL/L — SIGNIFICANT CHANGE UP (ref 20–26)
HCT VFR BLD CALC: 28.9 % — LOW (ref 37–47)
HGB BLD-MCNC: 9.6 G/DL — LOW (ref 12–16)
MCHC RBC-ENTMCNC: 30.2 PG — SIGNIFICANT CHANGE UP (ref 27–31)
MCHC RBC-ENTMCNC: 33.2 G/DL — SIGNIFICANT CHANGE UP (ref 32–36)
MCV RBC AUTO: 90.9 FL — SIGNIFICANT CHANGE UP (ref 81–99)
PCO2 BLDA: 47 MMHG — HIGH (ref 35–45)
PH BLDA: 7.37 — SIGNIFICANT CHANGE UP (ref 7.35–7.45)
PLATELET # BLD AUTO: 144 K/UL — LOW (ref 150–400)
PO2 BLDA: 128 MMHG — HIGH (ref 83–108)
POTASSIUM SERPL-MCNC: 4.3 MMOL/L — SIGNIFICANT CHANGE UP (ref 3.5–5.3)
POTASSIUM SERPL-SCNC: 4.3 MMOL/L — SIGNIFICANT CHANGE UP (ref 3.5–5.3)
RBC # BLD: 3.18 M/UL — LOW (ref 4.4–5.2)
RBC # FLD: 18.3 % — HIGH (ref 11–15.6)
SAO2 % BLDA: 100 % — HIGH (ref 95–99)
SODIUM SERPL-SCNC: 139 MMOL/L — SIGNIFICANT CHANGE UP (ref 135–145)
WBC # BLD: 9.7 K/UL — SIGNIFICANT CHANGE UP (ref 4.8–10.8)
WBC # FLD AUTO: 9.7 K/UL — SIGNIFICANT CHANGE UP (ref 4.8–10.8)

## 2017-03-06 PROCEDURE — 99233 SBSQ HOSP IP/OBS HIGH 50: CPT

## 2017-03-06 PROCEDURE — 82803 BLOOD GASES ANY COMBINATION: CPT

## 2017-03-06 PROCEDURE — 70450 CT HEAD/BRAIN W/O DYE: CPT | Mod: 26

## 2017-03-06 PROCEDURE — 71010: CPT | Mod: 26

## 2017-03-06 RX ORDER — ENOXAPARIN SODIUM 100 MG/ML
30 INJECTION SUBCUTANEOUS EVERY 24 HOURS
Qty: 0 | Refills: 0 | Status: DISCONTINUED | OUTPATIENT
Start: 2017-03-06 | End: 2017-03-13

## 2017-03-06 RX ORDER — LINEZOLID 600 MG/300ML
600 INJECTION, SOLUTION INTRAVENOUS ONCE
Qty: 0 | Refills: 0 | Status: COMPLETED | OUTPATIENT
Start: 2017-03-06 | End: 2017-03-06

## 2017-03-06 RX ORDER — LINEZOLID 600 MG/300ML
600 INJECTION, SOLUTION INTRAVENOUS EVERY 12 HOURS
Qty: 0 | Refills: 0 | Status: DISCONTINUED | OUTPATIENT
Start: 2017-03-06 | End: 2017-03-09

## 2017-03-06 RX ORDER — PANTOPRAZOLE SODIUM 20 MG/1
40 TABLET, DELAYED RELEASE ORAL
Qty: 0 | Refills: 0 | Status: DISCONTINUED | OUTPATIENT
Start: 2017-03-06 | End: 2017-04-04

## 2017-03-06 RX ORDER — LINEZOLID 600 MG/300ML
INJECTION, SOLUTION INTRAVENOUS
Qty: 0 | Refills: 0 | Status: DISCONTINUED | OUTPATIENT
Start: 2017-03-06 | End: 2017-03-09

## 2017-03-06 RX ADMIN — Medication 325 MILLIGRAM(S): at 17:16

## 2017-03-06 RX ADMIN — LINEZOLID 300 MILLIGRAM(S): 600 INJECTION, SOLUTION INTRAVENOUS at 11:29

## 2017-03-06 RX ADMIN — MEROPENEM 200 MILLIGRAM(S): 1 INJECTION INTRAVENOUS at 06:12

## 2017-03-06 RX ADMIN — Medication 100 MILLIGRAM(S): at 13:00

## 2017-03-06 RX ADMIN — Medication 1 MILLIGRAM(S): at 12:59

## 2017-03-06 RX ADMIN — LINEZOLID 300 MILLIGRAM(S): 600 INJECTION, SOLUTION INTRAVENOUS at 22:51

## 2017-03-06 RX ADMIN — Medication 100 MILLIGRAM(S): at 22:51

## 2017-03-06 RX ADMIN — PANTOPRAZOLE SODIUM 40 MILLIGRAM(S): 20 TABLET, DELAYED RELEASE ORAL at 17:16

## 2017-03-06 RX ADMIN — HEPARIN SODIUM 5000 UNIT(S): 5000 INJECTION INTRAVENOUS; SUBCUTANEOUS at 06:12

## 2017-03-06 RX ADMIN — SODIUM CHLORIDE 100 MILLILITER(S): 9 INJECTION, SOLUTION INTRAVENOUS at 22:51

## 2017-03-06 RX ADMIN — SODIUM CHLORIDE 100 MILLILITER(S): 9 INJECTION, SOLUTION INTRAVENOUS at 07:45

## 2017-03-06 RX ADMIN — SENNA PLUS 2 TABLET(S): 8.6 TABLET ORAL at 22:51

## 2017-03-06 RX ADMIN — MEROPENEM 200 MILLIGRAM(S): 1 INJECTION INTRAVENOUS at 17:16

## 2017-03-06 RX ADMIN — ENOXAPARIN SODIUM 30 MILLIGRAM(S): 100 INJECTION SUBCUTANEOUS at 12:59

## 2017-03-06 RX ADMIN — PANTOPRAZOLE SODIUM 40 MILLIGRAM(S): 20 TABLET, DELAYED RELEASE ORAL at 06:12

## 2017-03-06 RX ADMIN — Medication 1 TABLET(S): at 12:59

## 2017-03-06 RX ADMIN — Medication 100 MILLIGRAM(S): at 06:12

## 2017-03-06 RX ADMIN — Medication 325 MILLIGRAM(S): at 07:55

## 2017-03-06 RX ADMIN — Medication 325 MILLIGRAM(S): at 12:59

## 2017-03-06 NOTE — PROGRESS NOTE ADULT - ASSESSMENT
Plan is to continue iv antibiotics per ID.  May need to continie with po suppression  has spacer in place with possible TKA revision once infection cleared  Plastics following the wound and will decide when drains removed and further care  Risks discussed at length with patient of new TKA and plan outlined  Questions answered

## 2017-03-06 NOTE — PROGRESS NOTE ADULT - SUBJECTIVE AND OBJECTIVE BOX
[facility]  INFECTIOUS DISEASES AND INTERNAL MEDICINE OF [place]  =======================================================  Wilver Dela Cruz MD  Diplomates American Board of Internal Medicine and Infectious Diseases  =======================================================    LUKASZ SHYAM     Patient clinically improved. No fevers or chills  No pain    s/p  right total knee revision with application of antibiotic cement spacer 17    Allergies:  No Known Allergies    Antibiotics:  meropenem IVPB 500milliGRAM(s) IV Intermittent every 12 hours  zyvox     REVIEW OF SYSTEMS:  CONSTITUTIONAL:  No fevers or chills  HEENT:  No diplopia or blurred vision. No earache, sore throat or runny nose.  CARDIOVASCULAR:  No pressure, squeezing, strangling, tightness, heaviness or aching about the chest, neck, axilla or epigastrium.  RESPIRATORY:  No cough, shortness of breath  GASTROINTESTINAL:  No nausea, vomiting or diarrhea.  GENITOURINARY:  No dysuria, frequency or urgency.   MUSCULOSKELETAL:  no muscle pain  SKIN:  Rt Knee surgical bandage  NEUROLOGIC:  No paresthesias  PSYCHIATRIC:  No disorder of thought or mood.  ENDOCRINE:  No heat or cold intolerance, polyuria or polydipsia.  HEMATOLOGICAL:  No easy bruising or bleeding.     Physical Exam:  Vital Signs Last 24 Hrs  T(C): 36.6, Max: 36.9 (- @ 23:59)  T(F): 97.8, Max: 98.5 (- @ 23:59)  HR: 89 (56 - 89)  BP: 122/74 (81/53 - 123/63)  BP(mean): 74 (62 - 74)  RR: 18 (12 - 19)  SpO2: 100% (96% - 100%)    GEN: NAD, pleasant  HEENT: normocephalic and atraumatic. EOMI. PERRL.    NECK: Supple.   LUNGS: Clear to auscultation.  HEART: Regular rate and rhythm   ABDOMEN: Soft, nontender, and nondistended.  Positive bowel sounds.    : No CVA tenderness  EXTREMITIES: R and l knee bandaged  NEUROLOGIC: forgetful   PSYCHIATRIC: Appropriate affect .  SKIN: Rt Knee in surgical dressing    Labs:  02 Mar 2017 20:17    135    |  96     |  47.0   ----------------------------<  112    4.4     |  24.0   |  3.98     Ca    7.8        02 Mar 2017 20:17    TPro  5.3    /  Alb  2.4    /  TBili  0.8    /  DBili  0.4    /  AST  26     /  ALT  10     /  AlkPhos  77     02 Mar 2017 20:17                  8.8    14.5  )-----------( 235      ( 02 Mar 2017 20:17 )             26.1     PT/INR - ( 02 Mar 2017 06:52 )   PT: 11.4 sec;   INR: 1.04 ratio      Urinalysis Basic - ( 02 Mar 2017 08:31 )    Color: Yellow / Appearance: Slightly Turbid / S.015 / pH: x  Gluc: x / Ketone: Trace  / Bili: Negative / Urobili: 1 mg/dL   Blood: x / Protein: 100 mg/dL / Nitrite: Negative   Leuk Esterase: Moderate / RBC: 11-25 /HPF / WBC >50   Sq Epi: x / Non Sq Epi: Occasional / Bacteria: Occasional    LIVER FUNCTIONS - ( 02 Mar 2017 20:17 )  Alb: 2.4 g/dL / Pro: 5.3 g/dL / ALK PHOS: 77 U/L / ALT: 10 U/L / AST: 26 U/L / GGT: x             RECENT CULTURES:   .Surgical Swab right tibia (swab) Enterococcus faecalis (vancomycin resistant)   no gram stain perform only one swab received   Moderate Enterococcus faecalis (vancomycin resistant)  .  TYPE: (C=Critical, N=Notification, A=Abnormal) c  TESTS:  _ VRE  DATE/TIME CALLED: _ 2017 16:54:36  CALLED TO: Roberta segura  READ BACK (2 Patient Identifiers)(Y/N): _ y  READ DAMION     .Surgical Swab right femur (swabs) Enterococcus faecalis (vancomycin resistant)   no gram stain perform only one swab received   Moderate Enterococcus faecalis (vancomycin resistant)  Culture in progress  .  TYPE: (C=Critical, N=Notification, A=Abnormal) c  TESTS:  _ VRE  DATE/TIME CALLED: _ 2017 16:40:40  CALLED TO: _ cesilia griggs  READ BACK (2 Patient Identifiers)     .Body Fluid Knee Fluid Pseudomonas aeruginosa   Numerous White blood cells  No organisms seen   Rare Pseudomonas aeruginosa     .Body Fluid Synovial Fluid Pseudomonas aeruginosa  Enterococcus faecalis (vancomycin resistant)   Numerous White blood cells  No organisms seen   Rare Pseudomonas aeruginosa  Rare Enterococcus faecalis (vancomycin resistant)  .  TYPE: (C=Critical, N=Notification, A=Abnormal) C  TESTS:  _ VRE  DATE/TIME CALLED: _ 2017 10:22:02  CALLED TO: _ Baljeet Oates RN  READ BACK (2 Patient Identifier     .Blood Blood-Arterial XXXX XXXX   No growth at 5 days.

## 2017-03-06 NOTE — PROVIDER CONTACT NOTE (OTHER) - SITUATION
Patient lethargic yet arousable. According to previous documentation patient has been lethargic with unknown causes. Dr. Nicholson made aware, orders to follow. Patient lethargic yet arousable. According to previous documentation patient has been lethargic with unknown causes. Dr. Jones  aware, orders to follow.

## 2017-03-06 NOTE — PROGRESS NOTE ADULT - ASSESSMENT
I spoke with Dr Nicholson regarding pt's medical status.  In regards to DVT prophylaxis, we discussed putting the patient back on Lovenox.Dr Nicholson will discuss with Pharmacy the renal dose of Lovenox    In regard to pts lethargy, a work up is being initiated with further plans per medicine.    Plastics wants drain to remain in place at this time.

## 2017-03-06 NOTE — PROCEDURE NOTE - NSPOSTCAREGUIDE_GEN_A_CORE
Keep the cast/splint/dressing clean and dry/Instructed patient/caregiver to follow-up with primary care physician/Instructed patient/caregiver regarding signs and symptoms of infection/Verbal/written post procedure instructions were given to patient/caregiver

## 2017-03-06 NOTE — PROGRESS NOTE ADULT - SUBJECTIVE AND OBJECTIVE BOX
Right knee wound healing well.  Decr edema, no sign of infection.    JPs still with significant drianage  Continue driana, immobolizer, abx  Supportive care  D/c as per ortho

## 2017-03-06 NOTE — PROGRESS NOTE ADULT - PROBLEM SELECTOR PLAN 1
Continue local wound care, antibiotics per ID. PICC eval. Ortho follow up noted. Lovenox dosing for DVT adjusted based on renal function.

## 2017-03-06 NOTE — PROGRESS NOTE ADULT - ASSESSMENT
KARMEN on CKD , DM ---> baseline creat 1.8 , renal function better   Normal kidneys on NCCT   Debridement / dehis ---> Abx as per ID   Ortho follow up noted   Continue IVF   follow labs   ? Antecedent AIN , ATN ?     Anemia - S/P Transfusion , CBC stable      Lethargy  Afebrile  Blood sugar was OK   Off pain meds   Check Ammonia level and ABG  Check head CT

## 2017-03-06 NOTE — PROCEDURE NOTE - NSPROCDETAILS_GEN_ALL_CORE
ultrasound guidance/Seldinger technique/location identified, draped/prepped, sterile technique used, needle inserted/introduced

## 2017-03-06 NOTE — PROGRESS NOTE ADULT - ASSESSMENT
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  tx merrem adj for rf and zyvox for vre  defer dapto  tx through april 8

## 2017-03-06 NOTE — PROGRESS NOTE ADULT - SUBJECTIVE AND OBJECTIVE BOX
INTERVAL HPI/OVERNIGHT EVENTS:    CC: anemia, right knee wound dehiscence and infection Pseudomonas and VRE, CKD, diabetes, hypertension    Noted to have lethargy, although becomes easily arousable and oriented x3. Denies pain.     Vital Signs Last 24 Hrs  T(C): 36.9, Max: 37.3 (03-05 @ 15:40)  T(F): 98.5, Max: 99.2 (03-05 @ 15:40)  HR: 99 (99 - 99)  BP: 130/67 (130/67 - 140/78)  BP(mean): --  RR: 18 (18 - 18)  SpO2: 100% (100% - 100%)    PHYSICAL EXAM:    GENERAL: Not in distress,  drowsy but arousable  CHEST/LUNG: b/l air entry  HEART: Regular   ABDOMEN: Soft, BS+  EXTREMITIES: drain in place right knee. left no edema    MEDICATIONS  (STANDING):  docusate sodium 100milliGRAM(s) Oral three times a day  ferrous    sulfate 325milliGRAM(s) Oral three times a day with meals  folic acid 1milliGRAM(s) Oral daily  multivitamin 1Tablet(s) Oral daily  insulin lispro (HumaLOG) corrective regimen sliding scale  SubCutaneous Before meals and at bedtime  dextrose 50% Injectable 12.5Gram(s) IV Push once  dextrose 50% Injectable 25Gram(s) IV Push once  dextrose 50% Injectable 25Gram(s) IV Push once  meropenem IVPB 500milliGRAM(s) IV Intermittent every 12 hours  senna 2Tablet(s) Oral at bedtime  pantoprazole  Injectable 40milliGRAM(s) IV Push two times a day  multiple electrolytes Injection Type 1 1000milliLiter(s) IV Continuous <Continuous>  linezolid  IVPB  IV Intermittent   linezolid  IVPB 600milliGRAM(s) IV Intermittent every 12 hours  enoxaparin Injectable 30milliGRAM(s) SubCutaneous every 24 hours    MEDICATIONS  (PRN):  acetaminophen   Tablet 650milliGRAM(s) Oral every 6 hours PRN For Temp over 38.3 C (100.94 F)  aluminum hydroxide/magnesium hydroxide/simethicone Suspension 30milliLiter(s) Oral four times a day PRN Indigestion  ondansetron Injectable 4milliGRAM(s) IV Push every 6 hours PRN Nausea and/or Vomiting  dextrose Gel 1Dose(s) Oral once PRN Blood Glucose LESS THAN 70 milliGRAM(s)/deciliter  glucagon  Injectable 1milliGRAM(s) IntraMuscular once PRN Glucose LESS THAN 70 milligrams/deciliter  polyethylene glycol 3350 17Gram(s) Oral daily PRN Constipation      Allergies    No Known Allergies    Intolerances          LABS:                          9.6    9.7   )-----------( 144      ( 06 Mar 2017 08:15 )             28.9     06 Mar 2017 08:15    139    |  102    |  39.0   ----------------------------<  113    4.3     |  26.0   |  2.89     Ca    8.1        06 Mar 2017 08:15            RADIOLOGY & ADDITIONAL TESTS:

## 2017-03-06 NOTE — PROGRESS NOTE ADULT - ASSESSMENT
71 yr old female with hypertension, hyperlipidemia, LIN, CKD, COPD, paroxysmal A fib sent from rehab for right knee wound dehiscence. She was seen by orthopedics, ID. Local wound cultures growing resistant and Pseudomonas and VRE. She was started on Meropenem and Zyvox. Her renal function was slightly worse from baseline and hence started on IVF. No obstructive disease on renal ultrasound. Cardiology consulted for clearance. Renal was consulted.  She is scheduled for OR I&D, she underwent wound closure and insertion of antibiotic spacer. RRT was called on 3/2 for hypotension and lethargy. Labs revealed elevated lactate. She was transferred to ICU for fluid resuscitation and monitoring. Narcotics were discontinued. She was noted to be anemic 6.9, she was ordered for PRBC transfusion and transferred out of ICU. ID follow up requested for medication adjustment, advised to stop Linezolid and start Daptomycin. GI consulted for anemia, no endoscopic intervention advised at this time. Hb remained stable. Noted to have lethargy, hence narcotics stopped. ABG done, which revealed normal pH and CO2.

## 2017-03-06 NOTE — PROGRESS NOTE ADULT - PROBLEM SELECTOR PLAN 5
CT brain ordered. ABG and ammonia unremarkable. Hemodynamically stable. No concern for worsening infectious process at this time. CT brain done, shows no acute intracranial process. ABG and ammonia unremarkable. Hemodynamically stable. No concern for worsening infectious process at this time. Will monitor.

## 2017-03-06 NOTE — PROGRESS NOTE ADULT - SUBJECTIVE AND OBJECTIVE BOX
NEPHROLOGY INTERVAL HPI/OVERNIGHT EVENTS:    Some lethargy today   Does awaken , and offers no new complaints   ID and Ortho follow up noted   VSS , afebrile   Remains on IVF @ 100 ml / hr   UO NR     MEDICATIONS  (STANDING):  docusate sodium 100milliGRAM(s) Oral three times a day  ferrous    sulfate 325milliGRAM(s) Oral three times a day with meals  folic acid 1milliGRAM(s) Oral daily  multivitamin 1Tablet(s) Oral daily  insulin lispro (HumaLOG) corrective regimen sliding scale  SubCutaneous Before meals and at bedtime  dextrose 50% Injectable 12.5Gram(s) IV Push once  dextrose 50% Injectable 25Gram(s) IV Push once  dextrose 50% Injectable 25Gram(s) IV Push once  meropenem IVPB 500milliGRAM(s) IV Intermittent every 12 hours  senna 2Tablet(s) Oral at bedtime  pantoprazole  Injectable 40milliGRAM(s) IV Push two times a day  multiple electrolytes Injection Type 1 1000milliLiter(s) IV Continuous <Continuous>  linezolid  IVPB  IV Intermittent   linezolid  IVPB 600milliGRAM(s) IV Intermittent every 12 hours  enoxaparin Injectable 30milliGRAM(s) SubCutaneous every 24 hours    MEDICATIONS  (PRN):  acetaminophen   Tablet 650milliGRAM(s) Oral every 6 hours PRN For Temp over 38.3 C (100.94 F)  aluminum hydroxide/magnesium hydroxide/simethicone Suspension 30milliLiter(s) Oral four times a day PRN Indigestion  ondansetron Injectable 4milliGRAM(s) IV Push every 6 hours PRN Nausea and/or Vomiting  dextrose Gel 1Dose(s) Oral once PRN Blood Glucose LESS THAN 70 milliGRAM(s)/deciliter  glucagon  Injectable 1milliGRAM(s) IntraMuscular once PRN Glucose LESS THAN 70 milligrams/deciliter  polyethylene glycol 3350 17Gram(s) Oral daily PRN Constipation      Allergies    No Known Allergies    Intolerances              Vital Signs Last 24 Hrs  T(C): 36.9, Max: 37.3 (03-05 @ 15:40)  T(F): 98.5, Max: 99.2 (03-05 @ 15:40)  HR: 99 (99 - 99)  BP: 130/67 (130/67 - 140/78)  BP(mean): --  RR: 18 (18 - 18)  SpO2: 100% (100% - 100%)  Daily     Daily   I&O's Detail  I & Os for 24h ending 06 Mar 2017 07:00  =============================================  IN:    multiple electrolytes Injection Type 1: 1200 ml    Solution: 100 ml    Total IN: 1300 ml  ---------------------------------------------  OUT:    Indwelling Catheter - Urethral: 700 ml    Bulb: 125 ml    Bulb: 20 ml    Total OUT: 845 ml  ---------------------------------------------  Total NET: 455 ml    I & Os for current day (as of 06 Mar 2017 11:49)  =============================================  IN:    Total IN: 0 ml  ---------------------------------------------  OUT:    Bulb: 50 ml    Bulb: 20 ml    Total OUT: 70 ml  ---------------------------------------------  Total NET: -70 ml    I&O's Summary  I & Os for 24h ending 06 Mar 2017 07:00  =============================================  IN: 1300 ml / OUT: 845 ml / NET: 455 ml    I & Os for current day (as of 06 Mar 2017 11:49)  =============================================  IN: 0 ml / OUT: 70 ml / NET: -70 ml      PHYSICAL EXAM:    GENERAL: NAD, well-groomed, well-developed  HEAD:  Atraumatic, Normocephalic  EYES: EOMI, PERRLA, conjunctiva and sclera clear  ENMT: No tonsillar erythema, exudates, or enlargement; Moist mucous membranes, Good dentition, No lesions  NECK: Supple, No JVD, Normal thyroid  NERVOUS SYSTEM:  Alert & Oriented X3, Good concentration; Motor Strength 5/5 B/L upper and lower extremities; DTRs 2+ intact and symmetric  CHEST/LUNG: Clear to percussion bilaterally; No rales, rhonchi, wheezing, or rubs  HEART: Regular rate and rhythm; No murmurs, rubs, or gallops  ABDOMEN: Soft, Nontender, Nondistended; Bowel sounds present  EXTREMITIES:  2+ Peripheral Pulses, No clubbing, cyanosis, or edema  LYMPH: No lymphadenopathy noted  SKIN: No rashes or lesions    LABS:                        9.6    9.7   )-----------( 144      ( 06 Mar 2017 08:15 )             28.9     06 Mar 2017 08:15    139    |  102    |  39.0   ----------------------------<  113    4.3     |  26.0   |  2.89     Ca    8.1        06 Mar 2017 08:15                  RADIOLOGY & ADDITIONAL TESTS:

## 2017-03-06 NOTE — PROGRESS NOTE ADULT - SUBJECTIVE AND OBJECTIVE BOX
Progress Note:   · Provider Specialty	Orthopedics	    PATIENT SEEN AT 0830 HOURS TODAY      Name: SHYAM LAL    MR #: [id number]    Procedure: dynamic antibiotic knee spacer conversion to a static antibiotic knee spacer on 2/27/2017      Surgeon: Erma Rey    Pt comfortable without complaints, pain controlled. Patient is somnolent but acknowledges interaction and answers questions. Denies CP, SOB, N/V, numbness/tingling. Continues to be on bedrest with no energy to exit bed.     HEALTH ISSUES - PROBLEM Dx:  Anemia: Anemia  Chronic renal failure: Chronic renal failure  Paroxysmal atrial fibrillation: Paroxysmal atrial fibrillation  Essential hypertension: Essential hypertension  Type 2 diabetes mellitus without complication, unspecified long term insulin use status: Type 2 diabetes mellitus without complication, unspecified long term insulin use status  Hypotension, unspecified hypotension type: Hypotension, unspecified hypotension type  VRE (vancomycin-resistant Enterococci) infection: VRE (vancomycin-resistant Enterococci) infection  KARMEN (acute kidney injury): KARMEN (acute kidney injury)  Pseudomonas aeruginosa infection: Pseudomonas aeruginosa infection  Infection of prosthetic knee joint, sequela: Infection of prosthetic knee joint, sequela  Wound dehiscence: Wound dehiscence  Obesity: Obesity  DM (diabetes mellitus): DM (diabetes mellitus)          MEDICATIONS  (STANDING):  docusate sodium 100milliGRAM(s) Oral three times a day  ferrous    sulfate 325milliGRAM(s) Oral three times a day with meals  folic acid 1milliGRAM(s) Oral daily  multivitamin 1Tablet(s) Oral daily  insulin lispro (HumaLOG) corrective regimen sliding scale  SubCutaneous Before meals and at bedtime  dextrose 50% Injectable 12.5Gram(s) IV Push once  dextrose 50% Injectable 25Gram(s) IV Push once  dextrose 50% Injectable 25Gram(s) IV Push once  meropenem IVPB 500milliGRAM(s) IV Intermittent every 12 hours  senna 2Tablet(s) Oral at bedtime  pantoprazole  Injectable 40milliGRAM(s) IV Push two times a day  multiple electrolytes Injection Type 1 1000milliLiter(s) IV Continuous <Continuous>  heparin  Injectable 5000Unit(s) SubCutaneous every 8 hours  linezolid  IVPB  IV Intermittent   linezolid  IVPB 600milliGRAM(s) IV Intermittent once  linezolid  IVPB 600milliGRAM(s) IV Intermittent every 12 hours    MEDICATIONS  (PRN):  acetaminophen   Tablet 650milliGRAM(s) Oral every 6 hours PRN For Temp over 38.3 C (100.94 F)  aluminum hydroxide/magnesium hydroxide/simethicone Suspension 30milliLiter(s) Oral four times a day PRN Indigestion  ondansetron Injectable 4milliGRAM(s) IV Push every 6 hours PRN Nausea and/or Vomiting  dextrose Gel 1Dose(s) Oral once PRN Blood Glucose LESS THAN 70 milliGRAM(s)/deciliter  glucagon  Injectable 1milliGRAM(s) IntraMuscular once PRN Glucose LESS THAN 70 milligrams/deciliter  polyethylene glycol 3350 17Gram(s) Oral daily PRN Constipation    General Exam:  Vital Signs Last 24 Hrs  T(C): 36.9, Max: 37.3 (03-05 @ 15:40)  T(F): 98.5, Max: 99.2 (03-05 @ 15:40)  HR: 99 (99 - 99)  BP: 130/67 (130/67 - 140/78)  BP(mean): --  RR: 18 (18 - 18)  SpO2: 100% (100% - 100%)    I&O's Detail  I & Os for 24h ending 06 Mar 2017 07:00  =============================================  IN:    multiple electrolytes Injection Type 1: 1200 ml    Solution: 100 ml    Total IN: 1300 ml  ---------------------------------------------  OUT:    Indwelling Catheter - Urethral: 700 ml    Bulb: 125 ml    Bulb: 20 ml    Total OUT: 845 ml  ---------------------------------------------  Total NET: 455 ml    I & Os for current day (as of 06 Mar 2017 11:09)  =============================================  IN:    Total IN: 0 ml  ---------------------------------------------  OUT:    Bulb: 50 ml    Bulb: 20 ml    Total OUT: 70 ml  ---------------------------------------------  Total NET: -70 ml        General: Pt Alert and interactive, NAD, controlled pain.  Dressings C/D/I. Minimal staining of distal bandage No active bleeding. 2 drains in placed  Pulses: 2+ dorsalis pedis pulse. Cap refill < 2 sec.  Sensation: Grossly intact to light touch without deficit.  Motor: + intact motor at 3/5 of ankle and toes (hestitation due to pain).  Skin: + sutures of the anterior knee. No active discharge or drainage. No ulcerations noted of the heel or thigh.          Culture - Body Fluid with Gram Stain (02.27.17 @ 20:46)    -  Ciprofloxacin: R >2    -  Tetra/Doxy: R >8    -  Daptomycin: S <=0.5    -  Vancomycin: R >16    -  Linezolid: S <=1    Gram Stain:   No WBC's or organisms seen    -  Ampicillin: S <=2    Specimen Source: .Body Fluid right knee fluid (swabs)    Culture Results:   Few Enterococcus faecalis (vancomycin resistant)  .                        9.6    9.7   )-----------( 144      ( 06 Mar 2017 08:15 )             28.9       06 Mar 2017 08:15    139    |  102    |  39.0   ----------------------------<  113    4.3     |  26.0   |  2.89     Ca    8.1        06 Mar 2017 08:15      A/P: 71y Female POD #7 s/p explant of a dynamic antibiotic spacer and conversion to a static antibiotic cement spacer with post-op anemia, chronic renal failure,  Paroxysmal atrial fibrillation and Essential hypertension with VRE infection    - Pain Control  - DVT ppx: will resume heparin with improved H&H  - ABX as per ID-  Daptomycin & meropenem  - continue drains  - New dressing applied  - Weight bearing status: Full in knee immobilizer  - Will obtain a alyce brace

## 2017-03-07 LAB
ANION GAP SERPL CALC-SCNC: 10 MMOL/L — SIGNIFICANT CHANGE UP (ref 5–17)
BUN SERPL-MCNC: 34 MG/DL — HIGH (ref 8–20)
CALCIUM SERPL-MCNC: 7.9 MG/DL — LOW (ref 8.6–10.2)
CHLORIDE SERPL-SCNC: 100 MMOL/L — SIGNIFICANT CHANGE UP (ref 98–107)
CO2 SERPL-SCNC: 26 MMOL/L — SIGNIFICANT CHANGE UP (ref 22–29)
CREAT SERPL-MCNC: 2.51 MG/DL — HIGH (ref 0.5–1.3)
GLUCOSE SERPL-MCNC: 94 MG/DL — SIGNIFICANT CHANGE UP (ref 70–115)
HCT VFR BLD CALC: 28.6 % — LOW (ref 37–47)
HGB BLD-MCNC: 9.4 G/DL — LOW (ref 12–16)
INR BLD: 1.26 RATIO — HIGH (ref 0.88–1.16)
MAGNESIUM SERPL-MCNC: 2.2 MG/DL — SIGNIFICANT CHANGE UP (ref 1.8–2.5)
MCHC RBC-ENTMCNC: 30 PG — SIGNIFICANT CHANGE UP (ref 27–31)
MCHC RBC-ENTMCNC: 32.9 G/DL — SIGNIFICANT CHANGE UP (ref 32–36)
MCV RBC AUTO: 91.4 FL — SIGNIFICANT CHANGE UP (ref 81–99)
PHOSPHATE SERPL-MCNC: 2.7 MG/DL — SIGNIFICANT CHANGE UP (ref 2.4–4.7)
PLATELET # BLD AUTO: 129 K/UL — LOW (ref 150–400)
POTASSIUM SERPL-MCNC: 4 MMOL/L — SIGNIFICANT CHANGE UP (ref 3.5–5.3)
POTASSIUM SERPL-SCNC: 4 MMOL/L — SIGNIFICANT CHANGE UP (ref 3.5–5.3)
PROTHROM AB SERPL-ACNC: 13.9 SEC — HIGH (ref 10–13.1)
RBC # BLD: 3.13 M/UL — LOW (ref 4.4–5.2)
RBC # FLD: 18.1 % — HIGH (ref 11–15.6)
SODIUM SERPL-SCNC: 136 MMOL/L — SIGNIFICANT CHANGE UP (ref 135–145)
WBC # BLD: 8.7 K/UL — SIGNIFICANT CHANGE UP (ref 4.8–10.8)
WBC # FLD AUTO: 8.7 K/UL — SIGNIFICANT CHANGE UP (ref 4.8–10.8)

## 2017-03-07 PROCEDURE — 99233 SBSQ HOSP IP/OBS HIGH 50: CPT

## 2017-03-07 RX ORDER — WARFARIN SODIUM 2.5 MG/1
2.5 TABLET ORAL ONCE
Qty: 0 | Refills: 0 | Status: COMPLETED | OUTPATIENT
Start: 2017-03-07 | End: 2017-03-07

## 2017-03-07 RX ADMIN — Medication 100 MILLIGRAM(S): at 13:27

## 2017-03-07 RX ADMIN — Medication 100 MILLIGRAM(S): at 22:29

## 2017-03-07 RX ADMIN — ENOXAPARIN SODIUM 30 MILLIGRAM(S): 100 INJECTION SUBCUTANEOUS at 12:06

## 2017-03-07 RX ADMIN — MEROPENEM 200 MILLIGRAM(S): 1 INJECTION INTRAVENOUS at 17:27

## 2017-03-07 RX ADMIN — Medication 325 MILLIGRAM(S): at 17:21

## 2017-03-07 RX ADMIN — LINEZOLID 300 MILLIGRAM(S): 600 INJECTION, SOLUTION INTRAVENOUS at 13:27

## 2017-03-07 RX ADMIN — Medication 325 MILLIGRAM(S): at 09:01

## 2017-03-07 RX ADMIN — WARFARIN SODIUM 2.5 MILLIGRAM(S): 2.5 TABLET ORAL at 22:29

## 2017-03-07 RX ADMIN — Medication 1 TABLET(S): at 12:05

## 2017-03-07 RX ADMIN — Medication 100 MILLIGRAM(S): at 06:41

## 2017-03-07 RX ADMIN — Medication 1 MILLIGRAM(S): at 12:05

## 2017-03-07 RX ADMIN — LINEZOLID 300 MILLIGRAM(S): 600 INJECTION, SOLUTION INTRAVENOUS at 22:29

## 2017-03-07 RX ADMIN — SODIUM CHLORIDE 100 MILLILITER(S): 9 INJECTION, SOLUTION INTRAVENOUS at 14:53

## 2017-03-07 RX ADMIN — Medication 325 MILLIGRAM(S): at 12:05

## 2017-03-07 RX ADMIN — SODIUM CHLORIDE 100 MILLILITER(S): 9 INJECTION, SOLUTION INTRAVENOUS at 22:29

## 2017-03-07 RX ADMIN — SENNA PLUS 2 TABLET(S): 8.6 TABLET ORAL at 22:29

## 2017-03-07 RX ADMIN — MEROPENEM 200 MILLIGRAM(S): 1 INJECTION INTRAVENOUS at 06:41

## 2017-03-07 RX ADMIN — PANTOPRAZOLE SODIUM 40 MILLIGRAM(S): 20 TABLET, DELAYED RELEASE ORAL at 06:41

## 2017-03-07 RX ADMIN — PANTOPRAZOLE SODIUM 40 MILLIGRAM(S): 20 TABLET, DELAYED RELEASE ORAL at 17:22

## 2017-03-07 NOTE — PROGRESS NOTE ADULT - SUBJECTIVE AND OBJECTIVE BOX
[facility]  INFECTIOUS DISEASES AND INTERNAL MEDICINE OF [place]  =======================================================  Wilver Dela Cruz MD  Diplomates American Board of Internal Medicine and Infectious Diseases  =======================================================    LUKASZ SHYAM     Patient clinically improved. No fevers or chills  No pain    s/p  right total knee revision with application of antibiotic cement spacer 17    Allergies:  No Known Allergies    Antibiotics:  meropenem IVPB 500milliGRAM(s) IV Intermittent every 12 hours  zyvox     REVIEW OF SYSTEMS:  CONSTITUTIONAL:  No fevers or chills  HEENT:  No diplopia or blurred vision. No earache, sore throat or runny nose.  CARDIOVASCULAR:  No pressure, squeezing, strangling, tightness, heaviness or aching about the chest, neck, axilla or epigastrium.  RESPIRATORY:  No cough, shortness of breath  GASTROINTESTINAL:  No nausea, vomiting or diarrhea.  GENITOURINARY:  No dysuria, frequency or urgency.   MUSCULOSKELETAL:  no muscle pain  SKIN:  Rt Knee surgical bandage  NEUROLOGIC:  No paresthesias  PSYCHIATRIC:  No disorder of thought or mood.  ENDOCRINE:  No heat or cold intolerance, polyuria or polydipsia.  HEMATOLOGICAL:  No easy bruising or bleeding.     Physical Exam:  Vital Signs Last 24 Hrs  T(C): 36.6, Max: 36.9 (- @ 23:59)  T(F): 97.8, Max: 98.5 (- @ 23:59)  HR: 89 (56 - 89)  BP: 122/74 (81/53 - 123/63)  BP(mean): 74 (62 - 74)  RR: 18 (12 - 19)  SpO2: 100% (96% - 100%)    GEN: NAD, pleasant  HEENT: normocephalic and atraumatic. EOMI. PERRL.    NECK: Supple.   LUNGS: Clear to auscultation.  HEART: Regular rate and rhythm   ABDOMEN: Soft, nontender, and nondistended.  Positive bowel sounds.    : No CVA tenderness  EXTREMITIES: R and l knee bandaged  NEUROLOGIC: forgetful   PSYCHIATRIC: Appropriate affect .  SKIN: Rt Knee in surgical dressing    Labs:  02 Mar 2017 20:17    135    |  96     |  47.0   ----------------------------<  112    4.4     |  24.0   |  3.98     Ca    7.8        02 Mar 2017 20:17    TPro  5.3    /  Alb  2.4    /  TBili  0.8    /  DBili  0.4    /  AST  26     /  ALT  10     /  AlkPhos  77     02 Mar 2017 20:17                  8.8    14.5  )-----------( 235      ( 02 Mar 2017 20:17 )             26.1     PT/INR - ( 02 Mar 2017 06:52 )   PT: 11.4 sec;   INR: 1.04 ratio      Urinalysis Basic - ( 02 Mar 2017 08:31 )    Color: Yellow / Appearance: Slightly Turbid / S.015 / pH: x  Gluc: x / Ketone: Trace  / Bili: Negative / Urobili: 1 mg/dL   Blood: x / Protein: 100 mg/dL / Nitrite: Negative   Leuk Esterase: Moderate / RBC: 11-25 /HPF / WBC >50   Sq Epi: x / Non Sq Epi: Occasional / Bacteria: Occasional    LIVER FUNCTIONS - ( 02 Mar 2017 20:17 )  Alb: 2.4 g/dL / Pro: 5.3 g/dL / ALK PHOS: 77 U/L / ALT: 10 U/L / AST: 26 U/L / GGT: x             RECENT CULTURES:   .Surgical Swab right tibia (swab) Enterococcus faecalis (vancomycin resistant)   no gram stain perform only one swab received   Moderate Enterococcus faecalis (vancomycin resistant)  .  TYPE: (C=Critical, N=Notification, A=Abnormal) c  TESTS:  _ VRE  DATE/TIME CALLED: _ 2017 16:54:36  CALLED TO: Roberta segura  READ BACK (2 Patient Identifiers)(Y/N): _ y  READ DAMION     .Surgical Swab right femur (swabs) Enterococcus faecalis (vancomycin resistant)   no gram stain perform only one swab received   Moderate Enterococcus faecalis (vancomycin resistant)  Culture in progress  .  TYPE: (C=Critical, N=Notification, A=Abnormal) c  TESTS:  _ VRE  DATE/TIME CALLED: _ 2017 16:40:40  CALLED TO: _ cesilia griggs  READ BACK (2 Patient Identifiers)     .Body Fluid Knee Fluid Pseudomonas aeruginosa   Numerous White blood cells  No organisms seen   Rare Pseudomonas aeruginosa     .Body Fluid Synovial Fluid Pseudomonas aeruginosa  Enterococcus faecalis (vancomycin resistant)   Numerous White blood cells  No organisms seen   Rare Pseudomonas aeruginosa  Rare Enterococcus faecalis (vancomycin resistant)  .  TYPE: (C=Critical, N=Notification, A=Abnormal) C  TESTS:  _ VRE  DATE/TIME CALLED: _ 2017 10:22:02  CALLED TO: _ Baljeet Oates RN  READ BACK (2 Patient Identifier     .Blood Blood-Arterial XXXX XXXX   No growth at 5 days.

## 2017-03-07 NOTE — PROGRESS NOTE ADULT - PROBLEM SELECTOR PLAN 1
Patient s/p 6 weeks IV abx in September 2016  Patient has synovial joint aspiration by orthopedics with 19,000 nucleated cells  Culture with Pseudomonas and VRE  OR from 2/27 culture with VRE  Will D/C Linezolid and start Daptomycin 750mg q 48hours  Continue Meropenem based on sensitivities Patient s/p 6 weeks IV abx in September 2016  Patient has synovial joint aspiration by orthopedics with 19,000 nucleated cells  Culture with Pseudomonas and VRE  OR from 2/27 culture with VRE  cont zyvox.  Continue Meropenem based on sensitivities

## 2017-03-07 NOTE — PROGRESS NOTE ADULT - SUBJECTIVE AND OBJECTIVE BOX
PA - Note    S/P Right Knee Revision with Antibiotic Spacer POD#8.  Found patient laying in bed, talking with brother on the phone.  She states that she feel slightly better from yesterday.  Presently she is comfortable and pain is being controlled.  There is a [place] brace applied to her operative extremity.  Two SIGRID drains noted and empty, but under suction.  No new issues overnight.      ICU Vital Signs Last 24 Hrs  T(C): 37.2, Max: 37.2 (03-07 @ 00:25)  T(F): 99, Max: 99 (03-07 @ 00:25)  HR: 98 (94 - 98)  BP: 118/68 (118/68 - 137/83)  BP(mean): --  ABP: --  ABP(mean): --  RR: 18 (18 - 18)  SpO2: --    Physical Exam:  Right Lower Extremity  Calf soft non-tender,  Dressing has a few small areas of drainage.  + ROM of toes with + Dorsal Flexion of foot  + Sensation  Brisk capillary refill    06 Mar 2017 08:15    139    |  102    |  39.0   ----------------------------<  113    4.3     |  26.0   |  2.89     Ca    8.1        06 Mar 2017 08:15                          9.6    9.7   )-----------( 144      ( 06 Mar 2017 08:15 )             28.9      Ammonia, Serum (03.06.17 @ 13:12)    Ammonia, Serum: 34: Ammonia levels will be falsely elevated if specimen is NOT collected,  processed and maintained at 4-8 C umol/L    ABG - ( 06 Mar 2017 12:00 )  pH: 7.37  /  pCO2: 47    /  pO2: 128   / HCO3: 26    / Base Excess: 1.4   /  SaO2: 100        EXAM:  CT BRAIN                          PROCEDURE DATE:  03/06/2017        INTERPRETATION:  Head CT without contrast   COMPARISON: None.  CLINICAL INFORMATION: Lethargy. Altered mental status..  TECHNIQUE: Contiguous axial 2.5 mm slice thickness images of the head   were obtained without the use of intravenous contrast media.  FINDINGS:  Vascular calcification seen within carotid siphon vessels. Calcification   is also seen within the M2 segment of the left middle cerebral artery.     Mild cerebral volume loss is present with secondary proportional   prominence of the sulci and ventricles.      The posterior fossa structures and basal cisterns appear normal.    There is no intracranial hemorrhage.     There is no intracranial mass or midline shift.    There is no sulcal effacement to suggest acute stroke.    There are scattered white matter hypodensities consistent with small   vessel disease.    Bilateral basal ganglia chronic lacunar infarcts noted.     The visualized portions of the optic globes and paranasal sinuses are   normal.  There is a chronic left mastoid effusion with the sclerosis of the   mastoid tip.  There are no skull fractures.    IMPRESSION:  Vascular calcification seen within carotid siphon vessels. Left M2   segment middle cerebral artery calcification.  No acute intracranial findings.  Mild atrophy and chronic white matter microvascular ischemic changes as   described.   If acute stroke is of clinical concern, MRI with diffusion-weighted   images would behelpful for further characterization.    ALEK SCHAFER M.D., ATTENDING RADIOLOGIST  This document has been electronically signed. Mar  6 2017  2:57PM    Drain Output  SIGRID #1 - 360cc in 24hrs  SIGRID #2 - 100cc in 24hrs    A/P:  Right Total Knee Infection with Wound Dehiscence  OOB, WBAT, Knee Immobilizer   Pain medication as needed  IV Antibiotics per ID recommendations  Drains per plastics  Will continue to monitor Drain output  Continue medical management  Discussed with Dr. Ritchie/Dr. Rey

## 2017-03-07 NOTE — PROGRESS NOTE ADULT - PROBLEM SELECTOR PLAN 1
Continue Zyvox and Meropenem per ID, PICC eval, ortho following. Drain in place. Continue Lovenox for DVT prophylaxis.

## 2017-03-07 NOTE — PROGRESS NOTE ADULT - ASSESSMENT
70 y/o F with Rt prosthetic knee infection s/p Explant 9/2016 s/p 6 weeks of abx now with new Rt knee infection.  tx merrem adj for rf and zyvox for vre  defer dapto  tx through april 8

## 2017-03-07 NOTE — PROGRESS NOTE ADULT - SUBJECTIVE AND OBJECTIVE BOX
NEPHROLOGY INTERVAL HPI/OVERNIGHT EVENTS:    Seen earlier   More alert   CT head w/o acute changes 3-6  Ammonia and ABG OK   VSS , afebrile     MEDICATIONS  (STANDING):  docusate sodium 100milliGRAM(s) Oral three times a day  ferrous    sulfate 325milliGRAM(s) Oral three times a day with meals  folic acid 1milliGRAM(s) Oral daily  multivitamin 1Tablet(s) Oral daily  insulin lispro (HumaLOG) corrective regimen sliding scale  SubCutaneous Before meals and at bedtime  dextrose 50% Injectable 12.5Gram(s) IV Push once  dextrose 50% Injectable 25Gram(s) IV Push once  dextrose 50% Injectable 25Gram(s) IV Push once  meropenem IVPB 500milliGRAM(s) IV Intermittent every 12 hours  senna 2Tablet(s) Oral at bedtime  multiple electrolytes Injection Type 1 1000milliLiter(s) IV Continuous <Continuous>  linezolid  IVPB  IV Intermittent   linezolid  IVPB 600milliGRAM(s) IV Intermittent every 12 hours  enoxaparin Injectable 30milliGRAM(s) SubCutaneous every 24 hours  pantoprazole    Tablet 40milliGRAM(s) Oral two times a day before meals  warfarin 2.5milliGRAM(s) Oral once    MEDICATIONS  (PRN):  acetaminophen   Tablet 650milliGRAM(s) Oral every 6 hours PRN For Temp over 38.3 C (100.94 F)  aluminum hydroxide/magnesium hydroxide/simethicone Suspension 30milliLiter(s) Oral four times a day PRN Indigestion  ondansetron Injectable 4milliGRAM(s) IV Push every 6 hours PRN Nausea and/or Vomiting  dextrose Gel 1Dose(s) Oral once PRN Blood Glucose LESS THAN 70 milliGRAM(s)/deciliter  glucagon  Injectable 1milliGRAM(s) IntraMuscular once PRN Glucose LESS THAN 70 milligrams/deciliter  polyethylene glycol 3350 17Gram(s) Oral daily PRN Constipation      Allergies    No Known Allergies    Intolerances    Vital Signs Last 24 Hrs  T(C): 37.1, Max: 37.2 (03-07 @ 00:25)  T(F): 98.7, Max: 99 (03-07 @ 00:25)  HR: 98 (94 - 98)  BP: 114/68 (114/68 - 137/83)  BP(mean): --  RR: 17 (17 - 18)  SpO2: 100% (100% - 100%)  Daily     Daily   I&O's Detail  I & Os for 24h ending 07 Mar 2017 07:00  =============================================  IN:    multiple electrolytes Injection Type 1: 1000 ml    Solution: 300 ml    Solution: 100 ml    Total IN: 1400 ml  ---------------------------------------------  OUT:    Bulb: 360 ml    Bulb: 100 ml    Total OUT: 460 ml  ---------------------------------------------  Total NET: 940 ml    I & Os for current day (as of 07 Mar 2017 14:55)  =============================================  IN:    multiple electrolytes Injection Type 1: 500 ml    Solution: 300 ml    Total IN: 800 ml  ---------------------------------------------  OUT:    Indwelling Catheter - Urethral: 1150 ml    Bulb: 75 ml    Total OUT: 1225 ml  ---------------------------------------------  Total NET: -425 ml    I&O's Summary  I & Os for 24h ending 07 Mar 2017 07:00  =============================================  IN: 1400 ml / OUT: 460 ml / NET: 940 ml    I & Os for current day (as of 07 Mar 2017 14:55)  =============================================  IN: 800 ml / OUT: 1225 ml / NET: -425 ml      PHYSICAL EXAM:    HEENT: EOMI  Neck: supple  CHEST/LUNG: Clear to percussion bilaterally; No wheezing  HEART: S1S2+ audible  ABDOMEN: Soft, Nontender, Nondistended; Bowel sounds present  EXTREMITIES:  dressing over right knee, swollen both extremities     LABS:                        9.4    8.7   )-----------( 129      ( 07 Mar 2017 07:52 )             28.6     07 Mar 2017 07:52    136    |  100    |  34.0   ----------------------------<  94     4.0     |  26.0   |  2.51     Ca    7.9        07 Mar 2017 07:52  Phos  2.7       07 Mar 2017 07:52  Mg     2.2       07 Mar 2017 07:52          Magnesium, Serum: 2.2 mg/dL (03-07 @ 07:52)  Phosphorus Level, Serum: 2.7 mg/dL (03-07 @ 07:52)    ABG - ( 06 Mar 2017 12:00 )  pH: 7.37  /  pCO2: 47    /  pO2: 128   / HCO3: 26    / Base Excess: 1.4   /  SaO2: 100                   RADIOLOGY & ADDITIONAL TESTS:

## 2017-03-07 NOTE — PROGRESS NOTE ADULT - ASSESSMENT
Improved KARMEN on CKD , DM ---> baseline creat 1.8 , renal function better   Normal kidneys on NCCT   Debridement / dehis ---> Abx as per ID   Ortho follow up noted   Continue IVF   follow labs   ? Antecedent AIN , ATN ?     Anemia - S/P Transfusion , CBC stable

## 2017-03-07 NOTE — PROGRESS NOTE ADULT - SUBJECTIVE AND OBJECTIVE BOX
INTERVAL HPI/OVERNIGHT EVENTS:    CC: encephalopathy improving,  anemia, right knee wound dehiscence and infection Pseudomonas and VRE, CKD, diabetes, hypertension    More alert today, states she feels better. 'It [birth year] my [birth year]'. Denies pain, shortness of breath.    Vital Signs Last 24 Hrs  T(C): 37.1, Max: 37.2 (03-07 @ 00:25)  T(F): 98.7, Max: 99 (03-07 @ 00:25)  HR: 98 (94 - 98)  BP: 114/68 (114/68 - 137/83)  BP(mean): --  RR: 17 (17 - 18)  SpO2: 100% (100% - 100%)    PHYSICAL EXAM:    GENERAL: Not in distress, alert  CHEST/LUNG: b/l air entry, decreased in bases.  HEART: irregular  ABDOMEN: Soft, BS+  EXTREMITIES:  dressing over right, no edema of left.    MEDICATIONS  (STANDING):  docusate sodium 100milliGRAM(s) Oral three times a day  ferrous    sulfate 325milliGRAM(s) Oral three times a day with meals  folic acid 1milliGRAM(s) Oral daily  multivitamin 1Tablet(s) Oral daily  insulin lispro (HumaLOG) corrective regimen sliding scale  SubCutaneous Before meals and at bedtime  dextrose 50% Injectable 12.5Gram(s) IV Push once  dextrose 50% Injectable 25Gram(s) IV Push once  dextrose 50% Injectable 25Gram(s) IV Push once  meropenem IVPB 500milliGRAM(s) IV Intermittent every 12 hours  senna 2Tablet(s) Oral at bedtime  multiple electrolytes Injection Type 1 1000milliLiter(s) IV Continuous <Continuous>  linezolid  IVPB  IV Intermittent   linezolid  IVPB 600milliGRAM(s) IV Intermittent every 12 hours  enoxaparin Injectable 30milliGRAM(s) SubCutaneous every 24 hours  pantoprazole    Tablet 40milliGRAM(s) Oral two times a day before meals    MEDICATIONS  (PRN):  acetaminophen   Tablet 650milliGRAM(s) Oral every 6 hours PRN For Temp over 38.3 C (100.94 F)  aluminum hydroxide/magnesium hydroxide/simethicone Suspension 30milliLiter(s) Oral four times a day PRN Indigestion  ondansetron Injectable 4milliGRAM(s) IV Push every 6 hours PRN Nausea and/or Vomiting  dextrose Gel 1Dose(s) Oral once PRN Blood Glucose LESS THAN 70 milliGRAM(s)/deciliter  glucagon  Injectable 1milliGRAM(s) IntraMuscular once PRN Glucose LESS THAN 70 milligrams/deciliter  polyethylene glycol 3350 17Gram(s) Oral daily PRN Constipation      Allergies    No Known Allergies    Intolerances          LABS:                          9.4    8.7   )-----------( 129      ( 07 Mar 2017 07:52 )             28.6     07 Mar 2017 07:52    136    |  100    |  34.0   ----------------------------<  94     4.0     |  26.0   |  2.51     Ca    7.9        07 Mar 2017 07:52  Phos  2.7       07 Mar 2017 07:52  Mg     2.2       07 Mar 2017 07:52            RADIOLOGY & ADDITIONAL TESTS:

## 2017-03-07 NOTE — PROGRESS NOTE ADULT - ASSESSMENT
71 yr old female with hypertension, hyperlipidemia, LIN, CKD, COPD, paroxysmal A fib sent from rehab for right knee wound dehiscence. She was seen by orthopedics, ID. Local wound cultures growing resistant and Pseudomonas and VRE. She was started on Meropenem and Zyvox. Her renal function was slightly worse from baseline and hence started on IVF. No obstructive disease on renal ultrasound. Cardiology consulted for clearance. Renal was consulted.  She is scheduled for OR I&D, she underwent wound closure and insertion of antibiotic spacer. RRT was called on 3/2 for hypotension and lethargy. Labs revealed elevated lactate. She was transferred to ICU for fluid resuscitation and monitoring. Narcotics were discontinued. She was noted to be anemic 6.9, she was ordered for PRBC transfusion and transferred out of ICU. ID follow up requested for medication adjustment, advised to stop Linezolid and start Daptomycin. GI consulted for anemia, no endoscopic intervention advised at this time. Hb remained stable. Noted to have lethargy, hence narcotics stopped. ABG done, which revealed normal pH and CO2. CT brain showed no stroke. Mental status improving. Hb remained stable.

## 2017-03-08 DIAGNOSIS — D69.6 THROMBOCYTOPENIA, UNSPECIFIED: ICD-10-CM

## 2017-03-08 LAB
ANION GAP SERPL CALC-SCNC: 8 MMOL/L — SIGNIFICANT CHANGE UP (ref 5–17)
BUN SERPL-MCNC: 31 MG/DL — HIGH (ref 8–20)
CALCIUM SERPL-MCNC: 8.3 MG/DL — LOW (ref 8.6–10.2)
CHLORIDE SERPL-SCNC: 102 MMOL/L — SIGNIFICANT CHANGE UP (ref 98–107)
CO2 SERPL-SCNC: 28 MMOL/L — SIGNIFICANT CHANGE UP (ref 22–29)
CREAT SERPL-MCNC: 2.21 MG/DL — HIGH (ref 0.5–1.3)
CULTURE RESULTS: SIGNIFICANT CHANGE UP
GLUCOSE SERPL-MCNC: 91 MG/DL — SIGNIFICANT CHANGE UP (ref 70–115)
HCT VFR BLD CALC: 27 % — LOW (ref 37–47)
HGB BLD-MCNC: 8.9 G/DL — LOW (ref 12–16)
INR BLD: 1.22 RATIO — HIGH (ref 0.88–1.16)
MAGNESIUM SERPL-MCNC: 2.1 MG/DL — SIGNIFICANT CHANGE UP (ref 1.8–2.5)
MCHC RBC-ENTMCNC: 29.7 PG — SIGNIFICANT CHANGE UP (ref 27–31)
MCHC RBC-ENTMCNC: 33 G/DL — SIGNIFICANT CHANGE UP (ref 32–36)
MCV RBC AUTO: 90 FL — SIGNIFICANT CHANGE UP (ref 81–99)
PF4 HEPARIN CMPLX AB SER-ACNC: NEGATIVE — SIGNIFICANT CHANGE UP
PF4 HEPARIN CMPLX AB SERPL QL IA: 0.22 ABS — SIGNIFICANT CHANGE UP
PHOSPHATE SERPL-MCNC: 2.3 MG/DL — LOW (ref 2.4–4.7)
PLATELET # BLD AUTO: 136 K/UL — LOW (ref 150–400)
POTASSIUM SERPL-MCNC: 4.1 MMOL/L — SIGNIFICANT CHANGE UP (ref 3.5–5.3)
POTASSIUM SERPL-SCNC: 4.1 MMOL/L — SIGNIFICANT CHANGE UP (ref 3.5–5.3)
PROTHROM AB SERPL-ACNC: 13.4 SEC — HIGH (ref 10–13.1)
RBC # BLD: 3 M/UL — LOW (ref 4.4–5.2)
RBC # FLD: 17.5 % — HIGH (ref 11–15.6)
SODIUM SERPL-SCNC: 138 MMOL/L — SIGNIFICANT CHANGE UP (ref 135–145)
SPECIMEN SOURCE: SIGNIFICANT CHANGE UP
WBC # BLD: 8.1 K/UL — SIGNIFICANT CHANGE UP (ref 4.8–10.8)
WBC # FLD AUTO: 8.1 K/UL — SIGNIFICANT CHANGE UP (ref 4.8–10.8)

## 2017-03-08 PROCEDURE — 99232 SBSQ HOSP IP/OBS MODERATE 35: CPT

## 2017-03-08 PROCEDURE — 99233 SBSQ HOSP IP/OBS HIGH 50: CPT

## 2017-03-08 RX ORDER — METOPROLOL TARTRATE 50 MG
12.5 TABLET ORAL EVERY 12 HOURS
Qty: 0 | Refills: 0 | Status: DISCONTINUED | OUTPATIENT
Start: 2017-03-08 | End: 2017-04-04

## 2017-03-08 RX ORDER — WARFARIN SODIUM 2.5 MG/1
2.5 TABLET ORAL ONCE
Qty: 0 | Refills: 0 | Status: COMPLETED | OUTPATIENT
Start: 2017-03-08 | End: 2017-03-08

## 2017-03-08 RX ADMIN — Medication 100 MILLIGRAM(S): at 17:31

## 2017-03-08 RX ADMIN — Medication 1 TABLET(S): at 13:29

## 2017-03-08 RX ADMIN — OXYCODONE HYDROCHLORIDE 10 MILLIGRAM(S): 5 TABLET ORAL at 09:01

## 2017-03-08 RX ADMIN — Medication 325 MILLIGRAM(S): at 13:29

## 2017-03-08 RX ADMIN — Medication 100 MILLIGRAM(S): at 21:04

## 2017-03-08 RX ADMIN — Medication 650 MILLIGRAM(S): at 13:30

## 2017-03-08 RX ADMIN — MEROPENEM 200 MILLIGRAM(S): 1 INJECTION INTRAVENOUS at 17:34

## 2017-03-08 RX ADMIN — Medication 1 MILLIGRAM(S): at 13:29

## 2017-03-08 RX ADMIN — MEROPENEM 200 MILLIGRAM(S): 1 INJECTION INTRAVENOUS at 06:32

## 2017-03-08 RX ADMIN — PANTOPRAZOLE SODIUM 40 MILLIGRAM(S): 20 TABLET, DELAYED RELEASE ORAL at 17:34

## 2017-03-08 RX ADMIN — ENOXAPARIN SODIUM 30 MILLIGRAM(S): 100 INJECTION SUBCUTANEOUS at 13:29

## 2017-03-08 RX ADMIN — LINEZOLID 300 MILLIGRAM(S): 600 INJECTION, SOLUTION INTRAVENOUS at 23:16

## 2017-03-08 RX ADMIN — Medication 325 MILLIGRAM(S): at 17:32

## 2017-03-08 RX ADMIN — Medication 325 MILLIGRAM(S): at 09:01

## 2017-03-08 RX ADMIN — Medication 12.5 MILLIGRAM(S): at 17:33

## 2017-03-08 RX ADMIN — SODIUM CHLORIDE 100 MILLILITER(S): 9 INJECTION, SOLUTION INTRAVENOUS at 13:30

## 2017-03-08 RX ADMIN — SENNA PLUS 2 TABLET(S): 8.6 TABLET ORAL at 21:04

## 2017-03-08 RX ADMIN — LINEZOLID 300 MILLIGRAM(S): 600 INJECTION, SOLUTION INTRAVENOUS at 13:29

## 2017-03-08 RX ADMIN — Medication 100 MILLIGRAM(S): at 06:32

## 2017-03-08 RX ADMIN — WARFARIN SODIUM 2.5 MILLIGRAM(S): 2.5 TABLET ORAL at 21:04

## 2017-03-08 RX ADMIN — PANTOPRAZOLE SODIUM 40 MILLIGRAM(S): 20 TABLET, DELAYED RELEASE ORAL at 06:32

## 2017-03-08 NOTE — PROGRESS NOTE ADULT - SUBJECTIVE AND OBJECTIVE BOX
INTERVAL HPI/OVERNIGHT EVENTS:    CC: anemia, right knee wound dehiscence and infection Pseudomonas and VRE, CKD, diabetes, hypertension    No overnight events, more alert, denies complaints.       Vital Signs Last 24 Hrs  T(C): 36.9, Max: 37.2 (03-07 @ 15:38)  T(F): 98.4, Max: 99 (03-07 @ 15:38)  HR: 95 (88 - 95)  BP: 142/78 (114/66 - 142/78)  BP(mean): --  RR: 18 (18 - 18)  SpO2: 100% (100% - 100%)    PHYSICAL EXAM:    GENERAL: Alert, not in distress  CHEST/LUNG: b/l air entry  HEART: irregular  ABDOMEN: Soft, BS+  EXTREMITIES:  2+ swelling    MEDICATIONS  (STANDING):  docusate sodium 100milliGRAM(s) Oral three times a day  ferrous    sulfate 325milliGRAM(s) Oral three times a day with meals  folic acid 1milliGRAM(s) Oral daily  multivitamin 1Tablet(s) Oral daily  insulin lispro (HumaLOG) corrective regimen sliding scale  SubCutaneous Before meals and at bedtime  dextrose 50% Injectable 12.5Gram(s) IV Push once  dextrose 50% Injectable 25Gram(s) IV Push once  dextrose 50% Injectable 25Gram(s) IV Push once  meropenem IVPB 500milliGRAM(s) IV Intermittent every 12 hours  senna 2Tablet(s) Oral at bedtime  multiple electrolytes Injection Type 1 1000milliLiter(s) IV Continuous <Continuous>  linezolid  IVPB  IV Intermittent   linezolid  IVPB 600milliGRAM(s) IV Intermittent every 12 hours  enoxaparin Injectable 30milliGRAM(s) SubCutaneous every 24 hours  pantoprazole    Tablet 40milliGRAM(s) Oral two times a day before meals  warfarin 2.5milliGRAM(s) Oral once  metoprolol 12.5milliGRAM(s) Oral every 12 hours    MEDICATIONS  (PRN):  acetaminophen   Tablet 650milliGRAM(s) Oral every 6 hours PRN For Temp over 38.3 C (100.94 F)  aluminum hydroxide/magnesium hydroxide/simethicone Suspension 30milliLiter(s) Oral four times a day PRN Indigestion  ondansetron Injectable 4milliGRAM(s) IV Push every 6 hours PRN Nausea and/or Vomiting  dextrose Gel 1Dose(s) Oral once PRN Blood Glucose LESS THAN 70 milliGRAM(s)/deciliter  glucagon  Injectable 1milliGRAM(s) IntraMuscular once PRN Glucose LESS THAN 70 milligrams/deciliter  polyethylene glycol 3350 17Gram(s) Oral daily PRN Constipation      Allergies    No Known Allergies    Intolerances          LABS:                          8.9    8.1   )-----------( 136      ( 08 Mar 2017 07:11 )             27.0     08 Mar 2017 07:11    138    |  102    |  31.0   ----------------------------<  91     4.1     |  28.0   |  2.21     Ca    8.3        08 Mar 2017 07:11  Phos  2.3       08 Mar 2017 07:11  Mg     2.1       08 Mar 2017 07:11      PT/INR - ( 08 Mar 2017 07:11 )   PT: 13.4 sec;   INR: 1.22 ratio               RADIOLOGY & ADDITIONAL TESTS:

## 2017-03-08 NOTE — PROGRESS NOTE ADULT - SUBJECTIVE AND OBJECTIVE BOX
NEPHROLOGY INTERVAL HPI/OVERNIGHT EVENTS:  pt clinically stable  no sob, cp, n/v    MEDICATIONS  (STANDING):  docusate sodium 100milliGRAM(s) Oral three times a day  ferrous    sulfate 325milliGRAM(s) Oral three times a day with meals  folic acid 1milliGRAM(s) Oral daily  multivitamin 1Tablet(s) Oral daily  insulin lispro (HumaLOG) corrective regimen sliding scale  SubCutaneous Before meals and at bedtime  dextrose 50% Injectable 12.5Gram(s) IV Push once  dextrose 50% Injectable 25Gram(s) IV Push once  dextrose 50% Injectable 25Gram(s) IV Push once  meropenem IVPB 500milliGRAM(s) IV Intermittent every 12 hours  senna 2Tablet(s) Oral at bedtime  multiple electrolytes Injection Type 1 1000milliLiter(s) IV Continuous <Continuous>  linezolid  IVPB  IV Intermittent   linezolid  IVPB 600milliGRAM(s) IV Intermittent every 12 hours  enoxaparin Injectable 30milliGRAM(s) SubCutaneous every 24 hours  pantoprazole    Tablet 40milliGRAM(s) Oral two times a day before meals  warfarin 2.5milliGRAM(s) Oral once  metoprolol 12.5milliGRAM(s) Oral every 12 hours    MEDICATIONS  (PRN):  acetaminophen   Tablet 650milliGRAM(s) Oral every 6 hours PRN For Temp over 38.3 C (100.94 F)  aluminum hydroxide/magnesium hydroxide/simethicone Suspension 30milliLiter(s) Oral four times a day PRN Indigestion  ondansetron Injectable 4milliGRAM(s) IV Push every 6 hours PRN Nausea and/or Vomiting  dextrose Gel 1Dose(s) Oral once PRN Blood Glucose LESS THAN 70 milliGRAM(s)/deciliter  glucagon  Injectable 1milliGRAM(s) IntraMuscular once PRN Glucose LESS THAN 70 milligrams/deciliter  polyethylene glycol 3350 17Gram(s) Oral daily PRN Constipation      Allergies    No Known Allergies    Intolerances      Vital Signs Last 24 Hrs  T(C): 36.9, Max: 37.2 (03-07 @ 15:38)  T(F): 98.4, Max: 99 (03-07 @ 15:38)  HR: 95 (88 - 95)  BP: 142/78 (114/66 - 142/78)  BP(mean): --  RR: 18 (18 - 18)  SpO2: 100% (100% - 100%)    PHYSICAL EXAM:  HEENT: EOMI  Neck: supple  CHEST/LUNG: Clear to percussion bilaterally; No wheezing  HEART: S1S2+ audible  ABDOMEN: Soft, Nontender, Nondistended; Bowel sounds present  EXTREMITIES:  dressing over right knee, swollen both extremities       LABS:                        8.9    8.1   )-----------( 136      ( 08 Mar 2017 07:11 )             27.0     08 Mar 2017 07:11    138    |  102    |  31.0   ----------------------------<  91     4.1     |  28.0   |  2.21     Ca    8.3        08 Mar 2017 07:11  Phos  2.3       08 Mar 2017 07:11  Mg     2.1       08 Mar 2017 07:11      PT/INR - ( 08 Mar 2017 07:11 )   PT: 13.4 sec;   INR: 1.22 ratio             Magnesium, Serum: 2.1 mg/dL (03-08 @ 07:11)  Phosphorus Level, Serum: 2.3 mg/dL (03-08 @ 07:11)      RADIOLOGY & ADDITIONAL TESTS:

## 2017-03-08 NOTE — PROGRESS NOTE ADULT - ASSESSMENT
71 yr old female with hypertension, hyperlipidemia, LIN, CKD, COPD, paroxysmal A fib sent from rehab for right knee wound dehiscence. She was seen by orthopedics, ID. Local wound cultures growing resistant and Pseudomonas and VRE. She was started on Meropenem and Zyvox. Her renal function was slightly worse from baseline and hence started on IVF. No obstructive disease on renal ultrasound. Cardiology consulted for clearance. Renal was consulted.  She is scheduled for OR I&D, she underwent wound closure and insertion of antibiotic spacer. RRT was called on 3/2 for hypotension and lethargy. Labs revealed elevated lactate. She was transferred to ICU for fluid resuscitation and monitoring. Narcotics were discontinued. She was noted to be anemic 6.9, she was ordered for PRBC transfusion and transferred out of ICU. ID follow up requested for medication adjustment, advised to stop Linezolid and start Daptomycin. GI consulted for anemia, no endoscopic intervention advised at this time. Hb remained stable. Noted to have lethargy, hence narcotics stopped. ABG done, which revealed normal pH and CO2. CT brain showed no stroke. Mental status improved. Hb remained stable. After discussion with orthopedics, coumadin was started for atrial fibrillation.

## 2017-03-08 NOTE — PROGRESS NOTE ADULT - ASSESSMENT
· Assessment		  Improved KARMEN on CKD , DM ---> baseline creat 1.8  - cc    Anemia - S/P Transfusion , CBC stable

## 2017-03-08 NOTE — PROGRESS NOTE ADULT - SUBJECTIVE AND OBJECTIVE BOX
Ortho PA note:          Right Knee Revision with Antibiotic Spacer POD#9.  Found patient laying in bed no new complaints.  Presently she is comfortable and pain is being controlled.  There is a Fergus brace applied to her operative extremity.  No new issues overnight.      ICU Vital Signs Last 24 Hrs  T(C): 36.9, Max: 37.2 (03-07 @ 15:38)  T(F): 98.4, Max: 99 (03-07 @ 15:38)  HR: 95 (88 - 98)  BP: 142/78 (114/66 - 142/78)  BP(mean): --  ABP: --  ABP(mean): --  RR: 18 (18 - 18)  SpO2: 100% (100% - 100%)      Physical Exam:  Right Lower Extremity  Calf soft non-tender,  Dressing has a few small areas of drainage.  + Minimal ROM of toes with + Dorsal Flexion of foot  + Sensation  Brisk capillary refill                          8.9    8.1   )-----------( 136      ( 08 Mar 2017 07:11 )             27.0   08 Mar 2017 07:11    138    |  102    |  31.0   ----------------------------<  91     4.1     |  28.0   |  2.21     Ca    8.3        08 Mar 2017 07:11  Phos  2.3       08 Mar 2017 07:11  Mg     2.1       08 Mar 2017 07:11                 Drain Output  SIGRID #1 - 200cc in 24hrs  SIGRID #2 - 25cc in 24hrs    A/P:  Right Total Knee Infection with Wound Dehiscence  OOB, WBAT, Knee Immobilizer   Pain medication as needed  IV Antibiotics per ID recommendations  Drains per plastics  Will continue to monitor Drain output  Continue medical management  Discussed with Dr. Ritchie/Dr. Rey

## 2017-03-08 NOTE — PROGRESS NOTE ADULT - SUBJECTIVE AND OBJECTIVE BOX
[facility] CARMENZA HARRIS MD FACP   DESIRE METZGER MD  Diplomates American Board of Internal Medicine and Infectious Diseases      SHYAM LAL  [id number]  72y    INTERVAL HPI/OVERNIGHT EVENTS:    FOR PICC    PLATELETS TRENDING DOWN TO MID 130S - MAY BE ZYVOX RELATED  PT ASX    Vital Signs Last 24 Hrs  T(C): 36.9, Max: 37.2 (03-07 @ 15:38)  T(F): 98.4, Max: 99 (03-07 @ 15:38)  HR: 95 (88 - 98)  BP: 142/78 (114/66 - 142/78)  BP(mean): --  RR: 18 (18 - 18)  SpO2: 100% (100% - 100%)    ANTIBIOTICS  meropenem IVPB 500milliGRAM(s) IV Intermittent every 12 hours  linezolid  IVPB  IV Intermittent   linezolid  IVPB 600milliGRAM(s) IV Intermittent every 12 hours      Allergies    No Known Allergies    Intolerances        REVIEW OF SYSTEMS:    PHYSICAL EXAM:  Vital Signs Last 24 Hrs  T(C): 36.9, Max: 37.2 (03-07 @ 15:38)  T(F): 98.4, Max: 99 (03-07 @ 15:38)  HR: 95 (88 - 98)  BP: 142/78 (114/66 - 142/78)  BP(mean): --  RR: 18 (18 - 18)  SpO2: 100% (100% - 100%)      GEN: NAD, pleasant  HEENT: normocephalic and atraumatic. EOMI. CHIDI. Moist mucosa. Clear Posterior pharynx.  NECK: Supple. No carotid bruits.  No lymphadenopathy or thyromegaly.  LUNGS: Clear to auscultation.  HEART: Regular rate and rhythm without murmur.  ABDOMEN: Soft, nontender, and nondistended.  Positive bowel sounds.  No hepatosplenomegaly was noted.  EXTREMITIES: Without any cyanosis, clubbing, rash, lesions or edema.  NEUROLOGIC: Cranial nerves II through XII are grossly intact.  MUSCULOSKELETAL:  SKIN: No ulceration or induration present    LABS:                        8.9    8.1   )-----------( 136      ( 08 Mar 2017 07:11 )             27.0       08 Mar 2017 07:11    138    |  102    |  31.0   ----------------------------<  91     4.1     |  28.0   |  2.21     Ca    8.3        08 Mar 2017 07:11  Phos  2.3       08 Mar 2017 07:11  Mg     2.1       08 Mar 2017 07:11          03-08 @ 07:11  hct 27.0 % hgb 8.9 g/dL    03-08 @ 07:11  plat 136 K/uL wbc 8.1 K/uL    03-07 @ 07:52  hct 28.6 % hgb 9.4 g/dL    03-07 @ 07:52  plat 129 K/uL wbc 8.7 K/uL    03-06 @ 08:15  hct 28.9 % hgb 9.6 g/dL    03-06 @ 08:15  plat 144 K/uL wbc 9.7 K/uL    03-05 @ 09:57  hct 26.7 % hgb 9.0 g/dL    03-05 @ 09:57  plat 155 K/uL wbc 9.2 K/uL      03-08-17  creat 2.21 mg/dL gfr 25 mL/min/1.73M2 bun 31.0 mg/dL k 4.1 mmol/L  03-07-17  creat 2.51 mg/dL gfr 21 mL/min/1.73M2 bun 34.0 mg/dL k 4.0 mmol/L  03-06-17  creat 2.89 mg/dL gfr 18 mL/min/1.73M2 bun 39.0 mg/dL k 4.3 mmol/L  03-05-17  creat 3.13 mg/dL gfr 16 mL/min/1.73M2 bun 42.0 mg/dL k 4.8 mmol/L          RAÚL HARRIS MD [facility]

## 2017-03-08 NOTE — PROGRESS NOTE ADULT - ASSESSMENT
RELATIVE THROMBOCYTOPENIA - REPEAT IN AM - IF LOWER WILL HAVE TO D/C AND USE DAPTOMYCIN.  KNOWN COMPLICATION -IF STABILIZES WILL CONTINUE  HOME CBC ORDERED  WILL MAKE FINAL DECISION IN AM

## 2017-03-08 NOTE — PROGRESS NOTE ADULT - SUBJECTIVE AND OBJECTIVE BOX
INTERVAL HPI/OVERNIGHT EVENTS:  Patient seen and examined    MEDICATIONS  (STANDING):  docusate sodium 100milliGRAM(s) Oral three times a day  ferrous    sulfate 325milliGRAM(s) Oral three times a day with meals  folic acid 1milliGRAM(s) Oral daily  multivitamin 1Tablet(s) Oral daily  insulin lispro (HumaLOG) corrective regimen sliding scale  SubCutaneous Before meals and at bedtime  dextrose 50% Injectable 12.5Gram(s) IV Push once  dextrose 50% Injectable 25Gram(s) IV Push once  dextrose 50% Injectable 25Gram(s) IV Push once  meropenem IVPB 500milliGRAM(s) IV Intermittent every 12 hours  senna 2Tablet(s) Oral at bedtime  multiple electrolytes Injection Type 1 1000milliLiter(s) IV Continuous <Continuous>  linezolid  IVPB  IV Intermittent   linezolid  IVPB 600milliGRAM(s) IV Intermittent every 12 hours  enoxaparin Injectable 30milliGRAM(s) SubCutaneous every 24 hours  pantoprazole    Tablet 40milliGRAM(s) Oral two times a day before meals  warfarin 2.5milliGRAM(s) Oral once  metoprolol 12.5milliGRAM(s) Oral every 12 hours    MEDICATIONS  (PRN):  acetaminophen   Tablet 650milliGRAM(s) Oral every 6 hours PRN For Temp over 38.3 C (100.94 F)  aluminum hydroxide/magnesium hydroxide/simethicone Suspension 30milliLiter(s) Oral four times a day PRN Indigestion  ondansetron Injectable 4milliGRAM(s) IV Push every 6 hours PRN Nausea and/or Vomiting  dextrose Gel 1Dose(s) Oral once PRN Blood Glucose LESS THAN 70 milliGRAM(s)/deciliter  glucagon  Injectable 1milliGRAM(s) IntraMuscular once PRN Glucose LESS THAN 70 milligrams/deciliter  polyethylene glycol 3350 17Gram(s) Oral daily PRN Constipation      Allergies    No Known Allergies    Intolerances        Vital Signs Last 24 Hrs  T(C): 36.9, Max: 37.2 (03-07 @ 15:38)  T(F): 98.4, Max: 99 (03-07 @ 15:38)  HR: 95 (88 - 95)  BP: 142/78 (114/66 - 142/78)  BP(mean): --  RR: 18 (18 - 18)  SpO2: 100% (100% - 100%)    PHYSICAL EXAM:  General: NAD.  CVS: S1, S2  Chest: air entry bilaterally present  Abd: BS present, soft, non-tender      LABS:                        8.9    8.1   )-----------( 136      ( 08 Mar 2017 07:11 )             27.0     08 Mar 2017 07:11    138    |  102    |  31.0   ----------------------------<  91     4.1     |  28.0   |  2.21     Ca    8.3        08 Mar 2017 07:11  Phos  2.3       08 Mar 2017 07:11  Mg     2.1       08 Mar 2017 07:11      PT/INR - ( 08 Mar 2017 07:11 )   PT: 13.4 sec;   INR: 1.22 ratio

## 2017-03-08 NOTE — PROGRESS NOTE ADULT - ASSESSMENT
IMPRESSION:  71 y F hx DM2, PAF, HTN, LIN, CKD, COPD, sent from rehab for R knee wound dehiscensce. Pt has had multiple interventions on the R knee following TKR last year, including hardware removal for infection, IV abx course and most recently adm for wound dehiscence in Dec req washout and wound closure. The patient is now transferred from rehab for persistent wound serosanguinous drainage and wound dehiscence. she is status post right Knee revision with abx spacer placement. GI consult for drop in h/h. No gross gi bleed noted. No RP bleed noted on CT. h/h stable now. HIDA scan unermarkable.    PLAN:  - monitor cbc and LFTs  - IV PPI bid  - no endoscopic intervention for now  - f/u surgery  - gi clinic upon discharge

## 2017-03-08 NOTE — PROGRESS NOTE ADULT - SUBJECTIVE AND OBJECTIVE BOX
Patient looks better  More alert  Plan again d/w patient  Will begin d/c planning  Abx per ID and medicine care to continue regarding patient maximization

## 2017-03-09 ENCOUNTER — TRANSCRIPTION ENCOUNTER (OUTPATIENT)
Age: 72
End: 2017-03-09

## 2017-03-09 LAB
ANION GAP SERPL CALC-SCNC: 13 MMOL/L — SIGNIFICANT CHANGE UP (ref 5–17)
ANISOCYTOSIS BLD QL: SLIGHT — SIGNIFICANT CHANGE UP
BASOPHILS # BLD AUTO: 0 K/UL — SIGNIFICANT CHANGE UP (ref 0–0.2)
BASOPHILS NFR BLD AUTO: 1 % — SIGNIFICANT CHANGE UP (ref 0–2)
BUN SERPL-MCNC: 26 MG/DL — HIGH (ref 8–20)
CALCIUM SERPL-MCNC: 8.5 MG/DL — LOW (ref 8.6–10.2)
CHLORIDE SERPL-SCNC: 99 MMOL/L — SIGNIFICANT CHANGE UP (ref 98–107)
CO2 SERPL-SCNC: 26 MMOL/L — SIGNIFICANT CHANGE UP (ref 22–29)
CREAT SERPL-MCNC: 1.99 MG/DL — HIGH (ref 0.5–1.3)
EOSINOPHIL # BLD AUTO: 1 K/UL — HIGH (ref 0–0.5)
EOSINOPHIL NFR BLD AUTO: 10 % — HIGH (ref 0–5)
GLUCOSE SERPL-MCNC: 117 MG/DL — HIGH (ref 70–115)
HCT VFR BLD CALC: 33.8 % — LOW (ref 37–47)
HGB BLD-MCNC: 10.8 G/DL — LOW (ref 12–16)
HYPOCHROMIA BLD QL: SLIGHT — SIGNIFICANT CHANGE UP
INR BLD: 1.22 RATIO — HIGH (ref 0.88–1.16)
LYMPHOCYTES # BLD AUTO: 2.4 K/UL — SIGNIFICANT CHANGE UP (ref 1–4.8)
LYMPHOCYTES # BLD AUTO: 24 % — SIGNIFICANT CHANGE UP (ref 20–55)
MACROCYTES BLD QL: SLIGHT — SIGNIFICANT CHANGE UP
MAGNESIUM SERPL-MCNC: 2.1 MG/DL — SIGNIFICANT CHANGE UP (ref 1.8–2.5)
MCHC RBC-ENTMCNC: 29.6 PG — SIGNIFICANT CHANGE UP (ref 27–31)
MCHC RBC-ENTMCNC: 32 G/DL — SIGNIFICANT CHANGE UP (ref 32–36)
MCV RBC AUTO: 92.6 FL — SIGNIFICANT CHANGE UP (ref 81–99)
MONOCYTES # BLD AUTO: 1.2 K/UL — HIGH (ref 0–0.8)
MONOCYTES NFR BLD AUTO: 12 % — HIGH (ref 3–10)
NEUTROPHILS # BLD AUTO: 4.8 K/UL — SIGNIFICANT CHANGE UP (ref 1.8–8)
NEUTROPHILS NFR BLD AUTO: 51 % — SIGNIFICANT CHANGE UP (ref 37–73)
PHOSPHATE SERPL-MCNC: 2.5 MG/DL — SIGNIFICANT CHANGE UP (ref 2.4–4.7)
PLAT MORPH BLD: ABNORMAL
PLATELET # BLD AUTO: 142 K/UL — LOW (ref 150–400)
POIKILOCYTOSIS BLD QL AUTO: SLIGHT — SIGNIFICANT CHANGE UP
POLYCHROMASIA BLD QL SMEAR: SLIGHT — SIGNIFICANT CHANGE UP
POTASSIUM SERPL-MCNC: 4.2 MMOL/L — SIGNIFICANT CHANGE UP (ref 3.5–5.3)
POTASSIUM SERPL-SCNC: 4.2 MMOL/L — SIGNIFICANT CHANGE UP (ref 3.5–5.3)
PROTHROM AB SERPL-ACNC: 13.4 SEC — HIGH (ref 10–13.1)
RBC # BLD: 3.65 M/UL — LOW (ref 4.4–5.2)
RBC # FLD: 17.8 % — HIGH (ref 11–15.6)
RBC BLD AUTO: ABNORMAL
SODIUM SERPL-SCNC: 138 MMOL/L — SIGNIFICANT CHANGE UP (ref 135–145)
VARIANT LYMPHS # BLD: 2 % — SIGNIFICANT CHANGE UP (ref 0–6)
WBC # BLD: 9.4 K/UL — SIGNIFICANT CHANGE UP (ref 4.8–10.8)
WBC # FLD AUTO: 9.4 K/UL — SIGNIFICANT CHANGE UP (ref 4.8–10.8)

## 2017-03-09 PROCEDURE — 93970 EXTREMITY STUDY: CPT | Mod: 26

## 2017-03-09 PROCEDURE — 99233 SBSQ HOSP IP/OBS HIGH 50: CPT

## 2017-03-09 PROCEDURE — 99232 SBSQ HOSP IP/OBS MODERATE 35: CPT

## 2017-03-09 RX ORDER — DAPTOMYCIN 500 MG/10ML
750 INJECTION, POWDER, LYOPHILIZED, FOR SOLUTION INTRAVENOUS EVERY 24 HOURS
Qty: 0 | Refills: 0 | Status: DISCONTINUED | OUTPATIENT
Start: 2017-03-09 | End: 2017-04-04

## 2017-03-09 RX ORDER — WARFARIN SODIUM 2.5 MG/1
2.5 TABLET ORAL ONCE
Qty: 0 | Refills: 0 | Status: COMPLETED | OUTPATIENT
Start: 2017-03-09 | End: 2017-03-09

## 2017-03-09 RX ADMIN — MEROPENEM 200 MILLIGRAM(S): 1 INJECTION INTRAVENOUS at 17:23

## 2017-03-09 RX ADMIN — SODIUM CHLORIDE 100 MILLILITER(S): 9 INJECTION, SOLUTION INTRAVENOUS at 17:23

## 2017-03-09 RX ADMIN — Medication 1 MILLIGRAM(S): at 12:52

## 2017-03-09 RX ADMIN — DAPTOMYCIN 130 MILLIGRAM(S): 500 INJECTION, POWDER, LYOPHILIZED, FOR SOLUTION INTRAVENOUS at 15:51

## 2017-03-09 RX ADMIN — WARFARIN SODIUM 2.5 MILLIGRAM(S): 2.5 TABLET ORAL at 21:51

## 2017-03-09 RX ADMIN — SODIUM CHLORIDE 100 MILLILITER(S): 9 INJECTION, SOLUTION INTRAVENOUS at 21:51

## 2017-03-09 RX ADMIN — Medication 12.5 MILLIGRAM(S): at 05:10

## 2017-03-09 RX ADMIN — PANTOPRAZOLE SODIUM 40 MILLIGRAM(S): 20 TABLET, DELAYED RELEASE ORAL at 17:23

## 2017-03-09 RX ADMIN — Medication 325 MILLIGRAM(S): at 12:49

## 2017-03-09 RX ADMIN — Medication 100 MILLIGRAM(S): at 21:51

## 2017-03-09 RX ADMIN — Medication 325 MILLIGRAM(S): at 12:52

## 2017-03-09 RX ADMIN — MEROPENEM 200 MILLIGRAM(S): 1 INJECTION INTRAVENOUS at 05:10

## 2017-03-09 RX ADMIN — Medication 650 MILLIGRAM(S): at 12:49

## 2017-03-09 RX ADMIN — Medication 325 MILLIGRAM(S): at 17:23

## 2017-03-09 RX ADMIN — Medication 12.5 MILLIGRAM(S): at 17:23

## 2017-03-09 RX ADMIN — Medication 1 TABLET(S): at 12:49

## 2017-03-09 RX ADMIN — PANTOPRAZOLE SODIUM 40 MILLIGRAM(S): 20 TABLET, DELAYED RELEASE ORAL at 05:10

## 2017-03-09 RX ADMIN — ENOXAPARIN SODIUM 30 MILLIGRAM(S): 100 INJECTION SUBCUTANEOUS at 12:49

## 2017-03-09 RX ADMIN — SENNA PLUS 2 TABLET(S): 8.6 TABLET ORAL at 21:51

## 2017-03-09 NOTE — DISCHARGE NOTE ADULT - MEDICATION SUMMARY - MEDICATIONS TO CHANGE
I will SWITCH the dose or number of times a day I take the medications listed below when I get home from the hospital:    torsemide 20 mg oral tablet  -- 3 tab(s) by mouth once a day    torsemide 10 mg oral tablet  -- 3 tab(s) by mouth once a day

## 2017-03-09 NOTE — PROGRESS NOTE ADULT - ASSESSMENT
· Assessment		  Improved KARMEN on CKD , DM ---> baseline creat 1.8  Slowly resolving towards baseline  - monitor labs    Anemia - S/P Transfusion               - no SIDRA needed now

## 2017-03-09 NOTE — PROGRESS NOTE ADULT - SUBJECTIVE AND OBJECTIVE BOX
Assessed for PICC. Verbal consent from patient as she cannot sign. All questions answered. Pt has very edematous arms, and with ultrasound no upper arm veins seen. Lower arm assessed for regular IV and no veins seen. Has pink armband on left arm due to mastectomy. 18g right external jugular IV placed for access. Discussed with WILLIAMS Castillo and Dr. Nicholson. I will discuss with Dr. Leblanc.

## 2017-03-09 NOTE — DISCHARGE NOTE ADULT - PROVIDER TOKENS
TOKEN:'[id number]:'[id number] TOKEN:'[id number]'

## 2017-03-09 NOTE — PROGRESS NOTE ADULT - SUBJECTIVE AND OBJECTIVE BOX
Assessed for PICC. Has 4+ bilateral upper and lower extremity edema and pink armband on left arm due to mastectomy. Discussed with Dr. Zhang who has ordered sono to rule out DVT.

## 2017-03-09 NOTE — DISCHARGE NOTE ADULT - CARE PLAN
Goal:	Treat Infection  Instructions for follow-up, activity and diet:	- Patient can be transferred to [facility] with Prevena  - Prevena may remain in place until seen by Plastics  - Patient to Follow up with Plastics next Week (no later than 4/5/2017)  - Continue IV Antibiotics per ID recommendations  - Patient can be OOB, WB as tolerated with [place] [facility] Principal Discharge DX:	Infection of prosthetic knee joint, sequela  Goal:	right total knee revision with removal of hardware and placement of antibiotic spacer, on 2/27,  Instructions for follow-up, activity and diet:	follow up with Dr lou in 1 week and Dr Ritchie in 2 weeks  maintain right knee wound vac.  continue antibiotics via Right PICC line until 4/8/17, follow up with Dr Chamberlain  PT- WBAT with alyce brace when OOB, keep alyce removed when laying in bed.  Cont Prevena wound VAC, monitor output  Secondary Diagnosis:	Paroxysmal atrial fibrillation  Instructions for follow-up, activity and diet:	continue coumadin therapy  INR on 3/31/17 is 2.9  Secondary Diagnosis:	DM (diabetes mellitus)  Instructions for follow-up, activity and diet:	hga1c 4.9  sliding scale as needed, diabetic diet  Secondary Diagnosis:	Acute deep vein thrombosis (DVT) of other vein of right upper extremity  Instructions for follow-up, activity and diet:	coumadin therapy  Secondary Diagnosis:	CKD (chronic kidney disease) stage 3, GFR 30-59 ml/min  Instructions for follow-up, activity and diet:	baseline cr ~1.8-2  Secondary Diagnosis:	Anemia  Instructions for follow-up, activity and diet:	stable, s/p prbc  continue with epogen Principal Discharge DX:	Infection of prosthetic knee joint, sequela  Goal:	right total knee revision with removal of hardware and placement of antibiotic spacer, on 2/27,  Instructions for follow-up, activity and diet:	follow up with Dr lou in 1 week  call for a follow up apt with Dr Ritchie in 2 weeks [phone]  . follow up with Dr Armendariz for antibiotics  continue antibiotics via Right PICC line until 4/8/17, follow up with Dr Chamberlain  PT- WBAT with alyce brace when OOB, keep alyce removed when laying in bed.  Cont Prevena wound VAC, monitor output  Secondary Diagnosis:	Paroxysmal atrial fibrillation  Instructions for follow-up, activity and diet:	continue coumadin therapy  INR on 3/31/17 is 2.9  Secondary Diagnosis:	DM (diabetes mellitus)  Instructions for follow-up, activity and diet:	hga1c 4.9  sliding scale as needed, diabetic diet  Secondary Diagnosis:	Acute deep vein thrombosis (DVT) of other vein of right upper extremity  Instructions for follow-up, activity and diet:	coumadin therapy  Secondary Diagnosis:	CKD (chronic kidney disease) stage 3, GFR 30-59 ml/min  Instructions for follow-up, activity and diet:	baseline cr ~1.8-2  Secondary Diagnosis:	Anemia  Instructions for follow-up, activity and diet:	stable, s/p prbc  continue with epogen Principal Discharge DX:	Infection of prosthetic knee joint, sequela  Goal:	right total knee revision with removal of hardware and placement of antibiotic spacer, on 2/27,  Instructions for follow-up, activity and diet:	follow up with Dr lou in 1 week  call for a follow up apt with Dr Ritchie in 2 weeks [phone]  . follow up with Dr Gomes within 2 weeks for antibiotics  continue antibiotics via Right PICC line until 4/22/17, follow up with Dr Chamberlain/Mireya before completion  PT- WBAT with alyce brace when OOB, keep alyce removed when laying in bed.  Cont Prevena wound VAC, monitor output  Secondary Diagnosis:	Paroxysmal atrial fibrillation  Instructions for follow-up, activity and diet:	continue coumadin therapy  INR on 3/31/17 is 2.9  Secondary Diagnosis:	DM (diabetes mellitus)  Instructions for follow-up, activity and diet:	hga1c 4.9  sliding scale as needed, diabetic diet  Secondary Diagnosis:	Acute deep vein thrombosis (DVT) of other vein of right upper extremity  Instructions for follow-up, activity and diet:	coumadin therapy  Secondary Diagnosis:	CKD (chronic kidney disease) stage 3, GFR 30-59 ml/min  Instructions for follow-up, activity and diet:	baseline cr ~1.8-2  Secondary Diagnosis:	Anemia  Instructions for follow-up, activity and diet:	stable, s/p prbc  continue with epogen Principal Discharge DX:	Infection of prosthetic knee joint, sequela  Goal:	right total knee revision with removal of hardware and placement of antibiotic spacer, on 2/27,  Instructions for follow-up, activity and diet:	follow up with Dr lou in 1 week  call for a follow up apt with Dr Ritchie in 2 weeks [phone]  . follow up with Dr Gomes within 2 weeks for antibiotics  continue antibiotics via Right PICC line until 4/22/17, follow up with Dr Chamberlain/Mireya before completion  PT- WBAT with alyce brace when OOB, keep alyce removed when laying in bed.  Cont Prevena wound VAC, monitor output  Secondary Diagnosis:	Paroxysmal atrial fibrillation  Instructions for follow-up, activity and diet:	continue coumadin therapy  INR on 3/31/17 is 2.9  Secondary Diagnosis:	DM (diabetes mellitus)  Instructions for follow-up, activity and diet:	hga1c 4.9  sliding scale as needed, diabetic diet  Secondary Diagnosis:	Acute deep vein thrombosis (DVT) of other vein of right upper extremity  Instructions for follow-up, activity and diet:	coumadin therapy  keep Rt arm  elevated   if swelling persists - repeat DVT US - in 2 -3 weeks  Secondary Diagnosis:	CKD (chronic kidney disease) stage 3, GFR 30-59 ml/min  Instructions for follow-up, activity and diet:	baseline cr ~1.8-2  Secondary Diagnosis:	Anemia  Instructions for follow-up, activity and diet:	stable, s/p prbc  continue with epogen Principal Discharge DX:	Infection of prosthetic knee joint, sequela  Goal:	right total knee revision with removal of hardware and placement of antibiotic spacer, on 2/27,  Instructions for follow-up, activity and diet:	follow up with Dr lou in 1 week  call for a follow up apt with Dr Ritchie in 2 weeks [phone]  . follow up with Dr Gomes within 2 weeks for antibiotics  continue antibiotics via Right PICC line until 4/22/17, follow up with Dr Chamberlain/Mireya before completion  PT- WBAT with alyce brace when OOB, keep alyce removed when laying in bed.  Cont Prevena wound VAC, monitor output  Secondary Diagnosis:	Paroxysmal atrial fibrillation  Instructions for follow-up, activity and diet:	continue coumadin therapy  INR on 3/31/17 is 2.9  Secondary Diagnosis:	DM (diabetes mellitus)  Instructions for follow-up, activity and diet:	hga1c 4.9  sliding scale as needed, diabetic diet  Secondary Diagnosis:	Acute deep vein thrombosis (DVT) of other vein of right upper extremity  Instructions for follow-up, activity and diet:	coumadin therapy  keep Rt arm  elevated   if swelling persists - repeat DVT US - in 2 -3 weeks  Secondary Diagnosis:	CKD (chronic kidney disease) stage 3, GFR 30-59 ml/min  Instructions for follow-up, activity and diet:	baseline cr ~1.8-2  Secondary Diagnosis:	Anemia  Instructions for follow-up, activity and diet:	stable, s/p prbc  continue with epogen Principal Discharge DX:	Infection of prosthetic knee joint, sequela  Goal:	right total knee revision with removal of hardware and placement of antibiotic spacer, on 2/27,  Instructions for follow-up, activity and diet:	follow up with Dr lou in 1 week  call for a follow up apt with Dr Ritchie in 2 weeks [phone]  . follow up with Dr Gomes within 2 weeks for antibiotics  continue antibiotics via Right PICC line until 4/22/17, follow up with Dr Chamberlain/Mireya before completion  PT- WBAT with alyce brace when OOB, keep alyce removed when laying in bed.  Cont Prevena wound VAC, monitor output  Secondary Diagnosis:	Paroxysmal atrial fibrillation  Instructions for follow-up, activity and diet:	continue coumadin therapy  INR on 3/31/17 is 2.9  Secondary Diagnosis:	DM (diabetes mellitus)  Instructions for follow-up, activity and diet:	hga1c 4.9  sliding scale as needed, diabetic diet  Secondary Diagnosis:	Acute deep vein thrombosis (DVT) of other vein of right upper extremity  Instructions for follow-up, activity and diet:	coumadin therapy  keep Rt arm  elevated   if swelling persists - repeat DVT US - in 2 -3 weeks  Secondary Diagnosis:	CKD (chronic kidney disease) stage 3, GFR 30-59 ml/min  Instructions for follow-up, activity and diet:	baseline cr ~1.8-2  Secondary Diagnosis:	Anemia  Instructions for follow-up, activity and diet:	stable, s/p prbc  continue with epogen Principal Discharge DX:	Infection of prosthetic knee joint, sequela  Goal:	right total knee revision with removal of hardware and placement of antibiotic spacer, on 2/27,  Instructions for follow-up, activity and diet:	follow up with Dr lou in 1 week  call for a follow up apt with Dr Ritchie in 2 weeks [phone]  . follow up with Dr Gomes within 2 weeks for antibiotics  continue antibiotics via Right PICC line until 4/22/17, follow up with Dr Chamberlain/Mireya before completion  PT- WBAT with alyce brace when OOB, keep alyce removed when laying in bed.  Cont Prevena wound VAC, monitor output  Secondary Diagnosis:	Paroxysmal atrial fibrillation  Instructions for follow-up, activity and diet:	continue coumadin therapy  INR on 4/4 - 4 hold coumadin on 4/4. 4/5   check inr daily, dose accordingly  Secondary Diagnosis:	DM (diabetes mellitus)  Instructions for follow-up, activity and diet:	hga1c 4.9  sliding scale as needed, diabetic diet  Secondary Diagnosis:	Acute deep vein thrombosis (DVT) of other vein of right upper extremity  Instructions for follow-up, activity and diet:	coumadin therapy  keep Rt arm  elevated   if swelling persists - repeat DVT US - in 2 -3 weeks  Secondary Diagnosis:	CKD (chronic kidney disease) stage 3, GFR 30-59 ml/min  Instructions for follow-up, activity and diet:	baseline cr ~1.8-2  Secondary Diagnosis:	Anemia  Instructions for follow-up, activity and diet:	stable, s/p prbc  continue with epogen

## 2017-03-09 NOTE — PROGRESS NOTE ADULT - ASSESSMENT
71 yr old female with hypertension, hyperlipidemia, LIN, CKD, COPD, paroxysmal A fib sent from rehab for right knee wound dehiscence. She was seen by orthopedics, ID. Local wound cultures growing resistant and Pseudomonas and VRE. She was started on Meropenem and Zyvox. Her renal function was slightly worse from baseline and hence started on IVF. No obstructive disease on renal ultrasound. Cardiology consulted for clearance. Renal was consulted.  She is scheduled for OR I&D, she underwent wound closure and insertion of antibiotic spacer. RRT was called on 3/2 for hypotension and lethargy. Labs revealed elevated lactate. She was transferred to ICU for fluid resuscitation and monitoring. Narcotics were discontinued. She was noted to be anemic 6.9, she was ordered for PRBC transfusion and transferred out of ICU. ID follow up requested for medication adjustment, advised to stop Linezolid and start Daptomycin. GI consulted for anemia, no endoscopic intervention advised at this time. Hb remained stable. Noted to have lethargy, hence narcotics stopped. ABG done, which revealed normal pH and CO2. CT brain showed no stroke. Mental status improved. Hb remained stable. After discussion with orthopedics, coumadin was started for atrial fibrillation. She was noted to have bilateral upper extremity swelling, hence ultrasound was done on 3/9, negative for DVT. Given low platelets, Linezolid was discontinued by ID and Daptomycin was started. Heparin antibody negative.

## 2017-03-09 NOTE — PROGRESS NOTE ADULT - SUBJECTIVE AND OBJECTIVE BOX
NEPHROLOGY INTERVAL HPI/OVERNIGHT EVENTS:  pt clinically stable   no acute distress  not eating well    MEDICATIONS  (STANDING):  docusate sodium 100milliGRAM(s) Oral three times a day  ferrous    sulfate 325milliGRAM(s) Oral three times a day with meals  folic acid 1milliGRAM(s) Oral daily  multivitamin 1Tablet(s) Oral daily  insulin lispro (HumaLOG) corrective regimen sliding scale  SubCutaneous Before meals and at bedtime  dextrose 50% Injectable 12.5Gram(s) IV Push once  dextrose 50% Injectable 25Gram(s) IV Push once  dextrose 50% Injectable 25Gram(s) IV Push once  meropenem IVPB 500milliGRAM(s) IV Intermittent every 12 hours  senna 2Tablet(s) Oral at bedtime  multiple electrolytes Injection Type 1 1000milliLiter(s) IV Continuous <Continuous>  enoxaparin Injectable 30milliGRAM(s) SubCutaneous every 24 hours  pantoprazole    Tablet 40milliGRAM(s) Oral two times a day before meals  metoprolol 12.5milliGRAM(s) Oral every 12 hours  warfarin 2.5milliGRAM(s) Oral once  DAPTOmycin IVPB 750milliGRAM(s) IV Intermittent every 24 hours    MEDICATIONS  (PRN):  acetaminophen   Tablet 650milliGRAM(s) Oral every 6 hours PRN For Temp over 38.3 C (100.94 F)  aluminum hydroxide/magnesium hydroxide/simethicone Suspension 30milliLiter(s) Oral four times a day PRN Indigestion  ondansetron Injectable 4milliGRAM(s) IV Push every 6 hours PRN Nausea and/or Vomiting  dextrose Gel 1Dose(s) Oral once PRN Blood Glucose LESS THAN 70 milliGRAM(s)/deciliter  glucagon  Injectable 1milliGRAM(s) IntraMuscular once PRN Glucose LESS THAN 70 milligrams/deciliter  polyethylene glycol 3350 17Gram(s) Oral daily PRN Constipation      Allergies    No Known Allergies        Vital Signs Last 24 Hrs  T(C): 36.6, Max: 37 (03-08 @ 23:30)  T(F): 97.9, Max: 98.6 (03-08 @ 23:30)  HR: 75 (68 - 84)  BP: 168/85 (144/89 - 168/85)  BP(mean): --  RR: 18 (18 - 19)  SpO2: 100% (100% - 100%)    PHYSICAL EXAM:  HEENT: EOMI  Neck: supple  CHEST/LUNG: Clear to percussion bilaterally; No wheezing  HEART: S1S2+ audible  ABDOMEN: Soft, Nontender, Nondistended; Bowel sounds present  EXTREMITIES:  ++LE edema    LABS:                        10.8   9.4   )-----------( x        ( 09 Mar 2017 10:49 )             33.8     09 Mar 2017 10:49    138    |  99     |  26.0   ----------------------------<  117    4.2     |  26.0   |  1.99     Ca    8.5        09 Mar 2017 10:49  Phos  2.5       09 Mar 2017 10:49  Mg     2.1       09 Mar 2017 10:49  Creatinine, Serum: 2.21 mg/dL (03.08.17 @ 07:11)        PT/INR - ( 09 Mar 2017 07:46 )   PT: 13.4 sec;   INR: 1.22 ratio             Magnesium, Serum: 2.1 mg/dL (03-09 @ 10:49)  Phosphorus Level, Serum: 2.5 mg/dL (03-09 @ 10:49)      RADIOLOGY & ADDITIONAL TESTS:

## 2017-03-09 NOTE — DISCHARGE NOTE ADULT - PATIENT PORTAL LINK FT
“You can access the [facility] Patient Portal, offered by [facility], by registering with the following website: [email or web address]”

## 2017-03-09 NOTE — DISCHARGE NOTE ADULT - PLAN OF CARE
Treat Infection - Patient can be transferred to Re-[facility]ab with Prevena  - Prevena may remain in place until seen by Plastics  - Patient to Follow up with Plastics next Week (no later than 4/5/2017)  - Continue IV Antibiotics per ID recommendations  - Patient can be OOB, WB as tolerated with [place] [facility] continue coumadin therapy  INR on 3/31/17 is 2.9 hga1c 4.9  sliding scale as needed, diabetic diet coumadin therapy stable, s/p prbc  continue with epogen baseline cr ~1.8-2 right total knee revision with removal of hardware and placement of antibiotic spacer, on 2/27, follow up with Dr lou in 1 week and Dr Ritchie in 2 weeks  maintain right knee wound vac.  continue antibiotics via Right PICC line until 4/8/17, follow up with Dr Chamberlain  PT- WBAT with alyce brace when OOB, keep alyce removed when laying in bed.  Cont Prevena wound VAC, monitor output follow up with Dr lou in 1 week  call for a follow up apt with Dr Ritchie in 2 weeks [phone]  . follow up with Dr Armendariz for antibiotics  continue antibiotics via Right PICC line until 4/8/17, follow up with Dr Chamberlain  PT- WBAT with alyce brace when OOB, keep alyce removed when laying in bed.  Cont Prevena wound VAC, monitor output follow up with Dr lou in 1 week  call for a follow up apt with Dr Ritchie in 2 weeks [phone]  . follow up with Dr Gomes within 2 weeks for antibiotics  continue antibiotics via Right PICC line until 4/22/17, follow up with Dr Chamberlain/Mireya before completion  PT- WBAT with alyce brace when OOB, keep alyce removed when laying in bed.  Cont Prevena wound VAC, monitor output coumadin therapy  keep Rt arm  elevated   if swelling persists - repeat DVT US - in 2 -3 weeks continue coumadin therapy  INR on 4/4 - 4 hold coumadin on 4/4. 4/5   check inr daily, dose accordingly

## 2017-03-09 NOTE — DISCHARGE NOTE ADULT - MEDICATION SUMMARY - MEDICATIONS TO STOP TAKING
I will STOP taking the medications listed below when I get home from the hospital:    docusate sodium 100 mg oral capsule  -- 1 cap(s) by mouth 2 times a day, As Needed    magnesium oxide 400 mg (241.3 mg elemental magnesium) oral tablet  -- 1 tab(s) by mouth 2 times a day (with meals)    potassium chloride extended release 10 mEq oral capsule, extended release  -- 1 cap(s) by mouth once a day    hydrALAZINE 50 mg oral tablet  -- 1 tab(s) by mouth every 8 hours    Xarelto 15 mg oral tablet  -- 1 tab(s) by mouth once a day (in the evening)    oxyCODONE 5 mg oral tablet  --  by mouth every 4 hours, As Needed    Advair Diskus 500 mcg-50 mcg inhalation powder  -- 1 puff(s) inhaled 2 times a day    Venelex 788 mg-87 mg/g topical ointment  -- Apply on skin to affected area 3 times a day    HumaLOG 100 units/mL subcutaneous solution  --  subcutaneous   -- sliding scale at 1630 daily    rivaroxaban 15 mg oral tablet  -- 1 tab(s) by mouth once a day (in the evening)    oxyCODONE 5 mg oral tablet  -- 1 tab(s) by mouth every 4 hours, As Needed    Venelex 788 mg-87 mg/g topical ointment  -- Apply on skin to affected area 3 times a day    insulin lispro 100 units/mL subcutaneous solution  -- sliding scale once a day at 4:30pm  -- sliding scale at 1630 daily    above named patient is still hospitalized at [facility] and cannot be discharged due to medical condition. Thank you. If any questions arise, please feel free to contact me. I will STOP taking the medications listed below when I get home from the hospital:    docusate sodium 100 mg oral capsule  -- 1 cap(s) by mouth 2 times a day, As Needed    magnesium oxide 400 mg (241.3 mg elemental magnesium) oral tablet  -- 1 tab(s) by mouth 2 times a day (with meals)    potassium chloride extended release 10 mEq oral capsule, extended release  -- 1 cap(s) by mouth once a day    hydrALAZINE 50 mg oral tablet  -- 1 tab(s) by mouth every 8 hours    Xarelto 15 mg oral tablet  -- 1 tab(s) by mouth once a day (in the evening)    oxyCODONE 5 mg oral tablet  --  by mouth every 4 hours, As Needed    Advair Diskus 500 mcg-50 mcg inhalation powder  -- 1 puff(s) inhaled 2 times a day    Venelex 788 mg-87 mg/g topical ointment  -- Apply on skin to affected area 3 times a day    HumaLOG 100 units/mL subcutaneous solution  --  subcutaneous   -- sliding scale at 1630 daily    rivaroxaban 15 mg oral tablet  -- 1 tab(s) by mouth once a day (in the evening)    oxyCODONE 5 mg oral tablet  -- 1 tab(s) by mouth every 4 hours, As Needed    Venelex 788 mg-87 mg/g topical ointment  -- Apply on skin to affected area 3 times a day    insulin lispro 100 units/mL subcutaneous solution  -- sliding scale once a day at 4:30pm  -- sliding scale at 1630 daily    above named patient is still hospitalized at [facility] and cannot be discharged due to medical condition. Thank you. If any questions arise, please feel free to contact me.

## 2017-03-09 NOTE — DISCHARGE NOTE ADULT - MEDICATION SUMMARY - MEDICATIONS TO TAKE
I will START or STAY ON the medications listed below when I get home from the hospital:    pro-stat AWC  -- 30 milliliter(s) by mouth 2 times a day  -- Indication: For Vitamin    glucena shake  -- 1 dose(s) by mouth 4 times a day  -- Indication: For Vitamin    acetaminophen 325 mg oral tablet  -- 2 tab(s) by mouth every 6 hours, As needed, For Temp over 38.3 C (100.94 F)  -- Indication: For Pain    acetaminophen 325 mg oral tablet  -- 2 tab(s) by mouth every 6 hours, As needed, mild to mod  -- Indication: For Pain    aluminum hydroxide-magnesium hydroxide 200 mg-200 mg/5 mL oral suspension  -- 30 milliliter(s) by mouth 4 times a day, As needed, Indigestion  -- Indication: For reflux    calcium carbonate 500 mg (200 mg elemental calcium) oral tablet, chewable  -- 1 tab(s) by mouth 2 times a day  -- Indication: For reflux    Coumadin 2.5 mg oral tablet  -- 1 tab(s) by mouth once a day (at bedtime)  -- Indication: For Dvt right arm    ondansetron 4 mg oral tablet  -- 1 tab(s) by mouth every 6 hours, As Needed  -- Indication: For nausea    simvastatin 20 mg oral tablet  -- 1 tab(s) by mouth once a day (at bedtime)  -- Indication: For Hypercholesterolemia    Metoprolol Succinate ER 50 mg oral tablet, extended release  -- 1 tab(s) by mouth once a day  -- Indication: For Atrial fibrillation    meropenem 500 mg intravenous injection  -- 500 milligram(s) intravenous every 12 hours  -- Indication: For Infection    torsemide 20 mg oral tablet  -- 1 tab(s) by mouth once a day  -- Indication: For Anasarca    epoetin melany 20,000 units/mL injectable solution  -- 16949 unit(s) injectable once a week on Tuesday  -- once a day on tuesday, hold if hgb>11  -- Indication: For Anemia    ferrous sulfate 325 mg (65 mg elemental iron) oral delayed release tablet  -- 1 tab(s) by mouth once a day  -- Indication: For Anemia    polyethylene glycol 3350 oral powder for reconstitution  -- 17 gram(s) by mouth once a day, As needed, Constipation  -- Indication: For Constipation    DAPTOmycin 500 mg intravenous injection  -- 750 milligram(s) intravenous every 24 hours  -- Indication: For Infection    pantoprazole 40 mg oral delayed release tablet  -- 1 tab(s) by mouth 2 times a day (before meals)  -- Indication: For gerd    multivitamin  -- 1 tab(s) by mouth once a day  -- Indication: For Vitamin    folic acid 1 mg oral tablet  -- 1 tab(s) by mouth once a day  -- Indication: For Vitamin I will START or STAY ON the medications listed below when I get home from the hospital:    pro-stat AWC  -- 30 milliliter(s) by mouth 2 times a day  -- Indication: For Vitamin    glucena shake  -- 1 dose(s) by mouth 4 times a day  -- Indication: For Vitamin    acetaminophen 325 mg oral tablet  -- 2 tab(s) by mouth every 6 hours, As needed, For Temp over 38.3 C (100.94 F)  -- Indication: For Pain    acetaminophen 325 mg oral tablet  -- 2 tab(s) by mouth every 6 hours, As needed, mild to mod  -- Indication: For Pain    aluminum hydroxide-magnesium hydroxide 200 mg-200 mg/5 mL oral suspension  -- 30 milliliter(s) by mouth 4 times a day, As needed, Indigestion  -- Indication: For reflux    calcium carbonate 500 mg (200 mg elemental calcium) oral tablet, chewable  -- 1 tab(s) by mouth 2 times a day  -- Indication: For reflux    Coumadin 2.5 mg oral tablet  -- 1 tab(s) by mouth once a day (at bedtime)  -- Hold warfarin till 4/5. please check INR daily x 3 days  Goal INR 2-3  -- Indication: For Dvt femoral (deep venous thrombosis)    ondansetron 4 mg oral tablet  -- 1 tab(s) by mouth every 6 hours, As Needed  -- Indication: For nausea    simvastatin 20 mg oral tablet  -- 1 tab(s) by mouth once a day (at bedtime)  -- Indication: For Hypercholesterolemia    Metoprolol Succinate ER 50 mg oral tablet, extended release  -- 1 tab(s) by mouth once a day  -- Indication: For Atrial fibrillation    meropenem 500 mg intravenous injection  -- 500 milligram(s) intravenous every 12 hours  -- Indication: For Infection    nystatin 100,000 units/g topical powder  -- 1 application on skin 2 times a day  -- Indication: For fungal groin rash     torsemide 20 mg oral tablet  -- 1 tab(s) by mouth once a day  -- Indication: For Anasarca    epoetin melany 20,000 units/mL injectable solution  -- 47164 unit(s) injectable once a week on Tuesday  -- once a day on tuesday, hold if hgb>11  -- Indication: For Anemia    ferrous sulfate 325 mg (65 mg elemental iron) oral delayed release tablet  -- 1 tab(s) by mouth once a day  -- Indication: For Anemia    polyethylene glycol 3350 oral powder for reconstitution  -- 17 gram(s) by mouth once a day, As needed, Constipation  -- Indication: For Constipation    DAPTOmycin 500 mg intravenous injection  -- 750 milligram(s) intravenous every 24 hours  -- Indication: For Infection    pantoprazole 40 mg oral delayed release tablet  -- 1 tab(s) by mouth 2 times a day (before meals)  -- Indication: For gerd    multivitamin  -- 1 tab(s) by mouth once a day  -- Indication: For Vitamin    folic acid 1 mg oral tablet  -- 1 tab(s) by mouth once a day  -- Indication: For Vitamin

## 2017-03-09 NOTE — PROGRESS NOTE ADULT - SUBJECTIVE AND OBJECTIVE BOX
Patient seen and examined at bedside. Comfortable in bed. No complaints.    Vital Signs Last 24 Hrs  T(C): 36.6, Max: 37 (03-08 @ 23:30)  T(F): 97.9, Max: 98.6 (03-08 @ 23:30)  HR: 75 (68 - 84)  BP: 168/85 (144/89 - 168/85)  BP(mean): --  RR: 18 (18 - 19)  SpO2: 100% (100% - 100%)      RLE: [place] noted, in place, in tact. Edgar removed and skin examined - no skin breakdown or erythema noted to posterior or anterior aspect of leg. Dressing + mild staining mid dressing. No staining over drain site. Incisional dressing changed, + small skin tear (likely from ruptured blister) noted. No drainage from incision. No erythema. Sensation in tact to light touch. DP 1+. + weakened dorsi/plantarflexion. Compartments soft. Calf soft, NT B/L.                          10.8   9.4   )-----------( 142      ( 09 Mar 2017 10:49 )             33.8     A/P: 72 y.o F s/p right knee antibiotic spacer POD #10  - D/W Dr. Ritchie  - patient may leave edgar open while in bed to reduce tension on skin - however MUST keep leg extended - discussed at length with patient.  - Drain output: 100cc/24 hours, D/W Dr. Rey, leave drain.   - Dressing over incision changed  - DVTP  - awaiting PICC Patient seen and examined at bedside. Comfortable in bed. No complaints.    Vital Signs Last 24 Hrs  T(C): 36.6, Max: 37 (03-08 @ 23:30)  T(F): 97.9, Max: 98.6 (03-08 @ 23:30)  HR: 75 (68 - 84)  BP: 168/85 (144/89 - 168/85)  BP(mean): --  RR: 18 (18 - 19)  SpO2: 100% (100% - 100%)      RLE: [place] noted, in place, in tact. Edgar removed and skin examined - no skin breakdown or erythema noted to posterior or anterior aspect of leg. Dressing + mild staining mid dressing. No staining over drain site. Incisional dressing changed, + small skin tear (likely from ruptured blister) noted. No drainage from incision. No erythema. Sensation in tact to light touch. DP 1+. + weakened dorsi/plantarflexion. Compartments soft. Calf soft, NT B/L.                          10.8   9.4   )-----------( 142      ( 09 Mar 2017 10:49 )             33.8     A/P: 72 y.o F s/p right knee antibiotic spacer POD #10  - D/W Dr. Ritchie  - patient may leave edgar open while in bed to reduce tension on skin - however MUST keep leg extended - discussed at length with patient.  - Drain output: 100cc/24 hours, D/W Dr. Rey, leave drain.   - Dressing over incision changed  - cont abx as per ID  - DVTP  - awaiting PICC Patient seen and examined at bedside. Comfortable in bed. No complaints.    Vital Signs Last 24 Hrs  T(C): 36.6, Max: 37 (03-08 @ 23:30)  T(F): 97.9, Max: 98.6 (03-08 @ 23:30)  HR: 75 (68 - 84)  BP: 168/85 (144/89 - 168/85)  BP(mean): --  RR: 18 (18 - 19)  SpO2: 100% (100% - 100%)      RLE: [place] noted, in place, in tact. Edgar removed and skin examined - no skin breakdown or erythema noted to posterior or anterior aspect of leg. Dressing + mild staining mid dressing. 1 Drain noted, in place, functioning. No staining over drain site. Incisional dressing changed, + small skin tear (likely from ruptured blister) noted. No drainage from incision. No erythema. Sensation in tact to light touch. DP 1+. + weakened dorsi/plantarflexion. Compartments soft. Calf soft, NT B/L.                          10.8   9.4   )-----------( 142      ( 09 Mar 2017 10:49 )             33.8     A/P: 72 y.o F s/p right knee antibiotic spacer POD #10  - D/W Dr. Ritchie  - patient may leave edgar open while in bed to reduce tension on skin - however MUST keep leg extended - discussed at length with patient.  - Drain output: 100cc/24 hours, D/W Dr. Rey, leave drain.   - Dressing over incision changed  - cont abx as per ID  - DVTP  - awaiting PICC Patient seen and examined at bedside. Comfortable in bed. No complaints. Denies fever/chills, SOB/chest pain, abdominal pain, numbness/tingling. Pain controlled.    Vital Signs Last 24 Hrs  T(C): 36.6, Max: 37 (03-08 @ 23:30)  T(F): 97.9, Max: 98.6 (03-08 @ 23:30)  HR: 75 (68 - 84)  BP: 168/85 (144/89 - 168/85)  BP(mean): --  RR: 18 (18 - 19)  SpO2: 100% (100% - 100%)      RLE: [place] noted, in place, in tact. Edgar removed and skin examined - no skin breakdown or erythema noted to posterior or anterior aspect of leg. Dressing + mild staining mid dressing. 1 Drain noted, in place, functioning. No staining over drain site. Incisional dressing changed, + small skin tear (likely from ruptured blister) noted. No drainage from incision. No erythema. Sensation in tact to light touch. DP 1+. + weakened dorsi/plantarflexion. Compartments soft. Calf soft, NT B/L.                          10.8   9.4   )-----------( 142      ( 09 Mar 2017 10:49 )             33.8     A/P: 72 y.o F s/p right knee antibiotic spacer POD #10  - D/W Dr. Ritchie  - patient may leave edgar open while in bed to reduce tension on skin - however MUST keep leg extended - discussed at length with patient.  - Drain output: 100cc/24 hours, D/W Dr. Rey, leave drain.   - Dressing over incision changed  - cont abx as per ID  - DVTP  - awaiting PICC Patient seen and examined at bedside. Comfortable in bed. No complaints. Denies fever/chills, SOB/chest pain, abdominal pain, numbness/tingling. Pain controlled.    Vital Signs Last 24 Hrs  T(C): 36.6, Max: 37 (03-08 @ 23:30)  T(F): 97.9, Max: 98.6 (03-08 @ 23:30)  HR: 75 (68 - 84)  BP: 168/85 (144/89 - 168/85)  BP(mean): --  RR: 18 (18 - 19)  SpO2: 100% (100% - 100%)      RLE: [place] noted, in place, in tact. Edgar removed and skin examined - no skin breakdown or erythema noted to posterior or anterior aspect of leg. Dressing + mild staining mid dressing. 1 Drain noted, in place, functioning. No staining over drain site. Incisional dressing changed, + small skin tear (likely from ruptured blister) noted. No drainage from incision. No erythema. Sensation in tact to light touch. DP 1+. + weakened dorsi/plantarflexion. Compartments soft. Calf soft, NT B/L.                          10.8   9.4   )-----------( 142      ( 09 Mar 2017 10:49 )             33.8     A/P: 72 y.o F s/p right knee antibiotic spacer POD #10  - D/W Dr. Ritchie  - patient may leave edgar open while in bed to reduce tension on skin - however MUST keep leg extended - discussed at length with patient.  - Drain output: 100cc/24 hours, D/W Dr. Rey, leave drain.   - Dressing over incision changed  - cont abx as per ID  - DVTP  - awaiting PICC    ADDENDUM:  At time of exam, stage 2 ulcer noted to patient's sacral region. D/W medicine, who will call wound care consult Patient seen and examined at bedside. Comfortable in bed. No complaints. Denies fever/chills, SOB/chest pain, abdominal pain, numbness/tingling. Pain controlled.    Vital Signs Last 24 Hrs  T(C): 36.6, Max: 37 (03-08 @ 23:30)  T(F): 97.9, Max: 98.6 (03-08 @ 23:30)  HR: 75 (68 - 84)  BP: 168/85 (144/89 - 168/85)  BP(mean): --  RR: 18 (18 - 19)  SpO2: 100% (100% - 100%)      RLE: [place] noted, in place, in tact. Edgar removed and skin examined - no skin breakdown or erythema noted to posterior or anterior aspect of leg. Dressing + mild staining mid dressing. 1 Drain noted, in place, functioning. No staining over drain site. Incisional dressing changed, + small skin tear (likely from ruptured blister) noted. No drainage from incision. No erythema. Sensation in tact to light touch. DP 1+. + weakened dorsi/plantarflexion. Compartments soft. Calf soft, NT B/L.                          10.8   9.4   )-----------( 142      ( 09 Mar 2017 10:49 )             33.8     A/P: 72 y.o F s/p right knee antibiotic spacer POD #10  - D/W Dr. Ritchie  - patient may leave edgar open while in bed to reduce tension on skin - however MUST keep leg extended - discussed at length with patient.  - Drain output: 100cc/24 hours, D/W Dr. Rey, leave drain.   - Dressing over incision changed  - cont abx as per ID  - DVTP  - awaiting PICC    ADDENDUM:  At time of exam, stage 2 ulcer noted to patient's sacral region. D/W medicine (Dr. Nicholson), who will call wound care consult

## 2017-03-09 NOTE — DISCHARGE NOTE ADULT - CARE PROVIDERS DIRECT ADDRESSES
,DirectAddress_Unknown,[email or web address],DirectAddress_Unknown ,DirectAddress_Unknown,[email or web address],DirectAddress_Unknown,DirectAddress_Unknown

## 2017-03-09 NOTE — DISCHARGE NOTE ADULT - SECONDARY DIAGNOSIS.
Paroxysmal atrial fibrillation DM (diabetes mellitus) Acute deep vein thrombosis (DVT) of other vein of right upper extremity CKD (chronic kidney disease) stage 3, GFR 30-59 ml/min Anemia

## 2017-03-09 NOTE — DISCHARGE NOTE ADULT - HOSPITAL COURSE
71 yr old female with hypertension, hyperlipidemia, LIN, CKD, COPD, paroxysmal A fib sent from rehab for right knee wound dehiscence. She was seen by orthopedics, ID. Local wound cultures growing resistant and Pseudomonas and VRE. She was started on Meropenem and Zyvox. Her renal function was slightly worse from baseline and hence started on IVF. No obstructive disease on renal ultrasound. Cardiology consulted for clearance. Renal was consulted.  She is scheduled for OR I&D, she underwent wound closure and insertion of antibiotic spacer. RRT was called on 3/2 for hypotension and lethargy. Labs revealed elevated lactate. She was transferred to ICU for fluid resuscitation and monitoring. Narcotics were discontinued. She was noted to be anemic 6.9, she was ordered for PRBC transfusion and transferred out of ICU. ID follow up requested for medication adjustment, advised to stop Linezolid and start Daptomycin. GI consulted for anemia, no endoscopic intervention advised at this time. Hb remained stable. Noted to have lethargy, hence narcotics stopped. ABG done, which revealed normal pH and CO2. CT brain showed no stroke. Mental status improved. Hb remained stable. After discussion with orthopedics, coumadin was started for atrial fibrillation. She was noted to have bilateral upper extremity swelling, hence ultrasound was done on 3/9, negative for DVT. Given low platelets, Linezolid was discontinued by ID and Daptomycin was started. Heparin antibody negative. 71 yr old female with hypertension, hyperlipidemia, LIN, CKD3, COPD, paroxysmal A fib sent from rehab for right knee wound dehiscence. She was seen by orthopedics, ID. Local wound cultures growing resistant Pseudomonas and VRE. She was started on Meropenem and Zyvox. Her renal function was slightly worse from baseline and hence started on IVF. No obstructive disease on renal ultrasound. Cardiology consulted for clearance. Renal was consulted.  She is scheduled for OR I&D, she underwent wound closure and insertion of antibiotic spacer. RRT was called on 3/2 for hypotension and lethargy. Labs revealed elevated lactate. She was transferred to ICU for fluid resuscitation and monitoring. Narcotics were discontinued. She was noted to be anemic 6.9, she was ordered for PRBC transfusion and transferred out of ICU. ID follow up requested for medication adjustment, advised to stop Linezolid and start Daptomycin. GI consulted for anemia, no endoscopic intervention advised at this time. Hb remained stable. Noted to have lethargy, hence narcotics stopped. ABG done, which revealed normal pH and CO2. CT brain showed no stroke. Mental status improved. Hb remained stable. After discussion with orthopedics, coumadin was started for atrial fibrillation. She was noted to have bilateral upper extremity swelling, hence ultrasound was done on 3/9, negative for DVT. Given low platelets, Linezolid was discontinued by ID and Daptomycin was started. Heparin antibody negative.    She was continued on antibiotics per infectious disease with end date 4/8/17.PICC line was dislodged and replaced by IR with subsequent duplex showing DVT right subclavian vein. PICC was not removed as it is sole access point.  Patient initially had SIGRID drain in right knee, switched to wound vac by orthopedics and stable for discharge with wound vac per orthopedics and plastic surgery.  Patient restarted on coumadin and at times received PRBC's for anemia with stable hemoglobin on discharge.  restarted on torsemide by renal for anasarca. c diff negative, laxatives held.    d/c planning 65 min.  VSS afebrile, no acute complaints. AAOx3, NAD RRR s1s2 CTAB soft +BS, edema x 4 extremities, RUE picc c/d/i.  Right knee: wound vac. 71 yr old female with hypertension, hyperlipidemia, LIN, CKD3, COPD, paroxysmal A fib sent from rehab for right knee wound dehiscence. She was seen by orthopedics, ID. Local wound cultures growing resistant Pseudomonas and VRE. She was started on Meropenem and Zyvox. Her renal function was slightly worse from baseline and hence started on IVF. No obstructive disease on renal ultrasound. Cardiology consulted for clearance. Renal was consulted.  She is scheduled for OR I&D, she underwent wound closure and insertion of antibiotic spacer. RRT was called on 3/2 for hypotension and lethargy. Labs revealed elevated lactate. She was transferred to ICU for fluid resuscitation and monitoring. Narcotics were discontinued. She was noted to be anemic 6.9, she was ordered for PRBC transfusion and transferred out of ICU. ID follow up requested for medication adjustment, advised to stop Linezolid and start Daptomycin. GI consulted for anemia, no endoscopic intervention advised at this time. Hb remained stable. Noted to have lethargy, hence narcotics stopped. ABG done, which revealed normal pH and CO2. CT brain showed no stroke. Mental status improved. Hb remained stable. After discussion with orthopedics, coumadin was started for atrial fibrillation. She was noted to have bilateral upper extremity swelling, hence ultrasound was done on 3/9, negative for DVT. Given low platelets, Linezolid was discontinued by ID and Daptomycin was started. Heparin antibody negative.    She was continued on antibiotics per infectious disease with end date 4/8/17.PICC line was dislodged and replaced by IR with subsequent duplex showing DVT right subclavian vein. PICC was not removed as it is sole access point.  Patient initially had SIGRID drain in right knee, switched to wound vac by orthopedics and stable for discharge with wound vac per orthopedics and plastic surgery.  Patient restarted on coumadin and at times received PRBC's for anemia with stable hemoglobin on discharge.  restarted on torsemide by renal for anasarca. c diff negative, laxatives held.    d/c planning 65 min.= 71 yr old female with hypertension, hyperlipidemia, LIN, CKD3, COPD, paroxysmal A fib sent from rehab for right knee wound dehiscence. She was seen by orthopedics, ID. Local wound cultures growing resistant Pseudomonas and VRE. She was started on Meropenem and Zyvox. Her renal function was slightly worse from baseline and hence started on IVF. No obstructive disease on renal ultrasound. Cardiology consulted for clearance. Renal was consulted.  She is scheduled for OR I&D, she underwent wound closure and insertion of antibiotic spacer. RRT was called on 3/2 for hypotension and lethargy. Labs revealed elevated lactate. She was transferred to ICU for fluid resuscitation and monitoring. Narcotics were discontinued. She was noted to be anemic 6.9, she was ordered for PRBC transfusion and transferred out of ICU. ID follow up requested for medication adjustment, advised to stop Linezolid and start Daptomycin. GI consulted for anemia, no endoscopic intervention advised at this time. Hb remained stable. Noted to have lethargy, hence narcotics stopped. ABG done, which revealed normal pH and CO2. CT brain showed no stroke. Mental status improved. Hb remained stable. After discussion with orthopedics, coumadin was started for atrial fibrillation. She was noted to have bilateral upper extremity swelling, hence ultrasound was done on 3/9, negative for DVT. Given low platelets, Linezolid was discontinued by ID and Daptomycin was started. Heparin antibody negative.    She was continued on antibiotics per infectious disease with end date 4/8/17.PICC line was dislodged and replaced by IR with subsequent duplex showing DVT right subclavian vein. PICC was not removed as it is sole access point.  Patient initially had SIGRID drain in right knee, switched to wound vac by orthopedics and stable for discharge with wound vac per orthopedics and plastic surgery.  Patient restarted on coumadin and at times received PRBC's for anemia with stable hemoglobin on discharge.  restarted on torsemide by renal for anasarca. c diff negative, laxatives held.  Repeat Doppler US for RT arm done on 4/3 , after discussion with IR - No e/o DVT .     d/c planning 65 min.= 71 yr old female with hypertension, hyperlipidemia, LIN, CKD3, COPD, paroxysmal A fib sent from rehab for right knee wound dehiscence. She was seen by orthopedics, ID. Local wound cultures growing resistant Pseudomonas and VRE. She was started on Meropenem and Zyvox. Her renal function was slightly worse from baseline and hence started on IVF. No obstructive disease on renal ultrasound. Cardiology consulted for clearance. Renal was consulted.  She is scheduled for OR I&D, she underwent wound closure and insertion of antibiotic spacer. RRT was called on 3/2 for hypotension and lethargy. Labs revealed elevated lactate. She was transferred to ICU for fluid resuscitation and monitoring. Narcotics were discontinued. She was noted to be anemic 6.9, she was ordered for PRBC transfusion and transferred out of ICU. ID follow up requested for medication adjustment, advised to stop Linezolid and start Daptomycin. GI consulted for anemia, no endoscopic intervention advised at this time. Hb remained stable. Noted to have lethargy, hence narcotics stopped. ABG done, which revealed normal pH and CO2. CT brain showed no stroke. Mental status improved. Hb remained stable. After discussion with orthopedics, coumadin was started for atrial fibrillation. She was noted to have bilateral upper extremity swelling, hence ultrasound was done on 3/9, negative for DVT. Given low platelets, Linezolid was discontinued by ID and Daptomycin was started. Heparin antibody negative.    She was continued on antibiotics per infectious disease with end date 4/8/17.PICC line was dislodged and replaced by IR with subsequent duplex showing DVT right subclavian vein. PICC was not removed as it is sole access point.  Patient initially had SIGRID drain in right knee, switched to wound vac by orthopedics and stable for discharge with wound vac per orthopedics and plastic surgery.  Patient restarted on coumadin and at times received PRBC's for anemia with stable hemoglobin on discharge.  restarted on torsemide by renal for anasarca. c diff negative, laxatives held.  Antibiotics end date extended to 4/22 per ID and orthor recs.   Repeat Doppler US for RT arm done on 4/3 , after discussion with IR - No e/o DVT .   INR on discharge 4/4 is 4, she received warfarin 4mg 4/1, 4/2 and 4/3, advised to hold warfarin 4/4 and 4/5 check INR daily x 3 days and dose accordingly.  d/c planning 65 min.

## 2017-03-09 NOTE — PROGRESS NOTE ADULT - SUBJECTIVE AND OBJECTIVE BOX
[facility] [place] CARMENZA HARRIS MD FACP   DESIRE METZGER MD  Diplomates American Board of Internal Medicine and Infectious Diseases      SHYAM LAL  MRN-[id number]  72y    INTERVAL HPI/OVERNIGHT EVENTS:    FOR PICC    PLATELETS TRENDING DOWN TO MID 130S - MAY BE ZYVOX RELATED  D/C XYVOX AND START DAPTOMYCIN 750 Q 24  PT ASX    Vital Signs Last 24 Hrs  T(C): 36.9, Max: 37.2 (03-07 @ 15:38)  T(F): 98.4, Max: 99 (03-07 @ 15:38)  HR: 95 (88 - 98)  BP: 142/78 (114/66 - 142/78)  BP(mean): --  RR: 18 (18 - 18)  SpO2: 100% (100% - 100%)    ANTIBIOTICS  meropenem IVPB 500milliGRAM(s) IV Intermittent every 12 hours  liDAPTO 750 Q 24 THROUGH 04/08    Allergies    No Known Allergies    Intolerances        REVIEW OF SYSTEMS:    PHYSICAL EXAM:  Vital Signs Last 24 Hrs  T(C): 36.9, Max: 37.2 (03-07 @ 15:38)  T(F): 98.4, Max: 99 (03-07 @ 15:38)  HR: 95 (88 - 98)  BP: 142/78 (114/66 - 142/78)  BP(mean): --  RR: 18 (18 - 18)  SpO2: 100% (100% - 100%)      GEN: NAD, pleasant  HEENT: normocephalic and atraumatic. EOMI. CHIDI. Moist mucosa. Clear Posterior pharynx.  NECK: Supple. No carotid bruits.  No lymphadenopathy or thyromegaly.  LUNGS: Clear to auscultation.  HEART: Regular rate and rhythm without murmur.  ABDOMEN: Soft, nontender, and nondistended.  Positive bowel sounds.  No hepatosplenomegaly was noted.  EXTREMITIES: Without any cyanosis, clubbing, rash, lesions or edema.  NEUROLOGIC: Cranial nerves II through XII are grossly intact.  MUSCULOSKELETAL:  SKIN: No ulceration or induration present    LABS:                        8.9    8.1   )-----------( 136      ( 08 Mar 2017 07:11 )             27.0       08 Mar 2017 07:11    138    |  102    |  31.0   ----------------------------<  91     4.1     |  28.0   |  2.21     Ca    8.3        08 Mar 2017 07:11  Phos  2.3       08 Mar 2017 07:11  Mg     2.1       08 Mar 2017 07:11          03-08 @ 07:11  hct 27.0 % hgb 8.9 g/dL    03-08 @ 07:11  plat 136 K/uL wbc 8.1 K/uL    03-07 @ 07:52  hct 28.6 % hgb 9.4 g/dL    03-07 @ 07:52  plat 129 K/uL wbc 8.7 K/uL    03-06 @ 08:15  hct 28.9 % hgb 9.6 g/dL    03-06 @ 08:15  plat 144 K/uL wbc 9.7 K/uL    03-05 @ 09:57  hct 26.7 % hgb 9.0 g/dL    03-05 @ 09:57  plat 155 K/uL wbc 9.2 K/uL      03-08-17  creat 2.21 mg/dL gfr 25 mL/min/1.73M2 bun 31.0 mg/dL k 4.1 mmol/L  03-07-17  creat 2.51 mg/dL gfr 21 mL/min/1.73M2 bun 34.0 mg/dL k 4.0 mmol/L  03-06-17  creat 2.89 mg/dL gfr 18 mL/min/1.73M2 bun 39.0 mg/dL k 4.3 mmol/L  03-05-17  creat 3.13 mg/dL gfr 16 mL/min/1.73M2 bun 42.0 mg/dL k 4.8 mmol/L          RAÚL HARRIS MD [facility]

## 2017-03-09 NOTE — DISCHARGE NOTE ADULT - CARE PROVIDER_API CALL
Wilver Chamberlain), Infectious Disease; Internal Medicine  [address] [place] [address]  Phone: [phone]  Fax: [phone]    Clint Ritchie), Orthopaedic Surgery  [address] [address]  Phone: [phone]  Fax: [phone] Wilver Chamberlain), Infectious Disease; Internal Medicine  [address] [facility] [place] [address]  Phone: [phone]  Fax: [phone]    Clint Ritchie), Orthopaedic Surgery  [address] [place] [address]  Phone: [phone]  Fax: [phone]    Berhane Rey), Plastic Surgery; Surgery of the Hand  [address] [place] [address]  Phone: [phone]  Fax: [phone]

## 2017-03-09 NOTE — PROGRESS NOTE ADULT - SUBJECTIVE AND OBJECTIVE BOX
INTERVAL HPI/OVERNIGHT EVENTS:    CC: anemia, right knee wound dehiscence and infection Pseudomonas and VRE, CKD, diabetes, hypertension      No overnight events, more alert. Arm swelling noted.     Vital Signs Last 24 Hrs  T(C): 36.6, Max: 37 (03-08 @ 23:30)  T(F): 97.9, Max: 98.6 (03-08 @ 23:30)  HR: 75 (68 - 84)  BP: 168/85 (144/89 - 168/85)  BP(mean): --  RR: 18 (18 - 19)  SpO2: 100% (100% - 100%)    PHYSICAL EXAM:    GENERAL: Not in distress  CHEST/LUNG: b/l air entry  HEART: Regular   ABDOMEN: Soft, BS+  EXTREMITIES:  b/i arm swelling. LE 1+ edema    MEDICATIONS  (STANDING):  docusate sodium 100milliGRAM(s) Oral three times a day  ferrous    sulfate 325milliGRAM(s) Oral three times a day with meals  folic acid 1milliGRAM(s) Oral daily  multivitamin 1Tablet(s) Oral daily  insulin lispro (HumaLOG) corrective regimen sliding scale  SubCutaneous Before meals and at bedtime  dextrose 50% Injectable 12.5Gram(s) IV Push once  dextrose 50% Injectable 25Gram(s) IV Push once  dextrose 50% Injectable 25Gram(s) IV Push once  meropenem IVPB 500milliGRAM(s) IV Intermittent every 12 hours  senna 2Tablet(s) Oral at bedtime  multiple electrolytes Injection Type 1 1000milliLiter(s) IV Continuous <Continuous>  enoxaparin Injectable 30milliGRAM(s) SubCutaneous every 24 hours  pantoprazole    Tablet 40milliGRAM(s) Oral two times a day before meals  metoprolol 12.5milliGRAM(s) Oral every 12 hours  warfarin 2.5milliGRAM(s) Oral once  DAPTOmycin IVPB 750milliGRAM(s) IV Intermittent every 24 hours    MEDICATIONS  (PRN):  acetaminophen   Tablet 650milliGRAM(s) Oral every 6 hours PRN For Temp over 38.3 C (100.94 F)  aluminum hydroxide/magnesium hydroxide/simethicone Suspension 30milliLiter(s) Oral four times a day PRN Indigestion  ondansetron Injectable 4milliGRAM(s) IV Push every 6 hours PRN Nausea and/or Vomiting  dextrose Gel 1Dose(s) Oral once PRN Blood Glucose LESS THAN 70 milliGRAM(s)/deciliter  glucagon  Injectable 1milliGRAM(s) IntraMuscular once PRN Glucose LESS THAN 70 milligrams/deciliter  polyethylene glycol 3350 17Gram(s) Oral daily PRN Constipation      Allergies    No Known Allergies    Intolerances          LABS:                          8.9    8.1   )-----------( 136      ( 08 Mar 2017 07:11 )             27.0     08 Mar 2017 07:11    138    |  102    |  31.0   ----------------------------<  91     4.1     |  28.0   |  2.21     Ca    8.3        08 Mar 2017 07:11  Phos  2.3       08 Mar 2017 07:11  Mg     2.1       08 Mar 2017 07:11      PT/INR - ( 09 Mar 2017 07:46 )   PT: 13.4 sec;   INR: 1.22 ratio               RADIOLOGY & ADDITIONAL TESTS:

## 2017-03-10 DIAGNOSIS — Z96.0 PRESENCE OF UROGENITAL IMPLANTS: ICD-10-CM

## 2017-03-10 LAB
ANION GAP SERPL CALC-SCNC: 12 MMOL/L — SIGNIFICANT CHANGE UP (ref 5–17)
BUN SERPL-MCNC: 27 MG/DL — HIGH (ref 8–20)
CALCIUM SERPL-MCNC: 8.3 MG/DL — LOW (ref 8.6–10.2)
CHLORIDE SERPL-SCNC: 99 MMOL/L — SIGNIFICANT CHANGE UP (ref 98–107)
CO2 SERPL-SCNC: 27 MMOL/L — SIGNIFICANT CHANGE UP (ref 22–29)
CREAT SERPL-MCNC: 2.03 MG/DL — HIGH (ref 0.5–1.3)
GLUCOSE SERPL-MCNC: 112 MG/DL — SIGNIFICANT CHANGE UP (ref 70–115)
HCT VFR BLD CALC: 29.7 % — LOW (ref 37–47)
HGB BLD-MCNC: 9.9 G/DL — LOW (ref 12–16)
INR BLD: 1.48 RATIO — HIGH (ref 0.88–1.16)
MCHC RBC-ENTMCNC: 29.7 PG — SIGNIFICANT CHANGE UP (ref 27–31)
MCHC RBC-ENTMCNC: 33.3 G/DL — SIGNIFICANT CHANGE UP (ref 32–36)
MCV RBC AUTO: 89.2 FL — SIGNIFICANT CHANGE UP (ref 81–99)
PLATELET # BLD AUTO: 205 K/UL — SIGNIFICANT CHANGE UP (ref 150–400)
POTASSIUM SERPL-MCNC: 4.4 MMOL/L — SIGNIFICANT CHANGE UP (ref 3.5–5.3)
POTASSIUM SERPL-SCNC: 4.4 MMOL/L — SIGNIFICANT CHANGE UP (ref 3.5–5.3)
PROTHROM AB SERPL-ACNC: 16.3 SEC — HIGH (ref 10–13.1)
RBC # BLD: 3.33 M/UL — LOW (ref 4.4–5.2)
RBC # FLD: 17.9 % — HIGH (ref 11–15.6)
SODIUM SERPL-SCNC: 138 MMOL/L — SIGNIFICANT CHANGE UP (ref 135–145)
WBC # BLD: 9.2 K/UL — SIGNIFICANT CHANGE UP (ref 4.8–10.8)
WBC # FLD AUTO: 9.2 K/UL — SIGNIFICANT CHANGE UP (ref 4.8–10.8)

## 2017-03-10 PROCEDURE — 99232 SBSQ HOSP IP/OBS MODERATE 35: CPT

## 2017-03-10 PROCEDURE — 99233 SBSQ HOSP IP/OBS HIGH 50: CPT

## 2017-03-10 PROCEDURE — 36569 INSJ PICC 5 YR+ W/O IMAGING: CPT

## 2017-03-10 PROCEDURE — 77001 FLUOROGUIDE FOR VEIN DEVICE: CPT | Mod: 26

## 2017-03-10 RX ORDER — WARFARIN SODIUM 2.5 MG/1
5 TABLET ORAL ONCE
Qty: 0 | Refills: 0 | Status: COMPLETED | OUTPATIENT
Start: 2017-03-10 | End: 2017-03-10

## 2017-03-10 RX ADMIN — MEROPENEM 200 MILLIGRAM(S): 1 INJECTION INTRAVENOUS at 17:43

## 2017-03-10 RX ADMIN — WARFARIN SODIUM 5 MILLIGRAM(S): 2.5 TABLET ORAL at 21:58

## 2017-03-10 RX ADMIN — Medication 325 MILLIGRAM(S): at 17:44

## 2017-03-10 RX ADMIN — Medication 1 MILLIGRAM(S): at 12:14

## 2017-03-10 RX ADMIN — DAPTOMYCIN 130 MILLIGRAM(S): 500 INJECTION, POWDER, LYOPHILIZED, FOR SOLUTION INTRAVENOUS at 15:46

## 2017-03-10 RX ADMIN — Medication 12.5 MILLIGRAM(S): at 06:28

## 2017-03-10 RX ADMIN — SODIUM CHLORIDE 100 MILLILITER(S): 9 INJECTION, SOLUTION INTRAVENOUS at 17:44

## 2017-03-10 RX ADMIN — MEROPENEM 200 MILLIGRAM(S): 1 INJECTION INTRAVENOUS at 06:28

## 2017-03-10 RX ADMIN — Medication 12.5 MILLIGRAM(S): at 17:44

## 2017-03-10 RX ADMIN — PANTOPRAZOLE SODIUM 40 MILLIGRAM(S): 20 TABLET, DELAYED RELEASE ORAL at 06:28

## 2017-03-10 RX ADMIN — ENOXAPARIN SODIUM 30 MILLIGRAM(S): 100 INJECTION SUBCUTANEOUS at 12:14

## 2017-03-10 RX ADMIN — Medication 325 MILLIGRAM(S): at 12:14

## 2017-03-10 RX ADMIN — PANTOPRAZOLE SODIUM 40 MILLIGRAM(S): 20 TABLET, DELAYED RELEASE ORAL at 17:44

## 2017-03-10 RX ADMIN — Medication 1 TABLET(S): at 12:14

## 2017-03-10 RX ADMIN — Medication 325 MILLIGRAM(S): at 07:26

## 2017-03-10 NOTE — PROGRESS NOTE ADULT - SUBJECTIVE AND OBJECTIVE BOX
NEPHROLOGY INTERVAL HPI/OVERNIGHT EVENTS:    Examined earlier  Looks comfortable    MEDICATIONS  (STANDING):  docusate sodium 100milliGRAM(s) Oral three times a day  ferrous    sulfate 325milliGRAM(s) Oral three times a day with meals  folic acid 1milliGRAM(s) Oral daily  multivitamin 1Tablet(s) Oral daily  insulin lispro (HumaLOG) corrective regimen sliding scale  SubCutaneous Before meals and at bedtime  dextrose 50% Injectable 12.5Gram(s) IV Push once  dextrose 50% Injectable 25Gram(s) IV Push once  dextrose 50% Injectable 25Gram(s) IV Push once  meropenem IVPB 500milliGRAM(s) IV Intermittent every 12 hours  senna 2Tablet(s) Oral at bedtime  multiple electrolytes Injection Type 1 1000milliLiter(s) IV Continuous <Continuous>  enoxaparin Injectable 30milliGRAM(s) SubCutaneous every 24 hours  pantoprazole    Tablet 40milliGRAM(s) Oral two times a day before meals  metoprolol 12.5milliGRAM(s) Oral every 12 hours  DAPTOmycin IVPB 750milliGRAM(s) IV Intermittent every 24 hours  warfarin 5milliGRAM(s) Oral once    MEDICATIONS  (PRN):  acetaminophen   Tablet 650milliGRAM(s) Oral every 6 hours PRN For Temp over 38.3 C (100.94 F)  aluminum hydroxide/magnesium hydroxide/simethicone Suspension 30milliLiter(s) Oral four times a day PRN Indigestion  ondansetron Injectable 4milliGRAM(s) IV Push every 6 hours PRN Nausea and/or Vomiting  dextrose Gel 1Dose(s) Oral once PRN Blood Glucose LESS THAN 70 milliGRAM(s)/deciliter  glucagon  Injectable 1milliGRAM(s) IntraMuscular once PRN Glucose LESS THAN 70 milligrams/deciliter  polyethylene glycol 3350 17Gram(s) Oral daily PRN Constipation      Allergies    No Known Allergies    Intolerances        Vital Signs Last 24 Hrs  T(C): 36.9, Max: 36.9 (03-10 @ 00:33)  T(F): 98.4, Max: 98.5 (03-10 @ 00:33)  HR: 87 (79 - 89)  BP: 136/77 (110/74 - 139/79)  BP(mean): --  RR: 18 (18 - 18)  SpO2: 100% (97% - 100%)  Daily     Daily     PHYSICAL EXAM:  HEENT: EOMI  Neck: supple no JVD  CHEST/LUNG: CTA B/L   HEART: S1S2+ audible  ABDOMEN: Soft NDNT + BS  EXTREMITIES:  ++LE edema          LABS:                        10.8   9.4   )-----------( 142      ( 09 Mar 2017 10:49 )             33.8     09 Mar 2017 10:49    138    |  99     |  26.0   ----------------------------<  117    4.2     |  26.0   |  1.99     Ca    8.5        09 Mar 2017 10:49  Phos  2.5       09 Mar 2017 10:49  Mg     2.1       09 Mar 2017 10:49      PT/INR - ( 09 Mar 2017 07:46 )   PT: 13.4 sec;   INR: 1.22 ratio                     RADIOLOGY & ADDITIONAL TESTS:

## 2017-03-10 NOTE — PROGRESS NOTE ADULT - ASSESSMENT
Improved KARMEN on CKD , DM --->  overall improving cr today 1.9 baseline creat 1.8   Avoid nephrotoxic agents renally dose meds  - monitor labs    Anemia - S/P Transfusion Hg 10.8 yest              - no SIDRA needed now

## 2017-03-10 NOTE — PROGRESS NOTE ADULT - SUBJECTIVE AND OBJECTIVE BOX
CHIEF COMPLAINT/INTERVAL HISTORY:    Patient is a 72y old  Female who presents with a chief complaint of knee drainage (09 Mar 2017 16:38)      HPI:  71 y F hx DM2, PAF, HTN, LIN, CKD, COPD, sent from rehab for R knee wound dehiscensce. Pt has had multiple interventions on the R knee following TKR last year, including hardware removal for infection, IV abx course and most recently adm for wound dehiscence in Dec req washout and wound closure. The patient is now transferred from rehab for persistent wound serosanguinous drainage and wound dehiscence. Pt denies CP, F/C, SOB, cough, N/V/D/C, dysuria. c/o L heel pain. (22 Feb 2017 16:00)      SUBJECTIVE & OBJECTIVE: Pt seen and examined at bedside. No overnight issues reported.     ICU Vital Signs Last 24 Hrs  T(C): 36.9, Max: 36.9 (03-10 @ 00:33)  T(F): 98.4, Max: 98.5 (03-10 @ 00:33)  HR: 87 (79 - 89)  BP: 136/77 (110/74 - 139/79)  BP(mean): --  ABP: --  ABP(mean): --  RR: 18 (18 - 18)  SpO2: 100% (97% - 100%)        MEDICATIONS  (STANDING):  docusate sodium 100milliGRAM(s) Oral three times a day  ferrous    sulfate 325milliGRAM(s) Oral three times a day with meals  folic acid 1milliGRAM(s) Oral daily  multivitamin 1Tablet(s) Oral daily  insulin lispro (HumaLOG) corrective regimen sliding scale  SubCutaneous Before meals and at bedtime  dextrose 50% Injectable 12.5Gram(s) IV Push once  dextrose 50% Injectable 25Gram(s) IV Push once  dextrose 50% Injectable 25Gram(s) IV Push once  meropenem IVPB 500milliGRAM(s) IV Intermittent every 12 hours  senna 2Tablet(s) Oral at bedtime  multiple electrolytes Injection Type 1 1000milliLiter(s) IV Continuous <Continuous>  enoxaparin Injectable 30milliGRAM(s) SubCutaneous every 24 hours  pantoprazole    Tablet 40milliGRAM(s) Oral two times a day before meals  metoprolol 12.5milliGRAM(s) Oral every 12 hours  DAPTOmycin IVPB 750milliGRAM(s) IV Intermittent every 24 hours  warfarin 5milliGRAM(s) Oral once    MEDICATIONS  (PRN):  acetaminophen   Tablet 650milliGRAM(s) Oral every 6 hours PRN For Temp over 38.3 C (100.94 F)  aluminum hydroxide/magnesium hydroxide/simethicone Suspension 30milliLiter(s) Oral four times a day PRN Indigestion  ondansetron Injectable 4milliGRAM(s) IV Push every 6 hours PRN Nausea and/or Vomiting  dextrose Gel 1Dose(s) Oral once PRN Blood Glucose LESS THAN 70 milliGRAM(s)/deciliter  glucagon  Injectable 1milliGRAM(s) IntraMuscular once PRN Glucose LESS THAN 70 milligrams/deciliter  polyethylene glycol 3350 17Gram(s) Oral daily PRN Constipation      LABS:                        10.8   9.4   )-----------( 142      ( 09 Mar 2017 10:49 )             33.8     09 Mar 2017 10:49    138    |  99     |  26.0   ----------------------------<  117    4.2     |  26.0   |  1.99     Ca    8.5        09 Mar 2017 10:49  Phos  2.5       09 Mar 2017 10:49  Mg     2.1       09 Mar 2017 10:49      PT/INR - ( 09 Mar 2017 07:46 )   PT: 13.4 sec;   INR: 1.22 ratio               CAPILLARY BLOOD GLUCOSE  78 (10 Mar 2017 07:17)  102 (09 Mar 2017 21:50)      RECENT CULTURES:      RADIOLOGY & ADDITIONAL TESTS:      PHYSICAL EXAM:    GENERAL: Not in distress  CHEST/LUNG: b/l air entry  HEART: Regular   ABDOMEN: Soft, BS+  EXTREMITIES:  b/i arm swelling. LE 1+ edema CHIEF COMPLAINT/INTERVAL HISTORY:    Patient is a 72y old  Female who presents with a chief complaint of knee drainage (09 Mar 2017 16:38)      HPI:  71 y F hx DM2, PAF, HTN, LIN, CKD, COPD, sent from rehab for R knee wound dehiscensce. Pt has had multiple interventions on the R knee following TKR last year, including hardware removal for infection, IV abx course and most recently adm for wound dehiscence in Dec req washout and wound closure. The patient is now transferred from rehab for persistent wound serosanguinous drainage and wound dehiscence. Pt denies CP, F/C, SOB, cough, N/V/D/C, dysuria. c/o L heel pain. (22 Feb 2017 16:00)      SUBJECTIVE & OBJECTIVE: Pt seen and examined at bedside. No overnight issues reported.     ICU Vital Signs Last 24 Hrs  T(C): 36.9, Max: 36.9 (03-10 @ 00:33)  T(F): 98.4, Max: 98.5 (03-10 @ 00:33)  HR: 87 (79 - 89)  BP: 136/77 (110/74 - 139/79)  BP(mean): --  ABP: --  ABP(mean): --  RR: 18 (18 - 18)  SpO2: 100% (97% - 100%)        MEDICATIONS  (STANDING):  docusate sodium 100milliGRAM(s) Oral three times a day  ferrous    sulfate 325milliGRAM(s) Oral three times a day with meals  folic acid 1milliGRAM(s) Oral daily  multivitamin 1Tablet(s) Oral daily  insulin lispro (HumaLOG) corrective regimen sliding scale  SubCutaneous Before meals and at bedtime  dextrose 50% Injectable 12.5Gram(s) IV Push once  dextrose 50% Injectable 25Gram(s) IV Push once  dextrose 50% Injectable 25Gram(s) IV Push once  meropenem IVPB 500milliGRAM(s) IV Intermittent every 12 hours  senna 2Tablet(s) Oral at bedtime  multiple electrolytes Injection Type 1 1000milliLiter(s) IV Continuous <Continuous>  enoxaparin Injectable 30milliGRAM(s) SubCutaneous every 24 hours  pantoprazole    Tablet 40milliGRAM(s) Oral two times a day before meals  metoprolol 12.5milliGRAM(s) Oral every 12 hours  DAPTOmycin IVPB 750milliGRAM(s) IV Intermittent every 24 hours  warfarin 5milliGRAM(s) Oral once    MEDICATIONS  (PRN):  acetaminophen   Tablet 650milliGRAM(s) Oral every 6 hours PRN For Temp over 38.3 C (100.94 F)  aluminum hydroxide/magnesium hydroxide/simethicone Suspension 30milliLiter(s) Oral four times a day PRN Indigestion  ondansetron Injectable 4milliGRAM(s) IV Push every 6 hours PRN Nausea and/or Vomiting  dextrose Gel 1Dose(s) Oral once PRN Blood Glucose LESS THAN 70 milliGRAM(s)/deciliter  glucagon  Injectable 1milliGRAM(s) IntraMuscular once PRN Glucose LESS THAN 70 milligrams/deciliter  polyethylene glycol 3350 17Gram(s) Oral daily PRN Constipation      LABS:                        10.8   9.4   )-----------( 142      ( 09 Mar 2017 10:49 )             33.8     09 Mar 2017 10:49    138    |  99     |  26.0   ----------------------------<  117    4.2     |  26.0   |  1.99     Ca    8.5        09 Mar 2017 10:49  Phos  2.5       09 Mar 2017 10:49  Mg     2.1       09 Mar 2017 10:49      PT/INR - ( 09 Mar 2017 07:46 )   PT: 13.4 sec;   INR: 1.22 ratio               CAPILLARY BLOOD GLUCOSE  78 (10 Mar 2017 07:17)  102 (09 Mar 2017 21:50)      RECENT CULTURES:      RADIOLOGY & ADDITIONAL TESTS:      PHYSICAL EXAM:    GENERAL: Drowsy but arousable, answering questions appropriately, Not in distress  CHEST/LUNG: b/l CTA  HEART: Regular   ABDOMEN: Soft, BS+, anasarca, obese  EXTREMITIES: 3+ edema, +PICC, +right knee  Dressing C/D/I over incision, +SIGRID drain CHIEF COMPLAINT/INTERVAL HISTORY:    Patient is a 72y old  Female who presents with a chief complaint of knee drainage (09 Mar 2017 16:38)      HPI:  71 y F hx DM2, PAF, HTN, LIN, CKD, COPD, sent from rehab for R knee wound dehiscensce. Pt has had multiple interventions on the R knee following TKR last year, including hardware removal for infection, IV abx course and most recently adm for wound dehiscence in Dec req washout and wound closure. The patient is now transferred from rehab for persistent wound serosanguinous drainage and wound dehiscence. Pt denies CP, F/C, SOB, cough, N/V/D/C, dysuria. c/o L heel pain. (22 Feb 2017 16:00)      SUBJECTIVE & OBJECTIVE: Pt seen and examined at bedside. No overnight issues reported.     ICU Vital Signs Last 24 Hrs  T(C): 36.9, Max: 36.9 (03-10 @ 00:33)  T(F): 98.4, Max: 98.5 (03-10 @ 00:33)  HR: 87 (79 - 89)  BP: 136/77 (110/74 - 139/79)  BP(mean): --  ABP: --  ABP(mean): --  RR: 18 (18 - 18)  SpO2: 100% (97% - 100%)        MEDICATIONS  (STANDING):  docusate sodium 100milliGRAM(s) Oral three times a day  ferrous    sulfate 325milliGRAM(s) Oral three times a day with meals  folic acid 1milliGRAM(s) Oral daily  multivitamin 1Tablet(s) Oral daily  insulin lispro (HumaLOG) corrective regimen sliding scale  SubCutaneous Before meals and at bedtime  dextrose 50% Injectable 12.5Gram(s) IV Push once  dextrose 50% Injectable 25Gram(s) IV Push once  dextrose 50% Injectable 25Gram(s) IV Push once  meropenem IVPB 500milliGRAM(s) IV Intermittent every 12 hours  senna 2Tablet(s) Oral at bedtime  multiple electrolytes Injection Type 1 1000milliLiter(s) IV Continuous <Continuous>  enoxaparin Injectable 30milliGRAM(s) SubCutaneous every 24 hours  pantoprazole    Tablet 40milliGRAM(s) Oral two times a day before meals  metoprolol 12.5milliGRAM(s) Oral every 12 hours  DAPTOmycin IVPB 750milliGRAM(s) IV Intermittent every 24 hours  warfarin 5milliGRAM(s) Oral once    MEDICATIONS  (PRN):  acetaminophen   Tablet 650milliGRAM(s) Oral every 6 hours PRN For Temp over 38.3 C (100.94 F)  aluminum hydroxide/magnesium hydroxide/simethicone Suspension 30milliLiter(s) Oral four times a day PRN Indigestion  ondansetron Injectable 4milliGRAM(s) IV Push every 6 hours PRN Nausea and/or Vomiting  dextrose Gel 1Dose(s) Oral once PRN Blood Glucose LESS THAN 70 milliGRAM(s)/deciliter  glucagon  Injectable 1milliGRAM(s) IntraMuscular once PRN Glucose LESS THAN 70 milligrams/deciliter  polyethylene glycol 3350 17Gram(s) Oral daily PRN Constipation      LABS:                        10.8   9.4   )-----------( 142      ( 09 Mar 2017 10:49 )             33.8     09 Mar 2017 10:49    138    |  99     |  26.0   ----------------------------<  117    4.2     |  26.0   |  1.99     Ca    8.5        09 Mar 2017 10:49  Phos  2.5       09 Mar 2017 10:49  Mg     2.1       09 Mar 2017 10:49      PT/INR - ( 09 Mar 2017 07:46 )   PT: 13.4 sec;   INR: 1.22 ratio               CAPILLARY BLOOD GLUCOSE  78 (10 Mar 2017 07:17)  102 (09 Mar 2017 21:50)      RECENT CULTURES:      RADIOLOGY & ADDITIONAL TESTS:      PHYSICAL EXAM:    GENERAL: Drowsy but arousable, answering questions appropriately, Not in distress  CHEST/LUNG: b/l CTA  HEART: Regular   ABDOMEN: Soft, BS+, anasarca, obese  EXTREMITIES: 3+ edema, +right knee  Dressing C/D/I over incision, +SIGRID drain

## 2017-03-10 NOTE — PROGRESS NOTE ADULT - SUBJECTIVE AND OBJECTIVE BOX
[facility]  INFECTIOUS DISEASES AND INTERNAL MEDICINE OF [place]  =======================================================  Wilver Dela Cruz MD  Diplomates American Board of Internal Medicine and Infectious Diseases  =======================================================    SHYAM LAL   [id number]    patient is s/p  right total knee revision with application of antibiotic cement spacer 2/27/17      ===================  Allergies:  No Known Allergies    MEDICATIONS  (STANDING):  docusate sodium 100milliGRAM(s) Oral three times a day  ferrous    sulfate 325milliGRAM(s) Oral three times a day with meals  folic acid 1milliGRAM(s) Oral daily  multivitamin 1Tablet(s) Oral daily  insulin lispro (HumaLOG) corrective regimen sliding scale  SubCutaneous Before meals and at bedtime  dextrose 50% Injectable 12.5Gram(s) IV Push once  dextrose 50% Injectable 25Gram(s) IV Push once  dextrose 50% Injectable 25Gram(s) IV Push once  meropenem IVPB 500milliGRAM(s) IV Intermittent every 12 hours  senna 2Tablet(s) Oral at bedtime  multiple electrolytes Injection Type 1 1000milliLiter(s) IV Continuous <Continuous>  enoxaparin Injectable 30milliGRAM(s) SubCutaneous every 24 hours  pantoprazole    Tablet 40milliGRAM(s) Oral two times a day before meals  metoprolol 12.5milliGRAM(s) Oral every 12 hours  DAPTOmycin IVPB 750milliGRAM(s) IV Intermittent every 24 hours    MEDICATIONS  (PRN):  acetaminophen   Tablet 650milliGRAM(s) Oral every 6 hours PRN For Temp over 38.3 C (100.94 F)  aluminum hydroxide/magnesium hydroxide/simethicone Suspension 30milliLiter(s) Oral four times a day PRN Indigestion  ondansetron Injectable 4milliGRAM(s) IV Push every 6 hours PRN Nausea and/or Vomiting  dextrose Gel 1Dose(s) Oral once PRN Blood Glucose LESS THAN 70 milliGRAM(s)/deciliter  glucagon  Injectable 1milliGRAM(s) IntraMuscular once PRN Glucose LESS THAN 70 milligrams/deciliter  polyethylene glycol 3350 17Gram(s) Oral daily PRN Constipation    ==========  REVIEW OF SYSTEMS:  CONSTITUTIONAL:  No fevers or chills  HEENT:  No diplopia or blurred vision. No earache, sore throat or runny nose.  CARDIOVASCULAR:  No pressure, squeezing, strangling, tightness, heaviness or aching about the chest, neck, axilla or epigastrium.  RESPIRATORY:  No cough, shortness of breath  GASTROINTESTINAL:  No nausea, vomiting or diarrhea.  GENITOURINARY:  No dysuria, frequency or urgency.   MUSCULOSKELETAL:  no muscle pain  SKIN:  Rt Knee surgical bandage  NEUROLOGIC:  No paresthesias  PSYCHIATRIC:  No disorder of thought or mood.  ENDOCRINE:  No heat or cold intolerance, polyuria or polydipsia.  HEMATOLOGICAL:  No easy bruising or bleeding.     =====================  Physical Exam:  Vital Signs Last 24 Hrs  T(C): 36.9, Max: 36.9 (03-10 @ 00:33)  T(F): 98.4, Max: 98.5 (03-10 @ 00:33)  HR: 87 (79 - 89)  BP: 136/77 (110/74 - 139/79)  BP(mean): --  RR: 18 (18 - 18)  SpO2: 100% (97% - 100%)    GEN: NAD, pleasant  HEENT: normocephalic and atraumatic. EOMI. PERRL.    NECK: Supple.   LUNGS: Clear to auscultation.  HEART: Regular rate and rhythm   ABDOMEN: Soft, nontender, and nondistended.  Positive bowel sounds.    : No CVA tenderness  EXTREMITIES: R and l knee bandaged  NEUROLOGIC: forgetful   PSYCHIATRIC: Appropriate affect .  SKIN: Rt Knee in surgical dressing    ================  Labs:  09 Mar 2017 10:49    138    |  99     |  26.0   ----------------------------<  117    4.2     |  26.0   |  1.99     Ca    8.5        09 Mar 2017 10:49  Phos  2.5       09 Mar 2017 10:49  Mg     2.1       09 Mar 2017 10:49                            10.8   9.4   )-----------( 142      ( 09 Mar 2017 10:49 )             33.8       PT/INR - ( 09 Mar 2017 07:46 )   PT: 13.4 sec;   INR: 1.22 ratio              CAPILLARY BLOOD GLUCOSE  78 (10 Mar 2017 07:17)  102 (09 Mar 2017 21:50)       RECENT CULTURES:  02-27 .Surgical Swab right tibia (swab) Enterococcus faecalis (vancomycin resistant)   no gram stain perform only one swab received   Moderate Enterococcus faecalis (vancomycin resistant)  .  TYPE: (C=Critical, N=Notification, A=Abnormal) c  TESTS:  _ VRE  DATE/TIME CALLED: _ 03/01/2017 16:54:36  CALLED TO: Roberta segura  READ BACK (2 Patient Identifiers)(Y/N): _ jacki  READ DAMION    02-27 .Surgical Swab right femur (swabs) Enterococcus faecalis (vancomycin resistant)   no gram stain perform only one swab received   Moderate Enterococcus faecalis (vancomycin resistant)  Culture in progress  .  TYPE: (C=Critical, N=Notification, A=Abnormal) c  TESTS:  _ VRE  DATE/TIME CALLED: _ 03/01/2017 16:40:40  CALLED TO: Roberta griggs  READ BACK (2 Patient Identifiers)    02-22 .Body Fluid Knee Fluid Pseudomonas aeruginosa   Numerous White blood cells  No organisms seen   Rare Pseudomonas aeruginosa    02-22 .Body Fluid Synovial Fluid Pseudomonas aeruginosa  Enterococcus faecalis (vancomycin resistant)   Numerous White blood cells  No organisms seen   Rare Pseudomonas aeruginosa  Rare Enterococcus faecalis (vancomycin resistant)  .  TYPE: (C=Critical, N=Notification, A=Abnormal) C  TESTS:  _ VRE  DATE/TIME CALLED: _ 02/24/2017 10:22:02  CALLED TO: Roberta Oates RN  READ BACK (2 Patient Identifier    02-22 .Blood Blood-Arterial XXXX XXXX   No growth at 5 days. [facility]  INFECTIOUS DISEASES AND INTERNAL MEDICINE OF [place]  =======================================================  Wilver Chamberlain MD FACP   Larry Dela Cruz MD  Diplomates American Board of Internal Medicine and Infectious Diseases  =======================================================    SHYAM LAL   [id number]    patient is s/p  right total knee revision with application of antibiotic cement spacer 2/27/17  patient seen post procedure / PICC line    reports feeling better. No fevers,. no chills,. no diarrhea from antibiotics.   Still have pain in right knee    ===================  Allergies:  No Known Allergies    MEDICATIONS  (STANDING):  docusate sodium 100milliGRAM(s) Oral three times a day  ferrous    sulfate 325milliGRAM(s) Oral three times a day with meals  folic acid 1milliGRAM(s) Oral daily  multivitamin 1Tablet(s) Oral daily  insulin lispro (HumaLOG) corrective regimen sliding scale  SubCutaneous Before meals and at bedtime  dextrose 50% Injectable 12.5Gram(s) IV Push once  dextrose 50% Injectable 25Gram(s) IV Push once  dextrose 50% Injectable 25Gram(s) IV Push once  meropenem IVPB 500milliGRAM(s) IV Intermittent every 12 hours  senna 2Tablet(s) Oral at bedtime  multiple electrolytes Injection Type 1 1000milliLiter(s) IV Continuous <Continuous>  enoxaparin Injectable 30milliGRAM(s) SubCutaneous every 24 hours  pantoprazole    Tablet 40milliGRAM(s) Oral two times a day before meals  metoprolol 12.5milliGRAM(s) Oral every 12 hours  DAPTOmycin IVPB 750milliGRAM(s) IV Intermittent every 24 hours    MEDICATIONS  (PRN):  acetaminophen   Tablet 650milliGRAM(s) Oral every 6 hours PRN For Temp over 38.3 C (100.94 F)  aluminum hydroxide/magnesium hydroxide/simethicone Suspension 30milliLiter(s) Oral four times a day PRN Indigestion  ondansetron Injectable 4milliGRAM(s) IV Push every 6 hours PRN Nausea and/or Vomiting  dextrose Gel 1Dose(s) Oral once PRN Blood Glucose LESS THAN 70 milliGRAM(s)/deciliter  glucagon  Injectable 1milliGRAM(s) IntraMuscular once PRN Glucose LESS THAN 70 milligrams/deciliter  polyethylene glycol 3350 17Gram(s) Oral daily PRN Constipation    ==========  REVIEW OF SYSTEMS:  CONSTITUTIONAL:  No fevers or chills  HEENT:  No diplopia or blurred vision. No earache, sore throat or runny nose.  CARDIOVASCULAR:  No pressure, squeezing, strangling, tightness, heaviness or aching about the chest, neck, axilla or epigastrium.  RESPIRATORY:  No cough, shortness of breath  GASTROINTESTINAL:  No nausea, vomiting or diarrhea.  GENITOURINARY:  No dysuria, frequency or urgency.   MUSCULOSKELETAL:  PAIN IN RIGHT KNEE  SKIN:  Rt Knee surgical bandage  NEUROLOGIC:  No paresthesias  PSYCHIATRIC:  No disorder of thought or mood.  ENDOCRINE:  No heat or cold intolerance, polyuria or polydipsia.  HEMATOLOGICAL:  No easy bruising or bleeding.     =====================  Physical Exam:  Vital Signs Last 24 Hrs  T(C): 36.9, Max: 36.9 (03-10 @ 00:33)  T(F): 98.4, Max: 98.5 (03-10 @ 00:33)  HR: 87 (79 - 89)  BP: 136/77 (110/74 - 139/79)  BP(mean): --  RR: 18 (18 - 18)  SpO2: 100% (97% - 100%)    GEN: NAD, pleasant, obese  HEENT: normocephalic and atraumatic. EOMI. PERRL.    NECK: Supple.   LUNGS: good breath sounds anteriorly  HEART: Regular rate and rhythm   ABDOMEN: Soft, nontender, and nondistended.  Positive bowel sounds.    : No CVA tenderness  + CHILD with TURBID sediment  EXTREMITIES: Right knee with island dressing vertically placed; mid pole with blood tinged seepage.                    SUPERIOR POLE with SIGRID drain- serosanguinous fluid  PICC line in place in RIGHT ARM    NEUROLOGIC: forgetful   PSYCHIATRIC: Appropriate affect .  SKIN: Rt Knee in surgical dressing    ================  Labs:  09 Mar 2017 10:49    138    |  99     |  26.0   ----------------------------<  117    4.2     |  26.0   |  1.99     Ca    8.5        09 Mar 2017 10:49  Phos  2.5       09 Mar 2017 10:49  Mg     2.1       09 Mar 2017 10:49                            10.8   9.4   )-----------( 142      ( 09 Mar 2017 10:49 )             33.8       PT/INR - ( 09 Mar 2017 07:46 )   PT: 13.4 sec;   INR: 1.22 ratio              CAPILLARY BLOOD GLUCOSE  78 (10 Mar 2017 07:17)  102 (09 Mar 2017 21:50)       RECENT CULTURES:  02-27 .Surgical Swab right tibia (swab) Enterococcus faecalis (vancomycin resistant)   no gram stain perform only one swab received   Moderate Enterococcus faecalis (vancomycin resistant)  .  TYPE: (C=Critical, N=Notification, A=Abnormal) c  TESTS:  _ VRE  DATE/TIME CALLED: _ 03/01/2017 16:54:36  CALLED TO: Roberta segura  READ BACK (2 Patient Identifiers)(Y/N): _ y  READ DAMION    02-27 .Surgical Swab right femur (swabs) Enterococcus faecalis (vancomycin resistant)   no gram stain perform only one swab received   Moderate Enterococcus faecalis (vancomycin resistant)  Culture in progress  .  TYPE: (C=Critical, N=Notification, A=Abnormal) c  TESTS:  _ VRE  DATE/TIME CALLED: _ 03/01/2017 16:40:40  CALLED TO: Roberta griggs  READ BACK (2 Patient Identifiers)    02-22 .Body Fluid Knee Fluid Pseudomonas aeruginosa   Numerous White blood cells  No organisms seen   Rare Pseudomonas aeruginosa    02-22 .Body Fluid Synovial Fluid Pseudomonas aeruginosa  Enterococcus faecalis (vancomycin resistant)   Numerous White blood cells  No organisms seen   Rare Pseudomonas aeruginosa  Rare Enterococcus faecalis (vancomycin resistant)  .  TYPE: (C=Critical, N=Notification, A=Abnormal) C  TESTS:  _ VRE  DATE/TIME CALLED: _ 02/24/2017 10:22:02  CALLED TO: Roberta Oates RN  READ BACK (2 Patient Identifier    02-22 .Blood Blood-Arterial XXXX XXXX   No growth at 5 days.

## 2017-03-10 NOTE — PROGRESS NOTE ADULT - ASSESSMENT
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

## 2017-03-10 NOTE — ADVANCED PRACTICE NURSE CONSULT - ASSESSMENT
Spoke with MATY Delong, pt presented with incontinent associated dermatitis, no pressure injury noted at the pelvic skin area. RN was using Triad cream for the IAD.

## 2017-03-10 NOTE — PROGRESS NOTE ADULT - PROBLEM SELECTOR PLAN 1
Patient s/p 6 weeks IV abx in September 2016  Patient has synovial joint aspiration by orthopedics with 19,000 nucleated cells  Culture with Pseudomonas and VRE  OR from 2/27 culture with VRE  - continue Daptomycin 750mg q24H  - continue Meropenem 500m,g Q12h  ANTIBIOTICS through 4/8/17

## 2017-03-10 NOTE — PROGRESS NOTE ADULT - PROBLEM SELECTOR PLAN 1
Awaiting PICC placement,   - continue Daptomycin 750mg q24H  - continue Meropenem 500m,g Q12h  ANTIBIOTICS through 4/8/17.   F/U ortho/plastics recs Awaiting PICC placement  - continue Daptomycin 750mg q24H  - continue Meropenem 500m,g Q12h  ANTIBIOTICS through 4/8/17.   F/U ortho/plastics recs

## 2017-03-10 NOTE — PROGRESS NOTE ADULT - SUBJECTIVE AND OBJECTIVE BOX
Patient seen and examined at bedside. Patient c/o some right knee pain. Denies fever/chills, SOB/chest pain, abdominal pain, numbness/tingling.     Vital Signs Last 24 Hrs  T(C): 36.9, Max: 36.9 (03-10 @ 00:33)  T(F): 98.5, Max: 98.5 (03-10 @ 00:33)  HR: 87 (75 - 89)  BP: 110/74 (110/74 - 139/79)  RR: 18 (18 - 18)  SpO2: 98% (97% - 100%)    PE: NAD, alert awake  RLE: Edgar noted, in place, in tact. Alger removed and skin examined - no skin breakdown or erythema noted to posterior or anterior aspect of leg. Dressing C/D/I over incision. SIGRID drain x 1 noted, output 30 ccs over last 12 hours, in place, functioning. No staining over drain site. Incisional dressing changed, Sutures intact, no drainage from incision. No erythema. Medial knee blood blister without significant incased fluid noted separate from incision. Sensation in tact to light touch. DP 1+. + dorsiflexion. motor function limited as per patient since 3 months ago. Compartments soft. Calf soft, NT B/L. New dressing placed over incision with xeroform over blood blister and removed drain site.                          10.8   9.4   )-----------( 142      ( 09 Mar 2017 10:49 )             33.8        A/P: 72 y.o F s/p right knee antibiotic spacer POD #11  - patient may leave edgar open while in bed to reduce tension on skin - however MUST keep leg extended - reiterated at length with patient.  - Will d/w Dr. Rey regarding drain output  - Dressing over incision changed  - cont abx as per ID  - DVTP - lov/coum  - awaiting PICC line by IR  - cont care as per primary team  - Wound care recommended for sacral decubiti Patient seen and examined at bedside. Patient c/o some right knee pain. Denies fever/chills, SOB/chest pain, abdominal pain, numbness/tingling.     Vital Signs Last 24 Hrs  T(C): 36.9, Max: 36.9 (03-10 @ 00:33)  T(F): 98.5, Max: 98.5 (03-10 @ 00:33)  HR: 87 (75 - 89)  BP: 110/74 (110/74 - 139/79)  RR: 18 (18 - 18)  SpO2: 98% (97% - 100%)    PE: NAD, alert awake  RLE: Edgar noted, in place, in tact. Chamberino removed and skin examined - no skin breakdown or erythema noted to posterior or anterior aspect of leg. Dressing C/D/I over incision. SIGRID drain x 1 noted, output 30 ccs over last 12 hours, in place, functioning. No staining over drain site. Incisional dressing changed, Sutures intact, no drainage from incision. No erythema. Medial knee blood blister without significant incased fluid noted sangita-incisional. Sensation in tact to light touch. DP 1+. + dorsiflexion. motor function limited as per patient since 3 months ago. Compartments soft. Calf soft, NT B/L. New dressing placed over incision with xeroform over blood blister and removed drain site.                          10.8   9.4   )-----------( 142      ( 09 Mar 2017 10:49 )             33.8        A/P: 72 y.o F s/p right knee antibiotic spacer POD #11  - patient may leave edgar open while in bed to reduce tension on skin - however MUST keep leg extended - reiterated at length with patient.  - Will d/w Dr. Rey regarding drain output  - Dressing over incision changed  - cont abx as per ID  - DVTP - lov/coum  - awaiting PICC line by IR  - cont care as per primary team  - Wound care recommended for sacral decubiti  - pain control

## 2017-03-11 DIAGNOSIS — M79.672 PAIN IN LEFT FOOT: ICD-10-CM

## 2017-03-11 LAB — URATE SERPL-MCNC: 8.8 MG/DL — HIGH (ref 2.4–5.7)

## 2017-03-11 PROCEDURE — 99233 SBSQ HOSP IP/OBS HIGH 50: CPT

## 2017-03-11 RX ORDER — WARFARIN SODIUM 2.5 MG/1
5 TABLET ORAL ONCE
Qty: 0 | Refills: 0 | Status: COMPLETED | OUTPATIENT
Start: 2017-03-11 | End: 2017-03-11

## 2017-03-11 RX ORDER — ACETAMINOPHEN 500 MG
650 TABLET ORAL EVERY 6 HOURS
Qty: 0 | Refills: 0 | Status: DISCONTINUED | OUTPATIENT
Start: 2017-03-11 | End: 2017-04-04

## 2017-03-11 RX ADMIN — PANTOPRAZOLE SODIUM 40 MILLIGRAM(S): 20 TABLET, DELAYED RELEASE ORAL at 16:49

## 2017-03-11 RX ADMIN — ENOXAPARIN SODIUM 30 MILLIGRAM(S): 100 INJECTION SUBCUTANEOUS at 12:46

## 2017-03-11 RX ADMIN — MEROPENEM 200 MILLIGRAM(S): 1 INJECTION INTRAVENOUS at 05:35

## 2017-03-11 RX ADMIN — SODIUM CHLORIDE 100 MILLILITER(S): 9 INJECTION, SOLUTION INTRAVENOUS at 05:24

## 2017-03-11 RX ADMIN — PANTOPRAZOLE SODIUM 40 MILLIGRAM(S): 20 TABLET, DELAYED RELEASE ORAL at 05:35

## 2017-03-11 RX ADMIN — Medication 325 MILLIGRAM(S): at 12:46

## 2017-03-11 RX ADMIN — Medication 12.5 MILLIGRAM(S): at 16:49

## 2017-03-11 RX ADMIN — DAPTOMYCIN 130 MILLIGRAM(S): 500 INJECTION, POWDER, LYOPHILIZED, FOR SOLUTION INTRAVENOUS at 16:48

## 2017-03-11 RX ADMIN — Medication 325 MILLIGRAM(S): at 08:37

## 2017-03-11 RX ADMIN — Medication 1 MILLIGRAM(S): at 11:03

## 2017-03-11 RX ADMIN — Medication 1 TABLET(S): at 11:03

## 2017-03-11 RX ADMIN — MEROPENEM 200 MILLIGRAM(S): 1 INJECTION INTRAVENOUS at 16:48

## 2017-03-11 RX ADMIN — SODIUM CHLORIDE 100 MILLILITER(S): 9 INJECTION, SOLUTION INTRAVENOUS at 08:38

## 2017-03-11 RX ADMIN — Medication 12.5 MILLIGRAM(S): at 05:35

## 2017-03-11 RX ADMIN — Medication 650 MILLIGRAM(S): at 22:19

## 2017-03-11 RX ADMIN — WARFARIN SODIUM 5 MILLIGRAM(S): 2.5 TABLET ORAL at 21:30

## 2017-03-11 RX ADMIN — Medication 650 MILLIGRAM(S): at 21:49

## 2017-03-11 RX ADMIN — Medication 325 MILLIGRAM(S): at 16:49

## 2017-03-11 NOTE — PROGRESS NOTE ADULT - SUBJECTIVE AND OBJECTIVE BOX
Patient seen and examined at bedside. Patient c/o some right knee soreness. No other complaints.    Vital Signs Last 24 Hrs  T(C): 36.9, Max: 36.9 (03-11 @ 00:08)  T(F): 98.5, Max: 98.5 (03-11 @ 08:35)  HR: 81 (81 - 88)  BP: 130/63 (130/63 - 140/78)  RR: 18 (18 - 18)  SpO2: 100% (100% - 100%)    PE: NAD, alert awake  RLE: No Edgar brace present, patient laying in bed, knees straight, Dressing has serosangenous drainage towards middle of incision as well as from removed drain site. C/D/I over incision. SIGRID drain x 1 noted, output 50 ccs today, in place, functioning. No staining over drain site. Incisional dressing changed and xeroform placed over, Sutures intact, no drainage from incision however some darkening towards middle of incision noted. Incision shown to Dr. Rey. Medial knee blood blister without significant incased fluid noted sangita-incisional. Sensation in tact to light touch. DP intact via doppler, gross swelling foot. + dorsiflexion. motor function limited as per patient since 3 months ago. Compartments soft.   Right arm PICC line placed.             A/P: 72 y.o F s/p right knee antibiotic spacer POD #12  - As per RNA, patient soiled edgar and it was removed and thrown out. New edgar ordered. Patient may leave edgar open while in bed to reduce tension on skin - however MUST keep leg extended  - D/w Dr. Rey regarding drain output and incision - continue drain, dressing changes twice daily with application of silvadene  - cont abx as per ID  - DVTP - lov/coum  - cont care as per primary team  - Wound care recommended for sacral decubiti  - pain control

## 2017-03-11 NOTE — PROGRESS NOTE ADULT - SUBJECTIVE AND OBJECTIVE BOX
seen for Rt knee wound     complains of left 1st hallux pain. no hx gout  no cp/sob.  no abdominal pain.  ROS otherwise negative.    MEDICATIONS  (STANDING):  docusate sodium 100milliGRAM(s) Oral three times a day  ferrous    sulfate 325milliGRAM(s) Oral three times a day with meals  folic acid 1milliGRAM(s) Oral daily  multivitamin 1Tablet(s) Oral daily  insulin lispro (HumaLOG) corrective regimen sliding scale  SubCutaneous Before meals and at bedtime  dextrose 50% Injectable 12.5Gram(s) IV Push once  dextrose 50% Injectable 25Gram(s) IV Push once  dextrose 50% Injectable 25Gram(s) IV Push once  meropenem IVPB 500milliGRAM(s) IV Intermittent every 12 hours  senna 2Tablet(s) Oral at bedtime  enoxaparin Injectable 30milliGRAM(s) SubCutaneous every 24 hours  pantoprazole    Tablet 40milliGRAM(s) Oral two times a day before meals  metoprolol 12.5milliGRAM(s) Oral every 12 hours  DAPTOmycin IVPB 750milliGRAM(s) IV Intermittent every 24 hours  warfarin 5milliGRAM(s) Oral once    MEDICATIONS  (PRN):  acetaminophen   Tablet 650milliGRAM(s) Oral every 6 hours PRN For Temp over 38.3 C (100.94 F)  aluminum hydroxide/magnesium hydroxide/simethicone Suspension 30milliLiter(s) Oral four times a day PRN Indigestion  ondansetron Injectable 4milliGRAM(s) IV Push every 6 hours PRN Nausea and/or Vomiting  dextrose Gel 1Dose(s) Oral once PRN Blood Glucose LESS THAN 70 milliGRAM(s)/deciliter  glucagon  Injectable 1milliGRAM(s) IntraMuscular once PRN Glucose LESS THAN 70 milligrams/deciliter  polyethylene glycol 3350 17Gram(s) Oral daily PRN Constipation  acetaminophen   Tablet. 650milliGRAM(s) Oral every 6 hours PRN mild to mod      Allergies    No Known Allergies    Intolerances      Vital Signs Last 24 Hrs  T(C): 36.9, Max: 36.9 (03-11 @ 00:08)  T(F): 98.5, Max: 98.5 (03-11 @ 08:35)  HR: 81 (81 - 88)  BP: 130/63 (130/63 - 142/80)  BP(mean): --  RR: 18 (18 - 18)  SpO2: 100% (100% - 100%)    PHYSICAL EXAM:    GENERAL: NAD, obese  CHEST/LUNG: CTAB  HEART: Regular rate and rhythm  ABDOMEN: soft +BS  EXTREMITIES:  + bandage Rt knee   NERVOUS SYSTEM:  Alert & Oriented X3, nonfocal  PSYCH: normal mood, appropriate response.    LABS:                        9.9    9.2   )-----------( 205      ( 10 Mar 2017 14:44 )             29.7     10 Mar 2017 14:44    138    |  99     |  27.0   ----------------------------<  112    4.4     |  27.0   |  2.03     Ca    8.3        10 Mar 2017 14:44  Phos  2.5       09 Mar 2017 10:49  Mg     2.1       09 Mar 2017 10:49      PT/INR - ( 10 Mar 2017 14:44 )   PT: 16.3 sec;   INR: 1.48 ratio               CAPILLARY BLOOD GLUCOSE  109 (10 Mar 2017 21:56)        RADIOLOGY & ADDITIONAL TESTS:

## 2017-03-11 NOTE — PROGRESS NOTE ADULT - ASSESSMENT
Improved KARMEN on CKD , DM --->  overall improving cr today 2.0 baseline creat 1.8   Avoid nephrotoxic agents renally dose meds  - monitor labs    Anemia - S/P Transfusion Hg 9.9 today              - no SIDRA needed now

## 2017-03-11 NOTE — PROGRESS NOTE ADULT - PROBLEM SELECTOR PLAN 1
s/p  PICC placement  - continue Daptomycin 750mg q24H  - continue Meropenem 500m,g Q12h  ANTIBIOTICS through 4/8/17.   F/U ortho/plastics recs

## 2017-03-11 NOTE — PROGRESS NOTE ADULT - SUBJECTIVE AND OBJECTIVE BOX
NEPHROLOGY INTERVAL HPI/OVERNIGHT EVENTS:    Examined earlier  Looks comfortable    MEDICATIONS  (STANDING):  docusate sodium 100milliGRAM(s) Oral three times a day  ferrous    sulfate 325milliGRAM(s) Oral three times a day with meals  folic acid 1milliGRAM(s) Oral daily  multivitamin 1Tablet(s) Oral daily  insulin lispro (HumaLOG) corrective regimen sliding scale  SubCutaneous Before meals and at bedtime  dextrose 50% Injectable 12.5Gram(s) IV Push once  dextrose 50% Injectable 25Gram(s) IV Push once  dextrose 50% Injectable 25Gram(s) IV Push once  meropenem IVPB 500milliGRAM(s) IV Intermittent every 12 hours  senna 2Tablet(s) Oral at bedtime  enoxaparin Injectable 30milliGRAM(s) SubCutaneous every 24 hours  pantoprazole    Tablet 40milliGRAM(s) Oral two times a day before meals  metoprolol 12.5milliGRAM(s) Oral every 12 hours  DAPTOmycin IVPB 750milliGRAM(s) IV Intermittent every 24 hours  warfarin 5milliGRAM(s) Oral once    MEDICATIONS  (PRN):  acetaminophen   Tablet 650milliGRAM(s) Oral every 6 hours PRN For Temp over 38.3 C (100.94 F)  aluminum hydroxide/magnesium hydroxide/simethicone Suspension 30milliLiter(s) Oral four times a day PRN Indigestion  ondansetron Injectable 4milliGRAM(s) IV Push every 6 hours PRN Nausea and/or Vomiting  dextrose Gel 1Dose(s) Oral once PRN Blood Glucose LESS THAN 70 milliGRAM(s)/deciliter  glucagon  Injectable 1milliGRAM(s) IntraMuscular once PRN Glucose LESS THAN 70 milligrams/deciliter  polyethylene glycol 3350 17Gram(s) Oral daily PRN Constipation  acetaminophen   Tablet. 650milliGRAM(s) Oral every 6 hours PRN mild to mod      Allergies    No Known Allergies    Intolerances        Vital Signs Last 24 Hrs  T(C): 36.9, Max: 36.9 (03-11 @ 00:08)  T(F): 98.5, Max: 98.5 (03-11 @ 08:35)  HR: 81 (81 - 88)  BP: 130/63 (130/63 - 142/80)  BP(mean): --  RR: 18 (18 - 18)  SpO2: 100% (100% - 100%)  Daily     Daily     PHYSICAL EXAM:  HEENT: EOMI  Neck: supple no JVD  CHEST/LUNG: CTA B/L   HEART: S1S2+ audible  ABDOMEN: Soft NDNT + BS  EXTREMITIES:  ++LE edema        LABS:                        9.9    9.2   )-----------( 205      ( 10 Mar 2017 14:44 )             29.7     10 Mar 2017 14:44    138    |  99     |  27.0   ----------------------------<  112    4.4     |  27.0   |  2.03     Ca    8.3        10 Mar 2017 14:44  Phos  2.5       09 Mar 2017 10:49  Mg     2.1       09 Mar 2017 10:49      PT/INR - ( 10 Mar 2017 14:44 )   PT: 16.3 sec;   INR: 1.48 ratio                     RADIOLOGY & ADDITIONAL TESTS:

## 2017-03-12 LAB
APTT BLD: 43.7 SEC — HIGH (ref 27.5–37.4)
INR BLD: 2.04 RATIO — HIGH (ref 0.88–1.16)
PROTHROM AB SERPL-ACNC: 22.6 SEC — HIGH (ref 10–13.1)

## 2017-03-12 PROCEDURE — 73630 X-RAY EXAM OF FOOT: CPT | Mod: 26,LT

## 2017-03-12 PROCEDURE — 99232 SBSQ HOSP IP/OBS MODERATE 35: CPT

## 2017-03-12 RX ORDER — WARFARIN SODIUM 2.5 MG/1
5 TABLET ORAL ONCE
Qty: 0 | Refills: 0 | Status: COMPLETED | OUTPATIENT
Start: 2017-03-12 | End: 2017-03-12

## 2017-03-12 RX ADMIN — MEROPENEM 200 MILLIGRAM(S): 1 INJECTION INTRAVENOUS at 05:45

## 2017-03-12 RX ADMIN — Medication 325 MILLIGRAM(S): at 08:06

## 2017-03-12 RX ADMIN — Medication 1 TABLET(S): at 11:38

## 2017-03-12 RX ADMIN — Medication 650 MILLIGRAM(S): at 23:08

## 2017-03-12 RX ADMIN — Medication 12.5 MILLIGRAM(S): at 17:23

## 2017-03-12 RX ADMIN — Medication 325 MILLIGRAM(S): at 11:38

## 2017-03-12 RX ADMIN — ENOXAPARIN SODIUM 30 MILLIGRAM(S): 100 INJECTION SUBCUTANEOUS at 11:38

## 2017-03-12 RX ADMIN — PANTOPRAZOLE SODIUM 40 MILLIGRAM(S): 20 TABLET, DELAYED RELEASE ORAL at 05:45

## 2017-03-12 RX ADMIN — Medication 650 MILLIGRAM(S): at 06:14

## 2017-03-12 RX ADMIN — Medication 1 MILLIGRAM(S): at 11:38

## 2017-03-12 RX ADMIN — Medication 650 MILLIGRAM(S): at 22:38

## 2017-03-12 RX ADMIN — Medication 1 APPLICATION(S): at 17:32

## 2017-03-12 RX ADMIN — Medication 325 MILLIGRAM(S): at 17:23

## 2017-03-12 RX ADMIN — PANTOPRAZOLE SODIUM 40 MILLIGRAM(S): 20 TABLET, DELAYED RELEASE ORAL at 16:30

## 2017-03-12 RX ADMIN — WARFARIN SODIUM 5 MILLIGRAM(S): 2.5 TABLET ORAL at 22:38

## 2017-03-12 RX ADMIN — DAPTOMYCIN 130 MILLIGRAM(S): 500 INJECTION, POWDER, LYOPHILIZED, FOR SOLUTION INTRAVENOUS at 15:12

## 2017-03-12 RX ADMIN — Medication 12.5 MILLIGRAM(S): at 05:44

## 2017-03-12 RX ADMIN — Medication 650 MILLIGRAM(S): at 05:44

## 2017-03-12 RX ADMIN — MEROPENEM 200 MILLIGRAM(S): 1 INJECTION INTRAVENOUS at 17:23

## 2017-03-12 NOTE — PROGRESS NOTE ADULT - ASSESSMENT
71 yr old female with hypertension, hyperlipidemia, ILN, CKD, COPD, paroxysmal A fib sent from rehab for right knee wound dehiscence. She was seen by orthopedics, ID. Local wound cultures growing resistant and Pseudomonas and VRE. She was started on Meropenem and Zyvox. Her renal function was slightly worse from baseline and hence started on IVF. No obstructive disease on renal ultrasound. Cardiology consulted for clearance. Renal was consulted.  She is scheduled for OR I&D, she underwent wound closure and insertion of antibiotic spacer. RRT was called on 3/2 for hypotension and lethargy. Labs revealed elevated lactate. She was transferred to ICU for fluid resuscitation and monitoring. Narcotics were discontinued. She was noted to be anemic 6.9, she was ordered for PRBC transfusion and transferred out of ICU. ID follow up requested for medication adjustment, advised to stop Linezolid and start Daptomycin. GI consulted for anemia, no endoscopic intervention advised at this time. Hb remained stable. Noted to have lethargy, hence narcotics stopped. ABG done, which revealed normal pH and CO2. CT brain showed no stroke. Mental status improved. Hb remained stable. After discussion with orthopedics, coumadin was started for atrial fibrillation. She was noted to have bilateral upper extremity swelling, hence ultrasound was done on 3/9, negative for DVT. Given low platelets, Linezolid was discontinued by ID and Daptomycin was started. Heparin antibody negative.

## 2017-03-12 NOTE — PROGRESS NOTE ADULT - SUBJECTIVE AND OBJECTIVE BOX
seen for right knee wound    no acute complaints.  no cp/sob/abd pain.  wants drain out of knee (advised drain managed by ortho/plastics)  ROS otherwise negative.    MEDICATIONS  (STANDING):  docusate sodium 100milliGRAM(s) Oral three times a day  ferrous    sulfate 325milliGRAM(s) Oral three times a day with meals  folic acid 1milliGRAM(s) Oral daily  multivitamin 1Tablet(s) Oral daily  insulin lispro (HumaLOG) corrective regimen sliding scale  SubCutaneous Before meals and at bedtime  dextrose 50% Injectable 12.5Gram(s) IV Push once  dextrose 50% Injectable 25Gram(s) IV Push once  dextrose 50% Injectable 25Gram(s) IV Push once  meropenem IVPB 500milliGRAM(s) IV Intermittent every 12 hours  senna 2Tablet(s) Oral at bedtime  enoxaparin Injectable 30milliGRAM(s) SubCutaneous every 24 hours  pantoprazole    Tablet 40milliGRAM(s) Oral two times a day before meals  metoprolol 12.5milliGRAM(s) Oral every 12 hours  DAPTOmycin IVPB 750milliGRAM(s) IV Intermittent every 24 hours  silver sulfADIAZINE 1% Cream 1Application(s) Topical two times a day  warfarin 5milliGRAM(s) Oral once    MEDICATIONS  (PRN):  acetaminophen   Tablet 650milliGRAM(s) Oral every 6 hours PRN For Temp over 38.3 C (100.94 F)  aluminum hydroxide/magnesium hydroxide/simethicone Suspension 30milliLiter(s) Oral four times a day PRN Indigestion  ondansetron Injectable 4milliGRAM(s) IV Push every 6 hours PRN Nausea and/or Vomiting  dextrose Gel 1Dose(s) Oral once PRN Blood Glucose LESS THAN 70 milliGRAM(s)/deciliter  glucagon  Injectable 1milliGRAM(s) IntraMuscular once PRN Glucose LESS THAN 70 milligrams/deciliter  polyethylene glycol 3350 17Gram(s) Oral daily PRN Constipation  acetaminophen   Tablet. 650milliGRAM(s) Oral every 6 hours PRN mild to mod      Allergies    No Known Allergies    Intolerances      Vital Signs Last 24 Hrs  T(C): 36.9, Max: 36.9 (03-12 @ 00:34)  T(F): 98.4, Max: 98.4 (03-12 @ 00:34)  HR: 89 (77 - 90)  BP: 118/60 (118/60 - 135/71)  BP(mean): --  RR: 18 (18 - 20)  SpO2: 98% (96% - 100%)    PHYSICAL EXAM:    GENERAL: NAD, obese  CHEST/LUNG: Clear to percussion bilaterally  HEART: Regular rate and rhythm; S1 S2  ABDOMEN: Soft, Nontender, Nondistended; Bowel sounds present  EXTREMITIES:  nonpitting edema.  right knee with bandage/drain  NERVOUS SYSTEM:  Alert & Oriented X3, nonfocal  PSYCH: normal mood, appropriate response.    LABS:                        9.9    9.2   )-----------( 205      ( 10 Mar 2017 14:44 )             29.7     10 Mar 2017 14:44    138    |  99     |  27.0   ----------------------------<  112    4.4     |  27.0   |  2.03     Ca    8.3        10 Mar 2017 14:44      PT/INR - ( 10 Mar 2017 14:44 )   PT: 16.3 sec;   INR: 1.48 ratio               CAPILLARY BLOOD GLUCOSE  118 (12 Mar 2017 08:05)  86 (11 Mar 2017 11:03)        RADIOLOGY & ADDITIONAL TESTS:

## 2017-03-12 NOTE — PROGRESS NOTE ADULT - SUBJECTIVE AND OBJECTIVE BOX
dressing changed, applied silvadene cream. physical exam unchanged from AM. new alyce brace noted at bedside. keep extremity extended. alyce placed under extremity, left open. close when ambulating. NO ROM, WBAT in alyce brace.  continue dressing change with application of silvadene cream BID

## 2017-03-12 NOTE — PROGRESS NOTE ADULT - SUBJECTIVE AND OBJECTIVE BOX
Patient seen and examined at bedside. Denies CP, SOB, LE N/T, constitutional sx. admits to mild pain knee region. No other complaints.    SIGRID drain: 150cc/12hrs,240cc/24hrs    Vital Signs Last 24 Hrs  T(C): 36.9, Max: 36.9 (03-12 @ 00:34)  T(F): 98.4, Max: 98.4 (03-12 @ 00:34)  HR: 89 (77 - 90)  BP: 118/60 (118/60 - 135/71)  BP(mean): --  RR: 18 (18 - 20)  SpO2: 98% (96% - 100%)    PE: NAD  RLE: lying in bed, LE extended. Dressing has mild SS drainage mid/distal incision . SIGRID drain intact and functioning. Incisional dressing changed, silvadene cream applied, xeroform and sterile guaze placed . no drainage from incision however some darkening towards middle of incision noted. Medial knee blood blister without significant incased fluid noted sangita-incisional.   Sensation in tact to light touch.   DP intact via doppler, gross swelling foot.   + dorsiflexion. motor function limited as per patient since 3 months ago.   Compartments soft, NT b/l LE     Right arm PICC line placed.             A/P: 72 y.o F s/p right knee antibiotic spacer POD #13  -  New alyce ordered. Patient may leave alyce open while in bed to reduce tension on skin - however MUST keep leg extended  - maintain drain, monitor output     dressing changes twice daily with application of silvadene  - cont abx as per ID  - DVTP - lov/coum  - cont care as per primary team  - Wound care recommended for sacral decubiti  - pain control Patient seen and examined at bedside. Denies CP, SOB, LE N/T, constitutional sx. admits to mild pain knee region. No other complaints.    SIGRID drain: 150cc/12hrs,240cc/24hrs    Vital Signs Last 24 Hrs  T(C): 36.9, Max: 36.9 (03-12 @ 00:34)  T(F): 98.4, Max: 98.4 (03-12 @ 00:34)  HR: 89 (77 - 90)  BP: 118/60 (118/60 - 135/71)  BP(mean): --  RR: 18 (18 - 20)  SpO2: 98% (96% - 100%)    PE: NAD  RLE: lying in bed, LE extended. Dressing has mild SS drainage mid/distal incision . SIGRID drain intact and functioning. Incisional dressing changed, silvadene cream applied, xeroform and sterile guaze placed . no drainage from incision however some darkening towards middle of incision noted. Medial knee blood blister without significant incased fluid noted sangita-incisional.   Sensation in tact to light touch.   DP intact via doppler, gross swelling foot.   + dorsiflexion. motor function limited as per patient since 3 months ago.   Compartments soft, NT b/l LE     Right arm PICC line placed.             A/P: 72 y.o F s/p right knee antibiotic spacer POD #13  -  New alyce ordered. Patient may leave alyce open while in bed to reduce tension on skin - however MUST keep leg extended  - maintain drain, monitor output     dressing changes twice daily with application of silvadene  - cont abx as per ID  - DVTP - lov/coum    PT/OT: WBAT in alyce  - cont care as per primary team  - Wound care recommended for sacral decubiti  - pain control

## 2017-03-13 ENCOUNTER — APPOINTMENT (OUTPATIENT)
Dept: PULMONOLOGY | Facility: CLINIC | Age: 72
End: 2017-03-13

## 2017-03-13 LAB
ANION GAP SERPL CALC-SCNC: 8 MMOL/L — SIGNIFICANT CHANGE UP (ref 5–17)
APTT BLD: 42.1 SEC — HIGH (ref 27.5–37.4)
BUN SERPL-MCNC: 29 MG/DL — HIGH (ref 8–20)
CALCIUM SERPL-MCNC: 8.6 MG/DL — SIGNIFICANT CHANGE UP (ref 8.6–10.2)
CHLORIDE SERPL-SCNC: 103 MMOL/L — SIGNIFICANT CHANGE UP (ref 98–107)
CO2 SERPL-SCNC: 29 MMOL/L — SIGNIFICANT CHANGE UP (ref 22–29)
CREAT SERPL-MCNC: 2.16 MG/DL — HIGH (ref 0.5–1.3)
GLUCOSE SERPL-MCNC: 101 MG/DL — SIGNIFICANT CHANGE UP (ref 70–115)
INR BLD: 2.32 RATIO — HIGH (ref 0.88–1.16)
POTASSIUM SERPL-MCNC: 4.1 MMOL/L — SIGNIFICANT CHANGE UP (ref 3.5–5.3)
POTASSIUM SERPL-SCNC: 4.1 MMOL/L — SIGNIFICANT CHANGE UP (ref 3.5–5.3)
PROTHROM AB SERPL-ACNC: 25.7 SEC — HIGH (ref 10–13.1)
SODIUM SERPL-SCNC: 140 MMOL/L — SIGNIFICANT CHANGE UP (ref 135–145)

## 2017-03-13 PROCEDURE — 99233 SBSQ HOSP IP/OBS HIGH 50: CPT

## 2017-03-13 PROCEDURE — 99232 SBSQ HOSP IP/OBS MODERATE 35: CPT

## 2017-03-13 RX ORDER — ACETAMINOPHEN 500 MG
1000 TABLET ORAL ONCE
Qty: 0 | Refills: 0 | Status: COMPLETED | OUTPATIENT
Start: 2017-03-13 | End: 2017-03-13

## 2017-03-13 RX ORDER — WARFARIN SODIUM 2.5 MG/1
3 TABLET ORAL ONCE
Qty: 0 | Refills: 0 | Status: COMPLETED | OUTPATIENT
Start: 2017-03-13 | End: 2017-03-13

## 2017-03-13 RX ADMIN — PANTOPRAZOLE SODIUM 40 MILLIGRAM(S): 20 TABLET, DELAYED RELEASE ORAL at 06:48

## 2017-03-13 RX ADMIN — Medication 325 MILLIGRAM(S): at 11:11

## 2017-03-13 RX ADMIN — PANTOPRAZOLE SODIUM 40 MILLIGRAM(S): 20 TABLET, DELAYED RELEASE ORAL at 16:40

## 2017-03-13 RX ADMIN — Medication 12.5 MILLIGRAM(S): at 06:48

## 2017-03-13 RX ADMIN — MEROPENEM 200 MILLIGRAM(S): 1 INJECTION INTRAVENOUS at 17:49

## 2017-03-13 RX ADMIN — WARFARIN SODIUM 3 MILLIGRAM(S): 2.5 TABLET ORAL at 22:12

## 2017-03-13 RX ADMIN — Medication 1 MILLIGRAM(S): at 11:12

## 2017-03-13 RX ADMIN — ENOXAPARIN SODIUM 30 MILLIGRAM(S): 100 INJECTION SUBCUTANEOUS at 11:11

## 2017-03-13 RX ADMIN — Medication 325 MILLIGRAM(S): at 08:10

## 2017-03-13 RX ADMIN — Medication 100 MILLIGRAM(S): at 13:35

## 2017-03-13 RX ADMIN — MEROPENEM 200 MILLIGRAM(S): 1 INJECTION INTRAVENOUS at 06:48

## 2017-03-13 RX ADMIN — Medication 12.5 MILLIGRAM(S): at 17:49

## 2017-03-13 RX ADMIN — Medication 1 TABLET(S): at 11:12

## 2017-03-13 RX ADMIN — Medication 325 MILLIGRAM(S): at 17:49

## 2017-03-13 RX ADMIN — DAPTOMYCIN 130 MILLIGRAM(S): 500 INJECTION, POWDER, LYOPHILIZED, FOR SOLUTION INTRAVENOUS at 13:35

## 2017-03-13 NOTE — PROGRESS NOTE ADULT - SUBJECTIVE AND OBJECTIVE BOX
seen for right knee revision with spacer    patient has no acute complaints but sleeping   ROS limited to lack of cooperation.      MEDICATIONS  (STANDING):  docusate sodium 100milliGRAM(s) Oral three times a day  ferrous    sulfate 325milliGRAM(s) Oral three times a day with meals  folic acid 1milliGRAM(s) Oral daily  multivitamin 1Tablet(s) Oral daily  insulin lispro (HumaLOG) corrective regimen sliding scale  SubCutaneous Before meals and at bedtime  dextrose 50% Injectable 12.5Gram(s) IV Push once  dextrose 50% Injectable 25Gram(s) IV Push once  dextrose 50% Injectable 25Gram(s) IV Push once  meropenem IVPB 500milliGRAM(s) IV Intermittent every 12 hours  senna 2Tablet(s) Oral at bedtime  pantoprazole    Tablet 40milliGRAM(s) Oral two times a day before meals  metoprolol 12.5milliGRAM(s) Oral every 12 hours  DAPTOmycin IVPB 750milliGRAM(s) IV Intermittent every 24 hours  silver sulfADIAZINE 1% Cream 1Application(s) Topical two times a day  warfarin 3milliGRAM(s) Oral once    MEDICATIONS  (PRN):  acetaminophen   Tablet 650milliGRAM(s) Oral every 6 hours PRN For Temp over 38.3 C (100.94 F)  aluminum hydroxide/magnesium hydroxide/simethicone Suspension 30milliLiter(s) Oral four times a day PRN Indigestion  ondansetron Injectable 4milliGRAM(s) IV Push every 6 hours PRN Nausea and/or Vomiting  dextrose Gel 1Dose(s) Oral once PRN Blood Glucose LESS THAN 70 milliGRAM(s)/deciliter  glucagon  Injectable 1milliGRAM(s) IntraMuscular once PRN Glucose LESS THAN 70 milligrams/deciliter  polyethylene glycol 3350 17Gram(s) Oral daily PRN Constipation  acetaminophen   Tablet. 650milliGRAM(s) Oral every 6 hours PRN mild to mod      Allergies    No Known Allergies    Intolerances    Vital Signs Last 24 Hrs  T(C): 36.8, Max: 36.9 (03-12 @ 16:32)  T(F): 98.2, Max: 98.5 (03-13 @ 00:17)  HR: 84 (82 - 99)  BP: 119/69 (110/64 - 121/66)  BP(mean): --  RR: 18 (18 - 18)  SpO2: 100% (98% - 100%)    PHYSICAL EXAM:    GENERAL: NAD, obese  CHEST/LUNG: Clear to percussion bilaterally  HEART: Regular rate and rhythm; S1 S2  ABDOMEN: Soft, Nontender, Nondistended; Bowel sounds present  EXTREMITIES:  + drain right knee, bandaged.  NERVOUS SYSTEM: nonfocal.   PSYCH: normal mood, appropriate response.    LABS:    13 Mar 2017 08:23    140    |  103    |  29.0   ----------------------------<  101    4.1     |  29.0   |  2.16     Ca    8.6        13 Mar 2017 08:23      PT/INR - ( 13 Mar 2017 08:23 )   PT: 25.7 sec;   INR: 2.32 ratio         PTT - ( 13 Mar 2017 08:23 )  PTT:42.1 sec      CAPILLARY BLOOD GLUCOSE  112 (13 Mar 2017 11:30)  103 (13 Mar 2017 07:15)  123 (12 Mar 2017 22:38)  112 (12 Mar 2017 16:27)        RADIOLOGY & ADDITIONAL TESTS:

## 2017-03-13 NOTE — PROVIDER CONTACT NOTE (OTHER) - ACTION/TREATMENT ORDERED:
Will re-assess.
as per WILLIAMS Back ok to use PICC line to RUE; as per hospitalist  he will place an order for a new PICC line to be re-inserted the surgical PAs.

## 2017-03-13 NOTE — PROVIDER CONTACT NOTE (OTHER) - SITUATION
Pt noted removing her gown upon assessment RUE PICC line appeared out of place, no s/s of acute bleeding, WILLIAMS Back assessed patient, and re-adjusted PICC  to adequate level; blood return positive

## 2017-03-13 NOTE — PROGRESS NOTE ADULT - SUBJECTIVE AND OBJECTIVE BOX
NEPHROLOGY INTERVAL HPI/OVERNIGHT EVENTS:    Examined earlier  Looks comfortable    MEDICATIONS  (STANDING):  docusate sodium 100milliGRAM(s) Oral three times a day  ferrous    sulfate 325milliGRAM(s) Oral three times a day with meals  folic acid 1milliGRAM(s) Oral daily  multivitamin 1Tablet(s) Oral daily  insulin lispro (HumaLOG) corrective regimen sliding scale  SubCutaneous Before meals and at bedtime  dextrose 50% Injectable 12.5Gram(s) IV Push once  dextrose 50% Injectable 25Gram(s) IV Push once  dextrose 50% Injectable 25Gram(s) IV Push once  meropenem IVPB 500milliGRAM(s) IV Intermittent every 12 hours  senna 2Tablet(s) Oral at bedtime  enoxaparin Injectable 30milliGRAM(s) SubCutaneous every 24 hours  pantoprazole    Tablet 40milliGRAM(s) Oral two times a day before meals  metoprolol 12.5milliGRAM(s) Oral every 12 hours  DAPTOmycin IVPB 750milliGRAM(s) IV Intermittent every 24 hours  silver sulfADIAZINE 1% Cream 1Application(s) Topical two times a day  warfarin 3milliGRAM(s) Oral once    MEDICATIONS  (PRN):  acetaminophen   Tablet 650milliGRAM(s) Oral every 6 hours PRN For Temp over 38.3 C (100.94 F)  aluminum hydroxide/magnesium hydroxide/simethicone Suspension 30milliLiter(s) Oral four times a day PRN Indigestion  ondansetron Injectable 4milliGRAM(s) IV Push every 6 hours PRN Nausea and/or Vomiting  dextrose Gel 1Dose(s) Oral once PRN Blood Glucose LESS THAN 70 milliGRAM(s)/deciliter  glucagon  Injectable 1milliGRAM(s) IntraMuscular once PRN Glucose LESS THAN 70 milligrams/deciliter  polyethylene glycol 3350 17Gram(s) Oral daily PRN Constipation  acetaminophen   Tablet. 650milliGRAM(s) Oral every 6 hours PRN mild to mod      Allergies    No Known Allergies    Intolerances        Vital Signs Last 24 Hrs  T(C): 36.8, Max: 36.9 (03-12 @ 16:32)  T(F): 98.2, Max: 98.5 (03-13 @ 00:17)  HR: 84 (82 - 99)  BP: 119/69 (110/64 - 121/66)  BP(mean): --  RR: 18 (18 - 18)  SpO2: 100% (98% - 100%)  Daily     Daily     PHYSICAL EXAM:  HEENT: EOMI  Neck: supple no JVD  CHEST/LUNG: CTA B/L   HEART: S1S2+ audible  ABDOMEN: Soft NDNT + BS  EXTREMITIES:  +LE edema      LABS:    13 Mar 2017 08:23    140    |  103    |  29.0   ----------------------------<  101    4.1     |  29.0   |  2.16     Ca    8.6        13 Mar 2017 08:23      PT/INR - ( 13 Mar 2017 08:23 )   PT: 25.7 sec;   INR: 2.32 ratio         PTT - ( 13 Mar 2017 08:23 )  PTT:42.1 sec            RADIOLOGY & ADDITIONAL TESTS:

## 2017-03-13 NOTE — PROGRESS NOTE ADULT - SUBJECTIVE AND OBJECTIVE BOX
[facility] [place] CARMENZA HARRIS MD FACP   DESIRE METZGER MD  Diplomates American Board of Internal Medicine and Infectious Diseases      SHYAM LAL  MRN-[id number]  72y    INTERVAL HPI/OVERNIGHT EVENTS:  72y Female is status post right total knee revision with cement spacer, on 2/22, POD#14.  PT ON  DAPTOMYCIN/MEREM  LETHARGIC ANSWERS QUESTIONS        Vital Signs Last 24 Hrs  T(C): 36.8, Max: 36.9 (03-12 @ 16:32)  T(F): 98.2, Max: 98.5 (03-13 @ 00:17)  HR: 84 (82 - 99)  BP: 119/69 (110/64 - 121/66)  BP(mean): --  RR: 18 (18 - 18)  SpO2: 100% (98% - 100%)  ANTIBIOTICS  meropenem IVPB 500milliGRAM(s) IV Intermittent every 12 hours  liDAPTO 750 Q 24 THROUGH 04/08    Allergies    No Known Allergies    Intolerances        REVIEW OF SYSTEMS:    PHYSICAL EXAM:  Vital Signs Last 24 Hrs  T(C): 36.9, Max: 37.2 (03-07 @ 15:38)  T(F): 98.4, Max: 99 (03-07 @ 15:38)  HR: 95 (88 - 98)  BP: 142/78 (114/66 - 142/78)  BP(mean): --  RR: 18 (18 - 18)  SpO2: 100% (100% - 100%)      GEN: NAD,  LETHARGIC  HEENT: normocephalic and atraumatic. EOMI. CHIDI. Moist mucosa. Clear Posterior pharynx.  NECK: Supple. No carotid bruits.  No lymphadenopathy or thyromegaly.  LUNGS: Clear to auscultation.  HEART: Regular rate and rhythm without murmur.  ABDOMEN: Soft, nontender, and nondistended.  Positive bowel sounds.  No hepatosplenomegaly was noted.  EXTREMITIES: DRESSING IN PLACE RIGHT KNEE  NEUROLOGIC: Cranial nerves II through XII are grossly intact.  MUSCULOSKELETAL:  SKIN: No ulceration or induration present    LABS:                        8.9    8.1   )-----------( 136      ( 08 Mar 2017 07:11 )             27.0       08 Mar 2017 07:11    138    |  102    |  31.0   ----------------------------<  91     4.1     |  28.0   |  2.21     Ca    8.3        08 Mar 2017 07:11  Phos  2.3       08 Mar 2017 07:11  Mg     2.1       08 Mar 2017 07:11          03-08 @ 07:11  hct 27.0 % hgb 8.9 g/dL    03-08 @ 07:11  plat 136 K/uL wbc 8.1 K/uL    03-07 @ 07:52  hct 28.6 % hgb 9.4 g/dL    03-07 @ 07:52  plat 129 K/uL wbc 8.7 K/uL    03-06 @ 08:15  hct 28.9 % hgb 9.6 g/dL    03-06 @ 08:15  plat 144 K/uL wbc 9.7 K/uL    03-05 @ 09:57  hct 26.7 % hgb 9.0 g/dL    03-05 @ 09:57  plat 155 K/uL wbc 9.2 K/uL      03-08-17  creat 2.21 mg/dL gfr 25 mL/min/1.73M2 bun 31.0 mg/dL k 4.1 mmol/L  03-07-17  creat 2.51 mg/dL gfr 21 mL/min/1.73M2 bun 34.0 mg/dL k 4.0 mmol/L  03-06-17  creat 2.89 mg/dL gfr 18 mL/min/1.73M2 bun 39.0 mg/dL k 4.3 mmol/L  03-05-17  creat 3.13 mg/dL gfr 16 mL/min/1.73M2 bun 42.0 mg/dL k 4.8 mmol/L

## 2017-03-13 NOTE — PROGRESS NOTE ADULT - ASSESSMENT
Improved KARMEN on CKD , DM --->  overall improving cr today 2.1 baseline creat 1.8   Avoid nephrotoxic agents renally dose meds Cr overall stable    Anemia - S/P Transfusion Hg 9.9 today              - no SIDRA needed now

## 2017-03-13 NOTE — PROGRESS NOTE ADULT - ASSESSMENT
72y Female is status post right total knee revision with cement spacer, on 2/22, POD#14. 72y Female is status post right total knee revision with cement spacer, on 2/22, POD#14.  continue dapto molina will follow up

## 2017-03-13 NOTE — PROGRESS NOTE ADULT - SUBJECTIVE AND OBJECTIVE BOX
Pt is now 2 wks post-op.  She is still c/o knee pain.    Afebrile.  OR cx = VRE      Rigth knee incision remains in tact.  4x3cm area of eschar along medial skin flap, central third of the incision.  Wound remains together, not clear if eschar is partial or full thickness.  no cellulitis.  No collections.      SIGRID > 150-200/d serosanguinous.    A/P:  Pt with delayed wound healing following right knee prosthesis removal  For now, continue wound care with silvadene BID  Continue close observation  Abx per ID  Continue Drain for now

## 2017-03-13 NOTE — PROVIDER CONTACT NOTE (OTHER) - ASSESSMENT
WILLIAMS Back adjusted RUE PICC line, as per PA it is ok to be used for medication/IVF administration until new PICC line can be placed, PA placed new dsg, and is CDI, pt has no complaints of pain,VSS

## 2017-03-13 NOTE — PROGRESS NOTE ADULT - SUBJECTIVE AND OBJECTIVE BOX
SHYAMROSLYN LAL    [id number]    History: 72y Female is status post right total knee revision with cement spacer, on 2/22, POD#14. Patient is comfortable. The patient's pain is controlled using the prescribed pain medications. Denies nausea, vomiting, chest pain, shortness of breath, abdominal pain or fever. No new complaints.          13 Mar 2017 08:23    140    |  103    |  29.0   ----------------------------<  101    4.1     |  29.0   |  2.16     Ca    8.6        13 Mar 2017 08:23        MEDICATIONS  (STANDING):  docusate sodium 100milliGRAM(s) Oral three times a day  ferrous    sulfate 325milliGRAM(s) Oral three times a day with meals  folic acid 1milliGRAM(s) Oral daily  multivitamin 1Tablet(s) Oral daily  insulin lispro (HumaLOG) corrective regimen sliding scale  SubCutaneous Before meals and at bedtime  dextrose 50% Injectable 12.5Gram(s) IV Push once  dextrose 50% Injectable 25Gram(s) IV Push once  dextrose 50% Injectable 25Gram(s) IV Push once  meropenem IVPB 500milliGRAM(s) IV Intermittent every 12 hours  senna 2Tablet(s) Oral at bedtime  enoxaparin Injectable 30milliGRAM(s) SubCutaneous every 24 hours  pantoprazole    Tablet 40milliGRAM(s) Oral two times a day before meals  metoprolol 12.5milliGRAM(s) Oral every 12 hours  DAPTOmycin IVPB 750milliGRAM(s) IV Intermittent every 24 hours  silver sulfADIAZINE 1% Cream 1Application(s) Topical two times a day  warfarin 3milliGRAM(s) Oral once    MEDICATIONS  (PRN):  acetaminophen   Tablet 650milliGRAM(s) Oral every 6 hours PRN For Temp over 38.3 C (100.94 F)  aluminum hydroxide/magnesium hydroxide/simethicone Suspension 30milliLiter(s) Oral four times a day PRN Indigestion  ondansetron Injectable 4milliGRAM(s) IV Push every 6 hours PRN Nausea and/or Vomiting  dextrose Gel 1Dose(s) Oral once PRN Blood Glucose LESS THAN 70 milliGRAM(s)/deciliter  glucagon  Injectable 1milliGRAM(s) IntraMuscular once PRN Glucose LESS THAN 70 milligrams/deciliter  polyethylene glycol 3350 17Gram(s) Oral daily PRN Constipation  acetaminophen   Tablet. 650milliGRAM(s) Oral every 6 hours PRN mild to mod      Physical exam: The right knee dressing are changed.  No active  drainange noted to RLE. New dressings placed withSIILVADENE AND DRY DRESSINGS . The calf is supple nontender. . No calf tenderness. Sensation to light touch is grossly intact distally. Motor function distally is 5/5. Extensor hallucis longus and flexor hallucis longus are intact. No foot drop. 2+ dorsalis pedis pulse. Capillary refill is less than 2 seconds. No cyanosis.  drain output overnight 220    Primary Orthopedic Assessment:  • s/p RIGHT KNEE REVISION WITH CEMENT SPACER    Secondary  Orthopedic Assessment(s):   •     Secondary  Medical Assessment(s):   •     Plan:   • DVT prophylaxis as prescribed, including use of compression devices and ankle pumps  • Continue physical therapy  • Weightbearing as tolerated of the right lower extremity with assistance of a walker and use of alyce brace  • Incentive spirometry encouraged  • Pain control as clinically indicated  • Discharge planning –

## 2017-03-14 PROCEDURE — 71010: CPT | Mod: 26

## 2017-03-14 PROCEDURE — 99233 SBSQ HOSP IP/OBS HIGH 50: CPT

## 2017-03-14 PROCEDURE — 99232 SBSQ HOSP IP/OBS MODERATE 35: CPT

## 2017-03-14 RX ORDER — WARFARIN SODIUM 2.5 MG/1
3 TABLET ORAL ONCE
Qty: 0 | Refills: 0 | Status: COMPLETED | OUTPATIENT
Start: 2017-03-14 | End: 2017-03-14

## 2017-03-14 RX ADMIN — PANTOPRAZOLE SODIUM 40 MILLIGRAM(S): 20 TABLET, DELAYED RELEASE ORAL at 18:44

## 2017-03-14 RX ADMIN — Medication 1 TABLET(S): at 12:41

## 2017-03-14 RX ADMIN — DAPTOMYCIN 130 MILLIGRAM(S): 500 INJECTION, POWDER, LYOPHILIZED, FOR SOLUTION INTRAVENOUS at 21:17

## 2017-03-14 RX ADMIN — Medication 325 MILLIGRAM(S): at 12:41

## 2017-03-14 RX ADMIN — Medication 325 MILLIGRAM(S): at 18:46

## 2017-03-14 RX ADMIN — Medication 325 MILLIGRAM(S): at 08:12

## 2017-03-14 RX ADMIN — MEROPENEM 200 MILLIGRAM(S): 1 INJECTION INTRAVENOUS at 18:48

## 2017-03-14 RX ADMIN — Medication 1 APPLICATION(S): at 18:46

## 2017-03-14 RX ADMIN — Medication 1 APPLICATION(S): at 10:41

## 2017-03-14 RX ADMIN — Medication 12.5 MILLIGRAM(S): at 05:26

## 2017-03-14 RX ADMIN — Medication 1 MILLIGRAM(S): at 12:41

## 2017-03-14 RX ADMIN — MEROPENEM 200 MILLIGRAM(S): 1 INJECTION INTRAVENOUS at 05:05

## 2017-03-14 RX ADMIN — Medication 12.5 MILLIGRAM(S): at 18:45

## 2017-03-14 RX ADMIN — WARFARIN SODIUM 3 MILLIGRAM(S): 2.5 TABLET ORAL at 21:24

## 2017-03-14 RX ADMIN — PANTOPRAZOLE SODIUM 40 MILLIGRAM(S): 20 TABLET, DELAYED RELEASE ORAL at 05:06

## 2017-03-14 NOTE — PROGRESS NOTE ADULT - ATTENDING COMMENTS
d/c planning  needs repeat PICC vs midline vs peripherals d/c planning  CXR to evaluate PICC line placement after re-adjustment

## 2017-03-14 NOTE — PROGRESS NOTE ADULT - PROBLEM SELECTOR PLAN 1
s/p  PICC placement  - continue Daptomycin 750mg q24H  - continue Meropenem 500m,g Q12h  ANTIBIOTICS through 4/8/17.   d/w ortho: stable for d/c...maintain drain and outpatient follow up with Dr Rey in 1 week.

## 2017-03-14 NOTE — PROGRESS NOTE ADULT - SUBJECTIVE AND OBJECTIVE BOX
[facility] [place] CARMENZA HARRIS MD FACP   DESIRE METZGER MD  Diplomates American Board of Internal Medicine and Infectious Diseases      SHYAM LAL  MRN-[id number]  72y    INTERVAL HPI/OVERNIGHT EVENTS:  72y Female is status post right total knee revision with cement spacer, on 2/22, POD#14.  PT ON  DAPTOMYCIN/MEREM  MORE AWAKE TODAY        Vital Signs Last 24 Hrs  T(C): 36.8, Max: 36.9 (03-12 @ 16:32)  T(F): 98.2, Max: 98.5 (03-13 @ 00:17)  HR: 84 (82 - 99)  BP: 119/69 (110/64 - 121/66)  BP(mean): --  RR: 18 (18 - 18)  SpO2: 100% (98% - 100%)  ANTIBIOTICS  meropenem IVPB 500milliGRAM(s) IV Intermittent every 12 hours  liDAPTO 750 Q 24 THROUGH 04/08    Allergies    No Known Allergies    Intolerances        REVIEW OF SYSTEMS:    PHYSICAL EXAM:  Vital Signs Last 24 Hrs  T(C): 36.9, Max: 37.2 (03-07 @ 15:38)  T(F): 98.4, Max: 99 (03-07 @ 15:38)  HR: 95 (88 - 98)  BP: 142/78 (114/66 - 142/78)  BP(mean): --  RR: 18 (18 - 18)  SpO2: 100% (100% - 100%)      GEN: NAD,  LETHARGIC  HEENT: normocephalic and atraumatic. EOMI. CHIDI. Moist mucosa. Clear Posterior pharynx.  NECK: Supple. No carotid bruits.  No lymphadenopathy or thyromegaly.  LUNGS: Clear to auscultation.  HEART: Regular rate and rhythm without murmur.  ABDOMEN: Soft, nontender, and nondistended.  Positive bowel sounds.  No hepatosplenomegaly was noted.  EXTREMITIES: DRESSING IN PLACE RIGHT KNEE DRAIN IN PLACE  NEUROLOGIC: Cranial nerves II through XII are grossly intact.  MUSCULOSKELETAL:  SKIN: No ulceration or induration present    LABS:                        8.9    8.1   )-----------( 136      ( 08 Mar 2017 07:11 )             27.0       08 Mar 2017 07:11    138    |  102    |  31.0   ----------------------------<  91     4.1     |  28.0   |  2.21     Ca    8.3        08 Mar 2017 07:11  Phos  2.3       08 Mar 2017 07:11  Mg     2.1       08 Mar 2017 07:11          03-08 @ 07:11  hct 27.0 % hgb 8.9 g/dL    03-08 @ 07:11  plat 136 K/uL wbc 8.1 K/uL    03-07 @ 07:52  hct 28.6 % hgb 9.4 g/dL    03-07 @ 07:52  plat 129 K/uL wbc 8.7 K/uL    03-06 @ 08:15  hct 28.9 % hgb 9.6 g/dL    03-06 @ 08:15  plat 144 K/uL wbc 9.7 K/uL    03-05 @ 09:57  hct 26.7 % hgb 9.0 g/dL    03-05 @ 09:57  plat 155 K/uL wbc 9.2 K/uL      03-08-17  creat 2.21 mg/dL gfr 25 mL/min/1.73M2 bun 31.0 mg/dL k 4.1 mmol/L  03-07-17  creat 2.51 mg/dL gfr 21 mL/min/1.73M2 bun 34.0 mg/dL k 4.0 mmol/L  03-06-17  creat 2.89 mg/dL gfr 18 mL/min/1.73M2 bun 39.0 mg/dL k 4.3 mmol/L  03-05-17  creat 3.13 mg/dL gfr 16 mL/min/1.73M2 bun 42.0 mg/dL k 4.8 mmol/L

## 2017-03-14 NOTE — PROGRESS NOTE ADULT - SUBJECTIVE AND OBJECTIVE BOX
SHYAM LAL    [id number]    History: 72y Female is status post right total knee revision with cement spacer, on 2/22, POD#15. Patient is comfortable. The patient's pain is controlled using the prescribed pain medications. Denies nausea, vomiting, chest pain, shortness of breath, abdominal pain or fever. No new complaints.    MEDICATIONS  (STANDING):  docusate sodium 100milliGRAM(s) Oral three times a day  ferrous    sulfate 325milliGRAM(s) Oral three times a day with meals  folic acid 1milliGRAM(s) Oral daily  multivitamin 1Tablet(s) Oral daily  insulin lispro (HumaLOG) corrective regimen sliding scale  SubCutaneous Before meals and at bedtime  dextrose 50% Injectable 12.5Gram(s) IV Push once  dextrose 50% Injectable 25Gram(s) IV Push once  dextrose 50% Injectable 25Gram(s) IV Push once  meropenem IVPB 500milliGRAM(s) IV Intermittent every 12 hours  senna 2Tablet(s) Oral at bedtime  enoxaparin Injectable 30milliGRAM(s) SubCutaneous every 24 hours  pantoprazole    Tablet 40milliGRAM(s) Oral two times a day before meals  metoprolol 12.5milliGRAM(s) Oral every 12 hours  DAPTOmycin IVPB 750milliGRAM(s) IV Intermittent every 24 hours  silver sulfADIAZINE 1% Cream 1Application(s) Topical two times a day  warfarin 3milliGRAM(s) Oral once    MEDICATIONS  (PRN):  acetaminophen   Tablet 650milliGRAM(s) Oral every 6 hours PRN For Temp over 38.3 C (100.94 F)  aluminum hydroxide/magnesium hydroxide/simethicone Suspension 30milliLiter(s) Oral four times a day PRN Indigestion  ondansetron Injectable 4milliGRAM(s) IV Push every 6 hours PRN Nausea and/or Vomiting  dextrose Gel 1Dose(s) Oral once PRN Blood Glucose LESS THAN 70 milliGRAM(s)/deciliter  glucagon  Injectable 1milliGRAM(s) IntraMuscular once PRN Glucose LESS THAN 70 milligrams/deciliter  polyethylene glycol 3350 17Gram(s) Oral daily PRN Constipation  acetaminophen   Tablet. 650milliGRAM(s) Oral every 6 hours PRN mild to mod    Physical exam: The right knee dressing are changed.  No active  drainage noted to RLE. New dressings placed with SIILVADENE AND DRY DRESSINGS. The calf is supple nontender. . No calf tenderness. Sensation to light touch is grossly intact distally. Motor function distally is 5/5. Extensor hallucis longus and flexor hallucis longus are intact. No foot drop. 2+ dorsalis pedis pulse. Capillary refill is less than 2 seconds. No cyanosis.  SIGRID drain output overnight 200    Primary Orthopedic Assessment:  • s/p RIGHT KNEE REVISION WITH CEMENT SPACER    Plan:   • DVT prophylaxis as prescribed, including use of compression devices and ankle pumps  • Continue physical therapy  • Weightbearing as tolerated of the right lower extremity with assistance of a walker and use of alyce brace  • Incentive spirometry encouraged  • Pain control as clinically indicated  • Silvadene and dressing changes BID  • monitor drain output  • Continue IV Abx  • Discharge planning

## 2017-03-14 NOTE — PROGRESS NOTE ADULT - ASSESSMENT
72y Female is status post right total knee revision with cement spacer, on 2/22, POD#14.  continue dapto molina will follow up  PLANM IV ABX THROUGH 4/8/17

## 2017-03-14 NOTE — PROGRESS NOTE ADULT - SUBJECTIVE AND OBJECTIVE BOX
seen for rt knee revision    pain ok  no cp/sob  ROS otherwise negative     MEDICATIONS  (STANDING):  docusate sodium 100milliGRAM(s) Oral three times a day  ferrous    sulfate 325milliGRAM(s) Oral three times a day with meals  folic acid 1milliGRAM(s) Oral daily  multivitamin 1Tablet(s) Oral daily  insulin lispro (HumaLOG) corrective regimen sliding scale  SubCutaneous Before meals and at bedtime  dextrose 50% Injectable 12.5Gram(s) IV Push once  dextrose 50% Injectable 25Gram(s) IV Push once  dextrose 50% Injectable 25Gram(s) IV Push once  meropenem IVPB 500milliGRAM(s) IV Intermittent every 12 hours  senna 2Tablet(s) Oral at bedtime  pantoprazole    Tablet 40milliGRAM(s) Oral two times a day before meals  metoprolol 12.5milliGRAM(s) Oral every 12 hours  DAPTOmycin IVPB 750milliGRAM(s) IV Intermittent every 24 hours  silver sulfADIAZINE 1% Cream 1Application(s) Topical two times a day  warfarin 3milliGRAM(s) Oral once    MEDICATIONS  (PRN):  acetaminophen   Tablet 650milliGRAM(s) Oral every 6 hours PRN For Temp over 38.3 C (100.94 F)  aluminum hydroxide/magnesium hydroxide/simethicone Suspension 30milliLiter(s) Oral four times a day PRN Indigestion  ondansetron Injectable 4milliGRAM(s) IV Push every 6 hours PRN Nausea and/or Vomiting  dextrose Gel 1Dose(s) Oral once PRN Blood Glucose LESS THAN 70 milliGRAM(s)/deciliter  glucagon  Injectable 1milliGRAM(s) IntraMuscular once PRN Glucose LESS THAN 70 milligrams/deciliter  polyethylene glycol 3350 17Gram(s) Oral daily PRN Constipation  acetaminophen   Tablet. 650milliGRAM(s) Oral every 6 hours PRN mild to mod      Allergies    No Known Allergies    Intolerances      Vital Signs Last 24 Hrs  T(C): 36.6, Max: 37 (03-13 @ 17:50)  T(F): 97.8, Max: 98.6 (03-13 @ 17:50)  HR: 86 (86 - 98)  BP: 137/76 (130/76 - 137/76)  BP(mean): --  RR: 18 (18 - 18)  SpO2: 99% (99% - 100%)    PHYSICAL EXAM:    GENERAL: NAD, obese  CHEST/LUNG: Clear to percussion bilaterally  HEART: Regular rate and rhythm; S1 S2; no murmurs noted  ABDOMEN: Soft, Nontender, Nondistended; Bowel sounds present  EXTREMITIES:  + drain right knee, + bandage   NERVOUS SYSTEM:  Alert & Oriented X3, nonfocal  PSYCH: normal mood, appropriate response.    LABS:    13 Mar 2017 08:23    140    |  103    |  29.0   ----------------------------<  101    4.1     |  29.0   |  2.16     Ca    8.6        13 Mar 2017 08:23      PT/INR - ( 13 Mar 2017 08:23 )   PT: 25.7 sec;   INR: 2.32 ratio         PTT - ( 13 Mar 2017 08:23 )  PTT:42.1 sec      CAPILLARY BLOOD GLUCOSE  90 (14 Mar 2017 11:41)  93 (14 Mar 2017 08:11)  108 (13 Mar 2017 22:12)  120 (13 Mar 2017 16:17)        RADIOLOGY & ADDITIONAL TESTS:

## 2017-03-15 LAB
APTT BLD: 47.8 SEC — HIGH (ref 27.5–37.4)
HCT VFR BLD CALC: 24.8 % — LOW (ref 37–47)
HGB BLD-MCNC: 8.3 G/DL — LOW (ref 12–16)
INR BLD: 3.62 RATIO — HIGH (ref 0.88–1.16)
MCHC RBC-ENTMCNC: 30.5 PG — SIGNIFICANT CHANGE UP (ref 27–31)
MCHC RBC-ENTMCNC: 33.5 G/DL — SIGNIFICANT CHANGE UP (ref 32–36)
MCV RBC AUTO: 91.2 FL — SIGNIFICANT CHANGE UP (ref 81–99)
PLATELET # BLD AUTO: 290 K/UL — SIGNIFICANT CHANGE UP (ref 150–400)
PROTHROM AB SERPL-ACNC: 40.3 SEC — HIGH (ref 10–13.1)
RBC # BLD: 2.72 M/UL — LOW (ref 4.4–5.2)
RBC # FLD: 19.8 % — HIGH (ref 11–15.6)
WBC # BLD: 9.4 K/UL — SIGNIFICANT CHANGE UP (ref 4.8–10.8)
WBC # FLD AUTO: 9.4 K/UL — SIGNIFICANT CHANGE UP (ref 4.8–10.8)

## 2017-03-15 PROCEDURE — 99233 SBSQ HOSP IP/OBS HIGH 50: CPT

## 2017-03-15 RX ORDER — PHYTONADIONE (VIT K1) 5 MG
2.5 TABLET ORAL ONCE
Qty: 0 | Refills: 0 | Status: COMPLETED | OUTPATIENT
Start: 2017-03-15 | End: 2017-03-15

## 2017-03-15 RX ADMIN — Medication 325 MILLIGRAM(S): at 18:40

## 2017-03-15 RX ADMIN — DAPTOMYCIN 130 MILLIGRAM(S): 500 INJECTION, POWDER, LYOPHILIZED, FOR SOLUTION INTRAVENOUS at 18:44

## 2017-03-15 RX ADMIN — Medication 1 APPLICATION(S): at 18:41

## 2017-03-15 RX ADMIN — Medication 650 MILLIGRAM(S): at 18:40

## 2017-03-15 RX ADMIN — Medication 2.5 MILLIGRAM(S): at 12:09

## 2017-03-15 RX ADMIN — Medication 325 MILLIGRAM(S): at 12:04

## 2017-03-15 RX ADMIN — Medication 1 MILLIGRAM(S): at 12:09

## 2017-03-15 RX ADMIN — SENNA PLUS 2 TABLET(S): 8.6 TABLET ORAL at 22:54

## 2017-03-15 RX ADMIN — PANTOPRAZOLE SODIUM 40 MILLIGRAM(S): 20 TABLET, DELAYED RELEASE ORAL at 06:14

## 2017-03-15 RX ADMIN — Medication 1 APPLICATION(S): at 08:13

## 2017-03-15 RX ADMIN — Medication 325 MILLIGRAM(S): at 08:29

## 2017-03-15 RX ADMIN — MEROPENEM 200 MILLIGRAM(S): 1 INJECTION INTRAVENOUS at 18:52

## 2017-03-15 RX ADMIN — Medication 1 TABLET(S): at 12:04

## 2017-03-15 RX ADMIN — Medication 650 MILLIGRAM(S): at 19:30

## 2017-03-15 RX ADMIN — MEROPENEM 200 MILLIGRAM(S): 1 INJECTION INTRAVENOUS at 06:12

## 2017-03-15 RX ADMIN — PANTOPRAZOLE SODIUM 40 MILLIGRAM(S): 20 TABLET, DELAYED RELEASE ORAL at 18:51

## 2017-03-15 RX ADMIN — Medication 12.5 MILLIGRAM(S): at 06:13

## 2017-03-15 RX ADMIN — Medication 12.5 MILLIGRAM(S): at 18:49

## 2017-03-15 NOTE — PROGRESS NOTE ADULT - ASSESSMENT
Improved KARMEN  Cr overall stable  CKD IV diabetic nephropathy  Avoid nephrotoxic agents renally dose meds   Anemia - S/P Transfusion

## 2017-03-15 NOTE — PROGRESS NOTE ADULT - SUBJECTIVE AND OBJECTIVE BOX
S/W Dr. Ritchie:    Dr. Ritchie and Dr. Rey discussed plan, agrees with Dr. Parekh plan to return patient to OR on friday. Will do necessary pre op tomorrow, Dr. Carson aware to clear patient for upcoming surgery

## 2017-03-15 NOTE — PROGRESS NOTE ADULT - SUBJECTIVE AND OBJECTIVE BOX
Pt continues to progress slowly.  She has not been OOB.    Afebrile  SIGRID continues to put out significant amounts of serosang fluid.  No purulence.    Wound has 3cm area that is beginning to demarcate as full thickness eschar.  No dehiscence or cellulitis.      A/P; Continue silvadene, abx, drains, observation for now.    If no significant improvement in the wound, she may need repeat I&D.  Will continue to follow.

## 2017-03-15 NOTE — PROGRESS NOTE ADULT - SUBJECTIVE AND OBJECTIVE BOX
seen for R knee revision    no acute complaints  pain ok  no cp/sob  ROS otherwise negative.     MEDICATIONS  (STANDING):  docusate sodium 100milliGRAM(s) Oral three times a day  ferrous    sulfate 325milliGRAM(s) Oral three times a day with meals  folic acid 1milliGRAM(s) Oral daily  multivitamin 1Tablet(s) Oral daily  insulin lispro (HumaLOG) corrective regimen sliding scale  SubCutaneous Before meals and at bedtime  dextrose 50% Injectable 12.5Gram(s) IV Push once  dextrose 50% Injectable 25Gram(s) IV Push once  dextrose 50% Injectable 25Gram(s) IV Push once  meropenem IVPB 500milliGRAM(s) IV Intermittent every 12 hours  senna 2Tablet(s) Oral at bedtime  pantoprazole    Tablet 40milliGRAM(s) Oral two times a day before meals  metoprolol 12.5milliGRAM(s) Oral every 12 hours  DAPTOmycin IVPB 750milliGRAM(s) IV Intermittent every 24 hours  silver sulfADIAZINE 1% Cream 1Application(s) Topical two times a day    MEDICATIONS  (PRN):  acetaminophen   Tablet 650milliGRAM(s) Oral every 6 hours PRN For Temp over 38.3 C (100.94 F)  aluminum hydroxide/magnesium hydroxide/simethicone Suspension 30milliLiter(s) Oral four times a day PRN Indigestion  ondansetron Injectable 4milliGRAM(s) IV Push every 6 hours PRN Nausea and/or Vomiting  dextrose Gel 1Dose(s) Oral once PRN Blood Glucose LESS THAN 70 milliGRAM(s)/deciliter  glucagon  Injectable 1milliGRAM(s) IntraMuscular once PRN Glucose LESS THAN 70 milligrams/deciliter  polyethylene glycol 3350 17Gram(s) Oral daily PRN Constipation  acetaminophen   Tablet. 650milliGRAM(s) Oral every 6 hours PRN mild to mod      Allergies    No Known Allergies    Intolerances      Vital Signs Last 24 Hrs  T(C): 36.9, Max: 37.3 (03-14 @ 16:25)  T(F): 98.5, Max: 99.2 (03-14 @ 16:25)  HR: 92 (92 - 105)  BP: 128/70 (128/70 - 128/70)  BP(mean): --  RR: 18 (18 - 18)  SpO2: 100% (97% - 100%)    PHYSICAL EXAM:    GENERAL: NAD, obese  CHEST/LUNG: Clear to percussion bilaterally  HEART: Regular rate and rhythm; S1 S2; no murmurs noted  ABDOMEN: soft +BS  EXTREMITIES:  no edema LE, + drain R knee with drainage  NERVOUS SYSTEM:  Alert & Oriented X3, nonfocal  PSYCH: normal mood, appropriate response.    LABS:                        8.3    9.4   )-----------( 290      ( 15 Mar 2017 08:28 )             24.8           PT/INR - ( 15 Mar 2017 08:28 )   PT: 40.3 sec;   INR: 3.62 ratio         PTT - ( 15 Mar 2017 08:28 )  PTT:47.8 sec      CAPILLARY BLOOD GLUCOSE  89 (15 Mar 2017 12:16)  93 (15 Mar 2017 08:30)  113 (14 Mar 2017 21:35)  114 (14 Mar 2017 16:41)        RADIOLOGY & ADDITIONAL TESTS:

## 2017-03-15 NOTE — PROGRESS NOTE ADULT - SUBJECTIVE AND OBJECTIVE BOX
SHYAM WONGMORE    [id number]    History: 72y Female is status post right total knee revision with removal of hardware and placement of antibiotic spacer, on 2/27, POD#16.  . Patient is comfortable in bed.  The patient's pain is controlled using the prescribed pain medications. Denies nausea, vomiting, chest pain, shortness of breath, abdominal pain or fever. No new complaints.          13 Mar 2017 08:23    140    |  103    |  29.0   ----------------------------<  101    4.1     |  29.0   |  2.16     Ca    8.6        13 Mar 2017 08:23        MEDICATIONS  (STANDING):  docusate sodium 100milliGRAM(s) Oral three times a day  ferrous    sulfate 325milliGRAM(s) Oral three times a day with meals  folic acid 1milliGRAM(s) Oral daily  multivitamin 1Tablet(s) Oral daily  insulin lispro (HumaLOG) corrective regimen sliding scale  SubCutaneous Before meals and at bedtime  dextrose 50% Injectable 12.5Gram(s) IV Push once  dextrose 50% Injectable 25Gram(s) IV Push once  dextrose 50% Injectable 25Gram(s) IV Push once  meropenem IVPB 500milliGRAM(s) IV Intermittent every 12 hours  senna 2Tablet(s) Oral at bedtime  pantoprazole    Tablet 40milliGRAM(s) Oral two times a day before meals  metoprolol 12.5milliGRAM(s) Oral every 12 hours  DAPTOmycin IVPB 750milliGRAM(s) IV Intermittent every 24 hours  silver sulfADIAZINE 1% Cream 1Application(s) Topical two times a day    MEDICATIONS  (PRN):  acetaminophen   Tablet 650milliGRAM(s) Oral every 6 hours PRN For Temp over 38.3 C (100.94 F)  aluminum hydroxide/magnesium hydroxide/simethicone Suspension 30milliLiter(s) Oral four times a day PRN Indigestion  ondansetron Injectable 4milliGRAM(s) IV Push every 6 hours PRN Nausea and/or Vomiting  dextrose Gel 1Dose(s) Oral once PRN Blood Glucose LESS THAN 70 milliGRAM(s)/deciliter  glucagon  Injectable 1milliGRAM(s) IntraMuscular once PRN Glucose LESS THAN 70 milligrams/deciliter  polyethylene glycol 3350 17Gram(s) Oral daily PRN Constipation  acetaminophen   Tablet. 650milliGRAM(s) Oral every 6 hours PRN mild to mod      Physical exam: The right knee dressing is clean, dry and intact.  .Drain still remains in place  400cc output over the last 24 hours.   No drainage or discharge from incision.  new dressings placed with sulfadiazine cream applied twice daily with dry dressings.   No erythema is noted. No blistering. No ecchymosis. The calf is supple nontender.  No calf tenderness. Sensation to light touch is grossly intact distally.   No foot drop. 2+ dorsalis pedis pulse. Capillary refill is less than 2 seconds. No cyanosis.    Primary Orthopedic Assessment:  • s/p RIGHT total knee removal of hardware and implantation of cement spacer.      Secondary  Orthopedic Assessment(s):   •     Secondary  Medical Assessment(s):   •     Plan:   • DVT prophylaxis as prescribed, including use of compression devices and ankle pumps  • Weightbearing as tolerated of the right lower extremity in alyce brace,  with assistance of a walker  • Incentive spirometry encouraged  • Pain control as clinically indicated  Dressing changes twice daily with application of sulfadiazine cream  • Discharge planning –

## 2017-03-15 NOTE — PROGRESS NOTE ADULT - SUBJECTIVE AND OBJECTIVE BOX
NEPHROLOGY INTERVAL HPI/OVERNIGHT EVENTS:    Examined earlier  Looks comfortable    MEDICATIONS  (STANDING):  docusate sodium 100milliGRAM(s) Oral three times a day  ferrous    sulfate 325milliGRAM(s) Oral three times a day with meals  folic acid 1milliGRAM(s) Oral daily  multivitamin 1Tablet(s) Oral daily  insulin lispro (HumaLOG) corrective regimen sliding scale  SubCutaneous Before meals and at bedtime  dextrose 50% Injectable 12.5Gram(s) IV Push once  dextrose 50% Injectable 25Gram(s) IV Push once  dextrose 50% Injectable 25Gram(s) IV Push once  meropenem IVPB 500milliGRAM(s) IV Intermittent every 12 hours  senna 2Tablet(s) Oral at bedtime  pantoprazole    Tablet 40milliGRAM(s) Oral two times a day before meals  metoprolol 12.5milliGRAM(s) Oral every 12 hours  DAPTOmycin IVPB 750milliGRAM(s) IV Intermittent every 24 hours  silver sulfADIAZINE 1% Cream 1Application(s) Topical two times a day    MEDICATIONS  (PRN):  acetaminophen   Tablet 650milliGRAM(s) Oral every 6 hours PRN For Temp over 38.3 C (100.94 F)  aluminum hydroxide/magnesium hydroxide/simethicone Suspension 30milliLiter(s) Oral four times a day PRN Indigestion  ondansetron Injectable 4milliGRAM(s) IV Push every 6 hours PRN Nausea and/or Vomiting  dextrose Gel 1Dose(s) Oral once PRN Blood Glucose LESS THAN 70 milliGRAM(s)/deciliter  glucagon  Injectable 1milliGRAM(s) IntraMuscular once PRN Glucose LESS THAN 70 milligrams/deciliter  polyethylene glycol 3350 17Gram(s) Oral daily PRN Constipation  acetaminophen   Tablet. 650milliGRAM(s) Oral every 6 hours PRN mild to mod      Allergies    No Known Allergies    Intolerances        Vital Signs Last 24 Hrs  T(C): 36.9, Max: 37.3 (03-14 @ 16:25)  T(F): 98.5, Max: 99.2 (03-14 @ 16:25)  HR: 92 (92 - 105)  BP: 128/70 (128/70 - 128/70)  BP(mean): --  RR: 18 (18 - 18)  SpO2: 100% (97% - 100%)  Daily     Daily Weight in k.2 (15 Mar 2017 09:08)    PHYSICAL EXAM:  HEENT: EOMI  Neck: supple no JVD  CHEST/LUNG: CTA B/L   HEART: S1S2+ audible  ABDOMEN: Soft NDNT + BS  EXTREMITIES:  +LE edema        LABS:                        8.3    9.4   )-----------( 290      ( 15 Mar 2017 08:28 )             24.8           PT/INR - ( 15 Mar 2017 08:28 )   PT: 40.3 sec;   INR: 3.62 ratio         PTT - ( 15 Mar 2017 08:28 )  PTT:47.8 sec            RADIOLOGY & ADDITIONAL TESTS:

## 2017-03-15 NOTE — PROGRESS NOTE ADULT - PROBLEM SELECTOR PLAN 1
s/p  PICC placement  - continue Daptomycin 750mg q24H  - continue Meropenem 500m,g Q12h  ANTIBIOTICS through 4/8/17.   plastics/ortho following--may need 2nd drain

## 2017-03-16 LAB
ANION GAP SERPL CALC-SCNC: 10 MMOL/L — SIGNIFICANT CHANGE UP (ref 5–17)
APTT BLD: 36.8 SEC — SIGNIFICANT CHANGE UP (ref 27.5–37.4)
BUN SERPL-MCNC: 29 MG/DL — HIGH (ref 8–20)
CALCIUM SERPL-MCNC: 8.3 MG/DL — LOW (ref 8.6–10.2)
CHLORIDE SERPL-SCNC: 107 MMOL/L — SIGNIFICANT CHANGE UP (ref 98–107)
CO2 SERPL-SCNC: 27 MMOL/L — SIGNIFICANT CHANGE UP (ref 22–29)
CREAT SERPL-MCNC: 2.05 MG/DL — HIGH (ref 0.5–1.3)
GLUCOSE SERPL-MCNC: 92 MG/DL — SIGNIFICANT CHANGE UP (ref 70–115)
HCT VFR BLD CALC: 25 % — LOW (ref 37–47)
HGB BLD-MCNC: 8.3 G/DL — LOW (ref 12–16)
INR BLD: 1.77 RATIO — HIGH (ref 0.88–1.16)
MCHC RBC-ENTMCNC: 30 PG — SIGNIFICANT CHANGE UP (ref 27–31)
MCHC RBC-ENTMCNC: 33.2 G/DL — SIGNIFICANT CHANGE UP (ref 32–36)
MCV RBC AUTO: 90.3 FL — SIGNIFICANT CHANGE UP (ref 81–99)
PLATELET # BLD AUTO: 322 K/UL — SIGNIFICANT CHANGE UP (ref 150–400)
POTASSIUM SERPL-MCNC: 4 MMOL/L — SIGNIFICANT CHANGE UP (ref 3.5–5.3)
POTASSIUM SERPL-SCNC: 4 MMOL/L — SIGNIFICANT CHANGE UP (ref 3.5–5.3)
PROTHROM AB SERPL-ACNC: 19.6 SEC — HIGH (ref 10–13.1)
RBC # BLD: 2.77 M/UL — LOW (ref 4.4–5.2)
RBC # FLD: 19.6 % — HIGH (ref 11–15.6)
SODIUM SERPL-SCNC: 144 MMOL/L — SIGNIFICANT CHANGE UP (ref 135–145)
WBC # BLD: 10.4 K/UL — SIGNIFICANT CHANGE UP (ref 4.8–10.8)
WBC # FLD AUTO: 10.4 K/UL — SIGNIFICANT CHANGE UP (ref 4.8–10.8)

## 2017-03-16 PROCEDURE — 99233 SBSQ HOSP IP/OBS HIGH 50: CPT

## 2017-03-16 RX ADMIN — DAPTOMYCIN 130 MILLIGRAM(S): 500 INJECTION, POWDER, LYOPHILIZED, FOR SOLUTION INTRAVENOUS at 15:54

## 2017-03-16 RX ADMIN — Medication 1 APPLICATION(S): at 17:11

## 2017-03-16 RX ADMIN — Medication 325 MILLIGRAM(S): at 12:07

## 2017-03-16 RX ADMIN — PANTOPRAZOLE SODIUM 40 MILLIGRAM(S): 20 TABLET, DELAYED RELEASE ORAL at 06:33

## 2017-03-16 RX ADMIN — Medication 650 MILLIGRAM(S): at 15:49

## 2017-03-16 RX ADMIN — Medication 1 TABLET(S): at 12:10

## 2017-03-16 RX ADMIN — Medication 325 MILLIGRAM(S): at 12:10

## 2017-03-16 RX ADMIN — Medication 12.5 MILLIGRAM(S): at 06:38

## 2017-03-16 RX ADMIN — Medication 650 MILLIGRAM(S): at 17:08

## 2017-03-16 RX ADMIN — Medication 1 MILLIGRAM(S): at 12:10

## 2017-03-16 RX ADMIN — Medication 1 APPLICATION(S): at 06:33

## 2017-03-16 RX ADMIN — PANTOPRAZOLE SODIUM 40 MILLIGRAM(S): 20 TABLET, DELAYED RELEASE ORAL at 16:16

## 2017-03-16 RX ADMIN — SENNA PLUS 2 TABLET(S): 8.6 TABLET ORAL at 21:47

## 2017-03-16 RX ADMIN — Medication 325 MILLIGRAM(S): at 17:10

## 2017-03-16 RX ADMIN — Medication 12.5 MILLIGRAM(S): at 17:10

## 2017-03-16 RX ADMIN — MEROPENEM 200 MILLIGRAM(S): 1 INJECTION INTRAVENOUS at 06:32

## 2017-03-16 RX ADMIN — Medication 100 MILLIGRAM(S): at 21:47

## 2017-03-16 RX ADMIN — MEROPENEM 200 MILLIGRAM(S): 1 INJECTION INTRAVENOUS at 17:11

## 2017-03-16 NOTE — PROGRESS NOTE ADULT - SUBJECTIVE AND OBJECTIVE BOX
seen for right knee dehiscence    no acute complaints. no cp/sob  pain ok  ROS otherwise negative     MEDICATIONS  (STANDING):  docusate sodium 100milliGRAM(s) Oral three times a day  ferrous    sulfate 325milliGRAM(s) Oral three times a day with meals  folic acid 1milliGRAM(s) Oral daily  multivitamin 1Tablet(s) Oral daily  insulin lispro (HumaLOG) corrective regimen sliding scale  SubCutaneous Before meals and at bedtime  dextrose 50% Injectable 12.5Gram(s) IV Push once  dextrose 50% Injectable 25Gram(s) IV Push once  dextrose 50% Injectable 25Gram(s) IV Push once  meropenem IVPB 500milliGRAM(s) IV Intermittent every 12 hours  senna 2Tablet(s) Oral at bedtime  pantoprazole    Tablet 40milliGRAM(s) Oral two times a day before meals  metoprolol 12.5milliGRAM(s) Oral every 12 hours  DAPTOmycin IVPB 750milliGRAM(s) IV Intermittent every 24 hours  silver sulfADIAZINE 1% Cream 1Application(s) Topical two times a day    MEDICATIONS  (PRN):  acetaminophen   Tablet 650milliGRAM(s) Oral every 6 hours PRN For Temp over 38.3 C (100.94 F)  aluminum hydroxide/magnesium hydroxide/simethicone Suspension 30milliLiter(s) Oral four times a day PRN Indigestion  ondansetron Injectable 4milliGRAM(s) IV Push every 6 hours PRN Nausea and/or Vomiting  dextrose Gel 1Dose(s) Oral once PRN Blood Glucose LESS THAN 70 milliGRAM(s)/deciliter  glucagon  Injectable 1milliGRAM(s) IntraMuscular once PRN Glucose LESS THAN 70 milligrams/deciliter  polyethylene glycol 3350 17Gram(s) Oral daily PRN Constipation  acetaminophen   Tablet. 650milliGRAM(s) Oral every 6 hours PRN mild to mod      Allergies    No Known Allergies    Intolerances    Vital Signs Last 24 Hrs  T(C): 37.1, Max: 37.3 (03-15 @ 12:16)  T(F): 98.7, Max: 99.2 (03-15 @ 12:16)  HR: 95 (95 - 97)  BP: 140/82 (140/82 - 140/82)  BP(mean): --  RR: 18 (18 - 18)  SpO2: 98% (97% - 98%)    PHYSICAL EXAM:    GENERAL: NAD  CHEST/LUNG: Clear to percussion bilaterally  HEART: Regular rate and rhythm; S1 S2  ABDOMEN: Soft, Nontender, Nondistended; Bowel sounds present  EXTREMITIES:+ right knee drain with bloody discharge  NERVOUS SYSTEM:  Alert & Oriented X3, nonfocal  PSYCH: normal mood, appropriate response.    LABS:                        8.3    10.4  )-----------( 322      ( 16 Mar 2017 08:37 )             25.0     16 Mar 2017 08:37    144    |  107    |  29.0   ----------------------------<  92     4.0     |  27.0   |  2.05     Ca    8.3        16 Mar 2017 08:37      PT/INR - ( 16 Mar 2017 08:37 )   PT: 19.6 sec;   INR: 1.77 ratio         PTT - ( 16 Mar 2017 08:37 )  PTT:36.8 sec      CAPILLARY BLOOD GLUCOSE  91 (15 Mar 2017 23:14)  89 (15 Mar 2017 12:16)        RADIOLOGY & ADDITIONAL TESTS:

## 2017-03-16 NOTE — PHYSICAL THERAPY INITIAL EVALUATION ADULT - FOLLOWS COMMANDS/ANSWERS QUESTIONS, REHAB EVAL
75% of the time/able to follow single-step instructions
75% of the time/able to follow single-step instructions

## 2017-03-16 NOTE — PHYSICAL THERAPY INITIAL EVALUATION ADULT - ADDITIONAL COMMENTS
From a subacute rehab, prior to that but lived c daughter in 2-story house at some point with 4 steps to enter and staying on 1st floor. was independent short distances, owns and uses a RW
pt was vague and perseverative responding to social hx questions. states she lives at [facility] at this time with her sister, but will be going to a 2-story house at some point with 4 steps to enter and staying on 1st floor.

## 2017-03-16 NOTE — PHYSICAL THERAPY INITIAL EVALUATION ADULT - MANUAL MUSCLE TESTING RESULTS, REHAB EVAL
right shoulder flex 3+/5, elbow flex 4/5, hip flex 2-/5, knee NT due to precautions, ankle df 3+/5;;right shoulder flex 4/5, elbow flex 4/5, hip flex 3-/5, knee 2+/5ankle df 2-/5
right shoulder flex 3+/5, elbow flex 4/5, hip flex 2-/5, knee NT due to precautions, ankle df 3+/5;;right shoulder flex 4/5, elbow flex 4/5, hip flex 3-/5, knee flex 2-/5, ankle df 2-/5

## 2017-03-16 NOTE — PHYSICAL THERAPY INITIAL EVALUATION ADULT - GENERAL OBSERVATIONS, REHAB EVAL
supine in bed. +iv; +right knee immobilizer, daughter present
awake in bed. +iv; +; +right knee immobilizer

## 2017-03-16 NOTE — PHYSICAL THERAPY INITIAL EVALUATION ADULT - CRITERIA FOR SKILLED THERAPEUTIC INTERVENTIONS
predicted duration of therapy intervention/functional limitations in following categories/anticipated discharge recommendation/rehab potential/therapy frequency/impairments found
rehab potential/impairments found/anticipated equipment needs at discharge/risk reduction/prevention/therapy frequency/functional limitations in following categories/anticipated discharge recommendation

## 2017-03-16 NOTE — PROGRESS NOTE ADULT - ASSESSMENT
Improved KARMEN on CKD , DM ---> baseline creat 1.8 , renal function better   Normal kidneys on NCCT   Debridement / dehis ---> Abx as per ID   Ortho follow up noted   Continue IVF   follow labs   ? Antecedent AIN , ATN ?     Anemia - S/P Transfusion , watch CBC

## 2017-03-16 NOTE — PHYSICAL THERAPY INITIAL EVALUATION ADULT - PLANNED THERAPY INTERVENTIONS, PT EVAL
bed mobility training/balance training/strengthening/transfer training/gait training
gait training/transfer training/strengthening/balance training/bed mobility training

## 2017-03-16 NOTE — PROGRESS NOTE ADULT - SUBJECTIVE AND OBJECTIVE BOX
History: 72y Female is status post right total knee revision with cement spacer, on 2/22, POD#17. Patient is comfortable. The patient's pain is controlled using the prescribed pain medications. Denies nausea, vomiting, chest pain, shortness of breath, abdominal pain or fever. No new complaints.    Physical exam: The right knee dressing are changed.  No active  drainage noted to RLE. Anterior knee superficial wound medial aspect of mid-incision noted with jagged macerated edges. New dressings placed with SIILVADENE AND DRY DRESSINGS. SIGRID drain output overnight 195ccs    Primary Orthopedic Assessment:  • s/p RIGHT KNEE REVISION WITH CEMENT SPACER POD#17    Plan:   ·	DVT prophylaxis as prescribed, including use of compression devices and ankle pumps  ·	Continue physical therapy - Weightbearing as tolerated of the right lower extremity in alyce brace locked in extension with assistance of a walker  ·	Pain control as clinically indicated  ·	Silvadene and dressing changes BID  ·	monitor drain output  ·	Continue IV Abx  ·	Plan for OR tomorrow Friday 3/17/17   ·	NPO/IVF overnight History: 72y Female is status post right total knee revision with cement spacer, on 2/22, POD#17. Patient is comfortable. The patient's pain is controlled using the prescribed pain medications. Denies nausea, vomiting, chest pain, shortness of breath, abdominal pain or fever. No new complaints.    Physical exam: The right knee dressing are changed.  No active  drainage noted to RLE. Anterior knee superficial wound medial aspect of mid-incision noted with jagged macerated edges. New dressings placed with SIILVADENE AND DRY DRESSINGS. SIGRID drain output overnight 195ccs    Primary Orthopedic Assessment:  • s/p RIGHT KNEE REVISION WITH CEMENT SPACER POD#17    Plan:   ·	DVT prophylaxis as prescribed, including use of compression devices and ankle pumps  ·	Continue physical therapy - Weightbearing as tolerated of the right lower extremity in alyce brace locked in extension with assistance of a walker  ·	Pain control as clinically indicated  ·	Silvadene and dressing changes BID  ·	monitor drain output  ·	Continue IV Abx  ·	Plan for OR tomorrow Friday 3/17/17 pending INR, vit K given yesterday as per primary team  ·	PRBC on Hold for OR  ·	NPO/IVF overnight History: 72y Female is status post right total knee revision with cement spacer, on 2/22, POD#17. Patient is comfortable. The patient's pain is controlled using the prescribed pain medications. Denies nausea, vomiting, chest pain, shortness of breath, abdominal pain or fever. No new complaints.    Physical exam: The right knee dressing are changed.  No active  drainage noted to RLE. Anterior knee superficial wound medial aspect of mid-incision noted with jagged macerated edges. New dressings placed with SIILVADENE AND DRY DRESSINGS. SIGRID drain output overnight 195ccs    Primary Orthopedic Assessment:  • s/p RIGHT KNEE REVISION WITH CEMENT SPACER POD#17    Plan:   ·	DVT prophylaxis as prescribed, including use of compression devices and ankle pumps  ·	Continue physical therapy - Weightbearing as tolerated of the right lower extremity in alyce brace locked in extension with assistance of a walker  ·	Pain control as clinically indicated  ·	Silvadene and dressing changes BID  ·	monitor drain output  ·	Continue IV Abx  ·	Plan for OR tomorrow Friday 3/17/17 pending INR, vit K given yesterday as per primary team  ·	PRBC on Hold for OR  ·	NPO/IVF overnight  ·	Pending PICC placement (#2) History: 72y Female is status post right total knee revision with cement spacer, on 2/22, POD#17. Patient is comfortable. The patient's pain is controlled using the prescribed pain medications. Denies nausea, vomiting, chest pain, shortness of breath, abdominal pain or fever. No new complaints.    Physical exam: The right knee dressing are changed.  No active  drainage noted to RLE. Anterior knee superficial wound medial aspect of mid-incision noted with jagged macerated edges. New dressings placed with SIILVADENE AND DRY DRESSINGS. SIGRID drain output overnight 195ccs    PT/INR - ( 16 Mar 2017 08:37 )   PT: 19.6 sec;   INR: 1.77 ratio         PTT - ( 16 Mar 2017 08:37 )  PTT:36.8 sec    Primary Orthopedic Assessment:  • s/p RIGHT KNEE REVISION WITH CEMENT SPACER POD#17    Plan:   ·	DVT prophylaxis as prescribed, including use of compression devices and ankle pumps  ·	Continue physical therapy - Weightbearing as tolerated of the right lower extremity in alyce brace locked in extension with assistance of a walker  ·	Pain control as clinically indicated  ·	Silvadene and dressing changes BID  ·	monitor drain output  ·	Continue IV Abx  ·	Plan for OR tomorrow Friday 3/17/17 pending INR, vit K given yesterday as per primary team  ·	PRBC on Hold for OR  ·	NPO/IVF overnight  ·	Pending PICC placement (#2)

## 2017-03-16 NOTE — PROGRESS NOTE ADULT - PROBLEM SELECTOR PLAN 1
s/p  PICC placement, requires replacement due to malposition  - continue Daptomycin 750mg q24H  - continue Meropenem 500m,g Q12h  ANTIBIOTICS through 4/8/17.   plastics/ortho following-- plan for REPEAT I&D Fri  no absolute contraindication for proposed procedure, inherent risks of surgery/anesthesia

## 2017-03-16 NOTE — PHYSICAL THERAPY INITIAL EVALUATION ADULT - BED MOBILITY LIMITATIONS, REHAB EVAL
alyce brace in place, unsafe to proceed at this point due to pain and strength deficits, decreased activity tolerance, pt requesting to go back into bed

## 2017-03-16 NOTE — PHYSICAL THERAPY INITIAL EVALUATION ADULT - MUSCLE TONE ASSESSMENT, REHAB EVAL
bilateral lower extremities/bilateral upper extremities/normal
bilateral lower extremities/normal/bilateral upper extremities

## 2017-03-16 NOTE — PHYSICAL THERAPY INITIAL EVALUATION ADULT - PERTINENT HX OF CURRENT PROBLEM, REHAB EVAL
71 y F hx DM2, PAF, HTN, LIN, CKD, COPD, sent from rehab for R knee wound dehiscensce. Pt has had multiple interventions on the R knee following TKR last year, including hardware removal for infection, IV abx course and most recently adm for wound dehiscence in Dec req washout and wound closure. The patient is now transferred from rehab for persistent wound serosanguinous drainage and wound dehiscence now s/p right antibiotic knee spacer 2/27, s/p transfer to MICU due to rapid response/sepsis
71 y F hx DM2, PAF, HTN, LIN, CKD, COPD, sent from rehab for R knee wound dehiscensce. Pt has had multiple interventions on the R knee following TKR last year, including hardware removal for infection, IV abx course and most recently adm for wound dehiscence in Dec req washout and wound closure. The patient is now transferred from rehab for persistent wound serosanguinous drainage and wound dehiscence now s/p right antibiotic knee spacer 2/27

## 2017-03-16 NOTE — PHYSICAL THERAPY INITIAL EVALUATION ADULT - IMPAIRMENTS FOUND, PT EVAL
gait, locomotion, and balance/aerobic capacity/endurance/muscle strength
ROM/muscle strength/arousal, attention, and cognition/aerobic capacity/endurance/gait, locomotion, and balance

## 2017-03-16 NOTE — PROGRESS NOTE ADULT - SUBJECTIVE AND OBJECTIVE BOX
NEPHROLOGY INTERVAL HPI/OVERNIGHT EVENTS:  Feels better   no new events   meds noted     MEDICATIONS  (STANDING):  docusate sodium 100milliGRAM(s) Oral three times a day  ferrous    sulfate 325milliGRAM(s) Oral three times a day with meals  folic acid 1milliGRAM(s) Oral daily  multivitamin 1Tablet(s) Oral daily  insulin lispro (HumaLOG) corrective regimen sliding scale  SubCutaneous Before meals and at bedtime  dextrose 50% Injectable 12.5Gram(s) IV Push once  dextrose 50% Injectable 25Gram(s) IV Push once  dextrose 50% Injectable 25Gram(s) IV Push once  meropenem IVPB 500milliGRAM(s) IV Intermittent every 12 hours  senna 2Tablet(s) Oral at bedtime  pantoprazole    Tablet 40milliGRAM(s) Oral two times a day before meals  metoprolol 12.5milliGRAM(s) Oral every 12 hours  DAPTOmycin IVPB 750milliGRAM(s) IV Intermittent every 24 hours  silver sulfADIAZINE 1% Cream 1Application(s) Topical two times a day    MEDICATIONS  (PRN):  acetaminophen   Tablet 650milliGRAM(s) Oral every 6 hours PRN For Temp over 38.3 C (100.94 F)  aluminum hydroxide/magnesium hydroxide/simethicone Suspension 30milliLiter(s) Oral four times a day PRN Indigestion  ondansetron Injectable 4milliGRAM(s) IV Push every 6 hours PRN Nausea and/or Vomiting  dextrose Gel 1Dose(s) Oral once PRN Blood Glucose LESS THAN 70 milliGRAM(s)/deciliter  glucagon  Injectable 1milliGRAM(s) IntraMuscular once PRN Glucose LESS THAN 70 milligrams/deciliter  polyethylene glycol 3350 17Gram(s) Oral daily PRN Constipation  acetaminophen   Tablet. 650milliGRAM(s) Oral every 6 hours PRN mild to mod      Allergies    No Known Allergies    Intolerances            Vital Signs Last 24 Hrs  T(C): 37.1, Max: 37.3 (03-16 @ 00:32)  T(F): 98.7, Max: 99.1 (03-16 @ 00:32)  HR: 88 (88 - 95)  BP: 134/86 (134/86 - 140/82)  BP(mean): --  RR: 18 (18 - 18)  SpO2: 98% (98% - 98%)  Daily     Daily   I&O's Detail  I & Os for 24h ending 16 Mar 2017 07:00  =============================================  IN:    Oral Fluid: 250 ml    Total IN: 250 ml  ---------------------------------------------  OUT:    Bulb: 420 ml    Total OUT: 420 ml  ---------------------------------------------  Total NET: -170 ml    I & Os for current day (as of 16 Mar 2017 17:18)  =============================================  IN:    Oral Fluid: 534 ml    Total IN: 534 ml  ---------------------------------------------  OUT:    Bulb: 150 ml    Total OUT: 150 ml  ---------------------------------------------  Total NET: 384 ml    I&O's Summary  I & Os for 24h ending 16 Mar 2017 07:00  =============================================  IN: 250 ml / OUT: 420 ml / NET: -170 ml    I & Os for current day (as of 16 Mar 2017 17:18)  =============================================  IN: 534 ml / OUT: 150 ml / NET: 384 ml      PHYSICAL EXAM:  PHYSICAL EXAM:  HEENT: EOMI  Neck: supple no JVD  CHEST/LUNG: CTA B/L   HEART: S1S2+ audible  ABDOMEN: Soft NDNT + BS  EXTREMITIES:  +LE edema      LABS:                        8.3    10.4  )-----------( 322      ( 16 Mar 2017 08:37 )             25.0     16 Mar 2017 08:37    144    |  107    |  29.0   ----------------------------<  92     4.0     |  27.0   |  2.05     Ca    8.3        16 Mar 2017 08:37      PT/INR - ( 16 Mar 2017 08:37 )   PT: 19.6 sec;   INR: 1.77 ratio         PTT - ( 16 Mar 2017 08:37 )  PTT:36.8 sec            RADIOLOGY & ADDITIONAL TESTS:

## 2017-03-16 NOTE — PHYSICAL THERAPY INITIAL EVALUATION ADULT - RANGE OF MOTION EXAMINATION, REHAB EVAL
Left UE ROM was WFL (within functional limits)/right shoulder flex to 90 degrees, elbow WFL;;left hip,knee, ankle WFL;;right hip to 90 degrees, ankle WFL, knee NT due to precautions for NO ROM
Left UE ROM was WFL (within functional limits)/right shoulder flex to 90 degrees, elbow WFL;;left hip,knee, ankle WFL;;right hip to 90 degrees, ankle WFL, knee NT due to precautions for NO ROM

## 2017-03-17 PROBLEM — E56.1: Chronic | Status: ACTIVE | Noted: 2017-02-22

## 2017-03-17 PROBLEM — G89.29 OTHER CHRONIC PAIN: Chronic | Status: ACTIVE | Noted: 2017-02-22

## 2017-03-17 PROBLEM — E78.00 PURE HYPERCHOLESTEROLEMIA, UNSPECIFIED: Chronic | Status: ACTIVE | Noted: 2017-02-22

## 2017-03-17 PROBLEM — R33.9 RETENTION OF URINE, UNSPECIFIED: Chronic | Status: ACTIVE | Noted: 2017-02-22

## 2017-03-17 PROBLEM — K59.00 CONSTIPATION, UNSPECIFIED: Chronic | Status: ACTIVE | Noted: 2017-02-22

## 2017-03-17 PROBLEM — L89.90 PRESSURE ULCER OF UNSPECIFIED SITE, UNSPECIFIED STAGE: Chronic | Status: ACTIVE | Noted: 2017-02-22

## 2017-03-17 PROBLEM — J44.9 CHRONIC OBSTRUCTIVE PULMONARY DISEASE, UNSPECIFIED: Chronic | Status: ACTIVE | Noted: 2017-02-22

## 2017-03-17 LAB
ANION GAP SERPL CALC-SCNC: 9 MMOL/L — SIGNIFICANT CHANGE UP (ref 5–17)
APTT BLD: 34.4 SEC — SIGNIFICANT CHANGE UP (ref 27.5–37.4)
BLD GP AB SCN SERPL QL: SIGNIFICANT CHANGE UP
BUN SERPL-MCNC: 30 MG/DL — HIGH (ref 8–20)
CALCIUM SERPL-MCNC: 8 MG/DL — LOW (ref 8.6–10.2)
CHLORIDE SERPL-SCNC: 107 MMOL/L — SIGNIFICANT CHANGE UP (ref 98–107)
CO2 SERPL-SCNC: 28 MMOL/L — SIGNIFICANT CHANGE UP (ref 22–29)
CREAT SERPL-MCNC: 2.19 MG/DL — HIGH (ref 0.5–1.3)
GLUCOSE SERPL-MCNC: 96 MG/DL — SIGNIFICANT CHANGE UP (ref 70–115)
HCT VFR BLD CALC: 19.4 % — CRITICAL LOW (ref 37–47)
HCT VFR BLD CALC: 20.2 % — CRITICAL LOW (ref 37–47)
HGB BLD-MCNC: 6.6 G/DL — CRITICAL LOW (ref 12–16)
HGB BLD-MCNC: 6.8 G/DL — CRITICAL LOW (ref 12–16)
INR BLD: 1.48 RATIO — HIGH (ref 0.88–1.16)
MCHC RBC-ENTMCNC: 30.5 PG — SIGNIFICANT CHANGE UP (ref 27–31)
MCHC RBC-ENTMCNC: 33.7 G/DL — SIGNIFICANT CHANGE UP (ref 32–36)
MCV RBC AUTO: 90.6 FL — SIGNIFICANT CHANGE UP (ref 81–99)
PLATELET # BLD AUTO: 307 K/UL — SIGNIFICANT CHANGE UP (ref 150–400)
POTASSIUM SERPL-MCNC: 3.9 MMOL/L — SIGNIFICANT CHANGE UP (ref 3.5–5.3)
POTASSIUM SERPL-SCNC: 3.9 MMOL/L — SIGNIFICANT CHANGE UP (ref 3.5–5.3)
PROTHROM AB SERPL-ACNC: 16.4 SEC — HIGH (ref 10–13.1)
RBC # BLD: 2.23 M/UL — LOW (ref 4.4–5.2)
RBC # FLD: 19.8 % — HIGH (ref 11–15.6)
SODIUM SERPL-SCNC: 144 MMOL/L — SIGNIFICANT CHANGE UP (ref 135–145)
TYPE + AB SCN PNL BLD: SIGNIFICANT CHANGE UP
WBC # BLD: 9 K/UL — SIGNIFICANT CHANGE UP (ref 4.8–10.8)
WBC # FLD AUTO: 9 K/UL — SIGNIFICANT CHANGE UP (ref 4.8–10.8)

## 2017-03-17 PROCEDURE — 99233 SBSQ HOSP IP/OBS HIGH 50: CPT

## 2017-03-17 PROCEDURE — 77001 FLUOROGUIDE FOR VEIN DEVICE: CPT | Mod: 26

## 2017-03-17 PROCEDURE — 36584 COMPL RPLCMT PICC RS&I: CPT

## 2017-03-17 RX ORDER — ERYTHROPOIETIN 10000 [IU]/ML
20000 INJECTION, SOLUTION INTRAVENOUS; SUBCUTANEOUS
Qty: 0 | Refills: 0 | Status: DISCONTINUED | OUTPATIENT
Start: 2017-03-17 | End: 2017-04-04

## 2017-03-17 RX ORDER — PHYTONADIONE (VIT K1) 5 MG
5 TABLET ORAL ONCE
Qty: 0 | Refills: 0 | Status: COMPLETED | OUTPATIENT
Start: 2017-03-17 | End: 2017-03-17

## 2017-03-17 RX ADMIN — Medication 1 APPLICATION(S): at 17:42

## 2017-03-17 RX ADMIN — PANTOPRAZOLE SODIUM 40 MILLIGRAM(S): 20 TABLET, DELAYED RELEASE ORAL at 05:41

## 2017-03-17 RX ADMIN — MEROPENEM 200 MILLIGRAM(S): 1 INJECTION INTRAVENOUS at 17:43

## 2017-03-17 RX ADMIN — Medication 5 MILLIGRAM(S): at 14:27

## 2017-03-17 RX ADMIN — Medication 1 MILLIGRAM(S): at 11:42

## 2017-03-17 RX ADMIN — DAPTOMYCIN 130 MILLIGRAM(S): 500 INJECTION, POWDER, LYOPHILIZED, FOR SOLUTION INTRAVENOUS at 17:43

## 2017-03-17 RX ADMIN — Medication 325 MILLIGRAM(S): at 17:46

## 2017-03-17 RX ADMIN — Medication 12.5 MILLIGRAM(S): at 17:42

## 2017-03-17 RX ADMIN — PANTOPRAZOLE SODIUM 40 MILLIGRAM(S): 20 TABLET, DELAYED RELEASE ORAL at 17:42

## 2017-03-17 RX ADMIN — MEROPENEM 200 MILLIGRAM(S): 1 INJECTION INTRAVENOUS at 05:40

## 2017-03-17 RX ADMIN — Medication 1 TABLET(S): at 11:42

## 2017-03-17 RX ADMIN — Medication 12.5 MILLIGRAM(S): at 05:41

## 2017-03-17 RX ADMIN — Medication 1 APPLICATION(S): at 16:22

## 2017-03-17 RX ADMIN — Medication 325 MILLIGRAM(S): at 11:42

## 2017-03-17 RX ADMIN — Medication 325 MILLIGRAM(S): at 07:33

## 2017-03-17 NOTE — PROGRESS NOTE ADULT - PROBLEM SELECTOR PLAN 2
hold coumadin, Vitamin K  2U PRBC, no overt signs of bleeding other than drain  check occult stool  may recall GI if stool occult positive

## 2017-03-17 NOTE — PROGRESS NOTE ADULT - SUBJECTIVE AND OBJECTIVE BOX
seen for right knee revision.    no cp/sob/LH/dizzines.  no pain.  ROS otherwise negative.    MEDICATIONS  (STANDING):  docusate sodium 100milliGRAM(s) Oral three times a day  ferrous    sulfate 325milliGRAM(s) Oral three times a day with meals  folic acid 1milliGRAM(s) Oral daily  multivitamin 1Tablet(s) Oral daily  insulin lispro (HumaLOG) corrective regimen sliding scale  SubCutaneous Before meals and at bedtime  dextrose 50% Injectable 12.5Gram(s) IV Push once  dextrose 50% Injectable 25Gram(s) IV Push once  dextrose 50% Injectable 25Gram(s) IV Push once  meropenem IVPB 500milliGRAM(s) IV Intermittent every 12 hours  senna 2Tablet(s) Oral at bedtime  pantoprazole    Tablet 40milliGRAM(s) Oral two times a day before meals  metoprolol 12.5milliGRAM(s) Oral every 12 hours  DAPTOmycin IVPB 750milliGRAM(s) IV Intermittent every 24 hours  silver sulfADIAZINE 1% Cream 1Application(s) Topical two times a day  phytonadione   Solution 5milliGRAM(s) Oral once    MEDICATIONS  (PRN):  acetaminophen   Tablet 650milliGRAM(s) Oral every 6 hours PRN For Temp over 38.3 C (100.94 F)  aluminum hydroxide/magnesium hydroxide/simethicone Suspension 30milliLiter(s) Oral four times a day PRN Indigestion  ondansetron Injectable 4milliGRAM(s) IV Push every 6 hours PRN Nausea and/or Vomiting  dextrose Gel 1Dose(s) Oral once PRN Blood Glucose LESS THAN 70 milliGRAM(s)/deciliter  glucagon  Injectable 1milliGRAM(s) IntraMuscular once PRN Glucose LESS THAN 70 milligrams/deciliter  polyethylene glycol 3350 17Gram(s) Oral daily PRN Constipation  acetaminophen   Tablet. 650milliGRAM(s) Oral every 6 hours PRN mild to mod      Allergies    No Known Allergies    Intolerances    Vital Signs Last 24 Hrs  T(C): 37.2, Max: 37.2 (03-17 @ 08:03)  T(F): 99, Max: 99 (03-17 @ 08:03)  HR: 92 (88 - 95)  BP: 130/55 (126/72 - 134/86)  BP(mean): --  RR: 18 (18 - 18)  SpO2: 97% (97% - 97%)    PHYSICAL EXAM:    GENERAL: NAD obese  CHEST/LUNG: Clear to percussion bilaterally  HEART: Regular rate and rhythm; S1 S2; no murmurs noted  ABDOMEN: soft +BS  EXTREMITIES:  rt knee with bandage and drain with bloody discharge.  NERVOUS SYSTEM:  Alert & Oriented X3,  nonfocal exam.  PSYCH: normal mood, appropriate response.    LABS:                        6.6    x     )-----------( x        ( 17 Mar 2017 09:55 )             19.4     17 Mar 2017 08:38    144    |  107    |  30.0   ----------------------------<  96     3.9     |  28.0   |  2.19     Ca    8.0        17 Mar 2017 08:38      PT/INR - ( 17 Mar 2017 08:38 )   PT: 16.4 sec;   INR: 1.48 ratio         PTT - ( 17 Mar 2017 08:38 )  PTT:34.4 sec      CAPILLARY BLOOD GLUCOSE  100 (17 Mar 2017 07:32)  129 (16 Mar 2017 21:49)  144 (16 Mar 2017 15:48)        RADIOLOGY & ADDITIONAL TESTS:

## 2017-03-17 NOTE — PROGRESS NOTE ADULT - SUBJECTIVE AND OBJECTIVE BOX
NEPHROLOGY INTERVAL HPI/OVERNIGHT EVENTS:  pt clinically stable  no acute distress  no cp, sob, n/v/d    MEDICATIONS  (STANDING):  docusate sodium 100milliGRAM(s) Oral three times a day  ferrous    sulfate 325milliGRAM(s) Oral three times a day with meals  folic acid 1milliGRAM(s) Oral daily  multivitamin 1Tablet(s) Oral daily  insulin lispro (HumaLOG) corrective regimen sliding scale  SubCutaneous Before meals and at bedtime  dextrose 50% Injectable 12.5Gram(s) IV Push once  dextrose 50% Injectable 25Gram(s) IV Push once  dextrose 50% Injectable 25Gram(s) IV Push once  meropenem IVPB 500milliGRAM(s) IV Intermittent every 12 hours  senna 2Tablet(s) Oral at bedtime  pantoprazole    Tablet 40milliGRAM(s) Oral two times a day before meals  metoprolol 12.5milliGRAM(s) Oral every 12 hours  DAPTOmycin IVPB 750milliGRAM(s) IV Intermittent every 24 hours  silver sulfADIAZINE 1% Cream 1Application(s) Topical two times a day    MEDICATIONS  (PRN):  acetaminophen   Tablet 650milliGRAM(s) Oral every 6 hours PRN For Temp over 38.3 C (100.94 F)  aluminum hydroxide/magnesium hydroxide/simethicone Suspension 30milliLiter(s) Oral four times a day PRN Indigestion  ondansetron Injectable 4milliGRAM(s) IV Push every 6 hours PRN Nausea and/or Vomiting  dextrose Gel 1Dose(s) Oral once PRN Blood Glucose LESS THAN 70 milliGRAM(s)/deciliter  glucagon  Injectable 1milliGRAM(s) IntraMuscular once PRN Glucose LESS THAN 70 milligrams/deciliter  polyethylene glycol 3350 17Gram(s) Oral daily PRN Constipation  acetaminophen   Tablet. 650milliGRAM(s) Oral every 6 hours PRN mild to mod      Allergies    No Known Allergies    Intolerances          Vital Signs Last 24 Hrs  T(C): 37.2, Max: 37.2 (03-17 @ 08:03)  T(F): 99, Max: 99 (03-17 @ 08:03)  HR: 92 (88 - 95)  BP: 130/55 (126/72 - 134/86)  BP(mean): --  RR: 18 (18 - 18)  SpO2: 97% (97% - 97%)    PHYSICAL EXAM:  HEENT: EOMI  Neck: supple no JVD  CHEST/LUNG: CTA B/L   HEART: S1S2+ audible  ABDOMEN: Soft NDNT + BS  EXTREMITIES:  +LE edema; R leg with bandage and drain      LABS:                        6.6    x     )-----------( x        ( 17 Mar 2017 09:55 )             19.4     17 Mar 2017 08:38    144    |  107    |  30.0   ----------------------------<  96     3.9     |  28.0   |  2.19     Ca    8.0        17 Mar 2017 08:38      PT/INR - ( 17 Mar 2017 08:38 )   PT: 16.4 sec;   INR: 1.48 ratio         PTT - ( 17 Mar 2017 08:38 )  PTT:34.4 sec        RADIOLOGY & ADDITIONAL TESTS:

## 2017-03-17 NOTE — BRIEF OPERATIVE NOTE - PRE-OP DX
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
Open wound of knee, complicated, right, sequela  02/27/2017    Active  Berhane Rey

## 2017-03-17 NOTE — PROGRESS NOTE ADULT - ASSESSMENT
· Assessment		  Improved KARMEN on CKD , DM ---> baseline creat 1.8 , renal function better   Normal kidneys on NCCT   Debridement / dehis ---> Abx as per ID   - continue to monitor labs    Anemia - S/P Transfusion   - monitor H/H  - PRBCs as needed  - will add SIDRA and check Fe stores

## 2017-03-17 NOTE — PROGRESS NOTE ADULT - PROBLEM SELECTOR PLAN 1
s/p  PICC placement, requires replacement due to malposition (IR to do today)  - continue Daptomycin 750mg q24H  - continue Meropenem 500m,g Q12h  ANTIBIOTICS through 4/8/17.   plastics/ortho following-- plan for REPEAT I&D MONDAY  no absolute contraindication for proposed procedure, inherent risks of surgery/anesthesia

## 2017-03-17 NOTE — PROGRESS NOTE ADULT - SUBJECTIVE AND OBJECTIVE BOX
Patient is a 72y old  Female who presents with a chief complaint of knee drainage (2017 @ 16:00)  of blood and pus in the early hours of Wednesday the .  On Monday the  she  had gone to her podiatrist who worked on a painful ingrown toenail to relieve the pain it caused in her left   foot.     PAST MEDICAL HISTORY:  Hypercholesterolemia  Deficiency of vitamin K  Chronic pain  COPD (chronic obstructive pulmonary disease)  Urine retention  Pressure ulcer  Atrial fibrillation  Constipation  Breast cancer tx with chemo  HLD (hyperlipidemia)  HTN (hypertension)  GERD (gastroesophageal reflux disease)  DM (diabetes mellitus)  Obesity  LIN on CPAP      PAST SURGICAL HISTORY:  History of incision and drainage  S/p right total knee replacement on 2026  S/P left total knee replacement  S/P hysterectomy  S/P mastectomy in         MEDICATIONS  (STANDING):  docusate sodium 100milliGRAM(s) Oral three times a day  ferrous    sulfate 325milliGRAM(s) Oral three times a day with meals  folic acid 1milliGRAM(s) Oral daily  multivitamin 1Tablet(s) Oral daily  insulin lispro (HumaLOG) corrective regimen sliding scale  SubCutaneous Before meals and at bedtime  dextrose 50% Injectable 12.5Gram(s) IV Push once  dextrose 50% Injectable 25Gram(s) IV Push once  dextrose 50% Injectable 25Gram(s) IV Push once  meropenem IVPB 500milliGRAM(s) IV Intermittent every 12 hours  senna 2Tablet(s) Oral at bedtime  pantoprazole    Tablet 40milliGRAM(s) Oral two times a day before meals  metoprolol 12.5milliGRAM(s) Oral every 12 hours  DAPTOmycin IVPB 750milliGRAM(s) IV Intermittent every 24 hours  silver sulfADIAZINE 1% Cream 1Application(s) Topical two times a day  epoetin melany Injectable [id number](s) SubCutaneous <User Schedule>    MEDICATIONS  (PRN):  acetaminophen   Tablet 650milliGRAM(s) Oral every 6 hours PRN For Temp over 38.3 C (100.94 F)  aluminum hydroxide/magnesium hydroxide/simethicone Suspension 30milliLiter(s) Oral four times a day PRN Indigestion  ondansetron Injectable 4milliGRAM(s) IV Push every 6 hours PRN Nausea and/or Vomiting  dextrose Gel 1Dose(s) Oral once PRN Blood Glucose LESS THAN 70 milliGRAM(s)/deciliter  glucagon  Injectable 1milliGRAM(s) IntraMuscular once PRN Glucose LESS THAN 70 milligrams/deciliter  polyethylene glycol 3350 17Gram(s) Oral daily PRN Constipation  acetaminophen   Tablet. 650milliGRAM(s) Oral every 6 hours PRN mild to mod      Allergies:  No Known Drug Allergies      SOCIAL HISTORY:  The patient describes herself as a light drinker.  She quit smoking 17 years ago                                after smoking 1/4 ppd x 40 years and she never used illicit drugs.                         6.6    x     )-----------( x        ( 17 Mar 2017 09:55 )             19.4       PT/INR - ( 17 Mar 2017 08:38 )   PT: 16.4 sec;   INR: 1.48 ratio         PTT - ( 17 Mar 2017 08:38 )  PTT:34.4 sec    17 Mar 2017 08:38    144    |  107    |  30.0   ----------------------------<  96     3.9     |  28.0   |  2.19     Ca    8.0        17 Mar 2017 08:38    [id number]  Sinus rhythm with occasional Premature ventricular complexes  Left axis deviation  Nonspecific ST and T wave abnormality  Abnormal ECG    ECHO TRANSTHORACIC   Dec  6 2016    Summary:   1. Left ventricular ejection fraction, by visual estimation, is 55 to        60%.   2. Normal global left ventricular systolic function.   3. The mitral in-flow pattern reveals no discernable A-wave, therefore   no comment on diastolic function can be made.   4. Normal left ventricular internal cavity size.   5. There is mild concentric left ventricular hypertrophy.   6. The left atrium is normal in size.   7.Mildly dilated right atrium.   8. Mild to moderate mitral annular calcification.   9. Thickening and calcification of the anterior and posterior mitral   valve leaflets.  10. Mild mitral valve regurgitation.  11. Sclerotic aortic valve with normal opening.  12. Estimated pulmonary artery systolic pressure is 38.8 mmHg assuming a   right atrial pressure of 10 mmHg, which is consistent with borderline   pulmonary hypertension.    CHEST SINGLE VIEW FRONTAL  -   2017    Findings: PICC line in right subclavian vein. Airspace disease right   lower lobe left lung is clear. No pleural effusions . The pulmonary   vasculature and aorta are normal for age. Cardiomegaly unchanged.  . The   thorax is normal for age.  Impression: Airspace disease in right lower lobe with partial clearing.   Right pleural effusion has resolved    ASA # =  3    Mallampati # =  3  (She has no top teeth only teeth in the lower jaw.) Patient is a 72y old  Female who presents with a chief complaint of knee drainage (2017 @ 16:00)  of blood and pus in the early hours of Wednesday the .  On Monday the  she  had gone to her podiatrist who worked on a painful ingrown toenail to relieve the pain it caused in her left   foot. She is scheduled for a RIGHT KNEE IRRIGATION AND DEBRIDEMENT WITH ANTIBIOTIC  SPACER PLACEMENT.    PAST MEDICAL HISTORY:  Hypercholesterolemia  Deficiency of vitamin K  Chronic pain  COPD (chronic obstructive pulmonary disease)  Urine retention  Pressure ulcer  Atrial fibrillation  Constipation  Breast cancer tx with chemo  HLD (hyperlipidemia)  HTN (hypertension)  GERD (gastroesophageal reflux disease)  DM (diabetes mellitus)  Obesity  LIN on CPAP      PAST SURGICAL HISTORY:  History of incision and drainage  S/p right total knee replacement on 2026  S/P left total knee replacement  S/P hysterectomy  S/P mastectomy in         MEDICATIONS  (STANDING):  docusate sodium 100milliGRAM(s) Oral three times a day  ferrous    sulfate 325milliGRAM(s) Oral three times a day with meals  folic acid 1milliGRAM(s) Oral daily  multivitamin 1Tablet(s) Oral daily  insulin lispro (HumaLOG) corrective regimen sliding scale  SubCutaneous Before meals and at bedtime  dextrose 50% Injectable 12.5Gram(s) IV Push once  dextrose 50% Injectable 25Gram(s) IV Push once  dextrose 50% Injectable 25Gram(s) IV Push once  meropenem IVPB 500milliGRAM(s) IV Intermittent every 12 hours  senna 2Tablet(s) Oral at bedtime  pantoprazole    Tablet 40milliGRAM(s) Oral two times a day before meals  metoprolol 12.5milliGRAM(s) Oral every 12 hours  DAPTOmycin IVPB 750milliGRAM(s) IV Intermittent every 24 hours  silver sulfADIAZINE 1% Cream 1Application(s) Topical two times a day  epoetin melany Injectable [id number](s) SubCutaneous <User Schedule>    MEDICATIONS  (PRN):  acetaminophen   Tablet 650milliGRAM(s) Oral every 6 hours PRN For Temp over 38.3 C (100.94 F)  aluminum hydroxide/magnesium hydroxide/simethicone Suspension 30milliLiter(s) Oral four times a day PRN Indigestion  ondansetron Injectable 4milliGRAM(s) IV Push every 6 hours PRN Nausea and/or Vomiting  dextrose Gel 1Dose(s) Oral once PRN Blood Glucose LESS THAN 70 milliGRAM(s)/deciliter  glucagon  Injectable 1milliGRAM(s) IntraMuscular once PRN Glucose LESS THAN 70 milligrams/deciliter  polyethylene glycol 3350 17Gram(s) Oral daily PRN Constipation  acetaminophen   Tablet. 650milliGRAM(s) Oral every 6 hours PRN mild to mod      Allergies:  No Known Drug Allergies      SOCIAL HISTORY:  The patient describes herself as a light drinker.  She quit smoking 17 years ago                                after smoking 1/4 ppd x 40 years and she never used illicit drugs.                         6.6    x     )-----------( x        ( 17 Mar 2017 09:55 )             19.4       PT/INR - ( 17 Mar 2017 08:38 )   PT: 16.4 sec;   INR: 1.48 ratio         PTT - ( 17 Mar 2017 08:38 )  PTT:34.4 sec    17 Mar 2017 08:38    144    |  107    |  30.0   ----------------------------<  96     3.9     |  28.0   |  2.19     Ca    8.0        17 Mar 2017 08:38    [id number]  Sinus rhythm with occasional Premature ventricular complexes  Left axis deviation  Nonspecific ST and T wave abnormality  Abnormal ECG    ECHO TRANSTHORACIC   Dec  6 2016    Summary:   1. Left ventricular ejection fraction, by visual estimation, is 55 to        60%.   2. Normal global left ventricular systolic function.   3. The mitral in-flow pattern reveals no discernable A-wave, therefore   no comment on diastolic function can be made.   4. Normal left ventricular internal cavity size.   5. There is mild concentric left ventricular hypertrophy.   6. The left atrium is normal in size.   7.Mildly dilated right atrium.   8. Mild to moderate mitral annular calcification.   9. Thickening and calcification of the anterior and posterior mitral   valve leaflets.  10. Mild mitral valve regurgitation.  11. Sclerotic aortic valve with normal opening.  12. Estimated pulmonary artery systolic pressure is 38.8 mmHg assuming a   right atrial pressure of 10 mmHg, which is consistent with borderline   pulmonary hypertension.    CHEST SINGLE VIEW FRONTAL  -   2017    Findings: PICC line in right subclavian vein. Airspace disease right   lower lobe left lung is clear. No pleural effusions . The pulmonary   vasculature and aorta are normal for age. Cardiomegaly unchanged.  . The   thorax is normal for age.  Impression: Airspace disease in right lower lobe with partial clearing.   Right pleural effusion has resolved    ASA # =  3    Mallampati # =  3  (She has no top teeth only teeth in the lower jaw.)

## 2017-03-17 NOTE — BRIEF OPERATIVE NOTE - OPERATION/FINDINGS
Right knee septic joint
After changing picc over wire.  PICC tip in good postion
picc
Complex closure right knee wound 27 cm, Incisional wound VAC

## 2017-03-17 NOTE — CHART NOTE - NSCHARTNOTEFT_GEN_A_CORE
Patient previosuly  had IR PICC placement that was cut down into subclavian     Chart reviewed. Based on previously course of PICC insertion, if replacement still needed please contact IR for repeat  placement.

## 2017-03-17 NOTE — BRIEF OPERATIVE NOTE - PROCEDURE
Insertion of antibiotic spacer in right knee joint  02/27/2017    Active  [email or web address]
Peripherally inserted central catheter (PICC) line with ultrasound guidance  03/10/2017  placement under fluoroscopy  Active  Kapil Shay
Peripherally inserted central catheter (PICC) line with ultrasound guidance  03/10/2017  placement under fluoroscopy  Active  [email or web address]
Repair of wound  02/27/2017    Active  [email or web address]

## 2017-03-17 NOTE — BRIEF OPERATIVE NOTE - POST-OP DX
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
Open wound of knee with complication, right, sequela  02/27/2017    Active  Berhane Rey

## 2017-03-18 LAB
ANION GAP SERPL CALC-SCNC: 9 MMOL/L — SIGNIFICANT CHANGE UP (ref 5–17)
APTT BLD: 31.6 SEC — SIGNIFICANT CHANGE UP (ref 27.5–37.4)
BUN SERPL-MCNC: 28 MG/DL — HIGH (ref 8–20)
CALCIUM SERPL-MCNC: 8 MG/DL — LOW (ref 8.6–10.2)
CHLORIDE SERPL-SCNC: 106 MMOL/L — SIGNIFICANT CHANGE UP (ref 98–107)
CO2 SERPL-SCNC: 27 MMOL/L — SIGNIFICANT CHANGE UP (ref 22–29)
CREAT SERPL-MCNC: 2.17 MG/DL — HIGH (ref 0.5–1.3)
FERRITIN SERPL-MCNC: 616.8 NG/ML — HIGH (ref 11–306)
GLUCOSE SERPL-MCNC: 105 MG/DL — SIGNIFICANT CHANGE UP (ref 70–115)
HBA1C BLD-MCNC: 4.9 % — SIGNIFICANT CHANGE UP (ref 4–5.6)
HCT VFR BLD CALC: 28.2 % — LOW (ref 37–47)
HGB BLD-MCNC: 9.7 G/DL — LOW (ref 12–16)
INR BLD: 1.22 RATIO — HIGH (ref 0.88–1.16)
IRON SATN MFR SERPL: 40 UG/DL — SIGNIFICANT CHANGE UP (ref 37–145)
IRON SATN MFR SERPL: 48 % — SIGNIFICANT CHANGE UP (ref 14–50)
MCHC RBC-ENTMCNC: 30 PG — SIGNIFICANT CHANGE UP (ref 27–31)
MCHC RBC-ENTMCNC: 34.4 G/DL — SIGNIFICANT CHANGE UP (ref 32–36)
MCV RBC AUTO: 87.3 FL — SIGNIFICANT CHANGE UP (ref 81–99)
PLATELET # BLD AUTO: 357 K/UL — SIGNIFICANT CHANGE UP (ref 150–400)
POTASSIUM SERPL-MCNC: 3.6 MMOL/L — SIGNIFICANT CHANGE UP (ref 3.5–5.3)
POTASSIUM SERPL-SCNC: 3.6 MMOL/L — SIGNIFICANT CHANGE UP (ref 3.5–5.3)
PROTHROM AB SERPL-ACNC: 13.5 SEC — HIGH (ref 10–13.1)
RBC # BLD: 3.23 M/UL — LOW (ref 4.4–5.2)
RBC # FLD: 18.9 % — HIGH (ref 11–15.6)
SODIUM SERPL-SCNC: 142 MMOL/L — SIGNIFICANT CHANGE UP (ref 135–145)
TIBC SERPL-MCNC: 83 UG/DL — LOW (ref 220–430)
TRANSFERRIN SERPL-MCNC: 58 MG/DL — LOW (ref 192–382)
WBC # BLD: 9 K/UL — SIGNIFICANT CHANGE UP (ref 4.8–10.8)
WBC # FLD AUTO: 9 K/UL — SIGNIFICANT CHANGE UP (ref 4.8–10.8)

## 2017-03-18 PROCEDURE — 99233 SBSQ HOSP IP/OBS HIGH 50: CPT

## 2017-03-18 PROCEDURE — 93971 EXTREMITY STUDY: CPT | Mod: 26,RT

## 2017-03-18 RX ORDER — ENOXAPARIN SODIUM 100 MG/ML
120 INJECTION SUBCUTANEOUS ONCE
Qty: 0 | Refills: 0 | Status: COMPLETED | OUTPATIENT
Start: 2017-03-18 | End: 2017-03-18

## 2017-03-18 RX ORDER — ENOXAPARIN SODIUM 100 MG/ML
120 INJECTION SUBCUTANEOUS DAILY
Qty: 0 | Refills: 0 | Status: DISCONTINUED | OUTPATIENT
Start: 2017-03-19 | End: 2017-03-19

## 2017-03-18 RX ORDER — ENOXAPARIN SODIUM 100 MG/ML
120 INJECTION SUBCUTANEOUS EVERY 12 HOURS
Qty: 0 | Refills: 0 | Status: DISCONTINUED | OUTPATIENT
Start: 2017-03-18 | End: 2017-03-18

## 2017-03-18 RX ADMIN — Medication 1 APPLICATION(S): at 10:53

## 2017-03-18 RX ADMIN — PANTOPRAZOLE SODIUM 40 MILLIGRAM(S): 20 TABLET, DELAYED RELEASE ORAL at 06:15

## 2017-03-18 RX ADMIN — Medication 1 TABLET(S): at 12:27

## 2017-03-18 RX ADMIN — Medication 325 MILLIGRAM(S): at 12:27

## 2017-03-18 RX ADMIN — MEROPENEM 200 MILLIGRAM(S): 1 INJECTION INTRAVENOUS at 06:14

## 2017-03-18 RX ADMIN — DAPTOMYCIN 130 MILLIGRAM(S): 500 INJECTION, POWDER, LYOPHILIZED, FOR SOLUTION INTRAVENOUS at 17:14

## 2017-03-18 RX ADMIN — Medication 325 MILLIGRAM(S): at 17:14

## 2017-03-18 RX ADMIN — Medication 325 MILLIGRAM(S): at 07:49

## 2017-03-18 RX ADMIN — MEROPENEM 200 MILLIGRAM(S): 1 INJECTION INTRAVENOUS at 17:35

## 2017-03-18 RX ADMIN — Medication 12.5 MILLIGRAM(S): at 06:15

## 2017-03-18 RX ADMIN — ERYTHROPOIETIN 20000 UNIT(S): 10000 INJECTION, SOLUTION INTRAVENOUS; SUBCUTANEOUS at 17:27

## 2017-03-18 RX ADMIN — Medication 1 MILLIGRAM(S): at 12:27

## 2017-03-18 RX ADMIN — Medication 12.5 MILLIGRAM(S): at 17:35

## 2017-03-18 RX ADMIN — ENOXAPARIN SODIUM 120 MILLIGRAM(S): 100 INJECTION SUBCUTANEOUS at 19:47

## 2017-03-18 RX ADMIN — Medication 1 APPLICATION(S): at 22:13

## 2017-03-18 RX ADMIN — Medication 100 MILLIGRAM(S): at 14:46

## 2017-03-18 RX ADMIN — PANTOPRAZOLE SODIUM 40 MILLIGRAM(S): 20 TABLET, DELAYED RELEASE ORAL at 17:35

## 2017-03-18 NOTE — CHART NOTE - NSCHARTNOTEFT_GEN_A_CORE
Order place to remove R neck IV as patient now has new PICC placed by IR 3/17. Patient receiving antibiotics for knee infection from prior surgery and is slated for OR again monday.    Patient met in bed, A&O, pleasant, NAD, happy to have line removed. (time out done)  Discussed procedure with patient who acknowledged  understanding . Patients bed adjusted to lie supine as much a pain in knee allowed, head turn to left, advised to keep it turned until completion.  Old dressing removed, and catheter pulled. Sterile dressing placed with pressure for 10 minutes. Dressing secured Patient tolerated well.    Addendum: Patient's RUE  with PICC noted to be swollen and as per Brandi RN it appears more so. Patient c/o pain in arm points to PICC area and below ans says hand feels cold which is new.    RUE: edematous >LUE, Skin maceration under tegraderm, warm above AC cooling to distal hand, no pallor. Distant radial and ulner pulses. Good ROM in R hand    A/P R/O DVT:   H/O knee surgery, afib  RUE edema, off heparin for pending surgery and anemia, prior doppler negative for DVT, Discussed patient with Dr Nicholson who agrees to get venous doppler. Dr BOB to f/u on report no AC for now

## 2017-03-18 NOTE — PROGRESS NOTE ADULT - SUBJECTIVE AND OBJECTIVE BOX
INTERVAL HPI/OVERNIGHT EVENTS:    CC: anemia, right knee wound dehiscence and infection Pseudomonas and VRE, CKD    Chart and interval events noted. No overnight events, denies complaints. Sister at bedside.      Vital Signs Last 24 Hrs  T(C): 36.9, Max: 36.9 (03-17 @ 15:13)  T(F): 98.5, Max: 98.5 (03-17 @ 15:13)  HR: 92 (70 - 113)  BP: 128/78 (110/50 - 138/76)  BP(mean): --  RR: 16 (16 - 18)  SpO2: 93% (93% - 98%)    PHYSICAL EXAM:    GENERAL: Not in distress, alert  CHEST/LUNG: b/l air entry  HEART: irregular  ABDOMEN: Soft, BS+  EXTREMITIES:  2+ edema, right LE swelling, drain in place    MEDICATIONS  (STANDING):  docusate sodium 100milliGRAM(s) Oral three times a day  ferrous    sulfate 325milliGRAM(s) Oral three times a day with meals  folic acid 1milliGRAM(s) Oral daily  multivitamin 1Tablet(s) Oral daily  insulin lispro (HumaLOG) corrective regimen sliding scale  SubCutaneous Before meals and at bedtime  dextrose 50% Injectable 12.5Gram(s) IV Push once  dextrose 50% Injectable 25Gram(s) IV Push once  dextrose 50% Injectable 25Gram(s) IV Push once  meropenem IVPB 500milliGRAM(s) IV Intermittent every 12 hours  senna 2Tablet(s) Oral at bedtime  pantoprazole    Tablet 40milliGRAM(s) Oral two times a day before meals  metoprolol 12.5milliGRAM(s) Oral every 12 hours  DAPTOmycin IVPB 750milliGRAM(s) IV Intermittent every 24 hours  silver sulfADIAZINE 1% Cream 1Application(s) Topical two times a day  epoetin melany Injectable [id number](s) SubCutaneous <User Schedule>    MEDICATIONS  (PRN):  acetaminophen   Tablet 650milliGRAM(s) Oral every 6 hours PRN For Temp over 38.3 C (100.94 F)  aluminum hydroxide/magnesium hydroxide/simethicone Suspension 30milliLiter(s) Oral four times a day PRN Indigestion  ondansetron Injectable 4milliGRAM(s) IV Push every 6 hours PRN Nausea and/or Vomiting  dextrose Gel 1Dose(s) Oral once PRN Blood Glucose LESS THAN 70 milliGRAM(s)/deciliter  glucagon  Injectable 1milliGRAM(s) IntraMuscular once PRN Glucose LESS THAN 70 milligrams/deciliter  polyethylene glycol 3350 17Gram(s) Oral daily PRN Constipation  acetaminophen   Tablet. 650milliGRAM(s) Oral every 6 hours PRN mild to mod      Allergies    No Known Allergies    Intolerances          LABS:                          6.6    x     )-----------( x        ( 17 Mar 2017 09:55 )             19.4     17 Mar 2017 08:38    144    |  107    |  30.0   ----------------------------<  96     3.9     |  28.0   |  2.19     Ca    8.0        17 Mar 2017 08:38      PT/INR - ( 17 Mar 2017 08:38 )   PT: 16.4 sec;   INR: 1.48 ratio         PTT - ( 17 Mar 2017 08:38 )  PTT:34.4 sec      RADIOLOGY & ADDITIONAL TESTS:

## 2017-03-18 NOTE — PROGRESS NOTE ADULT - SUBJECTIVE AND OBJECTIVE BOX
NEPHROLOGY INTERVAL HPI/OVERNIGHT EVENTS:  pt clinically stable  no acute distress    MEDICATIONS  (STANDING):  docusate sodium 100milliGRAM(s) Oral three times a day  ferrous    sulfate 325milliGRAM(s) Oral three times a day with meals  folic acid 1milliGRAM(s) Oral daily  multivitamin 1Tablet(s) Oral daily  insulin lispro (HumaLOG) corrective regimen sliding scale  SubCutaneous Before meals and at bedtime  dextrose 50% Injectable 12.5Gram(s) IV Push once  dextrose 50% Injectable 25Gram(s) IV Push once  dextrose 50% Injectable 25Gram(s) IV Push once  meropenem IVPB 500milliGRAM(s) IV Intermittent every 12 hours  senna 2Tablet(s) Oral at bedtime  pantoprazole    Tablet 40milliGRAM(s) Oral two times a day before meals  metoprolol 12.5milliGRAM(s) Oral every 12 hours  DAPTOmycin IVPB 750milliGRAM(s) IV Intermittent every 24 hours  silver sulfADIAZINE 1% Cream 1Application(s) Topical two times a day  epoetin melany Injectable [id number](s) SubCutaneous <User Schedule>    MEDICATIONS  (PRN):  acetaminophen   Tablet 650milliGRAM(s) Oral every 6 hours PRN For Temp over 38.3 C (100.94 F)  aluminum hydroxide/magnesium hydroxide/simethicone Suspension 30milliLiter(s) Oral four times a day PRN Indigestion  ondansetron Injectable 4milliGRAM(s) IV Push every 6 hours PRN Nausea and/or Vomiting  dextrose Gel 1Dose(s) Oral once PRN Blood Glucose LESS THAN 70 milliGRAM(s)/deciliter  glucagon  Injectable 1milliGRAM(s) IntraMuscular once PRN Glucose LESS THAN 70 milligrams/deciliter  polyethylene glycol 3350 17Gram(s) Oral daily PRN Constipation  acetaminophen   Tablet. 650milliGRAM(s) Oral every 6 hours PRN mild to mod      Allergies    No Known Allergies        Vital Signs Last 24 Hrs  T(C): 36.9, Max: 36.9 (03-17 @ 15:13)  T(F): 98.5, Max: 98.5 (03-17 @ 15:13)  HR: 92 (70 - 113)  BP: 128/78 (110/50 - 138/76)  BP(mean): --  RR: 16 (16 - 18)  SpO2: 93% (93% - 98%)    PHYSICAL EXAM:  HEENT: EOMI  Neck: supple no JVD  CHEST/LUNG: CTA B/L   HEART: S1S2+ audible  ABDOMEN: Soft NDNT + BS  EXTREMITIES:  +LE edema; R leg with bandage; drain with minimal bloody fluid    LABS:                        6.6    x     )-----------( x        ( 17 Mar 2017 09:55 )             19.4     17 Mar 2017 08:38    144    |  107    |  30.0   ----------------------------<  96     3.9     |  28.0   |  2.19     Ca    8.0        17 Mar 2017 08:38      PT/INR - ( 17 Mar 2017 08:38 )   PT: 16.4 sec;   INR: 1.48 ratio         PTT - ( 17 Mar 2017 08:38 )  PTT:34.4 sec        RADIOLOGY & ADDITIONAL TESTS:

## 2017-03-18 NOTE — PROGRESS NOTE ADULT - ASSESSMENT
71 yr old female with hypertension, hyperlipidemia, LIN, CKD, COPD, paroxysmal A fib sent from rehab for right knee wound dehiscence. She was seen by orthopedics, ID. Local wound cultures growing resistant and Pseudomonas and VRE. She was started on Meropenem and Zyvox. Her renal function was slightly worse from baseline and hence started on IVF. No obstructive disease on renal ultrasound. Cardiology consulted for clearance. Renal was consulted.  She is scheduled for OR I&D, she underwent wound closure and insertion of antibiotic spacer. RRT was called on 3/2 for hypotension and lethargy. Labs revealed elevated lactate. She was transferred to ICU for fluid resuscitation and monitoring. Narcotics were discontinued. She was noted to be anemic 6.9, she was ordered for PRBC transfusion and transferred out of ICU. ID follow up requested for medication adjustment, advised to stop Linezolid and start Daptomycin. GI consulted for anemia, no endoscopic intervention advised at this time. Hb remained stable. Noted to have lethargy, hence narcotics stopped. ABG done, which revealed normal pH and CO2. CT brain showed no stroke. Mental status improved. Hb remained stable. After discussion with orthopedics, coumadin was started for atrial fibrillation. She was noted to have bilateral upper extremity swelling, hence ultrasound was done on 3/9, negative for DVT. Given low platelets, Linezolid was discontinued by ID and Daptomycin was started. Heparin antibody negative. Plastics and orthopedic follow up was done, felt need for repeat I&D. Coumadin was held in view of procedure. Noted to be anemic.

## 2017-03-18 NOTE — PROGRESS NOTE ADULT - SUBJECTIVE AND OBJECTIVE BOX
SHYAMROSLYN LAL    [id number]    History: 72y Female is status post right total knee removal of hardware, explant.  POD#19 Patient is doing well and is comfortable. The patient's pain is controlled using the prescribed pain medications.. Denies nausea, vomiting, chest pain, shortness of breath, abdominal pain or fever. No new complaints.Edgar brace to right knee at all times                              9.7    9.0   )-----------( 357      ( 18 Mar 2017 10:26 )             28.2     17 Mar 2017 08:38    144    |  107    |  30.0   ----------------------------<  96     3.9     |  28.0   |  2.19     Ca    8.0        17 Mar 2017 08:38        MEDICATIONS  (STANDING):  docusate sodium 100milliGRAM(s) Oral three times a day  ferrous    sulfate 325milliGRAM(s) Oral three times a day with meals  folic acid 1milliGRAM(s) Oral daily  multivitamin 1Tablet(s) Oral daily  insulin lispro (HumaLOG) corrective regimen sliding scale  SubCutaneous Before meals and at bedtime  dextrose 50% Injectable 12.5Gram(s) IV Push once  dextrose 50% Injectable 25Gram(s) IV Push once  dextrose 50% Injectable 25Gram(s) IV Push once  meropenem IVPB 500milliGRAM(s) IV Intermittent every 12 hours  senna 2Tablet(s) Oral at bedtime  pantoprazole    Tablet 40milliGRAM(s) Oral two times a day before meals  metoprolol 12.5milliGRAM(s) Oral every 12 hours  DAPTOmycin IVPB 750milliGRAM(s) IV Intermittent every 24 hours  silver sulfADIAZINE 1% Cream 1Application(s) Topical two times a day  epoetin melany Injectable [id number](s) SubCutaneous <User Schedule>    MEDICATIONS  (PRN):  acetaminophen   Tablet 650milliGRAM(s) Oral every 6 hours PRN For Temp over 38.3 C (100.94 F)  aluminum hydroxide/magnesium hydroxide/simethicone Suspension 30milliLiter(s) Oral four times a day PRN Indigestion  ondansetron Injectable 4milliGRAM(s) IV Push every 6 hours PRN Nausea and/or Vomiting  dextrose Gel 1Dose(s) Oral once PRN Blood Glucose LESS THAN 70 milliGRAM(s)/deciliter  glucagon  Injectable 1milliGRAM(s) IntraMuscular once PRN Glucose LESS THAN 70 milligrams/deciliter  polyethylene glycol 3350 17Gram(s) Oral daily PRN Constipation  acetaminophen   Tablet. 650milliGRAM(s) Oral every 6 hours PRN mild to mod      Physical exam: The right knee dressing is clean, dry and intact. No drainage or discharge. No erythema is noted. No blistering. No ecchymosis. The calf is supple nontender. No calf tenderness. Dressings changed/  silvadene applied to wound as per Dr Miguel.   Capillary refill is less than 2 seconds. No cyanosis .Medial aspect of incision continues to remain unchanged.      Primary Orthopedic Assessment:  • s/p RIGHT total knee replacement    Secondary  Orthopedic Assessment(s):   •     Secondary  Medical Assessment(s):   •     Plan:   • DVT prophylaxis as prescribed, including use of compression devices and ankle pumps  • Continue physical therapy  • Weightbearing as tolerated of the right lower extremity with assistance of a walker  • Incentive spirometry encouraged  • Pain control as clinically indicated  • dressing changed BID with Silvadene   Drain unclapmed at 10;45 am within 1 minute 100 cc of output in drain.

## 2017-03-18 NOTE — PROGRESS NOTE ADULT - ASSESSMENT
Improved KARMEN on CKD , DM ---> baseline creat 1.8 , renal function has improved   Normal kidneys on NCCT   Debridement / dehis ---> Abx as per ID   - continue to monitor labs  - avoid potential nephrotoxic agents    Anemia - s/p PRBcs  - monitor H/H  - further PRBCs as needed  -  added SIDRA and Fe stores pending

## 2017-03-19 DIAGNOSIS — I82.419 ACUTE EMBOLISM AND THROMBOSIS OF UNSPECIFIED FEMORAL VEIN: ICD-10-CM

## 2017-03-19 LAB
ANION GAP SERPL CALC-SCNC: 12 MMOL/L — SIGNIFICANT CHANGE UP (ref 5–17)
BUN SERPL-MCNC: 27 MG/DL — HIGH (ref 8–20)
CALCIUM SERPL-MCNC: 7.5 MG/DL — LOW (ref 8.6–10.2)
CHLORIDE SERPL-SCNC: 105 MMOL/L — SIGNIFICANT CHANGE UP (ref 98–107)
CO2 SERPL-SCNC: 25 MMOL/L — SIGNIFICANT CHANGE UP (ref 22–29)
CREAT SERPL-MCNC: 2.12 MG/DL — HIGH (ref 0.5–1.3)
GLUCOSE SERPL-MCNC: 79 MG/DL — SIGNIFICANT CHANGE UP (ref 70–115)
HCT VFR BLD CALC: 27.9 % — LOW (ref 37–47)
HGB BLD-MCNC: 9.4 G/DL — LOW (ref 12–16)
MAGNESIUM SERPL-MCNC: 1.7 MG/DL — LOW (ref 1.8–2.5)
MCHC RBC-ENTMCNC: 29.8 PG — SIGNIFICANT CHANGE UP (ref 27–31)
MCHC RBC-ENTMCNC: 33.7 G/DL — SIGNIFICANT CHANGE UP (ref 32–36)
MCV RBC AUTO: 88.6 FL — SIGNIFICANT CHANGE UP (ref 81–99)
OB PNL STL: NEGATIVE — SIGNIFICANT CHANGE UP
PLATELET # BLD AUTO: 363 K/UL — SIGNIFICANT CHANGE UP (ref 150–400)
POTASSIUM SERPL-MCNC: 3.5 MMOL/L — SIGNIFICANT CHANGE UP (ref 3.5–5.3)
POTASSIUM SERPL-SCNC: 3.5 MMOL/L — SIGNIFICANT CHANGE UP (ref 3.5–5.3)
RBC # BLD: 3.15 M/UL — LOW (ref 4.4–5.2)
RBC # FLD: 18.6 % — HIGH (ref 11–15.6)
SODIUM SERPL-SCNC: 142 MMOL/L — SIGNIFICANT CHANGE UP (ref 135–145)
TYPE + AB SCN PNL BLD: SIGNIFICANT CHANGE UP
WBC # BLD: 7.9 K/UL — SIGNIFICANT CHANGE UP (ref 4.8–10.8)
WBC # FLD AUTO: 7.9 K/UL — SIGNIFICANT CHANGE UP (ref 4.8–10.8)

## 2017-03-19 PROCEDURE — 99233 SBSQ HOSP IP/OBS HIGH 50: CPT

## 2017-03-19 RX ORDER — MAGNESIUM SULFATE 500 MG/ML
2 VIAL (ML) INJECTION ONCE
Qty: 0 | Refills: 0 | Status: COMPLETED | OUTPATIENT
Start: 2017-03-19 | End: 2017-03-19

## 2017-03-19 RX ADMIN — Medication 1 TABLET(S): at 12:04

## 2017-03-19 RX ADMIN — Medication 650 MILLIGRAM(S): at 23:15

## 2017-03-19 RX ADMIN — Medication 650 MILLIGRAM(S): at 13:19

## 2017-03-19 RX ADMIN — Medication 12.5 MILLIGRAM(S): at 07:06

## 2017-03-19 RX ADMIN — Medication 650 MILLIGRAM(S): at 12:26

## 2017-03-19 RX ADMIN — Medication 325 MILLIGRAM(S): at 08:25

## 2017-03-19 RX ADMIN — Medication 1 MILLIGRAM(S): at 12:04

## 2017-03-19 RX ADMIN — Medication 325 MILLIGRAM(S): at 17:51

## 2017-03-19 RX ADMIN — Medication 650 MILLIGRAM(S): at 22:45

## 2017-03-19 RX ADMIN — DAPTOMYCIN 130 MILLIGRAM(S): 500 INJECTION, POWDER, LYOPHILIZED, FOR SOLUTION INTRAVENOUS at 19:19

## 2017-03-19 RX ADMIN — Medication 1 APPLICATION(S): at 07:06

## 2017-03-19 RX ADMIN — Medication 50 GRAM(S): at 16:41

## 2017-03-19 RX ADMIN — PANTOPRAZOLE SODIUM 40 MILLIGRAM(S): 20 TABLET, DELAYED RELEASE ORAL at 17:56

## 2017-03-19 RX ADMIN — Medication 12.5 MILLIGRAM(S): at 17:51

## 2017-03-19 RX ADMIN — Medication 1 APPLICATION(S): at 17:52

## 2017-03-19 RX ADMIN — Medication 325 MILLIGRAM(S): at 12:04

## 2017-03-19 RX ADMIN — PANTOPRAZOLE SODIUM 40 MILLIGRAM(S): 20 TABLET, DELAYED RELEASE ORAL at 07:06

## 2017-03-19 RX ADMIN — MEROPENEM 200 MILLIGRAM(S): 1 INJECTION INTRAVENOUS at 17:56

## 2017-03-19 RX ADMIN — MEROPENEM 200 MILLIGRAM(S): 1 INJECTION INTRAVENOUS at 07:06

## 2017-03-19 NOTE — PROGRESS NOTE ADULT - ASSESSMENT
Improved KARMEN on CKD , DM ---> baseline creat 1.8 , renal function has improved towards baseline  Normal kidneys on NCCT   Debridement / dehis ---> Abx as per ID   - continue to monitor labs  - avoid potential nephrotoxic agents    Anemia - s/p PRBcs  - monitor H/H  -  added SIDRA and Fe stores ok

## 2017-03-19 NOTE — PROGRESS NOTE ADULT - SUBJECTIVE AND OBJECTIVE BOX
SHYAMROSLYN LAL    [id number]    History: 72y Female is status post right knee revision abx spacer, POD # 20. Patient is doing well today and is comfortable in bed. The patient's pain is controlled using the prescribed pain medications.  Denies nausea, vomiting, chest pain, shortness of breath, abdominal pain or fever. No new complaints.                          9.4    7.9   )-----------( 363      ( 19 Mar 2017 08:13 )             27.9     19 Mar 2017 08:13    142    |  105    |  27.0   ----------------------------<  79     3.5     |  25.0   |  2.12     Ca    7.5        19 Mar 2017 08:13  Mg     1.7       19 Mar 2017 08:13        MEDICATIONS  (STANDING):  docusate sodium 100milliGRAM(s) Oral three times a day  ferrous    sulfate 325milliGRAM(s) Oral three times a day with meals  folic acid 1milliGRAM(s) Oral daily  multivitamin 1Tablet(s) Oral daily  insulin lispro (HumaLOG) corrective regimen sliding scale  SubCutaneous Before meals and at bedtime  dextrose 50% Injectable 12.5Gram(s) IV Push once  dextrose 50% Injectable 25Gram(s) IV Push once  dextrose 50% Injectable 25Gram(s) IV Push once  meropenem IVPB 500milliGRAM(s) IV Intermittent every 12 hours  senna 2Tablet(s) Oral at bedtime  pantoprazole    Tablet 40milliGRAM(s) Oral two times a day before meals  metoprolol 12.5milliGRAM(s) Oral every 12 hours  DAPTOmycin IVPB 750milliGRAM(s) IV Intermittent every 24 hours  silver sulfADIAZINE 1% Cream 1Application(s) Topical two times a day  epoetin melany Injectable [id number](s) SubCutaneous <User Schedule>  enoxaparin Injectable 120milliGRAM(s) SubCutaneous daily    MEDICATIONS  (PRN):  acetaminophen   Tablet 650milliGRAM(s) Oral every 6 hours PRN For Temp over 38.3 C (100.94 F)  aluminum hydroxide/magnesium hydroxide/simethicone Suspension 30milliLiter(s) Oral four times a day PRN Indigestion  ondansetron Injectable 4milliGRAM(s) IV Push every 6 hours PRN Nausea and/or Vomiting  dextrose Gel 1Dose(s) Oral once PRN Blood Glucose LESS THAN 70 milliGRAM(s)/deciliter  glucagon  Injectable 1milliGRAM(s) IntraMuscular once PRN Glucose LESS THAN 70 milligrams/deciliter  polyethylene glycol 3350 17Gram(s) Oral daily PRN Constipation  acetaminophen   Tablet. 650milliGRAM(s) Oral every 6 hours PRN mild to mod    Vital Signs Last 24 Hrs  T(C): 36.9, Max: 36.9 (03-18 @ 16:12)  T(F): 98.4, Max: 98.5 (03-18 @ 16:12)  HR: 95 (90 - 95)  BP: 118/80 (118/80 - 130/66)  BP(mean): --  RR: 18 (18 - 18)  SpO2: 98% (98% - 98%)    Physical exam: Right lower extremity- The right knee dressing is clean, dry and intact.(changed this am by nursing). No drainage or discharge. No erythema is noted. No blistering. No ecchymosis. Drain intact with 50cc output over night shift, 140cc output in 24 hrs. The calf is supple nontender. Sensation to light touch is grossly intact distally. Motor function distally is 5/5. Extensor hallucis longus and flexor hallucis longus are intact. No foot drop. 2+ dorsalis pedis pulse. Capillary refill is less than 2 seconds. No cyanosis.    Primary Orthopedic Assessment:  • s/p RIGHT knee revision abx spacer, POD#20    - right subclavian dvt    Plan:   • DVT prophylaxis- therapeutic lovenox , including use of compression devices and ankle pumps  • Continue physical therapy  • Weightbearing as tolerated of the right lower extremity with assistance of a walker in brace  • Incentive spirometry encouraged  • Pain control as clinically indicated  -Continue alyce brace  -Monitor drain output  -continue bid dsg changes per plastics  -IV abx- has PICC  -plan for OR tomorrow with ortho and plastics Dr Rey  -NPO after midnight  -HOLD Lovenox 24 hrs prior to OR (will hold this afternoon)  -Medical clearance for OR

## 2017-03-19 NOTE — PROGRESS NOTE ADULT - PROBLEM SELECTOR PLAN 4
On Lovenox. IR follow up. On Lovenox. Discussed with IR, Dr Morrison, advised to maintain PICC for now and anticoagulate. Lovenox to be held 24 hrs prior to OR per orthopedics.

## 2017-03-19 NOTE — PROGRESS NOTE ADULT - SUBJECTIVE AND OBJECTIVE BOX
No new events.  Pr remains afebrile with normal WBC.  SIGRID 140-150 serosang per 24h   Rigth knee incision remains in tact.  Incision is dry.  Medial skin flap escahr is demarcating.  No dehiscence along the portion with escahr and islands of grnaulation tissue at the suture line.  Deep tissue may be viable.  No collections palpable.  no sign of infection.      A/P;  SIGRID drainage has been decreasing and no sign of infection.  Will d/w Dr. Ritchie tomorrow.  For now, can continue observation as the wound has shown incremental improvement over last 48h.    May hold off on re-exploration.    Continue silvadene and SIGRID

## 2017-03-19 NOTE — PROGRESS NOTE ADULT - ASSESSMENT
71 yr old female with hypertension, hyperlipidemia, LIN, CKD, COPD, paroxysmal A fib sent from rehab for right knee wound dehiscence. She was seen by orthopedics, ID. Local wound cultures growing resistant and Pseudomonas and VRE. She was started on Meropenem and Zyvox. Her renal function was slightly worse from baseline and hence started on IVF. No obstructive disease on renal ultrasound. Cardiology consulted for clearance. Renal was consulted.  She is scheduled for OR I&D, she underwent wound closure and insertion of antibiotic spacer. RRT was called on 3/2 for hypotension and lethargy. Labs revealed elevated lactate. She was transferred to ICU for fluid resuscitation and monitoring. Narcotics were discontinued. She was noted to be anemic 6.9, she was ordered for PRBC transfusion and transferred out of ICU. ID follow up requested for medication adjustment, advised to stop Linezolid and start Daptomycin. GI consulted for anemia, no endoscopic intervention advised at this time. Hb remained stable. Noted to have lethargy, hence narcotics stopped. ABG done, which revealed normal pH and CO2. CT brain showed no stroke. Mental status improved. Hb remained stable. After discussion with orthopedics, coumadin was started for atrial fibrillation. She was noted to have bilateral upper extremity swelling, hence ultrasound was done on 3/9, negative for DVT. Given low platelets, Linezolid was discontinued by ID and Daptomycin was started. Heparin antibody negative. Plastics and orthopedic follow up was done, felt need for repeat I&D. Coumadin was held in view of procedure. Noted to be anemic, improved with 2 units of PRBC on 3/17/17. She was noted to have right arm swelling, doppler was repeated and showed subclavian vein DVT. She had PICC line placed on 3/18/17.

## 2017-03-19 NOTE — PROGRESS NOTE ADULT - SUBJECTIVE AND OBJECTIVE BOX
INTERVAL HPI/OVERNIGHT EVENTS:    CC: subclavian DVT, anemia, CKD, right knee wound dehiscence and infection Pseudomonas and VRE    Right arm pain decreased, diagnosed with DVT of subclavian. No other complaints.     Vital Signs Last 24 Hrs  T(C): 36.9, Max: 36.9 (03-18 @ 16:12)  T(F): 98.4, Max: 98.5 (03-18 @ 16:12)  HR: 95 (90 - 95)  BP: 118/80 (118/80 - 130/66)  BP(mean): --  RR: 18 (18 - 18)  SpO2: 98% (98% - 98%)    PHYSICAL EXAM:    GENERAL: Not in distress, alert  CHEST/LUNG: b/l air entry  HEART: irregular  ABDOMEN: Soft, BS+  EXTREMITIES:  Right arm swelling    MEDICATIONS  (STANDING):  docusate sodium 100milliGRAM(s) Oral three times a day  ferrous    sulfate 325milliGRAM(s) Oral three times a day with meals  folic acid 1milliGRAM(s) Oral daily  multivitamin 1Tablet(s) Oral daily  insulin lispro (HumaLOG) corrective regimen sliding scale  SubCutaneous Before meals and at bedtime  dextrose 50% Injectable 12.5Gram(s) IV Push once  dextrose 50% Injectable 25Gram(s) IV Push once  dextrose 50% Injectable 25Gram(s) IV Push once  meropenem IVPB 500milliGRAM(s) IV Intermittent every 12 hours  senna 2Tablet(s) Oral at bedtime  pantoprazole    Tablet 40milliGRAM(s) Oral two times a day before meals  metoprolol 12.5milliGRAM(s) Oral every 12 hours  DAPTOmycin IVPB 750milliGRAM(s) IV Intermittent every 24 hours  silver sulfADIAZINE 1% Cream 1Application(s) Topical two times a day  epoetin melany Injectable [id number](s) SubCutaneous <User Schedule>  enoxaparin Injectable 120milliGRAM(s) SubCutaneous daily    MEDICATIONS  (PRN):  acetaminophen   Tablet 650milliGRAM(s) Oral every 6 hours PRN For Temp over 38.3 C (100.94 F)  aluminum hydroxide/magnesium hydroxide/simethicone Suspension 30milliLiter(s) Oral four times a day PRN Indigestion  ondansetron Injectable 4milliGRAM(s) IV Push every 6 hours PRN Nausea and/or Vomiting  dextrose Gel 1Dose(s) Oral once PRN Blood Glucose LESS THAN 70 milliGRAM(s)/deciliter  glucagon  Injectable 1milliGRAM(s) IntraMuscular once PRN Glucose LESS THAN 70 milligrams/deciliter  polyethylene glycol 3350 17Gram(s) Oral daily PRN Constipation  acetaminophen   Tablet. 650milliGRAM(s) Oral every 6 hours PRN mild to mod      Allergies    No Known Allergies    Intolerances          LABS:                          9.4    7.9   )-----------( 363      ( 19 Mar 2017 08:13 )             27.9     19 Mar 2017 08:13    142    |  105    |  27.0   ----------------------------<  79     3.5     |  25.0   |  2.12     Ca    7.5        19 Mar 2017 08:13  Mg     1.7       19 Mar 2017 08:13      PT/INR - ( 18 Mar 2017 10:26 )   PT: 13.5 sec;   INR: 1.22 ratio         PTT - ( 18 Mar 2017 10:26 )  PTT:31.6 sec      RADIOLOGY & ADDITIONAL TESTS:

## 2017-03-20 ENCOUNTER — APPOINTMENT (OUTPATIENT)
Dept: ORTHOPEDIC SURGERY | Facility: HOSPITAL | Age: 72
End: 2017-03-20

## 2017-03-20 LAB
ANION GAP SERPL CALC-SCNC: 11 MMOL/L — SIGNIFICANT CHANGE UP (ref 5–17)
BUN SERPL-MCNC: 25 MG/DL — HIGH (ref 8–20)
CALCIUM SERPL-MCNC: 7.6 MG/DL — LOW (ref 8.6–10.2)
CHLORIDE SERPL-SCNC: 106 MMOL/L — SIGNIFICANT CHANGE UP (ref 98–107)
CO2 SERPL-SCNC: 25 MMOL/L — SIGNIFICANT CHANGE UP (ref 22–29)
CREAT SERPL-MCNC: 2.08 MG/DL — HIGH (ref 0.5–1.3)
GLUCOSE SERPL-MCNC: 82 MG/DL — SIGNIFICANT CHANGE UP (ref 70–115)
HCT VFR BLD CALC: 27.9 % — LOW (ref 37–47)
HGB BLD-MCNC: 9.6 G/DL — LOW (ref 12–16)
MAGNESIUM SERPL-MCNC: 1.9 MG/DL — SIGNIFICANT CHANGE UP (ref 1.8–2.5)
MCHC RBC-ENTMCNC: 30.4 PG — SIGNIFICANT CHANGE UP (ref 27–31)
MCHC RBC-ENTMCNC: 34.4 G/DL — SIGNIFICANT CHANGE UP (ref 32–36)
MCV RBC AUTO: 88.3 FL — SIGNIFICANT CHANGE UP (ref 81–99)
PLATELET # BLD AUTO: 384 K/UL — SIGNIFICANT CHANGE UP (ref 150–400)
POTASSIUM SERPL-MCNC: 3.6 MMOL/L — SIGNIFICANT CHANGE UP (ref 3.5–5.3)
POTASSIUM SERPL-SCNC: 3.6 MMOL/L — SIGNIFICANT CHANGE UP (ref 3.5–5.3)
RBC # BLD: 3.16 M/UL — LOW (ref 4.4–5.2)
RBC # FLD: 18.3 % — HIGH (ref 11–15.6)
SODIUM SERPL-SCNC: 142 MMOL/L — SIGNIFICANT CHANGE UP (ref 135–145)
WBC # BLD: 8 K/UL — SIGNIFICANT CHANGE UP (ref 4.8–10.8)
WBC # FLD AUTO: 8 K/UL — SIGNIFICANT CHANGE UP (ref 4.8–10.8)

## 2017-03-20 PROCEDURE — 99233 SBSQ HOSP IP/OBS HIGH 50: CPT

## 2017-03-20 PROCEDURE — 99232 SBSQ HOSP IP/OBS MODERATE 35: CPT

## 2017-03-20 RX ORDER — ENOXAPARIN SODIUM 100 MG/ML
120 INJECTION SUBCUTANEOUS DAILY
Qty: 0 | Refills: 0 | Status: DISCONTINUED | OUTPATIENT
Start: 2017-03-20 | End: 2017-03-30

## 2017-03-20 RX ADMIN — ENOXAPARIN SODIUM 120 MILLIGRAM(S): 100 INJECTION SUBCUTANEOUS at 10:21

## 2017-03-20 RX ADMIN — Medication 1 APPLICATION(S): at 18:14

## 2017-03-20 RX ADMIN — ERYTHROPOIETIN 20000 UNIT(S): 10000 INJECTION, SOLUTION INTRAVENOUS; SUBCUTANEOUS at 18:36

## 2017-03-20 RX ADMIN — Medication 650 MILLIGRAM(S): at 12:44

## 2017-03-20 RX ADMIN — Medication 325 MILLIGRAM(S): at 17:56

## 2017-03-20 RX ADMIN — PANTOPRAZOLE SODIUM 40 MILLIGRAM(S): 20 TABLET, DELAYED RELEASE ORAL at 17:56

## 2017-03-20 RX ADMIN — Medication 12.5 MILLIGRAM(S): at 05:19

## 2017-03-20 RX ADMIN — DAPTOMYCIN 130 MILLIGRAM(S): 500 INJECTION, POWDER, LYOPHILIZED, FOR SOLUTION INTRAVENOUS at 16:36

## 2017-03-20 RX ADMIN — Medication 650 MILLIGRAM(S): at 12:00

## 2017-03-20 RX ADMIN — Medication 325 MILLIGRAM(S): at 09:22

## 2017-03-20 RX ADMIN — MEROPENEM 200 MILLIGRAM(S): 1 INJECTION INTRAVENOUS at 05:20

## 2017-03-20 RX ADMIN — Medication 325 MILLIGRAM(S): at 12:46

## 2017-03-20 RX ADMIN — Medication 1 MILLIGRAM(S): at 12:46

## 2017-03-20 RX ADMIN — Medication 12.5 MILLIGRAM(S): at 17:56

## 2017-03-20 RX ADMIN — MEROPENEM 200 MILLIGRAM(S): 1 INJECTION INTRAVENOUS at 17:57

## 2017-03-20 RX ADMIN — Medication 1 TABLET(S): at 12:46

## 2017-03-20 NOTE — PROGRESS NOTE ADULT - ASSESSMENT
71 yr old female with hypertension, hyperlipidemia, LIN, CKD, COPD, paroxysmal A fib sent from rehab for right knee wound dehiscence. She was seen by orthopedics, ID. Local wound cultures growing resistant and Pseudomonas and VRE. She was started on Meropenem and Zyvox. Her renal function was slightly worse from baseline and hence started on IVF. No obstructive disease on renal ultrasound. Cardiology consulted for clearance. Renal was consulted.  She is scheduled for OR I&D, she underwent wound closure and insertion of antibiotic spacer. RRT was called on 3/2 for hypotension and lethargy. Labs revealed elevated lactate. She was transferred to ICU for fluid resuscitation and monitoring. Narcotics were discontinued. She was noted to be anemic 6.9, she was ordered for PRBC transfusion and transferred out of ICU. ID follow up requested for medication adjustment, advised to stop Linezolid and start Daptomycin. GI consulted for anemia, no endoscopic intervention advised at this time. Hb remained stable. Noted to have lethargy, hence narcotics stopped. ABG done, which revealed normal pH and CO2. CT brain showed no stroke. Mental status improved. Hb remained stable. After discussion with orthopedics, coumadin was started for atrial fibrillation. She was noted to have bilateral upper extremity swelling, hence ultrasound was done on 3/9, negative for DVT. Given low platelets, Linezolid was discontinued by ID and Daptomycin was started. Heparin antibody negative. Plastics and orthopedic follow up was done, felt need for repeat I&D. Coumadin was held in view of procedure. Noted to be anemic, improved with 2 units of PRBC on 3/17/17. She was noted to have right arm swelling, doppler was repeated and showed subclavian vein DVT. She had PICC line placed on 3/18/17. Orthopedic and plastic surgery follow up done, given wound healing, further I&D deferred for now. Discussed with IR regarding DVT and possible need for PICC line removal, advised to maintain line for now and continue with anticoagulation.

## 2017-03-20 NOTE — PROGRESS NOTE ADULT - SUBJECTIVE AND OBJECTIVE BOX
INTERVAL HPI/OVERNIGHT EVENTS:    CC: Right subclavian DVT, CKD, anemia, right knee wound dehiscence and infection Pseudomonas and VRE    No new complaints. No OR today, discussed with ortho. Patient aware and is glad about the same. Denies pain. BM this am.     Vital Signs Last 24 Hrs  T(C): 36.8, Max: 36.9 (03-19 @ 15:50)  T(F): 98.3, Max: 98.4 (03-19 @ 15:50)  HR: 81 (81 - 90)  BP: 141/77 (122/82 - 141/77)  BP(mean): --  RR: 18 (18 - 18)  SpO2: 100% (98% - 100%)    PHYSICAL EXAM:    GENERAL: Not in distress, alert  CHEST/LUNG: b/l air entry  HEART: irregular  ABDOMEN: Soft, BS+  EXTREMITIES:  right arm swelling slightly improved. drain + right knee    MEDICATIONS  (STANDING):  docusate sodium 100milliGRAM(s) Oral three times a day  ferrous    sulfate 325milliGRAM(s) Oral three times a day with meals  folic acid 1milliGRAM(s) Oral daily  multivitamin 1Tablet(s) Oral daily  insulin lispro (HumaLOG) corrective regimen sliding scale  SubCutaneous Before meals and at bedtime  dextrose 50% Injectable 12.5Gram(s) IV Push once  dextrose 50% Injectable 25Gram(s) IV Push once  dextrose 50% Injectable 25Gram(s) IV Push once  meropenem IVPB 500milliGRAM(s) IV Intermittent every 12 hours  senna 2Tablet(s) Oral at bedtime  pantoprazole    Tablet 40milliGRAM(s) Oral two times a day before meals  metoprolol 12.5milliGRAM(s) Oral every 12 hours  DAPTOmycin IVPB 750milliGRAM(s) IV Intermittent every 24 hours  silver sulfADIAZINE 1% Cream 1Application(s) Topical two times a day  epoetin melany Injectable [id number](s) SubCutaneous <User Schedule>  enoxaparin Injectable 120milliGRAM(s) SubCutaneous daily    MEDICATIONS  (PRN):  acetaminophen   Tablet 650milliGRAM(s) Oral every 6 hours PRN For Temp over 38.3 C (100.94 F)  aluminum hydroxide/magnesium hydroxide/simethicone Suspension 30milliLiter(s) Oral four times a day PRN Indigestion  ondansetron Injectable 4milliGRAM(s) IV Push every 6 hours PRN Nausea and/or Vomiting  dextrose Gel 1Dose(s) Oral once PRN Blood Glucose LESS THAN 70 milliGRAM(s)/deciliter  glucagon  Injectable 1milliGRAM(s) IntraMuscular once PRN Glucose LESS THAN 70 milligrams/deciliter  polyethylene glycol 3350 17Gram(s) Oral daily PRN Constipation  acetaminophen   Tablet. 650milliGRAM(s) Oral every 6 hours PRN mild to mod      Allergies    No Known Allergies    Intolerances          LABS:                          9.4    7.9   )-----------( 363      ( 19 Mar 2017 08:13 )             27.9     19 Mar 2017 08:13    142    |  105    |  27.0   ----------------------------<  79     3.5     |  25.0   |  2.12     Ca    7.5        19 Mar 2017 08:13  Mg     1.7       19 Mar 2017 08:13      PT/INR - ( 18 Mar 2017 10:26 )   PT: 13.5 sec;   INR: 1.22 ratio         PTT - ( 18 Mar 2017 10:26 )  PTT:31.6 sec      RADIOLOGY & ADDITIONAL TESTS:

## 2017-03-20 NOTE — PROGRESS NOTE ADULT - SUBJECTIVE AND OBJECTIVE BOX
[facility] [place] CARMENZA HARRIS MD FACP   DESIRE METZGER MD  Diplomates American Board of Internal Medicine and Infectious Diseases      SHYAM LAL  [id number]  72y    INTERVAL HPI/OVERNIGHT EVENTS:  asx  dressing in place  or debridement on hold  abs through april 8    Vital Signs Last 24 Hrs  T(C): 36.8, Max: 36.9 (03-19 @ 15:50)  T(F): 98.3, Max: 98.4 (03-19 @ 15:50)  HR: 81 (81 - 90)  BP: 141/77 (122/82 - 141/77)  BP(mean): --  RR: 18 (18 - 18)  SpO2: 100% (98% - 100%)    ANTIBIOTICS  meropenem IVPB 500milliGRAM(s) IV Intermittent every 12 hours  DAPTOmycin IVPB 750milliGRAM(s) IV Intermittent every 24 hours  epoetin melany Injectable [id number](s) SubCutaneous <User Schedule>      Allergies    No Known Allergies    Intolerances        REVIEW OF SYSTEMS:    PHYSICAL EXAM:  Vital Signs Last 24 Hrs  T(C): 36.8, Max: 36.9 (03-19 @ 15:50)  T(F): 98.3, Max: 98.4 (03-19 @ 15:50)  HR: 81 (81 - 90)  BP: 141/77 (122/82 - 141/77)  BP(mean): --  RR: 18 (18 - 18)  SpO2: 100% (98% - 100%)      GEN: NAD, pleasant  HEENT: normocephalic and atraumatic. EOMI. CHIDI. Moist mucosa. Clear Posterior pharynx.  NECK: Supple. No carotid bruits.  No lymphadenopathy or thyromegaly.  LUNGS: Clear to auscultation.  HEART: Regular rate and rhythm without murmur.  ABDOMEN: Soft, nontender, and nondistended.  Positive bowel sounds.  No hepatosplenomegaly was noted.  EXTREMITIES: Without any cyanosis, clubbing, rash, lesions or edema.  NEUROLOGIC: Cranial nerves II through XII are grossly intact.  MUSCULOSKELETAL:  SKIN: No ulceration or induration present    LABS:                        9.6    8.0   )-----------( 384      ( 20 Mar 2017 09:51 )             27.9       20 Mar 2017 09:51    142    |  106    |  25.0   ----------------------------<  82     3.6     |  25.0   |  2.08     Ca    7.6        20 Mar 2017 09:51  Mg     1.9       20 Mar 2017 09:51          03-20 @ 09:51  hct 27.9 % hgb 9.6 g/dL    03-20 @ 09:51  plat 384 K/uL wbc 8.0 K/uL    03-19 @ 08:13  hct 27.9 % hgb 9.4 g/dL    03-19 @ 08:13  plat 363 K/uL wbc 7.9 K/uL    03-18 @ 10:26  hct 28.2 % hgb 9.7 g/dL    03-18 @ 10:26  plat 357 K/uL wbc 9.0 K/uL      03-20-17  creat 2.08 mg/dL gfr 27 mL/min/1.73M2 bun 25.0 mg/dL k 3.6 mmol/L  03-19-17  creat 2.12 mg/dL gfr 26 mL/min/1.73M2 bun 27.0 mg/dL k 3.5 mmol/L  03-18-17  creat 2.17 mg/dL gfr 25 mL/min/1.73M2 bun 28.0 mg/dL k 3.6 mmol/L      MICROBIOLOGY:        RADIOLOGY & ADDITIONAL STUDIES:          RAÚL HARRIS MD [facility]

## 2017-03-20 NOTE — PROGRESS NOTE ADULT - PROBLEM SELECTOR PLAN 1
Continue Daptomycin and Meropenem, Ortho and plastic surgery follow up noted, further intervention deferred for now.

## 2017-03-20 NOTE — PROGRESS NOTE ADULT - ASSESSMENT
[facility]  ABX per Infectious Disease  Dr Rey will monitor wound and decide if/when drain can be removed.Posssible wound vac in future also discussed with Dr Rey    Ortho will follow knee but infection must be cleared and wound healed before new knwee placed.

## 2017-03-20 NOTE — PROGRESS NOTE ADULT - SUBJECTIVE AND OBJECTIVE BOX
72y Female is status post right total knee revision with cement spacer, on 2/22, POD#21. Patient is comfortable. The patient's pain is controlled using the prescribed pain medications. Patient seen with Dr. Ritchie.     Physical exam: The right knee dressings are C/D/I.  No active  drainage noted to RLE. Anterior knee superficial wound medial aspect of mid-incision noted with jagged macerated edges improving. No s/o infx. Sutures intact. New dressings placed with SIILVADENE AND DRY DRESSINGS. SIGRID drain intact, unclamped this morning. Drain site cleaned with betadine swipe and dry dressing placed with gauze.                             9.4    7.9   )-----------( 363      ( 19 Mar 2017 08:13 )             27.9       Primary Orthopedic Assessment:  • s/p RIGHT KNEE REVISION WITH CEMENT SPACER POD #21    Plan:   ·	As per Dr. Ritchie, no surgical intervention today, will hold off for now as wound looks to be improving  ·	DVT prophylaxis as prescribed, including use of compression devices and ankle pumps - d/w Dr. Nicholson who will restart lovenox and diet  ·	Continue physical therapy - Weightbearing as tolerated of the right lower extremity in alyce brace locked in extension with assistance of a walker  ·	Pain control as clinically indicated  ·	Silvadene and dressing changes BID as per Dr. Rey  ·	Continue IV Abx

## 2017-03-20 NOTE — PROGRESS NOTE ADULT - SUBJECTIVE AND OBJECTIVE BOX
Right Knee- s/p resection with Abx spacer  DVT  Wound Comprimise        Wound examined.  Discussed with Dr Rey  Evidence of potential failure in future but no need for surgery/wound exploration  Discussed with Dr Rey who agrees with plan.Also increased risk post op if since treatment dose Lovenox required for DVT    Pt is awake and alert and much improved since last week  Questions answered regarding knee plan.    She ishappy no surgery needed today.  She understands might change ifdrainage worsens or does not improve

## 2017-03-21 LAB
ANION GAP SERPL CALC-SCNC: 13 MMOL/L — SIGNIFICANT CHANGE UP (ref 5–17)
BUN SERPL-MCNC: 24 MG/DL — HIGH (ref 8–20)
CALCIUM SERPL-MCNC: 8.1 MG/DL — LOW (ref 8.6–10.2)
CHLORIDE SERPL-SCNC: 108 MMOL/L — HIGH (ref 98–107)
CO2 SERPL-SCNC: 23 MMOL/L — SIGNIFICANT CHANGE UP (ref 22–29)
CREAT SERPL-MCNC: 2.15 MG/DL — HIGH (ref 0.5–1.3)
GLUCOSE SERPL-MCNC: 82 MG/DL — SIGNIFICANT CHANGE UP (ref 70–115)
HCT VFR BLD CALC: 29 % — LOW (ref 37–47)
HGB BLD-MCNC: 9.7 G/DL — LOW (ref 12–16)
MAGNESIUM SERPL-MCNC: 1.8 MG/DL — SIGNIFICANT CHANGE UP (ref 1.8–2.5)
MCHC RBC-ENTMCNC: 29.4 PG — SIGNIFICANT CHANGE UP (ref 27–31)
MCHC RBC-ENTMCNC: 33.4 G/DL — SIGNIFICANT CHANGE UP (ref 32–36)
MCV RBC AUTO: 87.9 FL — SIGNIFICANT CHANGE UP (ref 81–99)
PHOSPHATE SERPL-MCNC: 2.7 MG/DL — SIGNIFICANT CHANGE UP (ref 2.4–4.7)
PLATELET # BLD AUTO: 411 K/UL — HIGH (ref 150–400)
POTASSIUM SERPL-MCNC: 3.4 MMOL/L — LOW (ref 3.5–5.3)
POTASSIUM SERPL-SCNC: 3.4 MMOL/L — LOW (ref 3.5–5.3)
RBC # BLD: 3.3 M/UL — LOW (ref 4.4–5.2)
RBC # FLD: 18.6 % — HIGH (ref 11–15.6)
SODIUM SERPL-SCNC: 144 MMOL/L — SIGNIFICANT CHANGE UP (ref 135–145)
WBC # BLD: 8.1 K/UL — SIGNIFICANT CHANGE UP (ref 4.8–10.8)
WBC # FLD AUTO: 8.1 K/UL — SIGNIFICANT CHANGE UP (ref 4.8–10.8)

## 2017-03-21 PROCEDURE — 99233 SBSQ HOSP IP/OBS HIGH 50: CPT

## 2017-03-21 RX ADMIN — Medication 12.5 MILLIGRAM(S): at 10:13

## 2017-03-21 RX ADMIN — Medication 325 MILLIGRAM(S): at 17:24

## 2017-03-21 RX ADMIN — Medication 1 APPLICATION(S): at 18:50

## 2017-03-21 RX ADMIN — ENOXAPARIN SODIUM 120 MILLIGRAM(S): 100 INJECTION SUBCUTANEOUS at 13:05

## 2017-03-21 RX ADMIN — DAPTOMYCIN 130 MILLIGRAM(S): 500 INJECTION, POWDER, LYOPHILIZED, FOR SOLUTION INTRAVENOUS at 17:24

## 2017-03-21 RX ADMIN — MEROPENEM 200 MILLIGRAM(S): 1 INJECTION INTRAVENOUS at 05:22

## 2017-03-21 RX ADMIN — Medication 325 MILLIGRAM(S): at 13:04

## 2017-03-21 RX ADMIN — PANTOPRAZOLE SODIUM 40 MILLIGRAM(S): 20 TABLET, DELAYED RELEASE ORAL at 17:24

## 2017-03-21 RX ADMIN — Medication 650 MILLIGRAM(S): at 17:25

## 2017-03-21 RX ADMIN — Medication 1 TABLET(S): at 13:04

## 2017-03-21 RX ADMIN — PANTOPRAZOLE SODIUM 40 MILLIGRAM(S): 20 TABLET, DELAYED RELEASE ORAL at 05:22

## 2017-03-21 RX ADMIN — Medication 12.5 MILLIGRAM(S): at 17:23

## 2017-03-21 RX ADMIN — Medication 325 MILLIGRAM(S): at 10:13

## 2017-03-21 RX ADMIN — MEROPENEM 200 MILLIGRAM(S): 1 INJECTION INTRAVENOUS at 17:24

## 2017-03-21 RX ADMIN — Medication 650 MILLIGRAM(S): at 15:10

## 2017-03-21 RX ADMIN — Medication 650 MILLIGRAM(S): at 14:31

## 2017-03-21 RX ADMIN — Medication 1 MILLIGRAM(S): at 13:04

## 2017-03-21 RX ADMIN — Medication 1 APPLICATION(S): at 13:07

## 2017-03-21 NOTE — PROGRESS NOTE ADULT - SUBJECTIVE AND OBJECTIVE BOX
SHYAM LUKASZ    [id number]    History: s/p right knee revision, post operative course complicated secondary to wound dehiscence, Hospital day 27.  No acute events overnight.    Vital Signs Last 24 Hrs  T(C): 36.9, Max: 37 (03-20 @ 15:45)  T(F): 98.5, Max: 98.6 (03-20 @ 15:45)  HR: 100 (87 - 100)  BP: 130/75 (123/661 - 152/86)  BP(mean): --  RR: 18 (18 - 18)  SpO2: 97% (95% - 97%)  I&O's Summary    I & Os for current day (as of 21 Mar 2017 08:46)  =============================================  IN: 420 ml / OUT: 175 ml / NET: 245 ml                            9.6    8.0   )-----------( 384      ( 20 Mar 2017 09:51 )             27.9     20 Mar 2017 09:51    142    |  106    |  25.0   ----------------------------<  82     3.6     |  25.0   |  2.08     Ca    7.6        20 Mar 2017 09:51  Mg     1.9       20 Mar 2017 09:51        MEDICATIONS  (STANDING):  docusate sodium 100milliGRAM(s) Oral three times a day  ferrous    sulfate 325milliGRAM(s) Oral three times a day with meals  folic acid 1milliGRAM(s) Oral daily  multivitamin 1Tablet(s) Oral daily  insulin lispro (HumaLOG) corrective regimen sliding scale  SubCutaneous Before meals and at bedtime  dextrose 50% Injectable 12.5Gram(s) IV Push once  dextrose 50% Injectable 25Gram(s) IV Push once  dextrose 50% Injectable 25Gram(s) IV Push once  meropenem IVPB 500milliGRAM(s) IV Intermittent every 12 hours  senna 2Tablet(s) Oral at bedtime  pantoprazole    Tablet 40milliGRAM(s) Oral two times a day before meals  metoprolol 12.5milliGRAM(s) Oral every 12 hours  DAPTOmycin IVPB 750milliGRAM(s) IV Intermittent every 24 hours  silver sulfADIAZINE 1% Cream 1Application(s) Topical two times a day  epoetin melany Injectable [id number](s) SubCutaneous <User Schedule>  enoxaparin Injectable 120milliGRAM(s) SubCutaneous daily    MEDICATIONS  (PRN):  acetaminophen   Tablet 650milliGRAM(s) Oral every 6 hours PRN For Temp over 38.3 C (100.94 F)  aluminum hydroxide/magnesium hydroxide/simethicone Suspension 30milliLiter(s) Oral four times a day PRN Indigestion  ondansetron Injectable 4milliGRAM(s) IV Push every 6 hours PRN Nausea and/or Vomiting  dextrose Gel 1Dose(s) Oral once PRN Blood Glucose LESS THAN 70 milliGRAM(s)/deciliter  glucagon  Injectable 1milliGRAM(s) IntraMuscular once PRN Glucose LESS THAN 70 milligrams/deciliter  polyethylene glycol 3350 17Gram(s) Oral daily PRN Constipation  acetaminophen   Tablet. 650milliGRAM(s) Oral every 6 hours PRN mild to mod      Physical exam:  The dressing is clean, dry and intact. No wound erythema, discharge, drainage is noted. Sensation to light touch is grossly intact without focal deficit and is symmetric bilaterally. No calf tenderness. Sensation to light touch is grossly intact distally. Motor function distally is grossly intact. No foot drop. + dorsalis pedis pulse. No cyanosis.  SIGRID Drain with 260 cc/24hrs    Primary Orthopedic Assessment:  • Ortho stable  Secondary  Orthopedic Assessment(s):   •   Secondary  Medical Assessment(s):   •   Plan:   • DVT prophylaxis as prescribed, including use of compression devices and ankle pumps  • Cont with drain output monitoring  • Pain control as clinically indicated  • Incentive spirometry encouraged  Daily wound care management

## 2017-03-21 NOTE — PROGRESS NOTE ADULT - SUBJECTIVE AND OBJECTIVE BOX
NEPHROLOGY INTERVAL HPI/OVERNIGHT EVENTS:  pt resting comfortably when seen earlier  no acute distress noted    MEDICATIONS  (STANDING):  docusate sodium 100milliGRAM(s) Oral three times a day  ferrous    sulfate 325milliGRAM(s) Oral three times a day with meals  folic acid 1milliGRAM(s) Oral daily  multivitamin 1Tablet(s) Oral daily  insulin lispro (HumaLOG) corrective regimen sliding scale  SubCutaneous Before meals and at bedtime  dextrose 50% Injectable 12.5Gram(s) IV Push once  dextrose 50% Injectable 25Gram(s) IV Push once  dextrose 50% Injectable 25Gram(s) IV Push once  meropenem IVPB 500milliGRAM(s) IV Intermittent every 12 hours  senna 2Tablet(s) Oral at bedtime  pantoprazole    Tablet 40milliGRAM(s) Oral two times a day before meals  metoprolol 12.5milliGRAM(s) Oral every 12 hours  DAPTOmycin IVPB 750milliGRAM(s) IV Intermittent every 24 hours  silver sulfADIAZINE 1% Cream 1Application(s) Topical two times a day  epoetin melany Injectable [id number](s) SubCutaneous <User Schedule>  enoxaparin Injectable 120milliGRAM(s) SubCutaneous daily    MEDICATIONS  (PRN):  acetaminophen   Tablet 650milliGRAM(s) Oral every 6 hours PRN For Temp over 38.3 C (100.94 F)  aluminum hydroxide/magnesium hydroxide/simethicone Suspension 30milliLiter(s) Oral four times a day PRN Indigestion  ondansetron Injectable 4milliGRAM(s) IV Push every 6 hours PRN Nausea and/or Vomiting  dextrose Gel 1Dose(s) Oral once PRN Blood Glucose LESS THAN 70 milliGRAM(s)/deciliter  glucagon  Injectable 1milliGRAM(s) IntraMuscular once PRN Glucose LESS THAN 70 milligrams/deciliter  polyethylene glycol 3350 17Gram(s) Oral daily PRN Constipation  acetaminophen   Tablet. 650milliGRAM(s) Oral every 6 hours PRN mild to mod      Allergies    No Known Allergies    Intolerances                Vital Signs Last 24 Hrs  T(C): 36.9, Max: 37 (03-20 @ 15:45)  T(F): 98.5, Max: 98.6 (03-20 @ 15:45)  HR: 100 (87 - 100)  BP: 130/75 (123/661 - 152/86)  BP(mean): --  RR: 18 (18 - 18)  SpO2: 97% (95% - 97%)    PHYSICAL EXAM:  HEENT: EOMI  Neck: supple no JVD  CHEST/LUNG: CTA B/L   HEART: S1S2+ audible  ABDOMEN: Soft NDNT + BS  EXTREMITIES:  +LE edema; R leg with bandage      LABS:                        9.7    8.1   )-----------( 411      ( 21 Mar 2017 08:01 )             29.0     21 Mar 2017 08:01    144    |  108    |  24.0   ----------------------------<  82     3.4     |  23.0   |  2.15     Ca    8.1        21 Mar 2017 08:01  Phos  2.7       21 Mar 2017 08:01  Mg     1.8       21 Mar 2017 08:01          Magnesium, Serum: 1.8 mg/dL (03-21 @ 08:01)  Phosphorus Level, Serum: 2.7 mg/dL (03-21 @ 08:01)      RADIOLOGY & ADDITIONAL TESTS:

## 2017-03-21 NOTE — PROGRESS NOTE ADULT - SUBJECTIVE AND OBJECTIVE BOX
INTERVAL HPI/OVERNIGHT EVENTS:    CC: right subclavian DVT, anemia, right knee wound dehiscence and infection Pseudomonas and VRE      Vital Signs Last 24 Hrs  T(C): 36.9, Max: 37 (03-20 @ 15:45)  T(F): 98.5, Max: 98.6 (03-20 @ 15:45)  HR: 100 (87 - 100)  BP: 130/75 (123/661 - 152/86)  BP(mean): --  RR: 18 (18 - 18)  SpO2: 97% (95% - 97%)    PHYSICAL EXAM:    GENERAL: Not in distress, alert  CHEST/LUNG: b/l air entry  HEART: Regular   ABDOMEN: Soft, BS+  EXTREMITIES:  No edema    MEDICATIONS  (STANDING):  docusate sodium 100milliGRAM(s) Oral three times a day  ferrous    sulfate 325milliGRAM(s) Oral three times a day with meals  folic acid 1milliGRAM(s) Oral daily  multivitamin 1Tablet(s) Oral daily  insulin lispro (HumaLOG) corrective regimen sliding scale  SubCutaneous Before meals and at bedtime  dextrose 50% Injectable 12.5Gram(s) IV Push once  dextrose 50% Injectable 25Gram(s) IV Push once  dextrose 50% Injectable 25Gram(s) IV Push once  meropenem IVPB 500milliGRAM(s) IV Intermittent every 12 hours  senna 2Tablet(s) Oral at bedtime  pantoprazole    Tablet 40milliGRAM(s) Oral two times a day before meals  metoprolol 12.5milliGRAM(s) Oral every 12 hours  DAPTOmycin IVPB 750milliGRAM(s) IV Intermittent every 24 hours  silver sulfADIAZINE 1% Cream 1Application(s) Topical two times a day  epoetin melany Injectable [id number](s) SubCutaneous <User Schedule>  enoxaparin Injectable 120milliGRAM(s) SubCutaneous daily    MEDICATIONS  (PRN):  acetaminophen   Tablet 650milliGRAM(s) Oral every 6 hours PRN For Temp over 38.3 C (100.94 F)  aluminum hydroxide/magnesium hydroxide/simethicone Suspension 30milliLiter(s) Oral four times a day PRN Indigestion  ondansetron Injectable 4milliGRAM(s) IV Push every 6 hours PRN Nausea and/or Vomiting  dextrose Gel 1Dose(s) Oral once PRN Blood Glucose LESS THAN 70 milliGRAM(s)/deciliter  glucagon  Injectable 1milliGRAM(s) IntraMuscular once PRN Glucose LESS THAN 70 milligrams/deciliter  polyethylene glycol 3350 17Gram(s) Oral daily PRN Constipation  acetaminophen   Tablet. 650milliGRAM(s) Oral every 6 hours PRN mild to mod      Allergies    No Known Allergies    Intolerances          LABS:                          9.7    8.1   )-----------( 411      ( 21 Mar 2017 08:01 )             29.0     20 Mar 2017 09:51    142    |  106    |  25.0   ----------------------------<  82     3.6     |  25.0   |  2.08     Ca    7.6        20 Mar 2017 09:51  Mg     1.9       20 Mar 2017 09:51            RADIOLOGY & ADDITIONAL TESTS:

## 2017-03-22 LAB
ANION GAP SERPL CALC-SCNC: 9 MMOL/L — SIGNIFICANT CHANGE UP (ref 5–17)
BUN SERPL-MCNC: 23 MG/DL — HIGH (ref 8–20)
CALCIUM SERPL-MCNC: 8.1 MG/DL — LOW (ref 8.6–10.2)
CHLORIDE SERPL-SCNC: 108 MMOL/L — HIGH (ref 98–107)
CO2 SERPL-SCNC: 26 MMOL/L — SIGNIFICANT CHANGE UP (ref 22–29)
CREAT SERPL-MCNC: 1.99 MG/DL — HIGH (ref 0.5–1.3)
GLUCOSE SERPL-MCNC: 89 MG/DL — SIGNIFICANT CHANGE UP (ref 70–115)
HCT VFR BLD CALC: 29.2 % — LOW (ref 37–47)
HGB BLD-MCNC: 9.8 G/DL — LOW (ref 12–16)
MCHC RBC-ENTMCNC: 29.9 PG — SIGNIFICANT CHANGE UP (ref 27–31)
MCHC RBC-ENTMCNC: 33.6 G/DL — SIGNIFICANT CHANGE UP (ref 32–36)
MCV RBC AUTO: 89 FL — SIGNIFICANT CHANGE UP (ref 81–99)
PLATELET # BLD AUTO: 407 K/UL — HIGH (ref 150–400)
POTASSIUM SERPL-MCNC: 3.6 MMOL/L — SIGNIFICANT CHANGE UP (ref 3.5–5.3)
POTASSIUM SERPL-SCNC: 3.6 MMOL/L — SIGNIFICANT CHANGE UP (ref 3.5–5.3)
RBC # BLD: 3.28 M/UL — LOW (ref 4.4–5.2)
RBC # FLD: 18.9 % — HIGH (ref 11–15.6)
SODIUM SERPL-SCNC: 143 MMOL/L — SIGNIFICANT CHANGE UP (ref 135–145)
WBC # BLD: 8.2 K/UL — SIGNIFICANT CHANGE UP (ref 4.8–10.8)
WBC # FLD AUTO: 8.2 K/UL — SIGNIFICANT CHANGE UP (ref 4.8–10.8)

## 2017-03-22 PROCEDURE — 99232 SBSQ HOSP IP/OBS MODERATE 35: CPT

## 2017-03-22 PROCEDURE — 99233 SBSQ HOSP IP/OBS HIGH 50: CPT

## 2017-03-22 RX ADMIN — Medication 325 MILLIGRAM(S): at 17:24

## 2017-03-22 RX ADMIN — PANTOPRAZOLE SODIUM 40 MILLIGRAM(S): 20 TABLET, DELAYED RELEASE ORAL at 06:32

## 2017-03-22 RX ADMIN — ERYTHROPOIETIN 20000 UNIT(S): 10000 INJECTION, SOLUTION INTRAVENOUS; SUBCUTANEOUS at 15:58

## 2017-03-22 RX ADMIN — Medication 325 MILLIGRAM(S): at 07:55

## 2017-03-22 RX ADMIN — DAPTOMYCIN 130 MILLIGRAM(S): 500 INJECTION, POWDER, LYOPHILIZED, FOR SOLUTION INTRAVENOUS at 15:58

## 2017-03-22 RX ADMIN — Medication 1 APPLICATION(S): at 17:25

## 2017-03-22 RX ADMIN — Medication 12.5 MILLIGRAM(S): at 06:32

## 2017-03-22 RX ADMIN — ENOXAPARIN SODIUM 120 MILLIGRAM(S): 100 INJECTION SUBCUTANEOUS at 11:13

## 2017-03-22 RX ADMIN — Medication 12.5 MILLIGRAM(S): at 17:24

## 2017-03-22 RX ADMIN — Medication 1 TABLET(S): at 11:14

## 2017-03-22 RX ADMIN — Medication 1 APPLICATION(S): at 06:31

## 2017-03-22 RX ADMIN — MEROPENEM 200 MILLIGRAM(S): 1 INJECTION INTRAVENOUS at 17:24

## 2017-03-22 RX ADMIN — Medication 1 MILLIGRAM(S): at 11:14

## 2017-03-22 RX ADMIN — Medication 325 MILLIGRAM(S): at 11:14

## 2017-03-22 RX ADMIN — MEROPENEM 200 MILLIGRAM(S): 1 INJECTION INTRAVENOUS at 06:36

## 2017-03-22 RX ADMIN — PANTOPRAZOLE SODIUM 40 MILLIGRAM(S): 20 TABLET, DELAYED RELEASE ORAL at 17:25

## 2017-03-22 NOTE — PROGRESS NOTE ADULT - SUBJECTIVE AND OBJECTIVE BOX
INTERVAL HPI/OVERNIGHT EVENTS:    CC: subclavian DVT, CKD, right knee wound dehiscence and infection Pseudomonas and VRE    No new complaints, drain in place. Denies complaints.     Vital Signs Last 24 Hrs  T(C): 37.1, Max: 37.1 (03-22 @ 07:48)  T(F): 98.7, Max: 98.7 (03-22 @ 07:48)  HR: 81 (81 - 95)  BP: 139/74 (124/72 - 139/74)  BP(mean): --  RR: 18 (18 - 18)  SpO2: 95% (95% - 98%)    PHYSICAL EXAM:    GENERAL: Not in distress, alert  CHEST/LUNG: b/l air entry  HEART: irregular  ABDOMEN: Soft, BS+  EXTREMITIES:  2+ Peripheral Pulses, No clubbing, cyanosis, or edema    MEDICATIONS  (STANDING):  docusate sodium 100milliGRAM(s) Oral three times a day  ferrous    sulfate 325milliGRAM(s) Oral three times a day with meals  folic acid 1milliGRAM(s) Oral daily  multivitamin 1Tablet(s) Oral daily  insulin lispro (HumaLOG) corrective regimen sliding scale  SubCutaneous Before meals and at bedtime  dextrose 50% Injectable 12.5Gram(s) IV Push once  dextrose 50% Injectable 25Gram(s) IV Push once  dextrose 50% Injectable 25Gram(s) IV Push once  meropenem IVPB 500milliGRAM(s) IV Intermittent every 12 hours  senna 2Tablet(s) Oral at bedtime  pantoprazole    Tablet 40milliGRAM(s) Oral two times a day before meals  metoprolol 12.5milliGRAM(s) Oral every 12 hours  DAPTOmycin IVPB 750milliGRAM(s) IV Intermittent every 24 hours  silver sulfADIAZINE 1% Cream 1Application(s) Topical two times a day  epoetin melany Injectable [id number](s) SubCutaneous <User Schedule>  enoxaparin Injectable 120milliGRAM(s) SubCutaneous daily    MEDICATIONS  (PRN):  acetaminophen   Tablet 650milliGRAM(s) Oral every 6 hours PRN For Temp over 38.3 C (100.94 F)  aluminum hydroxide/magnesium hydroxide/simethicone Suspension 30milliLiter(s) Oral four times a day PRN Indigestion  ondansetron Injectable 4milliGRAM(s) IV Push every 6 hours PRN Nausea and/or Vomiting  dextrose Gel 1Dose(s) Oral once PRN Blood Glucose LESS THAN 70 milliGRAM(s)/deciliter  glucagon  Injectable 1milliGRAM(s) IntraMuscular once PRN Glucose LESS THAN 70 milligrams/deciliter  polyethylene glycol 3350 17Gram(s) Oral daily PRN Constipation  acetaminophen   Tablet. 650milliGRAM(s) Oral every 6 hours PRN mild to mod      Allergies    No Known Allergies    Intolerances          LABS:                          9.8    8.2   )-----------( 407      ( 22 Mar 2017 10:11 )             29.2     22 Mar 2017 10:11    143    |  108    |  23.0   ----------------------------<  89     3.6     |  26.0   |  1.99     Ca    8.1        22 Mar 2017 10:11  Phos  2.7       21 Mar 2017 08:01  Mg     1.8       21 Mar 2017 08:01            RADIOLOGY & ADDITIONAL TESTS: INTERVAL HPI/OVERNIGHT EVENTS:    CC: subclavian DVT, CKD, right knee wound dehiscence and infection Pseudomonas and VRE    No new complaints, drain in place. Denies complaints.     Vital Signs Last 24 Hrs  T(C): 37.1, Max: 37.1 (03-22 @ 07:48)  T(F): 98.7, Max: 98.7 (03-22 @ 07:48)  HR: 81 (81 - 95)  BP: 139/74 (124/72 - 139/74)  BP(mean): --  RR: 18 (18 - 18)  SpO2: 95% (95% - 98%)    PHYSICAL EXAM:    GENERAL: Not in distress, alert  CHEST/LUNG: b/l air entry  HEART: irregular  ABDOMEN: Soft, BS+  EXTREMITIES:  2+ edema , left knee drain in place    MEDICATIONS  (STANDING):  docusate sodium 100milliGRAM(s) Oral three times a day  ferrous    sulfate 325milliGRAM(s) Oral three times a day with meals  folic acid 1milliGRAM(s) Oral daily  multivitamin 1Tablet(s) Oral daily  insulin lispro (HumaLOG) corrective regimen sliding scale  SubCutaneous Before meals and at bedtime  dextrose 50% Injectable 12.5Gram(s) IV Push once  dextrose 50% Injectable 25Gram(s) IV Push once  dextrose 50% Injectable 25Gram(s) IV Push once  meropenem IVPB 500milliGRAM(s) IV Intermittent every 12 hours  senna 2Tablet(s) Oral at bedtime  pantoprazole    Tablet 40milliGRAM(s) Oral two times a day before meals  metoprolol 12.5milliGRAM(s) Oral every 12 hours  DAPTOmycin IVPB 750milliGRAM(s) IV Intermittent every 24 hours  silver sulfADIAZINE 1% Cream 1Application(s) Topical two times a day  epoetin melany Injectable [id number](s) SubCutaneous <User Schedule>  enoxaparin Injectable 120milliGRAM(s) SubCutaneous daily    MEDICATIONS  (PRN):  acetaminophen   Tablet 650milliGRAM(s) Oral every 6 hours PRN For Temp over 38.3 C (100.94 F)  aluminum hydroxide/magnesium hydroxide/simethicone Suspension 30milliLiter(s) Oral four times a day PRN Indigestion  ondansetron Injectable 4milliGRAM(s) IV Push every 6 hours PRN Nausea and/or Vomiting  dextrose Gel 1Dose(s) Oral once PRN Blood Glucose LESS THAN 70 milliGRAM(s)/deciliter  glucagon  Injectable 1milliGRAM(s) IntraMuscular once PRN Glucose LESS THAN 70 milligrams/deciliter  polyethylene glycol 3350 17Gram(s) Oral daily PRN Constipation  acetaminophen   Tablet. 650milliGRAM(s) Oral every 6 hours PRN mild to mod      Allergies    No Known Allergies    Intolerances          LABS:                          9.8    8.2   )-----------( 407      ( 22 Mar 2017 10:11 )             29.2     22 Mar 2017 10:11    143    |  108    |  23.0   ----------------------------<  89     3.6     |  26.0   |  1.99     Ca    8.1        22 Mar 2017 10:11  Phos  2.7       21 Mar 2017 08:01  Mg     1.8       21 Mar 2017 08:01            RADIOLOGY & ADDITIONAL TESTS:

## 2017-03-22 NOTE — PROGRESS NOTE ADULT - SUBJECTIVE AND OBJECTIVE BOX
NEPHROLOGY INTERVAL HPI/OVERNIGHT EVENTS:    Examined earlier   Looks comfortable    MEDICATIONS  (STANDING):  docusate sodium 100milliGRAM(s) Oral three times a day  ferrous    sulfate 325milliGRAM(s) Oral three times a day with meals  folic acid 1milliGRAM(s) Oral daily  multivitamin 1Tablet(s) Oral daily  insulin lispro (HumaLOG) corrective regimen sliding scale  SubCutaneous Before meals and at bedtime  dextrose 50% Injectable 12.5Gram(s) IV Push once  dextrose 50% Injectable 25Gram(s) IV Push once  dextrose 50% Injectable 25Gram(s) IV Push once  meropenem IVPB 500milliGRAM(s) IV Intermittent every 12 hours  senna 2Tablet(s) Oral at bedtime  pantoprazole    Tablet 40milliGRAM(s) Oral two times a day before meals  metoprolol 12.5milliGRAM(s) Oral every 12 hours  DAPTOmycin IVPB 750milliGRAM(s) IV Intermittent every 24 hours  silver sulfADIAZINE 1% Cream 1Application(s) Topical two times a day  epoetin melany Injectable [id number](s) SubCutaneous <User Schedule>  enoxaparin Injectable 120milliGRAM(s) SubCutaneous daily    MEDICATIONS  (PRN):  acetaminophen   Tablet 650milliGRAM(s) Oral every 6 hours PRN For Temp over 38.3 C (100.94 F)  aluminum hydroxide/magnesium hydroxide/simethicone Suspension 30milliLiter(s) Oral four times a day PRN Indigestion  ondansetron Injectable 4milliGRAM(s) IV Push every 6 hours PRN Nausea and/or Vomiting  dextrose Gel 1Dose(s) Oral once PRN Blood Glucose LESS THAN 70 milliGRAM(s)/deciliter  glucagon  Injectable 1milliGRAM(s) IntraMuscular once PRN Glucose LESS THAN 70 milligrams/deciliter  polyethylene glycol 3350 17Gram(s) Oral daily PRN Constipation  acetaminophen   Tablet. 650milliGRAM(s) Oral every 6 hours PRN mild to mod      Allergies    No Known Allergies    Intolerances        Vital Signs Last 24 Hrs  T(C): 37.1, Max: 37.1 (03-22 @ 07:48)  T(F): 98.7, Max: 98.7 (03- @ 07:48)  HR: 81 (81 - 95)  BP: 139/74 (124/72 - 139/74)  BP(mean): --  RR: 18 (18 - 18)  SpO2: 95% (95% - 98%)  Daily     Daily Weight in k.4 (22 Mar 2017 07:48)    PHYSICAL EXAM:  HEENT: EOMI  Neck: supple no JVD  CHEST/LUNG: CTA B/L   HEART: S1S2+ audible  ABDOMEN: Soft NDNT + BS  EXTREMITIES:  +LE edema; R leg with bandage          LABS:                        9.8    8.2   )-----------( 407      ( 22 Mar 2017 10:11 )             29.2     22 Mar 2017 10:11    143    |  108    |  23.0   ----------------------------<  89     3.6     |  26.0   |  1.99     Ca    8.1        22 Mar 2017 10:11  Phos  2.7       21 Mar 2017 08:01  Mg     1.8       21 Mar 2017 08:01                  RADIOLOGY & ADDITIONAL TESTS:

## 2017-03-22 NOTE — PROGRESS NOTE ADULT - SUBJECTIVE AND OBJECTIVE BOX
[facility] CARMENZA HARRIS MD FACP   DESIRE METZGER MD  Diplomates American Board of Internal Medicine and Infectious Diseases      SHYAM LAL  [id number]  72y    INTERVAL HPI/OVERNIGHT EVENTS:  asx  dressing in place  or debridement on hold  abs through april 8    Vital Signs Last 24 Hrs  T(C): 36.8, Max: 36.9 (03-19 @ 15:50)  T(F): 98.3, Max: 98.4 (03-19 @ 15:50)  HR: 81 (81 - 90)  BP: 141/77 (122/82 - 141/77)  BP(mean): --  RR: 18 (18 - 18)  SpO2: 100% (98% - 100%)    ANTIBIOTICS  meropenem IVPB 500milliGRAM(s) IV Intermittent every 12 hours  DAPTOmycin IVPB 750milliGRAM(s) IV Intermittent every 24 hours  epoetin melany Injectable [id number](s) SubCutaneous <User Schedule>      Allergies    No Known Allergies    Intolerances        REVIEW OF SYSTEMS:    PHYSICAL EXAM:  Vital Signs Last 24 Hrs  T(C): 36.8, Max: 36.9 (03-19 @ 15:50)  T(F): 98.3, Max: 98.4 (03-19 @ 15:50)  HR: 81 (81 - 90)  BP: 141/77 (122/82 - 141/77)  BP(mean): --  RR: 18 (18 - 18)  SpO2: 100% (98% - 100%)      GEN: NAD, pleasant  HEENT: normocephalic and atraumatic. EOMI. CHIDI. Moist mucosa. Clear Posterior pharynx.  NECK: Supple. No carotid bruits.  No lymphadenopathy or thyromegaly.  LUNGS: Clear to auscultation.  HEART: Regular rate and rhythm without murmur.  ABDOMEN: Soft, nontender, and nondistended.  Positive bowel sounds.  No hepatosplenomegaly was noted.  EXTREMITIES: Without any cyanosis, clubbing, rash, lesions or edema.knees no change  NEUROLOGIC: Cranial nerves II through XII are grossly intact.  MUSCULOSKELETAL:  SKIN: No ulceration or induration present    LABS:                        9.6    8.0   )-----------( 384      ( 20 Mar 2017 09:51 )             27.9       20 Mar 2017 09:51    142    |  106    |  25.0   ----------------------------<  82     3.6     |  25.0   |  2.08     Ca    7.6        20 Mar 2017 09:51  Mg     1.9       20 Mar 2017 09:51          03-20 @ 09:51  hct 27.9 % hgb 9.6 g/dL    03-20 @ 09:51  plat 384 K/uL wbc 8.0 K/uL    03-19 @ 08:13  hct 27.9 % hgb 9.4 g/dL    03-19 @ 08:13  plat 363 K/uL wbc 7.9 K/uL    03-18 @ 10:26  hct 28.2 % hgb 9.7 g/dL    03-18 @ 10:26  plat 357 K/uL wbc 9.0 K/uL      03-20-17  creat 2.08 mg/dL gfr 27 mL/min/1.73M2 bun 25.0 mg/dL k 3.6 mmol/L  03-19-17  creat 2.12 mg/dL gfr 26 mL/min/1.73M2 bun 27.0 mg/dL k 3.5 mmol/L  03-18-17  creat 2.17 mg/dL gfr 25 mL/min/1.73M2 bun 28.0 mg/dL k 3.6 mmol/L      MICROBIOLOGY:        RADIOLOGY & ADDITIONAL STUDIES:          RAÚL HARRIS MD [facility]

## 2017-03-22 NOTE — PROGRESS NOTE ADULT - SUBJECTIVE AND OBJECTIVE BOX
SHYAM LAL    [id number]    History:  The patient is status post Right Knee revision with post operative wound complication, now post hospital day 28.  No complaints. No acute events overnight.  Dressing recently changed by nursing staff.    T(C): 37, Max: 37 (03-22 @ 00:29)  T(F): 98.6, Max: 98.6 (03-22 @ 00:29)  HR: 95 (90 - 100)  BP: 127/72 (124/72 - 130/75)  BP(mean): --  RR: 18 (18 - 18)  SpO2: 98% (97% - 98%)  I&O's Summary    I & Os for current day (as of 22 Mar 2017 07:25)  =============================================  IN: 425 ml / OUT: 230 ml / NET: 195 ml                            9.7    8.1   )-----------( 411      ( 21 Mar 2017 08:01 )             29.0     21 Mar 2017 08:01    144    |  108    |  24.0   ----------------------------<  82     3.4     |  23.0   |  2.15     Ca    8.1        21 Mar 2017 08:01  Phos  2.7       21 Mar 2017 08:01  Mg     1.8       21 Mar 2017 08:01        MEDICATIONS  (STANDING):  docusate sodium 100milliGRAM(s) Oral three times a day  ferrous    sulfate 325milliGRAM(s) Oral three times a day with meals  folic acid 1milliGRAM(s) Oral daily  multivitamin 1Tablet(s) Oral daily  insulin lispro (HumaLOG) corrective regimen sliding scale  SubCutaneous Before meals and at bedtime  dextrose 50% Injectable 12.5Gram(s) IV Push once  dextrose 50% Injectable 25Gram(s) IV Push once  dextrose 50% Injectable 25Gram(s) IV Push once  meropenem IVPB 500milliGRAM(s) IV Intermittent every 12 hours  senna 2Tablet(s) Oral at bedtime  pantoprazole    Tablet 40milliGRAM(s) Oral two times a day before meals  metoprolol 12.5milliGRAM(s) Oral every 12 hours  DAPTOmycin IVPB 750milliGRAM(s) IV Intermittent every 24 hours  silver sulfADIAZINE 1% Cream 1Application(s) Topical two times a day  epoetin melany Injectable [id number](s) SubCutaneous <User Schedule>  enoxaparin Injectable 120milliGRAM(s) SubCutaneous daily    MEDICATIONS  (PRN):  acetaminophen   Tablet 650milliGRAM(s) Oral every 6 hours PRN For Temp over 38.3 C (100.94 F)  aluminum hydroxide/magnesium hydroxide/simethicone Suspension 30milliLiter(s) Oral four times a day PRN Indigestion  ondansetron Injectable 4milliGRAM(s) IV Push every 6 hours PRN Nausea and/or Vomiting  dextrose Gel 1Dose(s) Oral once PRN Blood Glucose LESS THAN 70 milliGRAM(s)/deciliter  glucagon  Injectable 1milliGRAM(s) IntraMuscular once PRN Glucose LESS THAN 70 milligrams/deciliter  polyethylene glycol 3350 17Gram(s) Oral daily PRN Constipation  acetaminophen   Tablet. 650milliGRAM(s) Oral every 6 hours PRN mild to mod      Physical exam: The dressing is clean, dry and intact. No wound erythema, discharge, drainage is noted. Sensation to light touch is grossly intact without focal deficit and is symmetric bilaterally. No calf tenderness.  Motor function distally is grossly intact. has difficulty flexing all toes, but able to dorisiflex and plantar flex. No foot drop. + dorsalis pedis pulse. Capillary refill is less than 2 seconds. No cyanosis.    Primary Orthopedic Assessment:  • Ortho stable  Secondary  Orthopedic Assessment(s):   •   Secondary  Medical Assessment(s):   •   Plan:   • DVT prophylaxis as prescribed, including use of compression devices and ankle pumps  • Continue physical therapy  • WBAT in [place]  • Pain control as clinically indicated  • Incentive spirometry encouraged  • Keep drain in place, follow output

## 2017-03-22 NOTE — PROGRESS NOTE ADULT - ASSESSMENT
71 yr old female with hypertension, hyperlipidemia, LIN, CKD, COPD, paroxysmal A fib sent from rehab for right knee wound dehiscence. She was seen by orthopedics, ID. Local wound cultures growing resistant and Pseudomonas and VRE. She was started on Meropenem and Zyvox. Her renal function was slightly worse from baseline and hence started on IVF. No obstructive disease on renal ultrasound. Cardiology consulted for clearance. Renal was consulted.  She is scheduled for OR I&D, she underwent wound closure and insertion of antibiotic spacer. RRT was called on 3/2 for hypotension and lethargy. Labs revealed elevated lactate. She was transferred to ICU for fluid resuscitation and monitoring. Narcotics were discontinued. She was noted to be anemic 6.9, she was ordered for PRBC transfusion and transferred out of ICU. ID follow up requested for medication adjustment, advised to stop Linezolid and start Daptomycin. GI consulted for anemia, no endoscopic intervention advised at this time. Hb remained stable. Noted to have lethargy, hence narcotics stopped. ABG done, which revealed normal pH and CO2. CT brain showed no stroke. Mental status improved. Hb remained stable. After discussion with orthopedics, coumadin was started for atrial fibrillation. She was noted to have bilateral upper extremity swelling, hence ultrasound was done on 3/9, negative for DVT. Given low platelets, Linezolid was discontinued by ID and Daptomycin was started. Heparin antibody negative. Plastics and orthopedic follow up was done, felt need for repeat I&D. Coumadin was held in view of procedure. Noted to be anemic, improved with 2 units of PRBC on 3/17/17. She was noted to have right arm swelling, doppler was repeated and showed subclavian vein DVT. She had PICC line placed on 3/18/17. Orthopedic and plastic surgery follow up done, given wound healing, further I&D deferred for now. Discussed with IR regarding DVT and possible need for PICC line removal, advised to maintain line for now and continue with anticoagulation. Plastics following for SIGRID drain.

## 2017-03-22 NOTE — PROGRESS NOTE ADULT - ASSESSMENT
Improved KARMEN on CKD , DM ---> today cr 1.9 baseline cr 1.8 , renal function has improving   Normal kidneys on NCCT   Debridement / Knee wound ---> Abx as per ID   - continue to monitor labs  - avoid potential nephrotoxic agents    Anemia - s/p PRBcs  - monitor H/H  -  added SIDRA and Fe stores ok Improved KARMEN on CKD , DM ---> today cr 1.9 baseline cr 1.8 , renal function has improving   Normal kidneys on NCCT   Debridement / prosthetic knee joint wound ---> Abx as per ID   - continue to monitor labs  - avoid potential nephrotoxic agents    Anemia - s/p PRBcs  - monitor H/H  -  added SIDRA and Fe stores ok

## 2017-03-22 NOTE — PROGRESS NOTE ADULT - SUBJECTIVE AND OBJECTIVE BOX
Right knee wound is stable.  Eschar is stable.  Wound remains dry and in tact.  No drianage form incision.  Decr edema.    Skin is more supple.  No collections.      SIGRID 170-230 serosanguinous daily.    A/P;  Continue SIGRID and silvadene for now.    Continue abx and observation  Plan per primary team

## 2017-03-23 LAB
ANION GAP SERPL CALC-SCNC: 8 MMOL/L — SIGNIFICANT CHANGE UP (ref 5–17)
BUN SERPL-MCNC: 22 MG/DL — HIGH (ref 8–20)
CALCIUM SERPL-MCNC: 8.1 MG/DL — LOW (ref 8.6–10.2)
CHLORIDE SERPL-SCNC: 110 MMOL/L — HIGH (ref 98–107)
CO2 SERPL-SCNC: 26 MMOL/L — SIGNIFICANT CHANGE UP (ref 22–29)
CREAT SERPL-MCNC: 2.07 MG/DL — HIGH (ref 0.5–1.3)
GLUCOSE SERPL-MCNC: 98 MG/DL — SIGNIFICANT CHANGE UP (ref 70–115)
HCT VFR BLD CALC: 29.5 % — LOW (ref 37–47)
HGB BLD-MCNC: 10 G/DL — LOW (ref 12–16)
MCHC RBC-ENTMCNC: 30.2 PG — SIGNIFICANT CHANGE UP (ref 27–31)
MCHC RBC-ENTMCNC: 33.9 G/DL — SIGNIFICANT CHANGE UP (ref 32–36)
MCV RBC AUTO: 89.1 FL — SIGNIFICANT CHANGE UP (ref 81–99)
PLATELET # BLD AUTO: 382 K/UL — SIGNIFICANT CHANGE UP (ref 150–400)
POTASSIUM SERPL-MCNC: 3.5 MMOL/L — SIGNIFICANT CHANGE UP (ref 3.5–5.3)
POTASSIUM SERPL-SCNC: 3.5 MMOL/L — SIGNIFICANT CHANGE UP (ref 3.5–5.3)
RBC # BLD: 3.31 M/UL — LOW (ref 4.4–5.2)
RBC # FLD: 19 % — HIGH (ref 11–15.6)
SODIUM SERPL-SCNC: 144 MMOL/L — SIGNIFICANT CHANGE UP (ref 135–145)
WBC # BLD: 8.6 K/UL — SIGNIFICANT CHANGE UP (ref 4.8–10.8)
WBC # FLD AUTO: 8.6 K/UL — SIGNIFICANT CHANGE UP (ref 4.8–10.8)

## 2017-03-23 PROCEDURE — 99233 SBSQ HOSP IP/OBS HIGH 50: CPT

## 2017-03-23 RX ADMIN — PANTOPRAZOLE SODIUM 40 MILLIGRAM(S): 20 TABLET, DELAYED RELEASE ORAL at 05:43

## 2017-03-23 RX ADMIN — PANTOPRAZOLE SODIUM 40 MILLIGRAM(S): 20 TABLET, DELAYED RELEASE ORAL at 17:34

## 2017-03-23 RX ADMIN — Medication 1 TABLET(S): at 12:13

## 2017-03-23 RX ADMIN — MEROPENEM 200 MILLIGRAM(S): 1 INJECTION INTRAVENOUS at 17:33

## 2017-03-23 RX ADMIN — ENOXAPARIN SODIUM 120 MILLIGRAM(S): 100 INJECTION SUBCUTANEOUS at 12:13

## 2017-03-23 RX ADMIN — Medication 12.5 MILLIGRAM(S): at 17:34

## 2017-03-23 RX ADMIN — Medication 1 APPLICATION(S): at 17:33

## 2017-03-23 RX ADMIN — Medication 325 MILLIGRAM(S): at 12:13

## 2017-03-23 RX ADMIN — Medication 325 MILLIGRAM(S): at 08:17

## 2017-03-23 RX ADMIN — Medication 1 APPLICATION(S): at 05:43

## 2017-03-23 RX ADMIN — Medication 12.5 MILLIGRAM(S): at 05:43

## 2017-03-23 RX ADMIN — MEROPENEM 200 MILLIGRAM(S): 1 INJECTION INTRAVENOUS at 05:43

## 2017-03-23 RX ADMIN — Medication 1 MILLIGRAM(S): at 12:13

## 2017-03-23 RX ADMIN — Medication 325 MILLIGRAM(S): at 17:34

## 2017-03-23 RX ADMIN — DAPTOMYCIN 130 MILLIGRAM(S): 500 INJECTION, POWDER, LYOPHILIZED, FOR SOLUTION INTRAVENOUS at 17:19

## 2017-03-23 NOTE — PROGRESS NOTE ADULT - SUBJECTIVE AND OBJECTIVE BOX
Patient was seen this evening at 7:30PM.  Her SIGRID drain was emptied.  It contained 45cc of bloody fluid.  After emptying the SIGRID drain the was clamped.  It will remain clamped for 12 hours, when it will be re-evaluated.

## 2017-03-23 NOTE — ADVANCED PRACTICE NURSE CONSULT - RECOMMEDATIONS
Wound care recommendation:  1. clean the area with normal saline and pat dry,   2. apply betadine wipe to right heel once a day and leave the area open to air.   3. offload the heels at all the time,   4. continue pressure injury prevention protocol.   Wound care recommendation discussed with MATY Ba at the bedside and Dr. Nicholson ( [phone]). Please contact wound care nurse if further follow up is needed.

## 2017-03-23 NOTE — PROGRESS NOTE ADULT - SUBJECTIVE AND OBJECTIVE BOX
NEPHROLOGY INTERVAL HPI/OVERNIGHT EVENTS:    Examined earlier  Looks comfortable    MEDICATIONS  (STANDING):  docusate sodium 100milliGRAM(s) Oral three times a day  ferrous    sulfate 325milliGRAM(s) Oral three times a day with meals  folic acid 1milliGRAM(s) Oral daily  multivitamin 1Tablet(s) Oral daily  insulin lispro (HumaLOG) corrective regimen sliding scale  SubCutaneous Before meals and at bedtime  dextrose 50% Injectable 12.5Gram(s) IV Push once  dextrose 50% Injectable 25Gram(s) IV Push once  dextrose 50% Injectable 25Gram(s) IV Push once  meropenem IVPB 500milliGRAM(s) IV Intermittent every 12 hours  senna 2Tablet(s) Oral at bedtime  pantoprazole    Tablet 40milliGRAM(s) Oral two times a day before meals  metoprolol 12.5milliGRAM(s) Oral every 12 hours  DAPTOmycin IVPB 750milliGRAM(s) IV Intermittent every 24 hours  silver sulfADIAZINE 1% Cream 1Application(s) Topical two times a day  epoetin melany Injectable [id number](s) SubCutaneous <User Schedule>  enoxaparin Injectable 120milliGRAM(s) SubCutaneous daily    MEDICATIONS  (PRN):  acetaminophen   Tablet 650milliGRAM(s) Oral every 6 hours PRN For Temp over 38.3 C (100.94 F)  aluminum hydroxide/magnesium hydroxide/simethicone Suspension 30milliLiter(s) Oral four times a day PRN Indigestion  ondansetron Injectable 4milliGRAM(s) IV Push every 6 hours PRN Nausea and/or Vomiting  dextrose Gel 1Dose(s) Oral once PRN Blood Glucose LESS THAN 70 milliGRAM(s)/deciliter  glucagon  Injectable 1milliGRAM(s) IntraMuscular once PRN Glucose LESS THAN 70 milligrams/deciliter  polyethylene glycol 3350 17Gram(s) Oral daily PRN Constipation  acetaminophen   Tablet. 650milliGRAM(s) Oral every 6 hours PRN mild to mod      Allergies    No Known Allergies    Intolerances        Vital Signs Last 24 Hrs  T(C): 36.4, Max: 36.8 (03-23 @ 00:06)  T(F): 97.5, Max: 98.3 (03-23 @ 00:06)  HR: 100 (87 - 100)  BP: 142/74 (120/75 - 148/83)  BP(mean): --  RR: 18 (18 - 18)  SpO2: 98% (98% - 99%)  Daily     Daily     PHYSICAL EXAM:  HEENT: EOMI  Neck: supple no JVD  CHEST/LUNG: CTA B/L   HEART: S1S2+ audible  ABDOMEN: Soft NDNT + BS  EXTREMITIES:  +LE edema; R leg with bandage          LABS:                        10.0   8.6   )-----------( 382      ( 23 Mar 2017 06:59 )             29.5     23 Mar 2017 06:58    144    |  110    |  22.0   ----------------------------<  98     3.5     |  26.0   |  2.07     Ca    8.1        23 Mar 2017 06:58                  RADIOLOGY & ADDITIONAL TESTS:

## 2017-03-23 NOTE — PROGRESS NOTE ADULT - SUBJECTIVE AND OBJECTIVE BOX
INTERVAL HPI/OVERNIGHT EVENTS:    CC: subclavian DVT,  right knee wound dehiscence and infection Pseudomonas and VRE, CKD    No new complaints. Dressing change done by ortho, drain clamped again this am, 30-40 cc overnight from drain.    Vital Signs Last 24 Hrs  T(C): 36.6, Max: 37.1 (03-22 @ 16:24)  T(F): 97.9, Max: 98.8 (03-22 @ 16:24)  HR: 87 (87 - 94)  BP: 148/83 (120/75 - 148/83)  BP(mean): --  RR: 18 (18 - 18)  SpO2: 99% (98% - 99%)    PHYSICAL EXAM:    GENERAL: Not in distress, alert  CHEST/LUNG: b/l air entry  HEART: irregular  ABDOMEN: Soft, BS+  EXTREMITIES:  b/l UE swelling, mild improvement.    MEDICATIONS  (STANDING):  docusate sodium 100milliGRAM(s) Oral three times a day  ferrous    sulfate 325milliGRAM(s) Oral three times a day with meals  folic acid 1milliGRAM(s) Oral daily  multivitamin 1Tablet(s) Oral daily  insulin lispro (HumaLOG) corrective regimen sliding scale  SubCutaneous Before meals and at bedtime  dextrose 50% Injectable 12.5Gram(s) IV Push once  dextrose 50% Injectable 25Gram(s) IV Push once  dextrose 50% Injectable 25Gram(s) IV Push once  meropenem IVPB 500milliGRAM(s) IV Intermittent every 12 hours  senna 2Tablet(s) Oral at bedtime  pantoprazole    Tablet 40milliGRAM(s) Oral two times a day before meals  metoprolol 12.5milliGRAM(s) Oral every 12 hours  DAPTOmycin IVPB 750milliGRAM(s) IV Intermittent every 24 hours  silver sulfADIAZINE 1% Cream 1Application(s) Topical two times a day  epoetin melany Injectable [id number](s) SubCutaneous <User Schedule>  enoxaparin Injectable 120milliGRAM(s) SubCutaneous daily    MEDICATIONS  (PRN):  acetaminophen   Tablet 650milliGRAM(s) Oral every 6 hours PRN For Temp over 38.3 C (100.94 F)  aluminum hydroxide/magnesium hydroxide/simethicone Suspension 30milliLiter(s) Oral four times a day PRN Indigestion  ondansetron Injectable 4milliGRAM(s) IV Push every 6 hours PRN Nausea and/or Vomiting  dextrose Gel 1Dose(s) Oral once PRN Blood Glucose LESS THAN 70 milliGRAM(s)/deciliter  glucagon  Injectable 1milliGRAM(s) IntraMuscular once PRN Glucose LESS THAN 70 milligrams/deciliter  polyethylene glycol 3350 17Gram(s) Oral daily PRN Constipation  acetaminophen   Tablet. 650milliGRAM(s) Oral every 6 hours PRN mild to mod      Allergies    No Known Allergies    Intolerances          LABS:                          10.0   8.6   )-----------( 382      ( 23 Mar 2017 06:59 )             29.5     23 Mar 2017 06:58    144    |  110    |  22.0   ----------------------------<  98     3.5     |  26.0   |  2.07     Ca    8.1        23 Mar 2017 06:58            RADIOLOGY & ADDITIONAL TESTS:

## 2017-03-23 NOTE — PROGRESS NOTE ADULT - SUBJECTIVE AND OBJECTIVE BOX
SHYAM LUKASZ    [id number]    History: 72y Female is status post right knee revision w abx spacer placement, POD # 24. Patient is doing well and is comfortable. The patient's pain is controlled using the prescribed pain medications. States PT has been getting her OOB into standing and seated position. Denies nausea, vomiting, chest pain, shortness of breath, abdominal pain, constitutional sx.. No new complaints. Drain was clamped at 19:30 yesterday.    SIGRID drain unclamped this morning after 12 hrs, 30cc of bloody fluid was noted    Vital Signs Last 24 Hrs  T(C): 36.8, Max: 37.1 (03-22 @ 16:24)  T(F): 98.3, Max: 98.8 (03-22 @ 16:24)  HR: 90 (90 - 94)  BP: 120/75 (120/75 - 132/78)  BP(mean): --  RR: 18 (18 - 18)  SpO2: 98% (98% - 98%)                              10.0   8.6   )-----------( 382      ( 23 Mar 2017 06:59 )             29.5     23 Mar 2017 06:58    144    |  110    |  22.0   ----------------------------<  98     3.5     |  26.0   |  2.07     Ca    8.1        23 Mar 2017 06:58        MEDICATIONS  (STANDING):  docusate sodium 100milliGRAM(s) Oral three times a day  ferrous    sulfate 325milliGRAM(s) Oral three times a day with meals  folic acid 1milliGRAM(s) Oral daily  multivitamin 1Tablet(s) Oral daily  insulin lispro (HumaLOG) corrective regimen sliding scale  SubCutaneous Before meals and at bedtime  dextrose 50% Injectable 12.5Gram(s) IV Push once  dextrose 50% Injectable 25Gram(s) IV Push once  dextrose 50% Injectable 25Gram(s) IV Push once  meropenem IVPB 500milliGRAM(s) IV Intermittent every 12 hours  senna 2Tablet(s) Oral at bedtime  pantoprazole    Tablet 40milliGRAM(s) Oral two times a day before meals  metoprolol 12.5milliGRAM(s) Oral every 12 hours  DAPTOmycin IVPB 750milliGRAM(s) IV Intermittent every 24 hours  silver sulfADIAZINE 1% Cream 1Application(s) Topical two times a day  epoetin melany Injectable [id number](s) SubCutaneous <User Schedule>  enoxaparin Injectable 120milliGRAM(s) SubCutaneous daily    MEDICATIONS  (PRN):  acetaminophen   Tablet 650milliGRAM(s) Oral every 6 hours PRN For Temp over 38.3 C (100.94 F)  aluminum hydroxide/magnesium hydroxide/simethicone Suspension 30milliLiter(s) Oral four times a day PRN Indigestion  ondansetron Injectable 4milliGRAM(s) IV Push every 6 hours PRN Nausea and/or Vomiting  dextrose Gel 1Dose(s) Oral once PRN Blood Glucose LESS THAN 70 milliGRAM(s)/deciliter  glucagon  Injectable 1milliGRAM(s) IntraMuscular once PRN Glucose LESS THAN 70 milligrams/deciliter  polyethylene glycol 3350 17Gram(s) Oral daily PRN Constipation  acetaminophen   Tablet. 650milliGRAM(s) Oral every 6 hours PRN mild to mod      Physical exam RLE:  Dressing changed, incision is C/D/I, Eschar/skin breakdown medial to incision noted, appears to be improving. No purulent drainage or discharge. No obvious collections noted. No erythema is noted. No new blistering. No ecchymosis. Silvadene cream applied.  drain intact and functioning well  positive edema noted diffusely   Gross Sensation to light touch is intact  Gross Motor function intact: positive dorsi/plantar, EHL,FHL  2+ dorsalis pedis pulse  Pulses appreciated No cyanosis.    Primary Orthopedic Assessment:  • s/p RIGHT  knee revision with abx spacer. plastics following    Secondary  Orthopedic Assessment(s):   •     Secondary  Medical Assessment(s):   •     Plan:   • DVT prophylaxis as prescribed, including use of compression devices and ankle pumps  • Continue physical therapy WBAT in [place] RLE, encourage OOB  • dressing changes with application of silvadene BID  • Incentive spirometry encouraged  • Pain control as clinically indicated    monitor drain output, leave unclamped for 12 hrs then reclamp at 19:30 and leave for 12hrs  • Continue IV abx as per ID    ortho to follow    Continue care with primary team

## 2017-03-23 NOTE — PROGRESS NOTE ADULT - ASSESSMENT
Improved KARMEN on CKD , DM ---> today cr 2.0 baseline cr 1.8 , renal function has improving   Normal kidneys on NCCT   Debridement / prosthetic knee joint wound ---> Abx as per ID   - continue to monitor labs  - avoid potential nephrotoxic agents    Anemia - s/p PRBcs  - monitor H/H  -  added SIDRA and Fe stores ok

## 2017-03-23 NOTE — PROGRESS NOTE ADULT - SUBJECTIVE AND OBJECTIVE BOX
Patient was seen this evening at 19:30 to evaluate her drain output.  The SIGRID drain was found under her leg and it was presently open.  There was bloody drainage noted in the SIGRID bulb.  It was emptied and contained 30cc of fluid.  After SIGRID was emptied it was re-clamped for an additional 12 hours.  It will be re-evaluated in the morning.

## 2017-03-23 NOTE — ADVANCED PRACTICE NURSE CONSULT - ASSESSMENT
Pt is 71 y/o female, alert and oriented. Black soft eschar noted firmly attached to the pt's right heel, area tender, boggy to touch, pt c/o pain while light pressure applied to the site, skin intact at the present. No s/s of wound infection noted, periwound skin intact with no erythema noted, no wound exudate -- possible evolving DTI to unstageable pressure injury.

## 2017-03-24 LAB
ANION GAP SERPL CALC-SCNC: 12 MMOL/L — SIGNIFICANT CHANGE UP (ref 5–17)
BUN SERPL-MCNC: 20 MG/DL — SIGNIFICANT CHANGE UP (ref 8–20)
CALCIUM SERPL-MCNC: 7.2 MG/DL — LOW (ref 8.6–10.2)
CHLORIDE SERPL-SCNC: 114 MMOL/L — HIGH (ref 98–107)
CO2 SERPL-SCNC: 20 MMOL/L — LOW (ref 22–29)
CREAT SERPL-MCNC: 1.64 MG/DL — HIGH (ref 0.5–1.3)
GLUCOSE SERPL-MCNC: 113 MG/DL — SIGNIFICANT CHANGE UP (ref 70–115)
HCT VFR BLD CALC: 27.8 % — LOW (ref 37–47)
HGB BLD-MCNC: 9.2 G/DL — LOW (ref 12–16)
MCHC RBC-ENTMCNC: 29.9 PG — SIGNIFICANT CHANGE UP (ref 27–31)
MCHC RBC-ENTMCNC: 33.1 G/DL — SIGNIFICANT CHANGE UP (ref 32–36)
MCV RBC AUTO: 90.3 FL — SIGNIFICANT CHANGE UP (ref 81–99)
PLATELET # BLD AUTO: 350 K/UL — SIGNIFICANT CHANGE UP (ref 150–400)
POTASSIUM SERPL-MCNC: 3 MMOL/L — LOW (ref 3.5–5.3)
POTASSIUM SERPL-SCNC: 3 MMOL/L — LOW (ref 3.5–5.3)
RBC # BLD: 3.08 M/UL — LOW (ref 4.4–5.2)
RBC # FLD: 19.9 % — HIGH (ref 11–15.6)
SODIUM SERPL-SCNC: 146 MMOL/L — HIGH (ref 135–145)
WBC # BLD: 7.3 K/UL — SIGNIFICANT CHANGE UP (ref 4.8–10.8)
WBC # FLD AUTO: 7.3 K/UL — SIGNIFICANT CHANGE UP (ref 4.8–10.8)

## 2017-03-24 PROCEDURE — 99232 SBSQ HOSP IP/OBS MODERATE 35: CPT

## 2017-03-24 PROCEDURE — 99233 SBSQ HOSP IP/OBS HIGH 50: CPT

## 2017-03-24 RX ORDER — MAGNESIUM OXIDE 400 MG ORAL TABLET 241.3 MG
400 TABLET ORAL
Qty: 0 | Refills: 0 | Status: DISCONTINUED | OUTPATIENT
Start: 2017-03-24 | End: 2017-04-04

## 2017-03-24 RX ORDER — POTASSIUM CHLORIDE 20 MEQ
20 PACKET (EA) ORAL DAILY
Qty: 0 | Refills: 0 | Status: DISCONTINUED | OUTPATIENT
Start: 2017-03-24 | End: 2017-03-27

## 2017-03-24 RX ADMIN — Medication 1 APPLICATION(S): at 06:59

## 2017-03-24 RX ADMIN — PANTOPRAZOLE SODIUM 40 MILLIGRAM(S): 20 TABLET, DELAYED RELEASE ORAL at 15:59

## 2017-03-24 RX ADMIN — Medication 325 MILLIGRAM(S): at 15:59

## 2017-03-24 RX ADMIN — Medication 20 MILLIEQUIVALENT(S): at 15:59

## 2017-03-24 RX ADMIN — Medication 1 APPLICATION(S): at 15:59

## 2017-03-24 RX ADMIN — DAPTOMYCIN 130 MILLIGRAM(S): 500 INJECTION, POWDER, LYOPHILIZED, FOR SOLUTION INTRAVENOUS at 15:48

## 2017-03-24 RX ADMIN — Medication 1 MILLIGRAM(S): at 13:15

## 2017-03-24 RX ADMIN — ENOXAPARIN SODIUM 120 MILLIGRAM(S): 100 INJECTION SUBCUTANEOUS at 13:16

## 2017-03-24 RX ADMIN — PANTOPRAZOLE SODIUM 40 MILLIGRAM(S): 20 TABLET, DELAYED RELEASE ORAL at 06:59

## 2017-03-24 RX ADMIN — Medication 325 MILLIGRAM(S): at 10:23

## 2017-03-24 RX ADMIN — MEROPENEM 200 MILLIGRAM(S): 1 INJECTION INTRAVENOUS at 06:59

## 2017-03-24 RX ADMIN — MAGNESIUM OXIDE 400 MG ORAL TABLET 400 MILLIGRAM(S): 241.3 TABLET ORAL at 15:59

## 2017-03-24 RX ADMIN — Medication 12.5 MILLIGRAM(S): at 06:59

## 2017-03-24 RX ADMIN — Medication 12.5 MILLIGRAM(S): at 15:59

## 2017-03-24 RX ADMIN — Medication 1 TABLET(S): at 13:16

## 2017-03-24 RX ADMIN — Medication 325 MILLIGRAM(S): at 13:15

## 2017-03-24 RX ADMIN — MEROPENEM 200 MILLIGRAM(S): 1 INJECTION INTRAVENOUS at 15:57

## 2017-03-24 NOTE — PROGRESS NOTE ADULT - PROBLEM SELECTOR PLAN 4
Continue  therapeutic Lovenox for now. Will resume Coumadin once cleared by orthopedics and Plastics.

## 2017-03-24 NOTE — PROGRESS NOTE ADULT - SUBJECTIVE AND OBJECTIVE BOX
Patient was found asleep laying in her hospital bed.  Was able to wake the patient without difficulty.  The SIGRID drain was found under suction.  It was emptied into a measuring jar to measure recent output.  The drain put out an additional 10cc of bloody fluid from the last time it was emptied.  In addition to emptying the SIGRID drain it was reclamped for an additional 12 hours.  In 12 hours it will be re-evaluated and possibly discounted.  The drain output was discussed with Dr. Rey.

## 2017-03-24 NOTE — PROGRESS NOTE ADULT - SUBJECTIVE AND OBJECTIVE BOX
INTERVAL HPI/OVERNIGHT EVENTS:    CC: subclavian DVT, atrial fibrillation,  right knee wound dehiscence and infection Pseudomonas and VRE, CKD    No overnight events, denies complaints. 15cc from SIGRID drain per orthopedics.     Vital Signs Last 24 Hrs  T(C): 36.9, Max: 36.9 (03-23 @ 23:52)  T(F): 98.4, Max: 98.4 (03-23 @ 23:52)  HR: 98 (98 - 100)  BP: 142/74 (142/74 - 142/74)  BP(mean): --  RR: 18 (18 - 18)  SpO2: 98% (98% - 98%)    PHYSICAL EXAM:    GENERAL: Not in distress, alert  CHEST/LUNG: b/l air entry  HEART: irregular  ABDOMEN: Soft, BS+  EXTREMITIES:  2+ edema    MEDICATIONS  (STANDING):  docusate sodium 100milliGRAM(s) Oral three times a day  ferrous    sulfate 325milliGRAM(s) Oral three times a day with meals  folic acid 1milliGRAM(s) Oral daily  multivitamin 1Tablet(s) Oral daily  insulin lispro (HumaLOG) corrective regimen sliding scale  SubCutaneous Before meals and at bedtime  dextrose 50% Injectable 12.5Gram(s) IV Push once  dextrose 50% Injectable 25Gram(s) IV Push once  dextrose 50% Injectable 25Gram(s) IV Push once  meropenem IVPB 500milliGRAM(s) IV Intermittent every 12 hours  senna 2Tablet(s) Oral at bedtime  pantoprazole    Tablet 40milliGRAM(s) Oral two times a day before meals  metoprolol 12.5milliGRAM(s) Oral every 12 hours  DAPTOmycin IVPB 750milliGRAM(s) IV Intermittent every 24 hours  silver sulfADIAZINE 1% Cream 1Application(s) Topical two times a day  epoetin melany Injectable [id number](s) SubCutaneous <User Schedule>  enoxaparin Injectable 120milliGRAM(s) SubCutaneous daily    MEDICATIONS  (PRN):  acetaminophen   Tablet 650milliGRAM(s) Oral every 6 hours PRN For Temp over 38.3 C (100.94 F)  aluminum hydroxide/magnesium hydroxide/simethicone Suspension 30milliLiter(s) Oral four times a day PRN Indigestion  ondansetron Injectable 4milliGRAM(s) IV Push every 6 hours PRN Nausea and/or Vomiting  dextrose Gel 1Dose(s) Oral once PRN Blood Glucose LESS THAN 70 milliGRAM(s)/deciliter  glucagon  Injectable 1milliGRAM(s) IntraMuscular once PRN Glucose LESS THAN 70 milligrams/deciliter  polyethylene glycol 3350 17Gram(s) Oral daily PRN Constipation  acetaminophen   Tablet. 650milliGRAM(s) Oral every 6 hours PRN mild to mod      Allergies    No Known Allergies    Intolerances          LABS:                          10.0   8.6   )-----------( 382      ( 23 Mar 2017 06:59 )             29.5     23 Mar 2017 06:58    144    |  110    |  22.0   ----------------------------<  98     3.5     |  26.0   |  2.07     Ca    8.1        23 Mar 2017 06:58            RADIOLOGY & ADDITIONAL TESTS:

## 2017-03-24 NOTE — PROGRESS NOTE ADULT - SUBJECTIVE AND OBJECTIVE BOX
[facility] CARMENZA HARRIS MD FACP   DESIRE METZGER MD  Diplomates American Board of Internal Medicine and Infectious Diseases      SHYAM LAL  [id number]  72y    INTERVAL HPI/OVERNIGHT EVENTS:  asx  dressing in place  or debridement on hold  abs through april 8  persistent drainage    ANTIBIOTICS  meropenem IVPB 500milliGRAM(s) IV Intermittent every 12 hours  DAPTOmycin IVPB 750milliGRAM(s) IV Intermittent every 24 hours  epoetin melany Injectable 69909Maie(s) SubCutaneous <User Schedule>      Allergies    No Known Allergies    Intolerances        REVIEW OF SYSTEMS:    PHYSICAL EXAM:  Vital Signs Last 24 Hrs  T(C): 36.8, Max: 36.9 (03-19 @ 15:50)  T(F): 98.3, Max: 98.4 (03-19 @ 15:50)  HR: 81 (81 - 90)  BP: 141/77 (122/82 - 141/77)  BP(mean): --  RR: 18 (18 - 18)  SpO2: 100% (98% - 100%)      GEN: NAD, pleasant  HEENT: normocephalic and atraumatic. EOMI. CHIDI. Moist mucosa. Clear Posterior pharynx.  NECK: Supple. No carotid bruits.  No lymphadenopathy or thyromegaly.  LUNGS: Clear to auscultation.  HEART: Regular rate and rhythm without murmur.  ABDOMEN: Soft, nontender, and nondistended.  Positive bowel sounds.  No hepatosplenomegaly was noted.  EXTREMITIES: Without any cyanosis, clubbing, rash, lesions or edema.knees no change  NEUROLOGIC: Cranial nerves II through XII are grossly intact.  MUSCULOSKELETAL:KNEE BANDAGED -   SKIN: No ulceration or induration present    LABS:                        9.6    8.0   )-----------( 384      ( 20 Mar 2017 09:51 )             27.9       20 Mar 2017 09:51    142    |  106    |  25.0   ----------------------------<  82     3.6     |  25.0   |  2.08     Ca    7.6        20 Mar 2017 09:51  Mg     1.9       20 Mar 2017 09:51                                10.0   8.6   )-----------( 382      ( 23 Mar 2017 06:59 )             29.5   03-20 @ 09:51  hct 27.9 % hgb 9.6 g/dL    03-20 @ 09:51  plat 384 K/uL wbc 8.0 K/uL    03-19 @ 08:13  hct 27.9 % hgb 9.4 g/dL    03-19 @ 08:13  plat 363 K/uL wbc 7.9 K/uL    03-18 @ 10:26  hct 28.2 % hgb 9.7 g/dL    03-18 @ 10:26  plat 357 K/uL wbc 9.0 K/uL      03-20-17  creat 2.08 mg/dL gfr 27 mL/min/1.73M2 bun 25.0 mg/dL k 3.6 mmol/L  03-19-17  creat 2.12 mg/dL gfr 26 mL/min/1.73M2 bun 27.0 mg/dL k 3.5 mmol/L  03-18-17  creat 2.17 mg/dL gfr 25 mL/min/1.73M2 bun 28.0 mg/dL k 3.6 mmol/L      MICROBIOLOGY:    CHK NEW DRAIN C/S    RADIOLOGY & ADDITIONAL STUDIES:          RAÚL HARRIS MD FACP

## 2017-03-24 NOTE — PROGRESS NOTE ADULT - SUBJECTIVE AND OBJECTIVE BOX
History:  The patient is status post Right Knee revision with post operative wound complication, now post op day 25.  No complaints. No acute events overnight.    Right LE SIGRID drain clamped over night. Patient states she was able to sit at side of bed and tried to stand but unsuccessful.    Vital Signs Last 24 Hrs  T(C): 36.9, Max: 36.9 (03-23 @ 23:52)  T(F): 98.4, Max: 98.4 (03-23 @ 23:52)  HR: 98 (98 - 100)  BP: 142/74 (142/74 - 142/74)  RR: 18 (18 - 18)  SpO2: 98% (98% - 98%)    Physical exam: NAD, alert awake  Right LE: The dressing is clean, dry and intact over incision however blood drainage around SIGRID drain site. No wound erythema, discharge, drainage is noted. Medial incisional wound dry, smooth, slightly macerated edge superficial wound slightly improved. New dressing placed after area was cleansed with sterile water and sulfa silvadene cream placed. Sensation to light touch is grossly intact Patient has difficulty flexing all toes, but able to dorisiflex and plantar flex.   SIGRID drain unclamped, 15 ccs then drained in SIGRID drain and emptied. Clamp left off.                          10.0   8.6   )-----------( 382      ( 23 Mar 2017 06:59 )             29.5       Primary Orthopedic Assessment: s/p right TKR revision Abx spacer placement POD#25     Plan:   ·	DVT prophylaxis as prescribed, including use of compression devices and ankle pumps  ·	Continue physical therapy: WBAT in alyce brace locked in extension. Brace off while laying in bed.  ·	Pain control as clinically indicated  ·	Keep drain in place, follow output  ·	Cont care as per primary team

## 2017-03-24 NOTE — PROGRESS NOTE ADULT - SUBJECTIVE AND OBJECTIVE BOX
Pain right knee at rest - meds ordered.    /66, afebrile,   LUNGS no rales noted    CVS no rub noted    Legs right Knee with clean dressing.    Lab reviewed with low K.

## 2017-03-24 NOTE — PROGRESS NOTE ADULT - PROBLEM SELECTOR PLAN 1
Continue Daptomycin and Meropenem. Ortho and plastics following, drain in place. To be removed when cleared by plastics.

## 2017-03-25 DIAGNOSIS — E87.8 OTHER DISORDERS OF ELECTROLYTE AND FLUID BALANCE, NOT ELSEWHERE CLASSIFIED: ICD-10-CM

## 2017-03-25 LAB
ANION GAP SERPL CALC-SCNC: 11 MMOL/L — SIGNIFICANT CHANGE UP (ref 5–17)
BUN SERPL-MCNC: 21 MG/DL — HIGH (ref 8–20)
CALCIUM SERPL-MCNC: 7.7 MG/DL — LOW (ref 8.6–10.2)
CHLORIDE SERPL-SCNC: 109 MMOL/L — HIGH (ref 98–107)
CO2 SERPL-SCNC: 24 MMOL/L — SIGNIFICANT CHANGE UP (ref 22–29)
CREAT SERPL-MCNC: 1.88 MG/DL — HIGH (ref 0.5–1.3)
GLUCOSE SERPL-MCNC: 95 MG/DL — SIGNIFICANT CHANGE UP (ref 70–115)
HCT VFR BLD CALC: 28.1 % — LOW (ref 37–47)
HGB BLD-MCNC: 9.2 G/DL — LOW (ref 12–16)
MCHC RBC-ENTMCNC: 29.2 PG — SIGNIFICANT CHANGE UP (ref 27–31)
MCHC RBC-ENTMCNC: 32.7 G/DL — SIGNIFICANT CHANGE UP (ref 32–36)
MCV RBC AUTO: 89.2 FL — SIGNIFICANT CHANGE UP (ref 81–99)
PLATELET # BLD AUTO: 364 K/UL — SIGNIFICANT CHANGE UP (ref 150–400)
POTASSIUM SERPL-MCNC: 3.6 MMOL/L — SIGNIFICANT CHANGE UP (ref 3.5–5.3)
POTASSIUM SERPL-SCNC: 3.6 MMOL/L — SIGNIFICANT CHANGE UP (ref 3.5–5.3)
RBC # BLD: 3.15 M/UL — LOW (ref 4.4–5.2)
RBC # FLD: 19.9 % — HIGH (ref 11–15.6)
SODIUM SERPL-SCNC: 144 MMOL/L — SIGNIFICANT CHANGE UP (ref 135–145)
WBC # BLD: 7.9 K/UL — SIGNIFICANT CHANGE UP (ref 4.8–10.8)
WBC # FLD AUTO: 7.9 K/UL — SIGNIFICANT CHANGE UP (ref 4.8–10.8)

## 2017-03-25 PROCEDURE — 99233 SBSQ HOSP IP/OBS HIGH 50: CPT

## 2017-03-25 RX ADMIN — MEROPENEM 200 MILLIGRAM(S): 1 INJECTION INTRAVENOUS at 06:46

## 2017-03-25 RX ADMIN — ERYTHROPOIETIN 20000 UNIT(S): 10000 INJECTION, SOLUTION INTRAVENOUS; SUBCUTANEOUS at 18:11

## 2017-03-25 RX ADMIN — Medication 1 MILLIGRAM(S): at 11:28

## 2017-03-25 RX ADMIN — Medication 12.5 MILLIGRAM(S): at 17:22

## 2017-03-25 RX ADMIN — Medication 325 MILLIGRAM(S): at 11:34

## 2017-03-25 RX ADMIN — MAGNESIUM OXIDE 400 MG ORAL TABLET 400 MILLIGRAM(S): 241.3 TABLET ORAL at 17:18

## 2017-03-25 RX ADMIN — MEROPENEM 200 MILLIGRAM(S): 1 INJECTION INTRAVENOUS at 17:18

## 2017-03-25 RX ADMIN — MAGNESIUM OXIDE 400 MG ORAL TABLET 400 MILLIGRAM(S): 241.3 TABLET ORAL at 08:26

## 2017-03-25 RX ADMIN — Medication 1 TABLET(S): at 11:29

## 2017-03-25 RX ADMIN — PANTOPRAZOLE SODIUM 40 MILLIGRAM(S): 20 TABLET, DELAYED RELEASE ORAL at 17:18

## 2017-03-25 RX ADMIN — PANTOPRAZOLE SODIUM 40 MILLIGRAM(S): 20 TABLET, DELAYED RELEASE ORAL at 06:46

## 2017-03-25 RX ADMIN — Medication 1 APPLICATION(S): at 06:46

## 2017-03-25 RX ADMIN — Medication 325 MILLIGRAM(S): at 08:26

## 2017-03-25 RX ADMIN — Medication 325 MILLIGRAM(S): at 17:18

## 2017-03-25 RX ADMIN — ENOXAPARIN SODIUM 120 MILLIGRAM(S): 100 INJECTION SUBCUTANEOUS at 11:28

## 2017-03-25 RX ADMIN — Medication 12.5 MILLIGRAM(S): at 06:47

## 2017-03-25 RX ADMIN — DAPTOMYCIN 130 MILLIGRAM(S): 500 INJECTION, POWDER, LYOPHILIZED, FOR SOLUTION INTRAVENOUS at 18:04

## 2017-03-25 RX ADMIN — Medication 20 MILLIEQUIVALENT(S): at 13:29

## 2017-03-25 NOTE — PROGRESS NOTE ADULT - SUBJECTIVE AND OBJECTIVE BOX
seen for subclavian DVT, atrial fibrillation,  right knee wound dehiscence and infection Pseudomonas and VRE,     no acute complaints.  no cp/sob/knee pain.  no diarrhea  ROS otherwise negative     MEDICATIONS  (STANDING):  docusate sodium 100milliGRAM(s) Oral three times a day  ferrous    sulfate 325milliGRAM(s) Oral three times a day with meals  folic acid 1milliGRAM(s) Oral daily  multivitamin 1Tablet(s) Oral daily  insulin lispro (HumaLOG) corrective regimen sliding scale  SubCutaneous Before meals and at bedtime  dextrose 50% Injectable 12.5Gram(s) IV Push once  dextrose 50% Injectable 25Gram(s) IV Push once  dextrose 50% Injectable 25Gram(s) IV Push once  meropenem IVPB 500milliGRAM(s) IV Intermittent every 12 hours  senna 2Tablet(s) Oral at bedtime  pantoprazole    Tablet 40milliGRAM(s) Oral two times a day before meals  metoprolol 12.5milliGRAM(s) Oral every 12 hours  DAPTOmycin IVPB 750milliGRAM(s) IV Intermittent every 24 hours  silver sulfADIAZINE 1% Cream 1Application(s) Topical two times a day  epoetin melany Injectable [id number](s) SubCutaneous <User Schedule>  enoxaparin Injectable 120milliGRAM(s) SubCutaneous daily  potassium chloride    Tablet ER 20milliEquivalent(s) Oral daily  magnesium oxide 400milliGRAM(s) Oral two times a day with meals    MEDICATIONS  (PRN):  acetaminophen   Tablet 650milliGRAM(s) Oral every 6 hours PRN For Temp over 38.3 C (100.94 F)  aluminum hydroxide/magnesium hydroxide/simethicone Suspension 30milliLiter(s) Oral four times a day PRN Indigestion  ondansetron Injectable 4milliGRAM(s) IV Push every 6 hours PRN Nausea and/or Vomiting  dextrose Gel 1Dose(s) Oral once PRN Blood Glucose LESS THAN 70 milliGRAM(s)/deciliter  glucagon  Injectable 1milliGRAM(s) IntraMuscular once PRN Glucose LESS THAN 70 milligrams/deciliter  polyethylene glycol 3350 17Gram(s) Oral daily PRN Constipation  acetaminophen   Tablet. 650milliGRAM(s) Oral every 6 hours PRN mild to mod      Allergies    No Known Allergies    Intolerances      Vital Signs Last 24 Hrs  T(C): 37.2, Max: 37.2 (03-24 @ 23:22)  T(F): 98.9, Max: 98.9 (03-24 @ 23:22)  HR: 98 (96 - 98)  BP: --  BP(mean): --  RR: 97 (97 - 97)  SpO2: 97% (97% - 97%)    PHYSICAL EXAM:    GENERAL: NAD, obese  CHEST/LUNG: Clear to percussion bilaterally  HEART: Regular rate and rhythm; S1 S2  ABDOMEN: Soft, Nontender, Nondistended; Bowel sounds present  EXTREMITIES:  no edema,    R drain in knee: bloody discharge   NERVOUS SYSTEM:  Alert & Oriented X3, nonfocal  PSYCH: normal mood, appropriate response.    LABS:                        9.2    7.3   )-----------( 350      ( 24 Mar 2017 17:34 )             27.8     24 Mar 2017 17:34    146    |  114    |  20.0   ----------------------------<  113    3.0     |  20.0   |  1.64     Ca    7.2        24 Mar 2017 17:34            CAPILLARY BLOOD GLUCOSE  99 (24 Mar 2017 22:07)  115 (24 Mar 2017 13:20)        RADIOLOGY & ADDITIONAL TESTS:

## 2017-03-25 NOTE — PROGRESS NOTE ADULT - SUBJECTIVE AND OBJECTIVE BOX
Ortho Post Op Check    Name: SHYAM LAL    MR #: [id number]    · Subjective and Objective:   History:  The patient is status post Right Knee revision with post operative wound complication POD # 26.  Patient complaining of some pain in her right arm by the PICC line. No acute events overnight.  States she gets out of bed into chair every other day.   Right LE SIGRID drain clamped over night.     General Exam:  Vital Signs Last 24 Hrs  T(C): 37.2, Max: 37.2 (03-24 @ 23:22)  T(F): 98.9, Max: 98.9 (03-24 @ 23:22)  HR: 98 (96 - 98)  BP: --  BP(mean): --  RR: 97 (97 - 97)  SpO2: 97% (97% - 97%)    General: Pt Alert and oriented, NAD, controlled pain.  Dressings had moderate amount of serous drainage by drain site. No other drainage noted from incision. Mild maceration of skin around drain site and small area distal incision. Calf soft and supple.   Right upper arm: PICC dressings appeared soiled were dated 3/20/17. I removed the dressing and cleansed the area with alcohol. New sterile dressings applied.   Pulses: 2+ dorsalis pedis pulse. Cap refill < 2 sec.  Sensation: Grossly intact to light touch without deficit.                            9.2    7.3   )-----------( 350      ( 24 Mar 2017 17:34 )             27.8   24 Mar 2017 17:34    146    |  114    |  20.0   ----------------------------<  113    3.0     |  20.0   |  1.64             A/P: 72yFemale POD#26 s/p Right knee I&D with complex wound closure.   - Stable  - Pain Control  - DVT ppx: Lovenox  - Post op abx: As per ID team  - Weight bearing status: WBAT  - Discussed with Dr Rey; will leave drain to suction for 24 hours and continue to monitor output.   - Nurse aware of PICC dressing change.

## 2017-03-25 NOTE — PROGRESS NOTE ADULT - PROBLEM SELECTOR PLAN 1
Continue Daptomycin and Meropenem. Ortho and plastics following, drain in place. To be removed when cleared by plastics/orthopedics

## 2017-03-26 DIAGNOSIS — I82.621 ACUTE EMBOLISM AND THROMBOSIS OF DEEP VEINS OF RIGHT UPPER EXTREMITY: ICD-10-CM

## 2017-03-26 LAB
ANION GAP SERPL CALC-SCNC: 11 MMOL/L — SIGNIFICANT CHANGE UP (ref 5–17)
BUN SERPL-MCNC: 20 MG/DL — SIGNIFICANT CHANGE UP (ref 8–20)
CALCIUM SERPL-MCNC: 7.8 MG/DL — LOW (ref 8.6–10.2)
CHLORIDE SERPL-SCNC: 111 MMOL/L — HIGH (ref 98–107)
CO2 SERPL-SCNC: 25 MMOL/L — SIGNIFICANT CHANGE UP (ref 22–29)
CREAT SERPL-MCNC: 1.99 MG/DL — HIGH (ref 0.5–1.3)
GLUCOSE SERPL-MCNC: 102 MG/DL — SIGNIFICANT CHANGE UP (ref 70–115)
HCT VFR BLD CALC: 27.3 % — LOW (ref 37–47)
HGB BLD-MCNC: 8.9 G/DL — LOW (ref 12–16)
MAGNESIUM SERPL-MCNC: 1.7 MG/DL — LOW (ref 1.8–2.5)
MCHC RBC-ENTMCNC: 29.4 PG — SIGNIFICANT CHANGE UP (ref 27–31)
MCHC RBC-ENTMCNC: 32.6 G/DL — SIGNIFICANT CHANGE UP (ref 32–36)
MCV RBC AUTO: 90.1 FL — SIGNIFICANT CHANGE UP (ref 81–99)
PLATELET # BLD AUTO: 323 K/UL — SIGNIFICANT CHANGE UP (ref 150–400)
POTASSIUM SERPL-MCNC: 4 MMOL/L — SIGNIFICANT CHANGE UP (ref 3.5–5.3)
POTASSIUM SERPL-SCNC: 4 MMOL/L — SIGNIFICANT CHANGE UP (ref 3.5–5.3)
RBC # BLD: 3.03 M/UL — LOW (ref 4.4–5.2)
RBC # FLD: 20.5 % — HIGH (ref 11–15.6)
SODIUM SERPL-SCNC: 147 MMOL/L — HIGH (ref 135–145)
WBC # BLD: 8 K/UL — SIGNIFICANT CHANGE UP (ref 4.8–10.8)
WBC # FLD AUTO: 8 K/UL — SIGNIFICANT CHANGE UP (ref 4.8–10.8)

## 2017-03-26 PROCEDURE — 99233 SBSQ HOSP IP/OBS HIGH 50: CPT

## 2017-03-26 RX ADMIN — Medication 12.5 MILLIGRAM(S): at 06:42

## 2017-03-26 RX ADMIN — MEROPENEM 200 MILLIGRAM(S): 1 INJECTION INTRAVENOUS at 06:22

## 2017-03-26 RX ADMIN — DAPTOMYCIN 130 MILLIGRAM(S): 500 INJECTION, POWDER, LYOPHILIZED, FOR SOLUTION INTRAVENOUS at 15:04

## 2017-03-26 RX ADMIN — Medication 1 TABLET(S): at 11:43

## 2017-03-26 RX ADMIN — PANTOPRAZOLE SODIUM 40 MILLIGRAM(S): 20 TABLET, DELAYED RELEASE ORAL at 07:52

## 2017-03-26 RX ADMIN — ENOXAPARIN SODIUM 120 MILLIGRAM(S): 100 INJECTION SUBCUTANEOUS at 11:43

## 2017-03-26 RX ADMIN — PANTOPRAZOLE SODIUM 40 MILLIGRAM(S): 20 TABLET, DELAYED RELEASE ORAL at 18:03

## 2017-03-26 RX ADMIN — MAGNESIUM OXIDE 400 MG ORAL TABLET 400 MILLIGRAM(S): 241.3 TABLET ORAL at 07:52

## 2017-03-26 RX ADMIN — Medication 20 MILLIEQUIVALENT(S): at 11:43

## 2017-03-26 RX ADMIN — Medication 12.5 MILLIGRAM(S): at 18:04

## 2017-03-26 RX ADMIN — Medication 1 MILLIGRAM(S): at 11:43

## 2017-03-26 RX ADMIN — MEROPENEM 200 MILLIGRAM(S): 1 INJECTION INTRAVENOUS at 18:04

## 2017-03-26 RX ADMIN — Medication 325 MILLIGRAM(S): at 11:46

## 2017-03-26 RX ADMIN — Medication 325 MILLIGRAM(S): at 07:52

## 2017-03-26 RX ADMIN — MAGNESIUM OXIDE 400 MG ORAL TABLET 400 MILLIGRAM(S): 241.3 TABLET ORAL at 18:04

## 2017-03-26 RX ADMIN — Medication 325 MILLIGRAM(S): at 18:04

## 2017-03-26 NOTE — PROGRESS NOTE ADULT - PROBLEM SELECTOR PLAN 1
Continue Daptomycin and Meropenem thru 4/8. Ortho and plastics following, drain in place. To be removed when cleared by plastics/orthopedics

## 2017-03-26 NOTE — PROGRESS NOTE ADULT - PROBLEM SELECTOR PLAN 2
Continue  therapeutic Lovenox for now. Will resume Coumadin (cleared cleared by orthopedics)  will d/w IR re PICC replacement prior to restarting coumadin.

## 2017-03-26 NOTE — PROGRESS NOTE ADULT - SUBJECTIVE AND OBJECTIVE BOX
seen for rt knee infection    no acute complaints.  no cp/sob  ROS otherwise negative.     MEDICATIONS  (STANDING):  docusate sodium 100milliGRAM(s) Oral three times a day  ferrous    sulfate 325milliGRAM(s) Oral three times a day with meals  folic acid 1milliGRAM(s) Oral daily  multivitamin 1Tablet(s) Oral daily  dextrose 50% Injectable 12.5Gram(s) IV Push once  dextrose 50% Injectable 25Gram(s) IV Push once  dextrose 50% Injectable 25Gram(s) IV Push once  meropenem IVPB 500milliGRAM(s) IV Intermittent every 12 hours  senna 2Tablet(s) Oral at bedtime  pantoprazole    Tablet 40milliGRAM(s) Oral two times a day before meals  metoprolol 12.5milliGRAM(s) Oral every 12 hours  DAPTOmycin IVPB 750milliGRAM(s) IV Intermittent every 24 hours  epoetin melany Injectable [id number](s) SubCutaneous <User Schedule>  enoxaparin Injectable 120milliGRAM(s) SubCutaneous daily  potassium chloride    Tablet ER 20milliEquivalent(s) Oral daily  magnesium oxide 400milliGRAM(s) Oral two times a day with meals    MEDICATIONS  (PRN):  acetaminophen   Tablet 650milliGRAM(s) Oral every 6 hours PRN For Temp over 38.3 C (100.94 F)  aluminum hydroxide/magnesium hydroxide/simethicone Suspension 30milliLiter(s) Oral four times a day PRN Indigestion  ondansetron Injectable 4milliGRAM(s) IV Push every 6 hours PRN Nausea and/or Vomiting  dextrose Gel 1Dose(s) Oral once PRN Blood Glucose LESS THAN 70 milliGRAM(s)/deciliter  glucagon  Injectable 1milliGRAM(s) IntraMuscular once PRN Glucose LESS THAN 70 milligrams/deciliter  polyethylene glycol 3350 17Gram(s) Oral daily PRN Constipation  acetaminophen   Tablet. 650milliGRAM(s) Oral every 6 hours PRN mild to mod      Allergies    No Known Allergies    Intolerances      Vital Signs Last 24 Hrs  T(C): 36.9, Max: 37.1 (03-25 @ 16:41)  T(F): 98.5, Max: 98.7 (03-25 @ 16:41)  HR: 98 (98 - 98)  BP: 130/90 (130/90 - 135/90)  BP(mean): --  RR: 18 (18 - 18)  SpO2: 94% (94% - 94%)    PHYSICAL EXAM:    GENERAL: NAD,obese  CHEST/LUNG: Clear to percussion bilaterally  HEART: Regular rate and rhythm; S1 S2  ABDOMEN: Soft, Nontender, Nondistended; Bowel sounds present  EXTREMITIES: right knee drain in place: bloody discharge    right knee bandaged  NERVOUS SYSTEM:  Alert & Oriented X3, nonfocal  PSYCH: normal mood, appropriate response.    LABS:                        9.2    7.9   )-----------( 364      ( 25 Mar 2017 10:09 )             28.1     25 Mar 2017 10:09    144    |  109    |  21.0   ----------------------------<  95     3.6     |  24.0   |  1.88     Ca    7.7        25 Mar 2017 10:09            CAPILLARY BLOOD GLUCOSE  88 (25 Mar 2017 11:24)        RADIOLOGY & ADDITIONAL TESTS:

## 2017-03-26 NOTE — PROGRESS NOTE ADULT - SUBJECTIVE AND OBJECTIVE BOX
Ortho Post Op Check    Name: SHYAM LAL    MR #: [id number]    Procedure: I&D of right knee wound with complex wound closure  Surgeon: Dr Ritchie and Dr Candelario    Subjective and Objective:   History:  The patient is status post Right Knee revision with post operative wound complication POD # 27.  Patient complaining of some pain in her right arm by the PICC line. No acute events overnight.  States she gets out of bed into chair every other day but was in bed all day yesterday.   Right LE SIGRID drain left open to suction.    General Exam:  Vital Signs Last 24 Hrs  T(C): 36.9, Max: 37.1 (03-25 @ 16:41)  T(F): 98.5, Max: 98.7 (03-25 @ 16:41)  HR: 98 (98 - 98)  BP: 130/90 (130/90 - 135/90)  BP(mean): --  RR: 18 (18 - 18)  SpO2: 94% (94% - 94%)    General: Pt Alert and oriented, NAD, controlled pain.  Dressings C/D/I. No bleeding. Drain intact. bulb with approximately 60ml emptied.   Pulses: 2+ dorsalis pedis pulse. Cap refill < 2 sec.  Sensation: Grossly intact to light touch without deficit.  Motor: + EHL/FHL/TA/GS                          9.2    7.9   )-----------( 364      ( 25 Mar 2017 10:09 )             28.1   25 Mar 2017 10:09    144    |  109    |  21.0   ----------------------------<  95     3.6     |  24.0   |  1.88           A/P: 72yFemale POD#27 s/p Right knee I&D with complex wound closure   - Stable  - Pain Control  - DVT ppx: Lovenox  - Post op abx: Daptomycin  - Weight bearing status: WBAT  - Discussed with Dr Candelario that i will leave the drain and sutures in until evaluated by him.   - Dr Ritchie aware

## 2017-03-27 LAB
ANION GAP SERPL CALC-SCNC: 10 MMOL/L — SIGNIFICANT CHANGE UP (ref 5–17)
APTT BLD: 41.4 SEC — HIGH (ref 27.5–37.4)
BUN SERPL-MCNC: 20 MG/DL — SIGNIFICANT CHANGE UP (ref 8–20)
CALCIUM SERPL-MCNC: 7.8 MG/DL — LOW (ref 8.6–10.2)
CHLORIDE SERPL-SCNC: 111 MMOL/L — HIGH (ref 98–107)
CO2 SERPL-SCNC: 24 MMOL/L — SIGNIFICANT CHANGE UP (ref 22–29)
CREAT SERPL-MCNC: 2.01 MG/DL — HIGH (ref 0.5–1.3)
GLUCOSE SERPL-MCNC: 88 MG/DL — SIGNIFICANT CHANGE UP (ref 70–115)
HCT VFR BLD CALC: 27.1 % — LOW (ref 37–47)
HGB BLD-MCNC: 9 G/DL — LOW (ref 12–16)
INR BLD: 1.32 RATIO — HIGH (ref 0.88–1.16)
MAGNESIUM SERPL-MCNC: 1.7 MG/DL — LOW (ref 1.8–2.5)
MCHC RBC-ENTMCNC: 30 PG — SIGNIFICANT CHANGE UP (ref 27–31)
MCHC RBC-ENTMCNC: 33.2 G/DL — SIGNIFICANT CHANGE UP (ref 32–36)
MCV RBC AUTO: 90.3 FL — SIGNIFICANT CHANGE UP (ref 81–99)
PLATELET # BLD AUTO: 312 K/UL — SIGNIFICANT CHANGE UP (ref 150–400)
POTASSIUM SERPL-MCNC: 3.9 MMOL/L — SIGNIFICANT CHANGE UP (ref 3.5–5.3)
POTASSIUM SERPL-SCNC: 3.9 MMOL/L — SIGNIFICANT CHANGE UP (ref 3.5–5.3)
PROTHROM AB SERPL-ACNC: 14.6 SEC — HIGH (ref 9.8–12.7)
RBC # BLD: 3 M/UL — LOW (ref 4.4–5.2)
RBC # FLD: 20.5 % — HIGH (ref 11–15.6)
SODIUM SERPL-SCNC: 145 MMOL/L — SIGNIFICANT CHANGE UP (ref 135–145)
WBC # BLD: 7.4 K/UL — SIGNIFICANT CHANGE UP (ref 4.8–10.8)
WBC # FLD AUTO: 7.4 K/UL — SIGNIFICANT CHANGE UP (ref 4.8–10.8)

## 2017-03-27 PROCEDURE — 99233 SBSQ HOSP IP/OBS HIGH 50: CPT

## 2017-03-27 PROCEDURE — 71010: CPT | Mod: 26

## 2017-03-27 RX ORDER — MAGNESIUM SULFATE 500 MG/ML
1 VIAL (ML) INJECTION ONCE
Qty: 0 | Refills: 0 | Status: COMPLETED | OUTPATIENT
Start: 2017-03-27 | End: 2017-03-27

## 2017-03-27 RX ORDER — WARFARIN SODIUM 2.5 MG/1
5 TABLET ORAL ONCE
Qty: 0 | Refills: 0 | Status: COMPLETED | OUTPATIENT
Start: 2017-03-27 | End: 2017-03-27

## 2017-03-27 RX ORDER — POTASSIUM CHLORIDE 20 MEQ
20 PACKET (EA) ORAL
Qty: 0 | Refills: 0 | Status: DISCONTINUED | OUTPATIENT
Start: 2017-03-27 | End: 2017-04-04

## 2017-03-27 RX ORDER — CALCIUM CARBONATE 500(1250)
1 TABLET ORAL
Qty: 0 | Refills: 0 | Status: DISCONTINUED | OUTPATIENT
Start: 2017-03-27 | End: 2017-04-04

## 2017-03-27 RX ADMIN — Medication 1 TABLET(S): at 17:35

## 2017-03-27 RX ADMIN — Medication 20 MILLIGRAM(S): at 17:35

## 2017-03-27 RX ADMIN — Medication 325 MILLIGRAM(S): at 17:35

## 2017-03-27 RX ADMIN — PANTOPRAZOLE SODIUM 40 MILLIGRAM(S): 20 TABLET, DELAYED RELEASE ORAL at 06:19

## 2017-03-27 RX ADMIN — ENOXAPARIN SODIUM 120 MILLIGRAM(S): 100 INJECTION SUBCUTANEOUS at 11:50

## 2017-03-27 RX ADMIN — MEROPENEM 200 MILLIGRAM(S): 1 INJECTION INTRAVENOUS at 06:19

## 2017-03-27 RX ADMIN — Medication 325 MILLIGRAM(S): at 07:53

## 2017-03-27 RX ADMIN — Medication 650 MILLIGRAM(S): at 17:35

## 2017-03-27 RX ADMIN — Medication 650 MILLIGRAM(S): at 17:40

## 2017-03-27 RX ADMIN — PANTOPRAZOLE SODIUM 40 MILLIGRAM(S): 20 TABLET, DELAYED RELEASE ORAL at 17:35

## 2017-03-27 RX ADMIN — WARFARIN SODIUM 5 MILLIGRAM(S): 2.5 TABLET ORAL at 22:11

## 2017-03-27 RX ADMIN — Medication 12.5 MILLIGRAM(S): at 06:19

## 2017-03-27 RX ADMIN — Medication 20 MILLIEQUIVALENT(S): at 17:35

## 2017-03-27 RX ADMIN — Medication 100 GRAM(S): at 12:40

## 2017-03-27 RX ADMIN — DAPTOMYCIN 130 MILLIGRAM(S): 500 INJECTION, POWDER, LYOPHILIZED, FOR SOLUTION INTRAVENOUS at 15:07

## 2017-03-27 RX ADMIN — MEROPENEM 200 MILLIGRAM(S): 1 INJECTION INTRAVENOUS at 17:35

## 2017-03-27 RX ADMIN — Medication 325 MILLIGRAM(S): at 11:50

## 2017-03-27 RX ADMIN — MAGNESIUM OXIDE 400 MG ORAL TABLET 400 MILLIGRAM(S): 241.3 TABLET ORAL at 17:35

## 2017-03-27 RX ADMIN — Medication 1 TABLET(S): at 11:50

## 2017-03-27 RX ADMIN — MAGNESIUM OXIDE 400 MG ORAL TABLET 400 MILLIGRAM(S): 241.3 TABLET ORAL at 07:53

## 2017-03-27 RX ADMIN — Medication 12.5 MILLIGRAM(S): at 17:35

## 2017-03-27 RX ADMIN — Medication 1 MILLIGRAM(S): at 11:50

## 2017-03-27 NOTE — PROGRESS NOTE ADULT - PROBLEM SELECTOR PLAN 2
Continue  therapeutic Lovenox for now. Will resume Coumadin (cleared by orthopedics)  check CXR to eval new picc: if in proper place start coumadin

## 2017-03-27 NOTE — PROGRESS NOTE ADULT - SUBJECTIVE AND OBJECTIVE BOX
NEPHROLOGY INTERVAL HPI/OVERNIGHT EVENTS: no new events.    MEDICATIONS  (STANDING):  docusate sodium 100milliGRAM(s) Oral three times a day  ferrous    sulfate 325milliGRAM(s) Oral three times a day with meals  folic acid 1milliGRAM(s) Oral daily  multivitamin 1Tablet(s) Oral daily  dextrose 50% Injectable 12.5Gram(s) IV Push once  dextrose 50% Injectable 25Gram(s) IV Push once  dextrose 50% Injectable 25Gram(s) IV Push once  meropenem IVPB 500milliGRAM(s) IV Intermittent every 12 hours  senna 2Tablet(s) Oral at bedtime  pantoprazole    Tablet 40milliGRAM(s) Oral two times a day before meals  metoprolol 12.5milliGRAM(s) Oral every 12 hours  DAPTOmycin IVPB 750milliGRAM(s) IV Intermittent every 24 hours  epoetin melany Injectable [id number](s) SubCutaneous <User Schedule>  enoxaparin Injectable 120milliGRAM(s) SubCutaneous daily  magnesium oxide 400milliGRAM(s) Oral two times a day with meals  magnesium sulfate  IVPB 1Gram(s) IV Intermittent once  potassium chloride    Tablet ER 20milliEquivalent(s) Oral two times a day  torsemide 20milliGRAM(s) Oral daily  calcium carbonate 500 mG (Tums) Chewable 1Tablet(s) Chew two times a day    MEDICATIONS  (PRN):  acetaminophen   Tablet 650milliGRAM(s) Oral every 6 hours PRN For Temp over 38.3 C (100.94 F)  aluminum hydroxide/magnesium hydroxide/simethicone Suspension 30milliLiter(s) Oral four times a day PRN Indigestion  ondansetron Injectable 4milliGRAM(s) IV Push every 6 hours PRN Nausea and/or Vomiting  dextrose Gel 1Dose(s) Oral once PRN Blood Glucose LESS THAN 70 milliGRAM(s)/deciliter  glucagon  Injectable 1milliGRAM(s) IntraMuscular once PRN Glucose LESS THAN 70 milligrams/deciliter  polyethylene glycol 3350 17Gram(s) Oral daily PRN Constipation  acetaminophen   Tablet. 650milliGRAM(s) Oral every 6 hours PRN mild to mod      Allergies    No Known Allergies    Intolerances        Vital Signs Last 24 Hrs  T(C): 37.1, Max: 37.1 (03-27 @ 07:10)  T(F): 98.8, Max: 98.8 (03-27 @ 07:10)  HR: 98 (98 - 108)  BP: 102/59 (102/59 - 156/80)  BP(mean): --  RR: 18 (18 - 22)  SpO2: 98% (95% - 98%)  Daily     Daily     PHYSICAL EXAM:    GENERAL: same  HEAD:    EYES: wnl  ENMT:   NECK: same  NERVOUS SYSTEM:  same  CHEST/LUNG:  HEART: no rub  ABDOMEN: not tender, loose bm earlier  EXTREMITIES:  pitting edema both legs with dressing right knee; pic line  LYMPH:   SKIN: no rash    LABS:                        9.0    7.4   )-----------( 312      ( 27 Mar 2017 08:17 )             27.1     27 Mar 2017 08:17    145    |  111    |  20.0   ----------------------------<  88     3.9     |  24.0   |  2.01     Ca    7.8        27 Mar 2017 08:17  Mg     1.7       27 Mar 2017 08:17      PT/INR - ( 27 Mar 2017 08:17 )   PT: 14.6 sec;   INR: 1.32 ratio         PTT - ( 27 Mar 2017 08:17 )  PTT:41.4 sec    Magnesium, Serum: 1.7 mg/dL (03-27 @ 08:17)  Magnesium, Serum: 1.7 mg/dL (03-26 @ 14:38)          RADIOLOGY & ADDITIONAL TESTS:

## 2017-03-27 NOTE — PROGRESS NOTE ADULT - PROBLEM SELECTOR PLAN 1
Continue Daptomycin and Meropenem thru 4/8.   Ortho and plastics following, s/p SIGRID drain removal, now with wound vac

## 2017-03-27 NOTE — PROGRESS NOTE ADULT - SUBJECTIVE AND OBJECTIVE BOX
Pt is now 4 wks post-op.  No new events.  C/o buttock pain.    AVSS  SIGRID continues to drain > 200cc serosang fluid in 24 h.  When clamped, drainage leaks out of drain site onto dressings.    Right knee eschar is stable and thinning.  Signs of healing and epithelialization at incision, deep tissue in tact and viable.    No collections palpable.      A/P;  Pt seen with Dr. Ritchie.    SIGRID rmeoved today, despite significant drainage, concern for fistula formation and infection outweighs need for drain at this point.    Will place small wound vac over drain site.    External compression wrap  Dry dressing  Remove superior sutures today  Continue to follow

## 2017-03-27 NOTE — PROGRESS NOTE ADULT - ASSESSMENT
Wound- Anterior Medial eschar without change  Wound edges together  Infection appears under control    A/P  1) D/Revision  plans for OR so ok to move towardsWarfarin dosing  3) Wound- Per Dr Rey- Will observe for now.Dry  dressing over eschar  4) Infection- Abx per ID- May consider period of PO suppression after iv completed  5) Knee- Spacer in place- Possible Revision at later date

## 2017-03-27 NOTE — PROGRESS NOTE ADULT - SUBJECTIVE AND OBJECTIVE BOX
seen for right knee drainage/ UE DVT    no acute complaints  no cp/sob  ROS otherwise negative.     MEDICATIONS  (STANDING):  docusate sodium 100milliGRAM(s) Oral three times a day  ferrous    sulfate 325milliGRAM(s) Oral three times a day with meals  folic acid 1milliGRAM(s) Oral daily  multivitamin 1Tablet(s) Oral daily  dextrose 50% Injectable 12.5Gram(s) IV Push once  dextrose 50% Injectable 25Gram(s) IV Push once  dextrose 50% Injectable 25Gram(s) IV Push once  meropenem IVPB 500milliGRAM(s) IV Intermittent every 12 hours  senna 2Tablet(s) Oral at bedtime  pantoprazole    Tablet 40milliGRAM(s) Oral two times a day before meals  metoprolol 12.5milliGRAM(s) Oral every 12 hours  DAPTOmycin IVPB 750milliGRAM(s) IV Intermittent every 24 hours  epoetin melany Injectable [id number](s) SubCutaneous <User Schedule>  enoxaparin Injectable 120milliGRAM(s) SubCutaneous daily  magnesium oxide 400milliGRAM(s) Oral two times a day with meals  magnesium sulfate  IVPB 1Gram(s) IV Intermittent once  potassium chloride    Tablet ER 20milliEquivalent(s) Oral two times a day  torsemide 20milliGRAM(s) Oral daily  calcium carbonate 500 mG (Tums) Chewable 1Tablet(s) Chew two times a day    MEDICATIONS  (PRN):  acetaminophen   Tablet 650milliGRAM(s) Oral every 6 hours PRN For Temp over 38.3 C (100.94 F)  aluminum hydroxide/magnesium hydroxide/simethicone Suspension 30milliLiter(s) Oral four times a day PRN Indigestion  ondansetron Injectable 4milliGRAM(s) IV Push every 6 hours PRN Nausea and/or Vomiting  dextrose Gel 1Dose(s) Oral once PRN Blood Glucose LESS THAN 70 milliGRAM(s)/deciliter  glucagon  Injectable 1milliGRAM(s) IntraMuscular once PRN Glucose LESS THAN 70 milligrams/deciliter  polyethylene glycol 3350 17Gram(s) Oral daily PRN Constipation  acetaminophen   Tablet. 650milliGRAM(s) Oral every 6 hours PRN mild to mod      Allergies    No Known Allergies    Intolerances    Vital Signs Last 24 Hrs  T(C): 37.1, Max: 37.1 (03-27 @ 07:10)  T(F): 98.8, Max: 98.8 (03-27 @ 07:10)  HR: 98 (98 - 108)  BP: 102/59 (102/59 - 156/80)  BP(mean): --  RR: 18 (18 - 22)  SpO2: 98% (95% - 98%)    PHYSICAL EXAM:    GENERAL: NAD, obese  CHEST/LUNG: ctab  HEART: Regular rate and rhythm; S1 S2  ABDOMEN: Soft, +BS  EXTREMITIES:   right knee with wound vac  NERVOUS SYSTEM:  Alert & Oriented X3 nonfocal  PSYCH: normal mood, appropriate response.    LABS:                        9.0    7.4   )-----------( 312      ( 27 Mar 2017 08:17 )             27.1     27 Mar 2017 08:17    145    |  111    |  20.0   ----------------------------<  88     3.9     |  24.0   |  2.01     Ca    7.8        27 Mar 2017 08:17  Mg     1.7       27 Mar 2017 08:17      PT/INR - ( 27 Mar 2017 08:17 )   PT: 14.6 sec;   INR: 1.32 ratio         PTT - ( 27 Mar 2017 08:17 )  PTT:41.4 sec      CAPILLARY BLOOD GLUCOSE        RADIOLOGY & ADDITIONAL TESTS:

## 2017-03-27 NOTE — PROGRESS NOTE ADULT - SUBJECTIVE AND OBJECTIVE BOX
S/P Right Knee revision with placement of Cement Spacer POD#28    Patient found laying in bed with family member at bedside.  Patient was asleep but was able to wake without much difficulty  Doing well, pain is controlled, SIGRID drain in place and under suction.  No new issues overnight.      ICU Vital Signs Last 24 Hrs  T(C): 37.1, Max: 37.1 (03-27 @ 07:10)  T(F): 98.8, Max: 98.8 (03-27 @ 07:10)  HR: 98 (98 - 108)  BP: 102/59 (102/59 - 156/80)  BP(mean): --  ABP: --  ABP(mean): --  RR: 18 (18 - 22)  SpO2: 98% (95% - 98%)    Physical:  Right Lower Extremity;  Dressing Changed, Area or serous drainage noted,  Incision intact with sutures, no active drainage noted, however area of eschar is noted where drainage was on dressing.  Calfs soft non-tender  + ROM Of toes  + Sensation  New dressing applied  SIGRID drain emptied with 30 cc of bloody fluid    A/P Right TKA infection with Wound Dehiscence  - OOB, PT with alyce, WBAT  - Pain medication as needed  - Continue care per medicine  - Antibiotics per ID recommendations  - Wound care per Plastics  - Discussed with Dr. Ritchie

## 2017-03-28 DIAGNOSIS — R60.1 GENERALIZED EDEMA: ICD-10-CM

## 2017-03-28 LAB
APTT BLD: 42.3 SEC — HIGH (ref 27.5–37.4)
INR BLD: 1.33 RATIO — HIGH (ref 0.88–1.16)
PROTHROM AB SERPL-ACNC: 14.7 SEC — HIGH (ref 9.8–12.7)

## 2017-03-28 PROCEDURE — 99232 SBSQ HOSP IP/OBS MODERATE 35: CPT

## 2017-03-28 PROCEDURE — 99233 SBSQ HOSP IP/OBS HIGH 50: CPT

## 2017-03-28 RX ORDER — WARFARIN SODIUM 2.5 MG/1
5 TABLET ORAL DAILY
Qty: 0 | Refills: 0 | Status: DISCONTINUED | OUTPATIENT
Start: 2017-03-28 | End: 2017-03-30

## 2017-03-28 RX ADMIN — MAGNESIUM OXIDE 400 MG ORAL TABLET 400 MILLIGRAM(S): 241.3 TABLET ORAL at 18:16

## 2017-03-28 RX ADMIN — WARFARIN SODIUM 5 MILLIGRAM(S): 2.5 TABLET ORAL at 21:34

## 2017-03-28 RX ADMIN — MEROPENEM 200 MILLIGRAM(S): 1 INJECTION INTRAVENOUS at 07:02

## 2017-03-28 RX ADMIN — Medication 325 MILLIGRAM(S): at 09:09

## 2017-03-28 RX ADMIN — Medication 12.5 MILLIGRAM(S): at 07:02

## 2017-03-28 RX ADMIN — MAGNESIUM OXIDE 400 MG ORAL TABLET 400 MILLIGRAM(S): 241.3 TABLET ORAL at 09:09

## 2017-03-28 RX ADMIN — Medication 12.5 MILLIGRAM(S): at 18:16

## 2017-03-28 RX ADMIN — DAPTOMYCIN 130 MILLIGRAM(S): 500 INJECTION, POWDER, LYOPHILIZED, FOR SOLUTION INTRAVENOUS at 15:47

## 2017-03-28 RX ADMIN — Medication 1 TABLET(S): at 19:44

## 2017-03-28 RX ADMIN — Medication 325 MILLIGRAM(S): at 18:16

## 2017-03-28 RX ADMIN — Medication 20 MILLIEQUIVALENT(S): at 18:16

## 2017-03-28 RX ADMIN — ENOXAPARIN SODIUM 120 MILLIGRAM(S): 100 INJECTION SUBCUTANEOUS at 12:43

## 2017-03-28 RX ADMIN — Medication 325 MILLIGRAM(S): at 12:43

## 2017-03-28 RX ADMIN — PANTOPRAZOLE SODIUM 40 MILLIGRAM(S): 20 TABLET, DELAYED RELEASE ORAL at 15:59

## 2017-03-28 RX ADMIN — Medication 1 TABLET(S): at 12:43

## 2017-03-28 RX ADMIN — MEROPENEM 200 MILLIGRAM(S): 1 INJECTION INTRAVENOUS at 18:18

## 2017-03-28 RX ADMIN — Medication 20 MILLIGRAM(S): at 07:02

## 2017-03-28 RX ADMIN — PANTOPRAZOLE SODIUM 40 MILLIGRAM(S): 20 TABLET, DELAYED RELEASE ORAL at 07:02

## 2017-03-28 RX ADMIN — Medication 1 MILLIGRAM(S): at 12:43

## 2017-03-28 RX ADMIN — Medication 20 MILLIEQUIVALENT(S): at 07:02

## 2017-03-28 RX ADMIN — Medication 1 TABLET(S): at 07:02

## 2017-03-28 NOTE — PROGRESS NOTE ADULT - SUBJECTIVE AND OBJECTIVE BOX
seen for RT knee surgery and dvt    no acute complaints:  no cp/sob  arms swollen and mildly painful.  ROS otherwise negative.     MEDICATIONS  (STANDING):  docusate sodium 100milliGRAM(s) Oral three times a day  ferrous    sulfate 325milliGRAM(s) Oral three times a day with meals  folic acid 1milliGRAM(s) Oral daily  multivitamin 1Tablet(s) Oral daily  dextrose 50% Injectable 12.5Gram(s) IV Push once  dextrose 50% Injectable 25Gram(s) IV Push once  dextrose 50% Injectable 25Gram(s) IV Push once  meropenem IVPB 500milliGRAM(s) IV Intermittent every 12 hours  senna 2Tablet(s) Oral at bedtime  pantoprazole    Tablet 40milliGRAM(s) Oral two times a day before meals  metoprolol 12.5milliGRAM(s) Oral every 12 hours  DAPTOmycin IVPB 750milliGRAM(s) IV Intermittent every 24 hours  epoetin melany Injectable [id number](s) SubCutaneous <User Schedule>  enoxaparin Injectable 120milliGRAM(s) SubCutaneous daily  magnesium oxide 400milliGRAM(s) Oral two times a day with meals  potassium chloride    Tablet ER 20milliEquivalent(s) Oral two times a day  torsemide 20milliGRAM(s) Oral daily  calcium carbonate 500 mG (Tums) Chewable 1Tablet(s) Chew two times a day  warfarin 5milliGRAM(s) Oral daily    MEDICATIONS  (PRN):  acetaminophen   Tablet 650milliGRAM(s) Oral every 6 hours PRN For Temp over 38.3 C (100.94 F)  aluminum hydroxide/magnesium hydroxide/simethicone Suspension 30milliLiter(s) Oral four times a day PRN Indigestion  ondansetron Injectable 4milliGRAM(s) IV Push every 6 hours PRN Nausea and/or Vomiting  dextrose Gel 1Dose(s) Oral once PRN Blood Glucose LESS THAN 70 milliGRAM(s)/deciliter  glucagon  Injectable 1milliGRAM(s) IntraMuscular once PRN Glucose LESS THAN 70 milligrams/deciliter  polyethylene glycol 3350 17Gram(s) Oral daily PRN Constipation  acetaminophen   Tablet. 650milliGRAM(s) Oral every 6 hours PRN mild to mod      Allergies    No Known Allergies    Intolerances      Vital Signs Last 24 Hrs  T(C): 36.9, Max: 37.2 (03-28 @ 00:16)  T(F): 98.4, Max: 99 (03-28 @ 00:16)  HR: 88 (80 - 88)  BP: 134/65 (125/70 - 136/80)  BP(mean): --  RR: 18 (18 - 18)  SpO2: 97% (97% - 98%)    PHYSICAL EXAM:    GENERAL: NAD obese  CHEST/LUNG:ctab  HEART: Regular rate and rhythm; S1 S2; no murmurs noted  ABDOMEN: soft +BS  EXTREMITIES:  right knee: wound vac with bloody discharge    pitting edema x 4 extremities  NERVOUS SYSTEM:  Alert & Oriented X3, nonfocal  PSYCH: normal mood, appropriate response.    LABS:                        9.0    7.4   )-----------( 312      ( 27 Mar 2017 08:17 )             27.1     27 Mar 2017 08:17    145    |  111    |  20.0   ----------------------------<  88     3.9     |  24.0   |  2.01     Ca    7.8        27 Mar 2017 08:17  Mg     1.7       27 Mar 2017 08:17      PT/INR - ( 28 Mar 2017 11:51 )   PT: 14.7 sec;   INR: 1.33 ratio         PTT - ( 28 Mar 2017 11:51 )  PTT:42.3 sec      CAPILLARY BLOOD GLUCOSE        RADIOLOGY & ADDITIONAL TESTS:

## 2017-03-28 NOTE — PROGRESS NOTE ADULT - SUBJECTIVE AND OBJECTIVE BOX
[facility] [place] CARMENZA HARRIS MD FACP   DESIRE METZGER MD  Diplomates American Board of Internal Medicine and Infectious Diseases      SHYAM LAL  [id number]  72y    INTERVAL HPI/OVERNIGHT EVENTS:  AFEBRILE NON TOXIC   dressing in place  or debridement on hold  ABX through april 8      ANTIBIOTICS  meropenem IVPB 500milliGRAM(s) IV Intermittent every 12 hours  DAPTOmycin IVPB 750milliGRAM(s) IV Intermittent every 24 hours  epoetin melany Injectable [id number](s) SubCutaneous <User Schedule>      Allergies    No Known Allergies    Intolerances        REVIEW OF SYSTEMS:    PHYSICAL EXAM:  Vital Signs Last 24 Hrs  T(C): 36.8, Max: 36.9 (03-19 @ 15:50)  T(F): 98.3, Max: 98.4 (03-19 @ 15:50)  HR: 81 (81 - 90)  BP: 141/77 (122/82 - 141/77)  BP(mean): --  RR: 18 (18 - 18)  SpO2: 100% (98% - 100%)      GEN: NAD, pleasant  HEENT: normocephalic and atraumatic. EOMI. CHIDI. Moist mucosa. Clear Posterior pharynx.  NECK: Supple. No carotid bruits.  No lymphadenopathy or thyromegaly.  LUNGS: Clear to auscultation.  HEART: Regular rate and rhythm without murmur.  ABDOMEN: Soft, nontender, and nondistended.  Positive bowel sounds.  No hepatosplenomegaly was noted.  EXTREMITIES: Without any cyanosis, clubbing, rash, lesions or edema.knees no change  NEUROLOGIC: Cranial nerves II through XII are grossly intact.  MUSCULOSKELETAL:KNEE BANDAGED -   SKIN: No ulceration or induration present    LABS:                        9.6    8.0   )-----------( 384      ( 20 Mar 2017 09:51 )             27.9       20 Mar 2017 09:51    142    |  106    |  25.0   ----------------------------<  82     3.6     |  25.0   |  2.08     Ca    7.6        20 Mar 2017 09:51  Mg     1.9       20 Mar 2017 09:51                                10.0   8.6   )-----------( 382      ( 23 Mar 2017 06:59 )             29.5   03-20 @ 09:51  hct 27.9 % hgb 9.6 g/dL    03-20 @ 09:51  plat 384 K/uL wbc 8.0 K/uL    03-19 @ 08:13  hct 27.9 % hgb 9.4 g/dL    03-19 @ 08:13  plat 363 K/uL wbc 7.9 K/uL    03-18 @ 10:26  hct 28.2 % hgb 9.7 g/dL    03-18 @ 10:26  plat 357 K/uL wbc 9.0 K/uL      03-20-17  creat 2.08 mg/dL gfr 27 mL/min/1.73M2 bun 25.0 mg/dL k 3.6 mmol/L  03-19-17  creat 2.12 mg/dL gfr 26 mL/min/1.73M2 bun 27.0 mg/dL k 3.5 mmol/L  03-18-17  creat 2.17 mg/dL gfr 25 mL/min/1.73M2 bun 28.0 mg/dL k 3.6 mmol/L

## 2017-03-28 NOTE — PROGRESS NOTE ADULT - ASSESSMENT
71 yr old female with hypertension, hyperlipidemia, LIN, CKD, COPD, paroxysmal A fib sent from rehab for right knee wound dehiscence. She was seen by orthopedics, ID. Local wound cultures growing resistant and Pseudomonas and VRE. She was started on Meropenem and Zyvox. Her renal function was slightly worse from baseline and hence started on IVF. No obstructive disease on renal ultrasound. Cardiology consulted for clearance. Renal was consulted.  She is scheduled for OR I&D, she underwent wound closure and insertion of antibiotic spacer. RRT was called on 3/2 for hypotension and lethargy. Labs revealed elevated lactate. She was transferred to ICU for fluid resuscitation and monitoring. Narcotics were discontinued. She was noted to be anemic 6.9, she was ordered for PRBC transfusion and transferred out of ICU. ID follow up requested for medication adjustment, advised to stop Linezolid and start Daptomycin. GI consulted for anemia, no endoscopic intervention advised at this time. Hb remained stable. Noted to have lethargy, hence narcotics stopped. ABG done, which revealed normal pH and CO2. CT brain showed no stroke. Mental status improved. Hb remained stable. After discussion with orthopedics, coumadin was started for atrial fibrillation. She was noted to have bilateral upper extremity swelling, hence ultrasound was done on 3/9, negative for DVT. Given low platelets, Linezolid was discontinued by ID and Daptomycin was started. Heparin antibody negative. Plastics and orthopedic follow up was done, felt need for repeat I&D. Coumadin was held in view of procedure. Noted to be anemic, improved with 2 units of PRBC on 3/17/17. She was noted to have right arm swelling, doppler was repeated and showed subclavian vein DVT. She had PICC line placed on 3/18/17. Orthopedic and plastic surgery follow up done, given wound healing, further I&D deferred for now. PICC line replaced as it was dislodged. SIGRID drain in knee replaced to wound vac by plastics/ortho

## 2017-03-28 NOTE — PROGRESS NOTE ADULT - SUBJECTIVE AND OBJECTIVE BOX
Pt Name: SHYAM LAL    MRN: [id number]      Patient is a being followed for right total knee explant POD # 29, 2/27/2017. She continued to reside in bed. Prevena collection chamber has been changed twice in past 12 hours. She denies of any acute orthopedic issues.      PAST MEDICAL & SURGICAL HISTORY:  PAST MEDICAL & SURGICAL HISTORY:  Hypercholesterolemia  Deficiency of vitamin K  Chronic pain  COPD (chronic obstructive pulmonary disease)  Urine retention  Pressure ulcer  Atrial fibrillation  Constipation  Breast cancer  HLD (hyperlipidemia)  HTN (hypertension)  GERD (gastroesophageal reflux disease)  DM (diabetes mellitus)  Obesity  LIN on CPAP  History of incision and drainage: rt knee  S/p total knee replacement, bilateral  S/P knee replacement, right  S/P hysterectomy  S/P mastectomy: Left  Breast cancer      Allergies: No Known Allergies      Medications: acetaminophen   Tablet 650milliGRAM(s) Oral every 6 hours PRN  aluminum hydroxide/magnesium hydroxide/simethicone Suspension 30milliLiter(s) Oral four times a day PRN  ondansetron Injectable 4milliGRAM(s) IV Push every 6 hours PRN  docusate sodium 100milliGRAM(s) Oral three times a day  ferrous    sulfate 325milliGRAM(s) Oral three times a day with meals  folic acid 1milliGRAM(s) Oral daily  multivitamin 1Tablet(s) Oral daily  dextrose Gel 1Dose(s) Oral once PRN  dextrose 50% Injectable 12.5Gram(s) IV Push once  dextrose 50% Injectable 25Gram(s) IV Push once  dextrose 50% Injectable 25Gram(s) IV Push once  glucagon  Injectable 1milliGRAM(s) IntraMuscular once PRN  meropenem IVPB 500milliGRAM(s) IV Intermittent every 12 hours  polyethylene glycol 3350 17Gram(s) Oral daily PRN  senna 2Tablet(s) Oral at bedtime  pantoprazole    Tablet 40milliGRAM(s) Oral two times a day before meals  metoprolol 12.5milliGRAM(s) Oral every 12 hours  DAPTOmycin IVPB 750milliGRAM(s) IV Intermittent every 24 hours  acetaminophen   Tablet. 650milliGRAM(s) Oral every 6 hours PRN  epoetin melany Injectable [id number](s) SubCutaneous <User Schedule>  enoxaparin Injectable 120milliGRAM(s) SubCutaneous daily  magnesium oxide 400milliGRAM(s) Oral two times a day with meals  potassium chloride    Tablet ER 20milliEquivalent(s) Oral two times a day  torsemide 20milliGRAM(s) Oral daily  calcium carbonate 500 mG (Tums) Chewable 1Tablet(s) Chew two times a day        Ambulation: Walking independently [ ] With Cane [ ] With Walker [ ]  Bedbound [x]                           9.0    7.4   )-----------( 312      ( 27 Mar 2017 08:17 )             27.1     27 Mar 2017 08:17    145    |  111    |  20.0   ----------------------------<  88     3.9     |  24.0   |  2.01     Ca    7.8        27 Mar 2017 08:17  Mg     1.7       27 Mar 2017 08:17        PHYSICAL EXAM:    Vital Signs Last 24 Hrs  T(C): 36.9, Max: 37.2 (03-28 @ 00:16)  T(F): 98.4, Max: 99 (03-28 @ 00:16)  HR: 88 (80 - 88)  BP: 134/65 (125/70 - 136/80)  BP(mean): --  RR: 18 (18 - 18)  SpO2: 97% (97% - 98%)  Daily     Daily     Appearance: Alert, responsive, in no acute distress.    Neurological: Sensation is grossly intact to light touch. 5/5 motor function of all extremities. No focal deficits or weaknesses found.    Skin: no rash on visible skin. Skin is clean, dry and intact. No bleeding. No abrasions. No ulcerations. + guaze dressing over the incision with a vac dressing over the drain site. + bloody discharge in the tubing and chamber.     Vascular: 2+ distal pulses. Cap refill < 2 sec. No signs of venous insuffiencey or stasis. No extremity ulcerations. No cyanosis.    Musculoskeletal:        Right Lower Extremity: No bony tenderness. No calf tenderness.       A/P:  Pt is a  72y Female with right total knee explant POD # 29, 2/27/2017    PLAN:   * Vac dressing changed to a large vac dressing  * will continue to follow  * continued anticoagulation for known DVT

## 2017-03-28 NOTE — PROGRESS NOTE ADULT - PROBLEM SELECTOR PLAN 1
Continue Daptomycin and Meropenem thru 4/8 via PICC  Ortho and plastics following, s/p SIGRID drain removal, now with wound vac

## 2017-03-28 NOTE — PROGRESS NOTE ADULT - SUBJECTIVE AND OBJECTIVE BOX
NEPHROLOGY INTERVAL HPI/OVERNIGHT EVENTS: No new events    MEDICATIONS  (STANDING):  docusate sodium 100milliGRAM(s) Oral three times a day  ferrous    sulfate 325milliGRAM(s) Oral three times a day with meals  folic acid 1milliGRAM(s) Oral daily  multivitamin 1Tablet(s) Oral daily  dextrose 50% Injectable 12.5Gram(s) IV Push once  dextrose 50% Injectable 25Gram(s) IV Push once  dextrose 50% Injectable 25Gram(s) IV Push once  meropenem IVPB 500milliGRAM(s) IV Intermittent every 12 hours  senna 2Tablet(s) Oral at bedtime  pantoprazole    Tablet 40milliGRAM(s) Oral two times a day before meals  metoprolol 12.5milliGRAM(s) Oral every 12 hours  DAPTOmycin IVPB 750milliGRAM(s) IV Intermittent every 24 hours  epoetin melany Injectable [id number](s) SubCutaneous <User Schedule>  enoxaparin Injectable 120milliGRAM(s) SubCutaneous daily  magnesium oxide 400milliGRAM(s) Oral two times a day with meals  potassium chloride    Tablet ER 20milliEquivalent(s) Oral two times a day  torsemide 20milliGRAM(s) Oral daily  calcium carbonate 500 mG (Tums) Chewable 1Tablet(s) Chew two times a day    MEDICATIONS  (PRN):  acetaminophen   Tablet 650milliGRAM(s) Oral every 6 hours PRN For Temp over 38.3 C (100.94 F)  aluminum hydroxide/magnesium hydroxide/simethicone Suspension 30milliLiter(s) Oral four times a day PRN Indigestion  ondansetron Injectable 4milliGRAM(s) IV Push every 6 hours PRN Nausea and/or Vomiting  dextrose Gel 1Dose(s) Oral once PRN Blood Glucose LESS THAN 70 milliGRAM(s)/deciliter  glucagon  Injectable 1milliGRAM(s) IntraMuscular once PRN Glucose LESS THAN 70 milligrams/deciliter  polyethylene glycol 3350 17Gram(s) Oral daily PRN Constipation  acetaminophen   Tablet. 650milliGRAM(s) Oral every 6 hours PRN mild to mod      Allergies    No Known Allergies    Intolerances        Vital Signs Last 24 Hrs  T(C): 36.9, Max: 37.2 (03-28 @ 00:16)  T(F): 98.4, Max: 99 (03-28 @ 00:16)  HR: 88 (80 - 88)  BP: 134/65 (125/70 - 136/80)  BP(mean): --  RR: 18 (18 - 18)  SpO2: 97% (97% - 98%)    PHYSICAL EXAM:    GENERAL: same  HEAD:    EYES:   ENMT:   NECK: veins flat  NERVOUS SYSTEM: same, depressed over medical issues   CHEST/LUNG: decreased bs bases  HEART: same  ABDOMEN: same  EXTREMITIES:  vac. right knee, bilateral leg edema  LYMPH:   SKIN: no rash    LABS:               27 Mar 2017 08:17    145    |  111    |  20.0   ----------------------------<  88     3.9     |  24.0   |  2.01     Ca    7.8        27 Mar 2017 08:17  Mg     1.7       27 Mar 2017 08:17              RADIOLOGY & ADDITIONAL TESTS:

## 2017-03-29 LAB
ALBUMIN SERPL ELPH-MCNC: 1.3 G/DL — LOW (ref 3.3–5.2)
ALP SERPL-CCNC: 96 U/L — SIGNIFICANT CHANGE UP (ref 40–120)
ALT FLD-CCNC: 15 U/L — SIGNIFICANT CHANGE UP
ANION GAP SERPL CALC-SCNC: 12 MMOL/L — SIGNIFICANT CHANGE UP (ref 5–17)
APTT BLD: 46.7 SEC — HIGH (ref 27.5–37.4)
AST SERPL-CCNC: 43 U/L — HIGH
BILIRUB SERPL-MCNC: 0.4 MG/DL — SIGNIFICANT CHANGE UP (ref 0.4–2)
BUN SERPL-MCNC: 19 MG/DL — SIGNIFICANT CHANGE UP (ref 8–20)
CALCIUM SERPL-MCNC: 7.7 MG/DL — LOW (ref 8.6–10.2)
CHLORIDE SERPL-SCNC: 108 MMOL/L — HIGH (ref 98–107)
CO2 SERPL-SCNC: 23 MMOL/L — SIGNIFICANT CHANGE UP (ref 22–29)
CREAT SERPL-MCNC: 2.19 MG/DL — HIGH (ref 0.5–1.3)
GLUCOSE SERPL-MCNC: 64 MG/DL — LOW (ref 70–115)
INR BLD: 1.62 RATIO — HIGH (ref 0.88–1.16)
MAGNESIUM SERPL-MCNC: 1.8 MG/DL — SIGNIFICANT CHANGE UP (ref 1.8–2.5)
POTASSIUM SERPL-MCNC: 4.4 MMOL/L — SIGNIFICANT CHANGE UP (ref 3.5–5.3)
POTASSIUM SERPL-SCNC: 4.4 MMOL/L — SIGNIFICANT CHANGE UP (ref 3.5–5.3)
PREALB SERPL-MCNC: 9 MG/DL — LOW (ref 18–38)
PROT SERPL-MCNC: 4.4 G/DL — LOW (ref 6.6–8.7)
PROTHROM AB SERPL-ACNC: 18 SEC — HIGH (ref 9.8–12.7)
SODIUM SERPL-SCNC: 143 MMOL/L — SIGNIFICANT CHANGE UP (ref 135–145)

## 2017-03-29 PROCEDURE — 99233 SBSQ HOSP IP/OBS HIGH 50: CPT

## 2017-03-29 RX ORDER — MAGNESIUM SULFATE 500 MG/ML
2 VIAL (ML) INJECTION ONCE
Qty: 0 | Refills: 0 | Status: COMPLETED | OUTPATIENT
Start: 2017-03-29 | End: 2017-03-29

## 2017-03-29 RX ADMIN — Medication 1 TABLET(S): at 11:52

## 2017-03-29 RX ADMIN — Medication 1 TABLET(S): at 05:39

## 2017-03-29 RX ADMIN — Medication 20 MILLIEQUIVALENT(S): at 17:17

## 2017-03-29 RX ADMIN — WARFARIN SODIUM 5 MILLIGRAM(S): 2.5 TABLET ORAL at 22:01

## 2017-03-29 RX ADMIN — PANTOPRAZOLE SODIUM 40 MILLIGRAM(S): 20 TABLET, DELAYED RELEASE ORAL at 16:17

## 2017-03-29 RX ADMIN — MAGNESIUM OXIDE 400 MG ORAL TABLET 400 MILLIGRAM(S): 241.3 TABLET ORAL at 09:48

## 2017-03-29 RX ADMIN — Medication 12.5 MILLIGRAM(S): at 05:39

## 2017-03-29 RX ADMIN — MEROPENEM 200 MILLIGRAM(S): 1 INJECTION INTRAVENOUS at 17:10

## 2017-03-29 RX ADMIN — Medication 1 TABLET(S): at 17:17

## 2017-03-29 RX ADMIN — Medication 50 GRAM(S): at 14:14

## 2017-03-29 RX ADMIN — ENOXAPARIN SODIUM 120 MILLIGRAM(S): 100 INJECTION SUBCUTANEOUS at 11:51

## 2017-03-29 RX ADMIN — DAPTOMYCIN 130 MILLIGRAM(S): 500 INJECTION, POWDER, LYOPHILIZED, FOR SOLUTION INTRAVENOUS at 16:14

## 2017-03-29 RX ADMIN — Medication 20 MILLIEQUIVALENT(S): at 05:39

## 2017-03-29 RX ADMIN — Medication 1 MILLIGRAM(S): at 11:52

## 2017-03-29 RX ADMIN — MAGNESIUM OXIDE 400 MG ORAL TABLET 400 MILLIGRAM(S): 241.3 TABLET ORAL at 16:16

## 2017-03-29 RX ADMIN — Medication 325 MILLIGRAM(S): at 16:15

## 2017-03-29 RX ADMIN — Medication 325 MILLIGRAM(S): at 11:52

## 2017-03-29 RX ADMIN — Medication 12.5 MILLIGRAM(S): at 17:10

## 2017-03-29 RX ADMIN — Medication 325 MILLIGRAM(S): at 09:48

## 2017-03-29 RX ADMIN — Medication 100 MILLIGRAM(S): at 13:00

## 2017-03-29 RX ADMIN — PANTOPRAZOLE SODIUM 40 MILLIGRAM(S): 20 TABLET, DELAYED RELEASE ORAL at 05:39

## 2017-03-29 RX ADMIN — MEROPENEM 200 MILLIGRAM(S): 1 INJECTION INTRAVENOUS at 05:39

## 2017-03-29 RX ADMIN — ERYTHROPOIETIN 20000 UNIT(S): 10000 INJECTION, SOLUTION INTRAVENOUS; SUBCUTANEOUS at 16:14

## 2017-03-29 RX ADMIN — Medication 20 MILLIGRAM(S): at 05:39

## 2017-03-29 NOTE — PROGRESS NOTE ADULT - SUBJECTIVE AND OBJECTIVE BOX
seen right knee wound/dvt    complaining of diarrhea. no cp/sob  ROS otherwise negative    MEDICATIONS  (STANDING):  ferrous    sulfate 325milliGRAM(s) Oral three times a day with meals  folic acid 1milliGRAM(s) Oral daily  multivitamin 1Tablet(s) Oral daily  dextrose 50% Injectable 12.5Gram(s) IV Push once  dextrose 50% Injectable 25Gram(s) IV Push once  dextrose 50% Injectable 25Gram(s) IV Push once  meropenem IVPB 500milliGRAM(s) IV Intermittent every 12 hours  pantoprazole    Tablet 40milliGRAM(s) Oral two times a day before meals  metoprolol 12.5milliGRAM(s) Oral every 12 hours  DAPTOmycin IVPB 750milliGRAM(s) IV Intermittent every 24 hours  epoetin melany Injectable [id number](s) SubCutaneous <User Schedule>  enoxaparin Injectable 120milliGRAM(s) SubCutaneous daily  magnesium oxide 400milliGRAM(s) Oral two times a day with meals  potassium chloride    Tablet ER 20milliEquivalent(s) Oral two times a day  torsemide 20milliGRAM(s) Oral daily  calcium carbonate 500 mG (Tums) Chewable 1Tablet(s) Chew two times a day  warfarin 5milliGRAM(s) Oral daily    MEDICATIONS  (PRN):  acetaminophen   Tablet 650milliGRAM(s) Oral every 6 hours PRN For Temp over 38.3 C (100.94 F)  aluminum hydroxide/magnesium hydroxide/simethicone Suspension 30milliLiter(s) Oral four times a day PRN Indigestion  ondansetron Injectable 4milliGRAM(s) IV Push every 6 hours PRN Nausea and/or Vomiting  dextrose Gel 1Dose(s) Oral once PRN Blood Glucose LESS THAN 70 milliGRAM(s)/deciliter  glucagon  Injectable 1milliGRAM(s) IntraMuscular once PRN Glucose LESS THAN 70 milligrams/deciliter  polyethylene glycol 3350 17Gram(s) Oral daily PRN Constipation  acetaminophen   Tablet. 650milliGRAM(s) Oral every 6 hours PRN mild to mod      Allergies    No Known Allergies    Intolerances    Vital Signs Last 24 Hrs  T(C): 36.9, Max: 36.9 (03-29 @ 07:10)  T(F): 98.5, Max: 98.5 (03-29 @ 07:10)  HR: 87 (80 - 87)  BP: 116/64 (116/64 - 127/86)  BP(mean): --  RR: 18 (18 - 18)  SpO2: 98% (98% - 98%)    PHYSICAL EXAM:    GENERAL: NAD, obese  CHEST/LUNG: Clear to percussion bilaterally  HEART: Regular rate and rhythm; S1 S2  ABDOMEN: Soft, Nontender, Nondistended; Bowel sounds present  EXTREMITIES:  right knee wound vac  edema x 4 extremities  NERVOUS SYSTEM:  Alert & Oriented X3, nonfocal  PSYCH: normal mood, appropriate response.    LABS:    29 Mar 2017 11:32    143    |  108    |  19.0   ----------------------------<  64     4.4     |  23.0   |  2.19     Ca    7.7        29 Mar 2017 11:32  Mg     1.8       29 Mar 2017 11:32    TPro  4.4    /  Alb  1.3    /  TBili  0.4    /  DBili  x      /  AST  43     /  ALT  15     /  AlkPhos  96     29 Mar 2017 11:32    PT/INR - ( 29 Mar 2017 11:32 )   PT: 18.0 sec;   INR: 1.62 ratio         PTT - ( 29 Mar 2017 11:32 )  PTT:46.7 sec      CAPILLARY BLOOD GLUCOSE        RADIOLOGY & ADDITIONAL TESTS:

## 2017-03-29 NOTE — PROGRESS NOTE ADULT - SUBJECTIVE AND OBJECTIVE BOX
SHYAM LAL    [id number]    History:  Patient is a being followed for right total knee explant POD # 29, 2/27/2017. She continued to reside in bed. Wound vac not functioning since last night (No Power to machine). New vac ordered.  She denies of any acute orthopedic issues.. Denies nausea, vomiting, chest pain, shortness of breath, abdominal pain or fever. No new complaints. No acute motor or sensory changes are reported.    Vital Signs Last 24 Hrs  T(C): 36.9, Max: 36.9 (03-29 @ 07:10)  T(F): 98.5, Max: 98.5 (03-29 @ 07:10)  HR: 87 (80 - 87)  BP: 116/64 (116/64 - 127/86)  BP(mean): --  RR: 18 (18 - 18)  SpO2: 98% (98% - 98%)  I&O's Summary    I & Os for current day (as of 29 Mar 2017 10:41)  =============================================  IN: 100 ml / OUT: 0 ml / NET: 100 ml                MEDICATIONS  (STANDING):  docusate sodium 100milliGRAM(s) Oral three times a day  ferrous    sulfate 325milliGRAM(s) Oral three times a day with meals  folic acid 1milliGRAM(s) Oral daily  multivitamin 1Tablet(s) Oral daily  dextrose 50% Injectable 12.5Gram(s) IV Push once  dextrose 50% Injectable 25Gram(s) IV Push once  dextrose 50% Injectable 25Gram(s) IV Push once  meropenem IVPB 500milliGRAM(s) IV Intermittent every 12 hours  senna 2Tablet(s) Oral at bedtime  pantoprazole    Tablet 40milliGRAM(s) Oral two times a day before meals  metoprolol 12.5milliGRAM(s) Oral every 12 hours  DAPTOmycin IVPB 750milliGRAM(s) IV Intermittent every 24 hours  epoetin melany Injectable [id number](s) SubCutaneous <User Schedule>  enoxaparin Injectable 120milliGRAM(s) SubCutaneous daily  magnesium oxide 400milliGRAM(s) Oral two times a day with meals  potassium chloride    Tablet ER 20milliEquivalent(s) Oral two times a day  torsemide 20milliGRAM(s) Oral daily  calcium carbonate 500 mG (Tums) Chewable 1Tablet(s) Chew two times a day  warfarin 5milliGRAM(s) Oral daily    MEDICATIONS  (PRN):  acetaminophen   Tablet 650milliGRAM(s) Oral every 6 hours PRN For Temp over 38.3 C (100.94 F)  aluminum hydroxide/magnesium hydroxide/simethicone Suspension 30milliLiter(s) Oral four times a day PRN Indigestion  ondansetron Injectable 4milliGRAM(s) IV Push every 6 hours PRN Nausea and/or Vomiting  dextrose Gel 1Dose(s) Oral once PRN Blood Glucose LESS THAN 70 milliGRAM(s)/deciliter  glucagon  Injectable 1milliGRAM(s) IntraMuscular once PRN Glucose LESS THAN 70 milligrams/deciliter  polyethylene glycol 3350 17Gram(s) Oral daily PRN Constipation  acetaminophen   Tablet. 650milliGRAM(s) Oral every 6 hours PRN mild to mod      Physical exam:clean dressing in place Motor function distally is grossly intact. No foot drop.. Capillary refill is less than 2 seconds. No cyanosis.  New vac machine placed, functioning.    Primary Orthopedic Assessment:  • Ortho stable  Secondary  Orthopedic Assessment(s):   •   Secondary  Medical Assessment(s):   •   Plan:   • DVT prophylaxis as prescribed, including use of compression devices and ankle pumps  • Continue current wound care management, will continue to follow

## 2017-03-29 NOTE — PROGRESS NOTE ADULT - SUBJECTIVE AND OBJECTIVE BOX
NEPHROLOGY INTERVAL HPI/OVERNIGHT EVENTS:  pt clinically stable when seen earlier  no acute distress    MEDICATIONS  (STANDING):  docusate sodium 100milliGRAM(s) Oral three times a day  ferrous    sulfate 325milliGRAM(s) Oral three times a day with meals  folic acid 1milliGRAM(s) Oral daily  multivitamin 1Tablet(s) Oral daily  dextrose 50% Injectable 12.5Gram(s) IV Push once  dextrose 50% Injectable 25Gram(s) IV Push once  dextrose 50% Injectable 25Gram(s) IV Push once  meropenem IVPB 500milliGRAM(s) IV Intermittent every 12 hours  senna 2Tablet(s) Oral at bedtime  pantoprazole    Tablet 40milliGRAM(s) Oral two times a day before meals  metoprolol 12.5milliGRAM(s) Oral every 12 hours  DAPTOmycin IVPB 750milliGRAM(s) IV Intermittent every 24 hours  epoetin melany Injectable [id number](s) SubCutaneous <User Schedule>  enoxaparin Injectable 120milliGRAM(s) SubCutaneous daily  magnesium oxide 400milliGRAM(s) Oral two times a day with meals  potassium chloride    Tablet ER 20milliEquivalent(s) Oral two times a day  torsemide 20milliGRAM(s) Oral daily  calcium carbonate 500 mG (Tums) Chewable 1Tablet(s) Chew two times a day  warfarin 5milliGRAM(s) Oral daily    MEDICATIONS  (PRN):  acetaminophen   Tablet 650milliGRAM(s) Oral every 6 hours PRN For Temp over 38.3 C (100.94 F)  aluminum hydroxide/magnesium hydroxide/simethicone Suspension 30milliLiter(s) Oral four times a day PRN Indigestion  ondansetron Injectable 4milliGRAM(s) IV Push every 6 hours PRN Nausea and/or Vomiting  dextrose Gel 1Dose(s) Oral once PRN Blood Glucose LESS THAN 70 milliGRAM(s)/deciliter  glucagon  Injectable 1milliGRAM(s) IntraMuscular once PRN Glucose LESS THAN 70 milligrams/deciliter  polyethylene glycol 3350 17Gram(s) Oral daily PRN Constipation  acetaminophen   Tablet. 650milliGRAM(s) Oral every 6 hours PRN mild to mod      Allergies    No Known Allergies    Intolerances            Vital Signs Last 24 Hrs  T(C): 36.9, Max: 36.9 (03-29 @ 07:10)  T(F): 98.5, Max: 98.5 (03-29 @ 07:10)  HR: 87 (80 - 87)  BP: 116/64 (116/64 - 127/86)  BP(mean): --  RR: 18 (18 - 18)  SpO2: 98% (98% - 98%)    PHYSICAL EXAM:  HEENT: EOMI  Neck: supple no JVD  CHEST/LUNG: CTA B/L   HEART: S1S2+ audible  ABDOMEN: Soft NDNT + BS  EXTREMITIES:  +LE edema; R leg with dressing and VAC    LABS:    29 Mar 2017 11:32    143    |  108    |  19.0   ----------------------------<  64     4.4     |  23.0   |  2.19     Ca    7.7        29 Mar 2017 11:32  Mg     1.8       29 Mar 2017 11:32    TPro  4.4    /  Alb  1.3    /  TBili  0.4    /  DBili  x      /  AST  43     /  ALT  15     /  AlkPhos  96     29 Mar 2017 11:32    PT/INR - ( 29 Mar 2017 11:32 )   PT: 18.0 sec;   INR: 1.62 ratio         PTT - ( 29 Mar 2017 11:32 )  PTT:46.7 sec    Magnesium, Serum: 1.8 mg/dL (03-29 @ 11:32)  Hemoglobin: 9.0 g/dL (03.27.17 @ 08:17)        RADIOLOGY & ADDITIONAL TESTS:

## 2017-03-29 NOTE — PROGRESS NOTE ADULT - ASSESSMENT
Improved KARMEN on CKD , DM ---> baseline creat 1.8 in past  Probable pre renal azotemic component due to diuretics  Normal kidneys on NCCT   Debridement / dehis ---> Abx as per ID   - continue to monitor labs  - avoid potential nephrotoxic agents    Anemia - s/p PRBcs  - monitor H/H  -  continue SIDRA

## 2017-03-30 LAB
ANION GAP SERPL CALC-SCNC: 10 MMOL/L — SIGNIFICANT CHANGE UP (ref 5–17)
APTT BLD: 51.1 SEC — HIGH (ref 27.5–37.4)
BUN SERPL-MCNC: 20 MG/DL — SIGNIFICANT CHANGE UP (ref 8–20)
C DIFF BY PCR RESULT: SIGNIFICANT CHANGE UP
C DIFF TOX GENS STL QL NAA+PROBE: SIGNIFICANT CHANGE UP
CALCIUM SERPL-MCNC: 8 MG/DL — LOW (ref 8.6–10.2)
CHLORIDE SERPL-SCNC: 109 MMOL/L — HIGH (ref 98–107)
CO2 SERPL-SCNC: 23 MMOL/L — SIGNIFICANT CHANGE UP (ref 22–29)
CREAT SERPL-MCNC: 2.24 MG/DL — HIGH (ref 0.5–1.3)
GLUCOSE SERPL-MCNC: 68 MG/DL — LOW (ref 70–115)
HCT VFR BLD CALC: 29.5 % — LOW (ref 37–47)
HGB BLD-MCNC: 9.8 G/DL — LOW (ref 12–16)
INR BLD: 2.18 RATIO — HIGH (ref 0.88–1.16)
MCHC RBC-ENTMCNC: 30.2 PG — SIGNIFICANT CHANGE UP (ref 27–31)
MCHC RBC-ENTMCNC: 33.2 G/DL — SIGNIFICANT CHANGE UP (ref 32–36)
MCV RBC AUTO: 90.8 FL — SIGNIFICANT CHANGE UP (ref 81–99)
PLATELET # BLD AUTO: 330 K/UL — SIGNIFICANT CHANGE UP (ref 150–400)
POTASSIUM SERPL-MCNC: 4.4 MMOL/L — SIGNIFICANT CHANGE UP (ref 3.5–5.3)
POTASSIUM SERPL-SCNC: 4.4 MMOL/L — SIGNIFICANT CHANGE UP (ref 3.5–5.3)
PROTHROM AB SERPL-ACNC: 24.4 SEC — HIGH (ref 9.8–12.7)
RBC # BLD: 3.25 M/UL — LOW (ref 4.4–5.2)
RBC # FLD: 21.9 % — HIGH (ref 11–15.6)
SODIUM SERPL-SCNC: 142 MMOL/L — SIGNIFICANT CHANGE UP (ref 135–145)
WBC # BLD: 7.6 K/UL — SIGNIFICANT CHANGE UP (ref 4.8–10.8)
WBC # FLD AUTO: 7.6 K/UL — SIGNIFICANT CHANGE UP (ref 4.8–10.8)

## 2017-03-30 PROCEDURE — 99233 SBSQ HOSP IP/OBS HIGH 50: CPT

## 2017-03-30 RX ORDER — WARFARIN SODIUM 2.5 MG/1
3 TABLET ORAL DAILY
Qty: 0 | Refills: 0 | Status: DISCONTINUED | OUTPATIENT
Start: 2017-03-30 | End: 2017-03-31

## 2017-03-30 RX ADMIN — Medication 650 MILLIGRAM(S): at 09:30

## 2017-03-30 RX ADMIN — Medication 325 MILLIGRAM(S): at 12:25

## 2017-03-30 RX ADMIN — Medication 1 TABLET(S): at 17:52

## 2017-03-30 RX ADMIN — MEROPENEM 200 MILLIGRAM(S): 1 INJECTION INTRAVENOUS at 17:51

## 2017-03-30 RX ADMIN — Medication 1 MILLIGRAM(S): at 12:25

## 2017-03-30 RX ADMIN — MEROPENEM 200 MILLIGRAM(S): 1 INJECTION INTRAVENOUS at 05:35

## 2017-03-30 RX ADMIN — Medication 20 MILLIEQUIVALENT(S): at 17:51

## 2017-03-30 RX ADMIN — Medication 20 MILLIEQUIVALENT(S): at 05:35

## 2017-03-30 RX ADMIN — Medication 650 MILLIGRAM(S): at 08:42

## 2017-03-30 RX ADMIN — ENOXAPARIN SODIUM 120 MILLIGRAM(S): 100 INJECTION SUBCUTANEOUS at 12:25

## 2017-03-30 RX ADMIN — WARFARIN SODIUM 3 MILLIGRAM(S): 2.5 TABLET ORAL at 22:05

## 2017-03-30 RX ADMIN — Medication 1 TABLET(S): at 12:26

## 2017-03-30 RX ADMIN — PANTOPRAZOLE SODIUM 40 MILLIGRAM(S): 20 TABLET, DELAYED RELEASE ORAL at 05:35

## 2017-03-30 RX ADMIN — MAGNESIUM OXIDE 400 MG ORAL TABLET 400 MILLIGRAM(S): 241.3 TABLET ORAL at 08:42

## 2017-03-30 RX ADMIN — Medication 20 MILLIGRAM(S): at 05:35

## 2017-03-30 RX ADMIN — Medication 12.5 MILLIGRAM(S): at 05:35

## 2017-03-30 RX ADMIN — Medication 12.5 MILLIGRAM(S): at 17:51

## 2017-03-30 RX ADMIN — MAGNESIUM OXIDE 400 MG ORAL TABLET 400 MILLIGRAM(S): 241.3 TABLET ORAL at 17:51

## 2017-03-30 RX ADMIN — PANTOPRAZOLE SODIUM 40 MILLIGRAM(S): 20 TABLET, DELAYED RELEASE ORAL at 17:51

## 2017-03-30 RX ADMIN — Medication 1 TABLET(S): at 05:35

## 2017-03-30 RX ADMIN — Medication 325 MILLIGRAM(S): at 17:52

## 2017-03-30 RX ADMIN — Medication 325 MILLIGRAM(S): at 08:42

## 2017-03-30 RX ADMIN — DAPTOMYCIN 130 MILLIGRAM(S): 500 INJECTION, POWDER, LYOPHILIZED, FOR SOLUTION INTRAVENOUS at 14:53

## 2017-03-30 NOTE — PROGRESS NOTE ADULT - SUBJECTIVE AND OBJECTIVE BOX
NEPHROLOGY INTERVAL HPI/OVERNIGHT EVENTS:  pt comfortable and in no distress when seen earlier  no cp, sob, n/v/d    MEDICATIONS  (STANDING):  ferrous    sulfate 325milliGRAM(s) Oral three times a day with meals  folic acid 1milliGRAM(s) Oral daily  multivitamin 1Tablet(s) Oral daily  dextrose 50% Injectable 12.5Gram(s) IV Push once  dextrose 50% Injectable 25Gram(s) IV Push once  dextrose 50% Injectable 25Gram(s) IV Push once  meropenem IVPB 500milliGRAM(s) IV Intermittent every 12 hours  pantoprazole    Tablet 40milliGRAM(s) Oral two times a day before meals  metoprolol 12.5milliGRAM(s) Oral every 12 hours  DAPTOmycin IVPB 750milliGRAM(s) IV Intermittent every 24 hours  epoetin melany Injectable [id number](s) SubCutaneous <User Schedule>  enoxaparin Injectable 120milliGRAM(s) SubCutaneous daily  magnesium oxide 400milliGRAM(s) Oral two times a day with meals  potassium chloride    Tablet ER 20milliEquivalent(s) Oral two times a day  torsemide 20milliGRAM(s) Oral daily  calcium carbonate 500 mG (Tums) Chewable 1Tablet(s) Chew two times a day  warfarin 5milliGRAM(s) Oral daily    MEDICATIONS  (PRN):  acetaminophen   Tablet 650milliGRAM(s) Oral every 6 hours PRN For Temp over 38.3 C (100.94 F)  aluminum hydroxide/magnesium hydroxide/simethicone Suspension 30milliLiter(s) Oral four times a day PRN Indigestion  ondansetron Injectable 4milliGRAM(s) IV Push every 6 hours PRN Nausea and/or Vomiting  dextrose Gel 1Dose(s) Oral once PRN Blood Glucose LESS THAN 70 milliGRAM(s)/deciliter  glucagon  Injectable 1milliGRAM(s) IntraMuscular once PRN Glucose LESS THAN 70 milligrams/deciliter  polyethylene glycol 3350 17Gram(s) Oral daily PRN Constipation  acetaminophen   Tablet. 650milliGRAM(s) Oral every 6 hours PRN mild to mod      Allergies    No Known Allergies      Vital Signs Last 24 Hrs  T(C): 37.3, Max: 37.3 (03-30 @ 09:11)  T(F): 99.1, Max: 99.1 (03-30 @ 09:11)  HR: 85 (80 - 89)  BP: 140/74 (138/76 - 140/74)  BP(mean): --  RR: 19 (18 - 19)  SpO2: 94% (94% - 94%)    PHYSICAL EXAM:    HEENT: EOMI  Neck: supple no JVD  CHEST/LUNG: CTA B/L   HEART: S1S2+ audible  ABDOMEN: Soft NDNT + BS  EXTREMITIES:  +LE edema; R leg with dressing and VAC    LABS:                        9.8    7.6   )-----------( 330      ( 30 Mar 2017 10:06 )             29.5     30 Mar 2017 10:06    142    |  109    |  20.0   ----------------------------<  68     4.4     |  23.0   |  2.24     Ca    8.0        30 Mar 2017 10:06  Mg     1.8       29 Mar 2017 11:32    TPro  4.4    /  Alb  1.3    /  TBili  0.4    /  DBili  x      /  AST  43     /  ALT  15     /  AlkPhos  96     29 Mar 2017 11:32    PT/INR - ( 30 Mar 2017 10:06 )   PT: 24.4 sec;   INR: 2.18 ratio         PTT - ( 30 Mar 2017 10:06 )  PTT:51.1 sec    Magnesium, Serum: 1.8 mg/dL (03-29 @ 11:32)      RADIOLOGY & ADDITIONAL TESTS:

## 2017-03-30 NOTE — PROGRESS NOTE ADULT - ASSESSMENT
Improved KARMEN on CKD , DM ---> baseline creat 1.8 in past  Probable pre renal azotemic component due to diuretics  Normal kidneys on NCCT   Debridement / dehis ---> Abx as per ID; local care  - continue to monitor labs  - avoid potential nephrotoxic agents    Anemia - s/p PRBcs  - monitor H/H  -  continue SIDRA

## 2017-03-30 NOTE — PROGRESS NOTE ADULT - SUBJECTIVE AND OBJECTIVE BOX
Patient seen and eval at bedside. Patient c/o pain in right knee. Denies CP, SOB, dizziness, N/V. Patient states she was able to standup with assistance from PT beside the bed a few days ago. Patient also c/o pain at right arm PICC line site. D/w and examined with RN at bedside.    PE: NAD, alert, awake   Right LE: Dressing over knee C/D/I, Prevena dressing over right thigh drain site connected to wound VAC C/D/I estimated 125ccs serosangenous drainage in canister.  Right antecubital fossa area with some discoloration mild erythema locally outlined where tegaderm bandage was removed. Good active elbow ROM. Rad pulse intact    A/P: s/p right knee abx spacer placement for septic TKR POD#31, draining wound  ·	Pain control  ·	DVT propx: lov/coum   ·	PT- WBAT with alyce brace when OOB, keep alyce removed when laying in bed.  ·	Cont Prevena wound VAC, monitor output  ·	Cont care as per primary team, as per RN, primary team aware of right arm and managing.  ·	Cont Abx as per ID

## 2017-03-30 NOTE — PROGRESS NOTE ADULT - SUBJECTIVE AND OBJECTIVE BOX
seen for right knee infection    no acute complaints.  + loose stool  arm swelling improving  ROS otherwise negative     MEDICATIONS  (STANDING):  ferrous    sulfate 325milliGRAM(s) Oral three times a day with meals  folic acid 1milliGRAM(s) Oral daily  multivitamin 1Tablet(s) Oral daily  dextrose 50% Injectable 12.5Gram(s) IV Push once  dextrose 50% Injectable 25Gram(s) IV Push once  dextrose 50% Injectable 25Gram(s) IV Push once  meropenem IVPB 500milliGRAM(s) IV Intermittent every 12 hours  pantoprazole    Tablet 40milliGRAM(s) Oral two times a day before meals  metoprolol 12.5milliGRAM(s) Oral every 12 hours  DAPTOmycin IVPB 750milliGRAM(s) IV Intermittent every 24 hours  epoetin melany Injectable 80346Kkal(s) SubCutaneous <User Schedule>  magnesium oxide 400milliGRAM(s) Oral two times a day with meals  potassium chloride    Tablet ER 20milliEquivalent(s) Oral two times a day  torsemide 20milliGRAM(s) Oral daily  calcium carbonate 500 mG (Tums) Chewable 1Tablet(s) Chew two times a day  warfarin 3milliGRAM(s) Oral daily    MEDICATIONS  (PRN):  acetaminophen   Tablet 650milliGRAM(s) Oral every 6 hours PRN For Temp over 38.3 C (100.94 F)  aluminum hydroxide/magnesium hydroxide/simethicone Suspension 30milliLiter(s) Oral four times a day PRN Indigestion  ondansetron Injectable 4milliGRAM(s) IV Push every 6 hours PRN Nausea and/or Vomiting  dextrose Gel 1Dose(s) Oral once PRN Blood Glucose LESS THAN 70 milliGRAM(s)/deciliter  glucagon  Injectable 1milliGRAM(s) IntraMuscular once PRN Glucose LESS THAN 70 milligrams/deciliter  polyethylene glycol 3350 17Gram(s) Oral daily PRN Constipation  acetaminophen   Tablet. 650milliGRAM(s) Oral every 6 hours PRN mild to mod      Allergies    No Known Allergies    Intolerances    Vital Signs Last 24 Hrs  T(C): 37.3, Max: 37.3 (03-30 @ 09:11)  T(F): 99.1, Max: 99.1 (03-30 @ 09:11)  HR: 85 (80 - 89)  BP: 140/74 (138/76 - 140/74)  BP(mean): --  RR: 19 (18 - 19)  SpO2: 94% (94% - 94%)    PHYSICAL EXAM:    GENERAL: NAD  CHEST/LUNG:ctab  HEART: Regular rate and rhythm; S1 S2; no murmurs noted  ABDOMEN: Soft, Nontender, Nondistended; Bowel sounds present  EXTREMITIES:  rt knee wound vac, edemax 4 extremities  NERVOUS SYSTEM:  Alert & Oriented X3, nonfocal  PSYCH: normal mood, appropriate response.    LABS:                        9.8    7.6   )-----------( 330      ( 30 Mar 2017 10:06 )             29.5     30 Mar 2017 10:06    142    |  109    |  20.0   ----------------------------<  68     4.4     |  23.0   |  2.24     Ca    8.0        30 Mar 2017 10:06  Mg     1.8       29 Mar 2017 11:32    TPro  4.4    /  Alb  1.3    /  TBili  0.4    /  DBili  x      /  AST  43     /  ALT  15     /  AlkPhos  96     29 Mar 2017 11:32    PT/INR - ( 30 Mar 2017 10:06 )   PT: 24.4 sec;   INR: 2.18 ratio         PTT - ( 30 Mar 2017 10:06 )  PTT:51.1 sec      CAPILLARY BLOOD GLUCOSE        RADIOLOGY & ADDITIONAL TESTS:

## 2017-03-30 NOTE — SWALLOW BEDSIDE ASSESSMENT ADULT - ORAL PREPARATORY PHASE
consistent with dentition, however grossly functional/Decreased mastication ability/Within functional limits Within functional limits

## 2017-03-31 LAB
APTT BLD: 58.3 SEC — HIGH (ref 27.5–37.4)
INR BLD: 2.93 RATIO — HIGH (ref 0.88–1.16)
PROTHROM AB SERPL-ACNC: 32.9 SEC — HIGH (ref 9.8–12.7)

## 2017-03-31 PROCEDURE — 99223 1ST HOSP IP/OBS HIGH 75: CPT

## 2017-03-31 PROCEDURE — 99497 ADVNCD CARE PLAN 30 MIN: CPT | Mod: 25

## 2017-03-31 PROCEDURE — 99233 SBSQ HOSP IP/OBS HIGH 50: CPT

## 2017-03-31 PROCEDURE — 99232 SBSQ HOSP IP/OBS MODERATE 35: CPT

## 2017-03-31 RX ORDER — DAPTOMYCIN 500 MG/10ML
750 INJECTION, POWDER, LYOPHILIZED, FOR SOLUTION INTRAVENOUS
Qty: 0 | Refills: 0 | COMMUNITY
Start: 2017-03-31

## 2017-03-31 RX ORDER — CALCIUM CARBONATE 500(1250)
1 TABLET ORAL
Qty: 0 | Refills: 0 | COMMUNITY
Start: 2017-03-31

## 2017-03-31 RX ORDER — FERROUS SULFATE 325(65) MG
1 TABLET ORAL
Qty: 0 | Refills: 0 | COMMUNITY

## 2017-03-31 RX ORDER — FOLIC ACID 0.8 MG
1 TABLET ORAL
Qty: 0 | Refills: 0 | COMMUNITY
Start: 2017-03-31

## 2017-03-31 RX ORDER — ACETAMINOPHEN 500 MG
2 TABLET ORAL
Qty: 0 | Refills: 0 | COMMUNITY
Start: 2017-03-31

## 2017-03-31 RX ORDER — OXYCODONE HYDROCHLORIDE 5 MG/1
1 TABLET ORAL
Qty: 0 | Refills: 0 | COMMUNITY

## 2017-03-31 RX ORDER — RIVAROXABAN 15 MG-20MG
1 KIT ORAL
Qty: 0 | Refills: 0 | COMMUNITY

## 2017-03-31 RX ORDER — CASTOR OIL AND BALSAM, PERU 788; 87 MG/G; MG/G
1 OINTMENT TOPICAL
Qty: 0 | Refills: 0 | COMMUNITY

## 2017-03-31 RX ORDER — FLUTICASONE PROPIONATE AND SALMETEROL 50; 250 UG/1; UG/1
1 POWDER ORAL; RESPIRATORY (INHALATION)
Qty: 0 | Refills: 0 | COMMUNITY

## 2017-03-31 RX ORDER — INSULIN LISPRO 100/ML
0 VIAL (ML) SUBCUTANEOUS
Qty: 0 | Refills: 0 | COMMUNITY

## 2017-03-31 RX ORDER — PANTOPRAZOLE SODIUM 20 MG/1
1 TABLET, DELAYED RELEASE ORAL
Qty: 0 | Refills: 0 | COMMUNITY
Start: 2017-03-31

## 2017-03-31 RX ORDER — POLYETHYLENE GLYCOL 3350 17 G/17G
17 POWDER, FOR SOLUTION ORAL
Qty: 0 | Refills: 0 | COMMUNITY
Start: 2017-03-31

## 2017-03-31 RX ORDER — MEROPENEM 1 G/30ML
500 INJECTION INTRAVENOUS
Qty: 0 | Refills: 0 | COMMUNITY
Start: 2017-03-31

## 2017-03-31 RX ADMIN — Medication 1 TABLET(S): at 13:09

## 2017-03-31 RX ADMIN — PANTOPRAZOLE SODIUM 40 MILLIGRAM(S): 20 TABLET, DELAYED RELEASE ORAL at 06:50

## 2017-03-31 RX ADMIN — Medication 1 TABLET(S): at 06:51

## 2017-03-31 RX ADMIN — MAGNESIUM OXIDE 400 MG ORAL TABLET 400 MILLIGRAM(S): 241.3 TABLET ORAL at 08:42

## 2017-03-31 RX ADMIN — DAPTOMYCIN 130 MILLIGRAM(S): 500 INJECTION, POWDER, LYOPHILIZED, FOR SOLUTION INTRAVENOUS at 18:05

## 2017-03-31 RX ADMIN — Medication 12.5 MILLIGRAM(S): at 17:21

## 2017-03-31 RX ADMIN — Medication 1 MILLIGRAM(S): at 13:10

## 2017-03-31 RX ADMIN — Medication 12.5 MILLIGRAM(S): at 06:50

## 2017-03-31 RX ADMIN — Medication 20 MILLIEQUIVALENT(S): at 06:50

## 2017-03-31 RX ADMIN — MEROPENEM 200 MILLIGRAM(S): 1 INJECTION INTRAVENOUS at 06:51

## 2017-03-31 RX ADMIN — Medication 325 MILLIGRAM(S): at 17:21

## 2017-03-31 RX ADMIN — MEROPENEM 200 MILLIGRAM(S): 1 INJECTION INTRAVENOUS at 18:19

## 2017-03-31 RX ADMIN — Medication 20 MILLIGRAM(S): at 06:50

## 2017-03-31 RX ADMIN — Medication 325 MILLIGRAM(S): at 13:09

## 2017-03-31 RX ADMIN — PANTOPRAZOLE SODIUM 40 MILLIGRAM(S): 20 TABLET, DELAYED RELEASE ORAL at 17:21

## 2017-03-31 RX ADMIN — Medication 20 MILLIEQUIVALENT(S): at 17:21

## 2017-03-31 RX ADMIN — MAGNESIUM OXIDE 400 MG ORAL TABLET 400 MILLIGRAM(S): 241.3 TABLET ORAL at 17:22

## 2017-03-31 RX ADMIN — Medication 325 MILLIGRAM(S): at 08:42

## 2017-03-31 RX ADMIN — Medication 1 TABLET(S): at 17:21

## 2017-03-31 NOTE — PROGRESS NOTE ADULT - ASSESSMENT
VRE/PSEUDOMONAS   VAC IN PLACE   CONTINUE TREATMENT THROUGH 4/8   WILL FOLLOW UP AS NEEDED  PLEASE CALL AS NEEDED

## 2017-03-31 NOTE — CONSULT NOTE ADULT - CONSULT REASON
right knee wound dehiscence
CRF plus ARF
Infected Rt prosthetic Knee
anemia
Goals of care
preop CV exam

## 2017-03-31 NOTE — CONSULT NOTE ADULT - PROBLEM SELECTOR RECOMMENDATION 9
operative intervention
Patient s/p 6 weeks IV abx in September 2016  Culture on 11/30/16 with Pseudomonas A, and VRE, d/w orthopedics this was a superficial culture and was not treated.   Patient has synovial joint aspiration by orthopedics with 19,000 nucleated cells  Gram stain with no organisms  patient is not febrile, hypotensive and without leukocytosis  no cellulitis on the skin  Will hold off on antibiotics and await orthopedics surgical plan.
infected knee IV antibiotics  via pic line   wound vac

## 2017-03-31 NOTE — PROGRESS NOTE ADULT - SUBJECTIVE AND OBJECTIVE BOX
[facility] CARMENZA HARRIS MD FACP   DESIRE METZGER MD  Diplomates American Board of Internal Medicine and Infectious Diseases      SHYAM LAL  [id number]  72y    INTERVAL HPI/OVERNIGHT EVENTS:  AFEBRILE NON TOXIC   dressing in place  or debridement on hold  ABX through april 8      ANTIBIOTICS  meropenem IVPB 500milliGRAM(s) IV Intermittent every 12 hours  DAPTOmycin IVPB 750milliGRAM(s) IV Intermittent every 24 hours  epoetin melany Injectable [id number](s) SubCutaneous <User Schedule>      Allergies    No Known Allergies    Intolerances        REVIEW OF SYSTEMS:    PHYSICAL EXAM:  Vital Signs Last 24 Hrs  T(C): 36.8, Max: 36.9 (03-19 @ 15:50)  T(F): 98.3, Max: 98.4 (03-19 @ 15:50)  HR: 81 (81 - 90)  BP: 141/77 (122/82 - 141/77)  BP(mean): --  RR: 18 (18 - 18)  SpO2: 100% (98% - 100%)      GEN: NAD, pleasant  HEENT: normocephalic and atraumatic. EOMI. CHIDI. Moist mucosa. Clear Posterior pharynx.  NECK: Supple. No carotid bruits.  No lymphadenopathy or thyromegaly.  LUNGS: Clear to auscultation.  HEART: Regular rate and rhythm without murmur.  ABDOMEN: Soft, nontender, and nondistended.  Positive bowel sounds.  No hepatosplenomegaly was noted.  EXTREMITIES: Without any cyanosis, clubbing, rash, lesions or edema.knees no change  NEUROLOGIC: Cranial nerves II through XII are grossly intact.  MUSCULOSKELETAL:KNEE BANDAGED -   SKIN: No ulceration or induration present    LABS:                        9.6    8.0   )-----------( 384      ( 20 Mar 2017 09:51 )             27.9       20 Mar 2017 09:51    142    |  106    |  25.0   ----------------------------<  82     3.6     |  25.0   |  2.08     Ca    7.6        20 Mar 2017 09:51  Mg     1.9       20 Mar 2017 09:51                                10.0   8.6   )-----------( 382      ( 23 Mar 2017 06:59 )             29.5   03-20 @ 09:51  hct 27.9 % hgb 9.6 g/dL    03-20 @ 09:51  plat 384 K/uL wbc 8.0 K/uL    03-19 @ 08:13  hct 27.9 % hgb 9.4 g/dL    03-19 @ 08:13  plat 363 K/uL wbc 7.9 K/uL    03-18 @ 10:26  hct 28.2 % hgb 9.7 g/dL    03-18 @ 10:26  plat 357 K/uL wbc 9.0 K/uL      03-20-17  creat 2.08 mg/dL gfr 27 mL/min/1.73M2 bun 25.0 mg/dL k 3.6 mmol/L  03-19-17  creat 2.12 mg/dL gfr 26 mL/min/1.73M2 bun 27.0 mg/dL k 3.5 mmol/L  03-18-17  creat 2.17 mg/dL gfr 25 mL/min/1.73M2 bun 28.0 mg/dL k 3.6 mmol/L

## 2017-03-31 NOTE — CONSULT NOTE ADULT - SUBJECTIVE AND OBJECTIVE BOX
HPI:  71 y F hx DM2, PAF, HTN, LIN, CKD, COPD, sent from rehab for R knee wound dehiscensce. Pt has had multiple interventions on the R knee following TKR last year, including hardware removal for infection, IV abx course and most recently adm for wound dehiscence in Dec req washout and wound closure. The patient is now transferred from rehab for persistent wound serosanguinous drainage and wound dehiscence. Pt denies CP, F/C, SOB, cough, N/V/D/C, dysuria. c/o L heel pain. (22 Feb 2017 16:00)      PERTINENT PMH REVIEWED: Yes No    PAST MEDICAL & SURGICAL HISTORY:  Hypercholesterolemia  Deficiency of vitamin K  Chronic pain  COPD (chronic obstructive pulmonary disease)  Urine retention  Pressure ulcer  Atrial fibrillation  Constipation  Breast cancer  HLD (hyperlipidemia)  HTN (hypertension)  GERD (gastroesophageal reflux disease)  DM (diabetes mellitus)  Obesity  LIN on CPAP  History of incision and drainage: rt knee  S/p total knee replacement, bilateral  S/P knee replacement, right  S/P hysterectomy  S/P mastectomy: Left  Breast cancer      SOCIAL HISTORY:  EtOH Yes   No                                    Drugs  Yes  No                                    smoker  nonsmoker                                    Admitted from: home  SNF _________ [facility] ________Surrogate/HCP/Guardian: Phone#:    FAMILY HISTORY:  Family history of diabetes mellitus (Mother)      Baseline ADLs (prior to admission):  Independent/ Dependent      Present Symptoms:     Dyspnea: 0 1 2 3   Nausea/Vomiting: Yes No  Anxiety:  Yes No  Depression: Yes No  Fatigue: Yes No  Loss of appetite: Yes No    Pain:             Character-            Duration-            Effect-            Factors-            Frequency-            Location-            Severity-    Review of Systems: Reviewed                     Negative:                     Positive:  Unable to obtain due to poor mentation   All others negative    MEDICATIONS  (STANDING):  ferrous    sulfate 325milliGRAM(s) Oral three times a day with meals  folic acid 1milliGRAM(s) Oral daily  multivitamin 1Tablet(s) Oral daily  dextrose 50% Injectable 12.5Gram(s) IV Push once  dextrose 50% Injectable 25Gram(s) IV Push once  dextrose 50% Injectable 25Gram(s) IV Push once  meropenem IVPB 500milliGRAM(s) IV Intermittent every 12 hours  pantoprazole    Tablet 40milliGRAM(s) Oral two times a day before meals  metoprolol 12.5milliGRAM(s) Oral every 12 hours  DAPTOmycin IVPB 750milliGRAM(s) IV Intermittent every 24 hours  epoetin melany Injectable [id number](s) SubCutaneous <User Schedule>  magnesium oxide 400milliGRAM(s) Oral two times a day with meals  potassium chloride    Tablet ER 20milliEquivalent(s) Oral two times a day  torsemide 20milliGRAM(s) Oral daily  calcium carbonate 500 mG (Tums) Chewable 1Tablet(s) Chew two times a day  warfarin 3milliGRAM(s) Oral daily    MEDICATIONS  (PRN):  acetaminophen   Tablet 650milliGRAM(s) Oral every 6 hours PRN For Temp over 38.3 C (100.94 F)  aluminum hydroxide/magnesium hydroxide/simethicone Suspension 30milliLiter(s) Oral four times a day PRN Indigestion  ondansetron Injectable 4milliGRAM(s) IV Push every 6 hours PRN Nausea and/or Vomiting  dextrose Gel 1Dose(s) Oral once PRN Blood Glucose LESS THAN 70 milliGRAM(s)/deciliter  glucagon  Injectable 1milliGRAM(s) IntraMuscular once PRN Glucose LESS THAN 70 milligrams/deciliter  polyethylene glycol 3350 17Gram(s) Oral daily PRN Constipation  acetaminophen   Tablet. 650milliGRAM(s) Oral every 6 hours PRN mild to mod      Allergies    No Known Allergies    Intolerances        PHYSICAL EXAM:    Vital Signs Last 24 Hrs  T(C): 37.1, Max: 37.3 (03-30 @ 09:11)  T(F): 98.8, Max: 99.1 (03-30 @ 09:11)  HR: 87 (84 - 87)  BP: 139/74 (132/70 - 140/74)  BP(mean): --  RR: 18 (18 - 19)  SpO2: 96% (95% - 96%)    General: alert  oriented x ____ lethargic agitated                  cachexia  nonverbal  coma    Karnofsky:  %    HEENT: normal  dry mouth  ET tube/trach    Lungs: comfortable tachypnea/labored breathing  excessive secretions    CV: normal  tachycardia    GI: normal  distended  tender  no BS               PEG/NG/OG tube  constipation  last BM:     : normal  incontinent  oliguria/anuria  anna    MSK: normal  weakness  edema             ambulatory  bedbound/wheelchair bound    Skin: normal  pressure ulcers- Stage_____  no rash    LABS:                        9.8    7.6   )-----------( 330      ( 30 Mar 2017 10:06 )             29.5     30 Mar 2017 10:06    142    |  109    |  20.0   ----------------------------<  68     4.4     |  23.0   |  2.24     Ca    8.0        30 Mar 2017 10:06  Mg     1.8       29 Mar 2017 11:32    TPro  4.4    /  Alb  1.3    /  TBili  0.4    /  DBili  x      /  AST  43     /  ALT  15     /  AlkPhos  96     29 Mar 2017 11:32    PT/INR - ( 30 Mar 2017 10:06 )   PT: 24.4 sec;   INR: 2.18 ratio         PTT - ( 30 Mar 2017 10:06 )  PTT:51.1 sec    I&O's Summary  I & Os for 24h ending 30 Mar 2017 07:00  =============================================  IN: 720 ml / OUT: 0 ml / NET: 720 ml    I & Os for current day (as of 31 Mar 2017 06:35)  =============================================  IN: 534 ml / OUT: 0 ml / NET: 534 ml      RADIOLOGY & ADDITIONAL STUDIES:    ADVANCE DIRECTIVES:   DNR YES NO  Completed on:                     MOLST  YES NO   Completed on:  Living Will  YES NO   Completed on:    COUNSELING:    Face to face meeting to discuss Advanced Care Planning - Time Spent ______ Minutes.  See goals of care note.    More than 50% time spent in counseling and coordinating care. ______ Minutes.     Thank you for the opportunity to assist with the care of this patient.   [facility] [phone]. HPI:  71 y F hx DM2, PAF, HTN, LIN, CKD, COPD, sent from rehab for R knee wound dehiscensce. Pt has had multiple interventions on the R knee following TKR last year, including hardware removal for infection, IV abx course and most recently adm for wound dehiscence in Dec req washout and wound closure. The patient is now transferred from rehab for persistent wound serosanguinous drainage and wound dehiscence. Pt denies CP, F/C, SOB, cough, N/V/D/C, dysuria. c/o L heel pain. (22 Feb 2017 16:00)      PERTINENT PMH REVIEWED: Yes    PAST MEDICAL & SURGICAL HISTORY:  Hypercholesterolemia  Deficiency of vitamin K  Chronic pain  COPD (chronic obstructive pulmonary disease)  Urine retention  Pressure ulcer  Atrial fibrillation  Constipation  Breast cancer  HLD (hyperlipidemia)  HTN (hypertension)  GERD (gastroesophageal reflux disease)  DM (diabetes mellitus)  Obesity  LIN on CPAP  History of incision and drainage: rt knee  S/p total knee replacement, bilateral  S/P knee replacement, right  S/P hysterectomy  S/P mastectomy: Left  Breast cancer      SOCIAL HISTORY:  No                                   No                                  r  nonsmoker                                    Admitted from: [facility]  Surrogate/ SISTER Carmen [phone]  FAMILY HISTORY:  Family history of diabetes mellitus (Mother)      Baseline ADLs (prior to admission):  Independent/ Dependent      Present Symptoms:     Dyspnea: 0  Nausea/Vomiting:No  Anxiety:  No  Depression:No  Fatigue: No  Loss of appetite:No    Pain: denies any pain            Character-            Duration-            Effect-            Factors-            Frequency-            Location-            Severity-    Review of Systems: Reviewed                     Negative:                     Positive: surgical site dressing with wound vac  Unable to obtain due to poor mentation   All others negative    MEDICATIONS  (STANDING):  ferrous    sulfate 325milliGRAM(s) Oral three times a day with meals  folic acid 1milliGRAM(s) Oral daily  multivitamin 1Tablet(s) Oral daily  dextrose 50% Injectable 12.5Gram(s) IV Push once  dextrose 50% Injectable 25Gram(s) IV Push once  dextrose 50% Injectable 25Gram(s) IV Push once  meropenem IVPB 500milliGRAM(s) IV Intermittent every 12 hours  pantoprazole    Tablet 40milliGRAM(s) Oral two times a day before meals  metoprolol 12.5milliGRAM(s) Oral every 12 hours  DAPTOmycin IVPB 750milliGRAM(s) IV Intermittent every 24 hours  epoetin melany Injectable [id number](s) SubCutaneous <User Schedule>  magnesium oxide 400milliGRAM(s) Oral two times a day with meals  potassium chloride    Tablet ER 20milliEquivalent(s) Oral two times a day  torsemide 20milliGRAM(s) Oral daily  calcium carbonate 500 mG (Tums) Chewable 1Tablet(s) Chew two times a day  warfarin 3milliGRAM(s) Oral daily    MEDICATIONS  (PRN):  acetaminophen   Tablet 650milliGRAM(s) Oral every 6 hours PRN For Temp over 38.3 C (100.94 F)  aluminum hydroxide/magnesium hydroxide/simethicone Suspension 30milliLiter(s) Oral four times a day PRN Indigestion  ondansetron Injectable 4milliGRAM(s) IV Push every 6 hours PRN Nausea and/or Vomiting  dextrose Gel 1Dose(s) Oral once PRN Blood Glucose LESS THAN 70 milliGRAM(s)/deciliter  glucagon  Injectable 1milliGRAM(s) IntraMuscular once PRN Glucose LESS THAN 70 milligrams/deciliter  polyethylene glycol 3350 17Gram(s) Oral daily PRN Constipation  acetaminophen   Tablet. 650milliGRAM(s) Oral every 6 hours PRN mild to mod      Allergies    No Known Allergies    Intolerances        PHYSICAL EXAM:    Vital Signs Last 24 Hrs  T(C): 37.1, Max: 37.3 (03-30 @ 09:11)  T(F): 98.8, Max: 99.1 (03-30 @ 09:11)  HR: 87 (84 - 87)  BP: 139/74 (132/70 - 140/74)  BP(mean): --  RR: 18 (18 - 19)  SpO2: 96% (95% - 96%)    General: alert  oriented x _3___       HEENT: normal      Lungs: comfortableCV: normal  tachycardia    GI: normal                PEG/NG/OG tube  constipation  last BM: 3.30    : incontenant  MSK:  edema +  to surgical knee site with wound vac    bedbound/wheelchair bound    Skin:wound vac to surgical site  infected replacement with hardware removal  LABS:                        9.8    7.6   )-----------( 330      ( 30 Mar 2017 10:06 )             29.5     30 Mar 2017 10:06    142    |  109    |  20.0   ----------------------------<  68     4.4     |  23.0   |  2.24     Ca    8.0        30 Mar 2017 10:06  Mg     1.8       29 Mar 2017 11:32    TPro  4.4    /  Alb  1.3    /  TBili  0.4    /  DBili  x      /  AST  43     /  ALT  15     /  AlkPhos  96     29 Mar 2017 11:32    PT/INR - ( 30 Mar 2017 10:06 )   PT: 24.4 sec;   INR: 2.18 ratio         PTT - ( 30 Mar 2017 10:06 )  PTT:51.1 sec    I&O's Summary  I & Os for 24h ending 30 Mar 2017 07:00  =============================================  IN: 720 ml / OUT: 0 ml / NET: 720 ml    I & Os for current day (as of 31 Mar 2017 06:35)  =============================================  IN: 534 ml / OUT: 0 ml / NET: 534 ml      RADIOLOGY & ADDITIONAL STUDIES:    ADVANCE DIRECTIVES:   DNR YES NO  Completed on:   no                  MOLST  YES NO   Completed on: patient wants to remain a full code  Living Will  YES NO   Completed on:

## 2017-03-31 NOTE — CONSULT NOTE ADULT - ATTENDING COMMENTS
COUNSELING:    Face to face meeting to discuss Advanced Care Planning - Time Spent ______ Minutes.  See goals of care note.    More than 50% time spent in counseling and coordinating care. _60_____ Minutes.     Thank you for the opportunity to assist with the care of this patient.   [facility] [phone].
Change IV fluid, Urine studies ordered.
Will follow

## 2017-03-31 NOTE — GOALS OF CARE CONVERSATION - PERSONAL ADVANCE DIRECTIVE - CONVERSATION DETAILS
Discussed with patient  in regards to plan and treatment options  she wants to continue to be treated and wants to remain a full code

## 2017-03-31 NOTE — PROGRESS NOTE ADULT - SUBJECTIVE AND OBJECTIVE BOX
seen for dvt, right knee infection    no acute complaints.  no CP/SOB  ROS otherwise negative     MEDICATIONS  (STANDING):  ferrous    sulfate 325milliGRAM(s) Oral three times a day with meals  folic acid 1milliGRAM(s) Oral daily  multivitamin 1Tablet(s) Oral daily  dextrose 50% Injectable 12.5Gram(s) IV Push once  dextrose 50% Injectable 25Gram(s) IV Push once  dextrose 50% Injectable 25Gram(s) IV Push once  meropenem IVPB 500milliGRAM(s) IV Intermittent every 12 hours  pantoprazole    Tablet 40milliGRAM(s) Oral two times a day before meals  metoprolol 12.5milliGRAM(s) Oral every 12 hours  DAPTOmycin IVPB 750milliGRAM(s) IV Intermittent every 24 hours  epoetin melany Injectable [id number](s) SubCutaneous <User Schedule>  magnesium oxide 400milliGRAM(s) Oral two times a day with meals  potassium chloride    Tablet ER 20milliEquivalent(s) Oral two times a day  torsemide 20milliGRAM(s) Oral daily  calcium carbonate 500 mG (Tums) Chewable 1Tablet(s) Chew two times a day    MEDICATIONS  (PRN):  acetaminophen   Tablet 650milliGRAM(s) Oral every 6 hours PRN For Temp over 38.3 C (100.94 F)  aluminum hydroxide/magnesium hydroxide/simethicone Suspension 30milliLiter(s) Oral four times a day PRN Indigestion  ondansetron Injectable 4milliGRAM(s) IV Push every 6 hours PRN Nausea and/or Vomiting  dextrose Gel 1Dose(s) Oral once PRN Blood Glucose LESS THAN 70 milliGRAM(s)/deciliter  glucagon  Injectable 1milliGRAM(s) IntraMuscular once PRN Glucose LESS THAN 70 milligrams/deciliter  polyethylene glycol 3350 17Gram(s) Oral daily PRN Constipation  acetaminophen   Tablet. 650milliGRAM(s) Oral every 6 hours PRN mild to mod      Vital Signs Last 24 Hrs  T(C): 36.7, Max: 37.1 (03-30 @ 23:40)  T(F): 98, Max: 98.8 (03-30 @ 23:40)  HR: 88 (84 - 88)  BP: 133/78 (133/78 - 146/78)  BP(mean): --  RR: 18 (18 - 18)  SpO2: 96% (96% - 96%)    PHYSICAL EXAM:    GENERAL: NAD obese  CHEST/LUNG: Clear to percussion bilaterally; No rales, rhonchi, wheezing, or rubs  HEART: Regular rate and rhythm; S1 S2; no murmurs noted  ABDOMEN: Soft, Nontender, Nondistended; Bowel sounds present  EXTREMITIES: edema x 4 extremities   Rt knee with wound vac  NERVOUS SYSTEM:  Alert & Oriented X3, Good concentration;nonfocal  PSYCH: normal mood, appropriate response.    LABS:                        9.8    7.6   )-----------( 330      ( 30 Mar 2017 10:06 )             29.5     30 Mar 2017 10:06    142    |  109    |  20.0   ----------------------------<  68     4.4     |  23.0   |  2.24     Ca    8.0        30 Mar 2017 10:06      PT/INR - ( 31 Mar 2017 08:21 )   PT: 32.9 sec;   INR: 2.93 ratio         PTT - ( 31 Mar 2017 08:21 )  PTT:58.3 sec      CAPILLARY BLOOD GLUCOSE        RADIOLOGY & ADDITIONAL TESTS:

## 2017-03-31 NOTE — CONSULT NOTE ADULT - CONSULT REQUESTED DATE/TIME
22-Feb-2017 13:32
03-Mar-2017 13:08
20-Mar-2017 06:35
22-Feb-2017 15:36
26-Feb-2017 17:09
22-Feb-2017

## 2017-03-31 NOTE — PROGRESS NOTE ADULT - SUBJECTIVE AND OBJECTIVE BOX
Patient seen and examined  comfortable    I&O's Summary  I & Os for 24h ending 31 Mar 2017 07:00  =============================================  IN: 534 ml / OUT: 0 ml / NET: 534 ml    I & Os for current day (as of 31 Mar 2017 21:12)  =============================================  IN: 350 ml / OUT: 0 ml / NET: 350 ml      REVIEW OF SYSTEMS:    CONSTITUTIONAL: No F/C    RESPIRATORY: No cough or SOB  CARDIOVASCULAR: No CP/palpitations,    GASTROINTESTINAL: No abdominal , NVD   GENITOURINARY: No UTI sx  NEUROLOGICAL: No headaches/wk/numbness  MUSCULOSKELETAL:  No joint pain/swelling; No LBP  EXTREMITIES : RLE dressing/vac; + LE/RUE swelling, ++ LUE swelling; has PICC L side    Vital Signs Last 24 Hrs  T(C): 36.7, Max: 37.1 (03-30 @ 23:40)  T(F): 98, Max: 98.8 (03-30 @ 23:40)  HR: 88 (84 - 88)  BP: 133/78 (133/78 - 146/78)  BP(mean): --  RR: 18 (18 - 18)  SpO2: 96% (96% - 96%)    PHYSICAL EXAM:    GENERAL: NAD,   EYES:  conjunctiva and sclera clear  NECK: Supple, No JVD/Bruit  NERVOUS SYSTEM:  A/O x3,   CHEST:  CTA ,No rales orrhonchi  HEART:  RRR, No murmurs  ABDOMEN: Soft, NT/ND BS+  EXTREMITIES:  No C/C/Edema; NT  SKIN: No rashes    LABS:                        9.8    7.6   )-----------( 330      ( 30 Mar 2017 10:06 )             29.5     30 Mar 2017 10:06    142    |  109    |  20.0   ----------------------------<  68     4.4     |  23.0   |  2.24     Ca    8.0        30 Mar 2017 10:06        MEDICATIONS  (STANDING):  acetaminophen   Tablet PRN  aluminum hydroxide/magnesium hydroxide/simethicone Suspension PRN  ondansetron Injectable PRN  ferrous    sulfate  folic acid  multivitamin  dextrose Gel PRN  dextrose 50% Injectable  dextrose 50% Injectable  dextrose 50% Injectable  glucagon  Injectable PRN  meropenem IVPB  polyethylene glycol 3350 PRN  pantoprazole    Tablet  metoprolol  DAPTOmycin IVPB  acetaminophen   Tablet. PRN  epoetin melany Injectable  magnesium oxide  potassium chloride    Tablet ER  torsemide  calcium carbonate 500 mG (Tums) Chewable

## 2017-03-31 NOTE — CONSULT NOTE ADULT - ASSESSMENT
Preop cardiovascular assessment: No recent ACS of CHF symptoms.   From a cardiovascular perspective, there is no contraindication to proceed with surgical treatment of right knee infection.   Paroxysmal AF: No evidence of recurrence. Continue Toprol XL. Would resume Xarelto when considered safe postoperatively.
70 y/o female with wound dehiscence s/p plastics wound closure 12/16   Procedure Note:  under sterile technique I aspirated the right knee, sent for cell count, culture and sensitivity  After the completion of the aspiration fliud was expressed from the wound and sent for cell count and cultures. bulky compressive wrap was placed to right knee.  Dr Ritchie was at bedside.    Infectious disease consult was callled and patient admitted to medical service for optimization for upcoming surgical procedure
71 year old alert and oriented female wound vac in place s/p debridement
72 y/o F with Rt prosthetic knee infection s/p Explant 9/2016 s/p 6 weeks of abx now with new Rt knee infection
ARF plus possible CRF; Infected Right Knee  s/p replacement, DM, Possible Acute Interstitial Nephritis .
IMPRESSION:  71 y F hx DM2, PAF, HTN, LIN, CKD, COPD, sent from rehab for R knee wound dehiscensce. Pt has had multiple interventions on the R knee following TKR last year, including hardware removal for infection, IV abx course and most recently adm for wound dehiscence in Dec req washout and wound closure. The patient is now transferred from rehab for persistent wound serosanguinous drainage and wound dehiscence. she is status post right Knee revision with abx spacer placement. GI consult for drop in h/h. No gross gi bleed noted. No RP bleed noted on CT. h/h stable now. the  etiology of recent h/h drop is unclear at present.    PLAN:  - monitor cbc  -  stool for occult blood  - IV PPI bid  - no endoscopic intervention for now  - d/w medicine team    thanks for the consult.

## 2017-03-31 NOTE — PROGRESS NOTE ADULT - SUBJECTIVE AND OBJECTIVE BOX
Patient seen this morning.  Was found awake laying in bed.  States that pain is under control.  Has no complaints and had no issues overnight.  Waiting for placement at [facility].  Has a Wound Vac at foot of bed which is connected to Prevena covering old drain site.  Presently there is 150cc of bloody fluid in Vac Canister.  Dressing still under suction no signs of leaks.  Reaming dressing in place over wound.  Staining noted over distal aspect of dressing, however ever unchanged from previous day.  Remaining exam unchanged.  Patient can be discharge when medicine sees fit.  Patient should keep Prevena in place until Monday or seen by plastics early next week.  At this time no further orthopedic procedures planned.  Remaining sutures located at the distal part of wound will be removed per plastics recommendation.

## 2017-04-01 LAB
APTT BLD: 57.6 SEC — HIGH (ref 27.5–37.4)
INR BLD: 2.66 RATIO — HIGH (ref 0.88–1.16)
PROTHROM AB SERPL-ACNC: 29.9 SEC — HIGH (ref 9.8–12.7)

## 2017-04-01 PROCEDURE — 99233 SBSQ HOSP IP/OBS HIGH 50: CPT

## 2017-04-01 RX ORDER — NYSTATIN CREAM 100000 [USP'U]/G
1 CREAM TOPICAL
Qty: 0 | Refills: 0 | Status: DISCONTINUED | OUTPATIENT
Start: 2017-04-01 | End: 2017-04-04

## 2017-04-01 RX ORDER — WARFARIN SODIUM 2.5 MG/1
4 TABLET ORAL ONCE
Qty: 0 | Refills: 0 | Status: COMPLETED | OUTPATIENT
Start: 2017-04-01 | End: 2017-04-01

## 2017-04-01 RX ADMIN — Medication 1 MILLIGRAM(S): at 12:49

## 2017-04-01 RX ADMIN — MAGNESIUM OXIDE 400 MG ORAL TABLET 400 MILLIGRAM(S): 241.3 TABLET ORAL at 18:03

## 2017-04-01 RX ADMIN — MEROPENEM 200 MILLIGRAM(S): 1 INJECTION INTRAVENOUS at 18:04

## 2017-04-01 RX ADMIN — Medication 20 MILLIEQUIVALENT(S): at 05:18

## 2017-04-01 RX ADMIN — PANTOPRAZOLE SODIUM 40 MILLIGRAM(S): 20 TABLET, DELAYED RELEASE ORAL at 05:18

## 2017-04-01 RX ADMIN — Medication 1 TABLET(S): at 05:17

## 2017-04-01 RX ADMIN — Medication 12.5 MILLIGRAM(S): at 18:03

## 2017-04-01 RX ADMIN — MEROPENEM 200 MILLIGRAM(S): 1 INJECTION INTRAVENOUS at 05:17

## 2017-04-01 RX ADMIN — ERYTHROPOIETIN 20000 UNIT(S): 10000 INJECTION, SOLUTION INTRAVENOUS; SUBCUTANEOUS at 15:20

## 2017-04-01 RX ADMIN — Medication 1 TABLET(S): at 12:50

## 2017-04-01 RX ADMIN — MAGNESIUM OXIDE 400 MG ORAL TABLET 400 MILLIGRAM(S): 241.3 TABLET ORAL at 08:34

## 2017-04-01 RX ADMIN — Medication 1 TABLET(S): at 18:03

## 2017-04-01 RX ADMIN — Medication 325 MILLIGRAM(S): at 08:34

## 2017-04-01 RX ADMIN — NYSTATIN CREAM 1 APPLICATION(S): 100000 CREAM TOPICAL at 18:03

## 2017-04-01 RX ADMIN — DAPTOMYCIN 130 MILLIGRAM(S): 500 INJECTION, POWDER, LYOPHILIZED, FOR SOLUTION INTRAVENOUS at 15:20

## 2017-04-01 RX ADMIN — Medication 20 MILLIEQUIVALENT(S): at 18:03

## 2017-04-01 RX ADMIN — PANTOPRAZOLE SODIUM 40 MILLIGRAM(S): 20 TABLET, DELAYED RELEASE ORAL at 18:03

## 2017-04-01 RX ADMIN — Medication 325 MILLIGRAM(S): at 18:03

## 2017-04-01 RX ADMIN — Medication 12.5 MILLIGRAM(S): at 05:17

## 2017-04-01 RX ADMIN — Medication 325 MILLIGRAM(S): at 12:49

## 2017-04-01 RX ADMIN — WARFARIN SODIUM 4 MILLIGRAM(S): 2.5 TABLET ORAL at 21:31

## 2017-04-01 RX ADMIN — Medication 20 MILLIGRAM(S): at 05:17

## 2017-04-01 NOTE — PROGRESS NOTE ADULT - SUBJECTIVE AND OBJECTIVE BOX
Patient seen and examined  NS change  feels swollen LEs marbella R    I&O's Summary    I & Os for current day (as of 01 Apr 2017 15:13)  =============================================  IN: 350 ml / OUT: 0 ml / NET: 350 ml      REVIEW OF SYSTEMS:    CONSTITUTIONAL: No F/C    RESPIRATORY: No cough or SOB  CARDIOVASCULAR: No CP/palpitations,    GASTROINTESTINAL: No abdominal , NVD   GENITOURINARY: No UTI sx  NEUROLOGICAL: No headaches/wk/numbness  MUSCULOSKELETAL:  No joint pain/swelling; No LBP  EXTREMITIES : + swelling, pain    Vital Signs Last 24 Hrs  T(C): 37, Max: 37.2 (03-31 @ 23:00)  T(F): 98.6, Max: 99 (03-31 @ 23:00)  HR: 90 (82 - 92)  BP: 132/80 (130/74 - 133/78)  BP(mean): --  RR: 18 (18 - 18)  SpO2: 97% (97% - 98%)    PHYSICAL EXAM:    GENERAL: NAD,   EYES:  conjunctiva and sclera clear  NECK: Supple, No JVD/Bruit  NERVOUS SYSTEM:  A/O x3,   CHEST:  CTA ,No rales orrhonchi  HEART:  RRR, No murmurs  ABDOMEN: Soft, NT/ND BS+  EXTREMITIES:  No C/C, ++ Edema R LE/dressing noted; + LLE  + RUE and PICC line +NT  SKIN: No rashes    LABS:    NA        MEDICATIONS  (STANDING):  acetaminophen   Tablet PRN  aluminum hydroxide/magnesium hydroxide/simethicone Suspension PRN  ondansetron Injectable PRN  ferrous    sulfate  folic acid  multivitamin  dextrose Gel PRN  dextrose 50% Injectable  dextrose 50% Injectable  dextrose 50% Injectable  glucagon  Injectable PRN  meropenem IVPB  polyethylene glycol 3350 PRN  pantoprazole    Tablet  metoprolol  DAPTOmycin IVPB  acetaminophen   Tablet. PRN  epoetin melany Injectable  magnesium oxide  potassium chloride    Tablet ER  torsemide  calcium carbonate 500 mG (Tums) Chewable  warfarin

## 2017-04-01 NOTE — PROGRESS NOTE ADULT - SUBJECTIVE AND OBJECTIVE BOX
seen for dvt, right knee infection    no acute complaints.  no CP/SOB  ROS otherwise negative       MEDICATIONS  (STANDING):  ferrous    sulfate 325milliGRAM(s) Oral three times a day with meals  folic acid 1milliGRAM(s) Oral daily  multivitamin 1Tablet(s) Oral daily  dextrose 50% Injectable 12.5Gram(s) IV Push once  dextrose 50% Injectable 25Gram(s) IV Push once  dextrose 50% Injectable 25Gram(s) IV Push once  meropenem IVPB 500milliGRAM(s) IV Intermittent every 12 hours  pantoprazole    Tablet 40milliGRAM(s) Oral two times a day before meals  metoprolol 12.5milliGRAM(s) Oral every 12 hours  DAPTOmycin IVPB 750milliGRAM(s) IV Intermittent every 24 hours  epoetin melany Injectable [id number](s) SubCutaneous <User Schedule>  magnesium oxide 400milliGRAM(s) Oral two times a day with meals  potassium chloride    Tablet ER 20milliEquivalent(s) Oral two times a day  torsemide 20milliGRAM(s) Oral daily  calcium carbonate 500 mG (Tums) Chewable 1Tablet(s) Chew two times a day  warfarin 4milliGRAM(s) Oral once    MEDICATIONS  (PRN):  acetaminophen   Tablet 650milliGRAM(s) Oral every 6 hours PRN For Temp over 38.3 C (100.94 F)  aluminum hydroxide/magnesium hydroxide/simethicone Suspension 30milliLiter(s) Oral four times a day PRN Indigestion  ondansetron Injectable 4milliGRAM(s) IV Push every 6 hours PRN Nausea and/or Vomiting  dextrose Gel 1Dose(s) Oral once PRN Blood Glucose LESS THAN 70 milliGRAM(s)/deciliter  glucagon  Injectable 1milliGRAM(s) IntraMuscular once PRN Glucose LESS THAN 70 milligrams/deciliter  polyethylene glycol 3350 17Gram(s) Oral daily PRN Constipation  acetaminophen   Tablet. 650milliGRAM(s) Oral every 6 hours PRN mild to mod    Vital Signs Last 24 Hrs  T(C): 37, Max: 37.2 (03-31 @ 23:00)  T(F): 98.6, Max: 99 (03-31 @ 23:00)  HR: 90 (82 - 92)  BP: 132/80 (130/74 - 133/78)  BP(mean): --  RR: 18 (18 - 18)  SpO2: 97% (97% - 98%)    PHYSICAL EXAM:    GENERAL: NAD obese  CHEST/LUNG: Clear to percussion bilaterally; No rales, rhonchi, wheezing, or rubs  HEART: Regular rate and rhythm; S1 S2; no murmurs noted  ABDOMEN: Soft, Nontender, Nondistended; Bowel sounds present  EXTREMITIES: edema x 4 extremities   Rt knee with wound vac  NERVOUS SYSTEM:  Alert & Oriented X3, Good concentration; nonfocal  PSYCH: normal mood, appropriate response.    LABS:                    prior labs reviewed    CAPILLARY BLOOD GLUCOSE        RADIOLOGY & ADDITIONAL TESTS:

## 2017-04-01 NOTE — PROGRESS NOTE ADULT - SUBJECTIVE AND OBJECTIVE BOX
Patient seen and examined. States that pain is under control. Reports no complaints or overnight events..  Waiting for placement at [facility].  Denies CP, SOB, constitutional sx, LE N/T    PE RLE:  stain noted mid/distal dressing, dressing changed in that area, no active drainage noted, area of rupture blister/escar with no significant change, no purulence. Proximal dressing left intact with no drainage noted.  Wound Vac at foot of bed which is connected to Prevena covering old drain site, intact and functioning well.  Presently there is 100cc of bloody fluid in Vac Canister.   Remaining exam unchanged, gross distal motor and sensation intact, no acute motor or sensory changes.      A/P 73 y/o female s/p right knee revision with placement of ABX spacer.  Ortho stable, Patient can be discharge when medicine clears.    Patient should keep Prevena in place until Monday or seen by plastics early next week.    At this time no further orthopedic procedures planned.    Remaining sutures located at the distal part of wound will be removed per plastics recommendation.

## 2017-04-02 DIAGNOSIS — E43 UNSPECIFIED SEVERE PROTEIN-CALORIE MALNUTRITION: ICD-10-CM

## 2017-04-02 LAB
ALBUMIN SERPL ELPH-MCNC: 1.2 G/DL — LOW (ref 3.3–5.2)
ALP SERPL-CCNC: 116 U/L — SIGNIFICANT CHANGE UP (ref 40–120)
ALT FLD-CCNC: 13 U/L — SIGNIFICANT CHANGE UP
ANION GAP SERPL CALC-SCNC: 12 MMOL/L — SIGNIFICANT CHANGE UP (ref 5–17)
APTT BLD: 54.8 SEC — HIGH (ref 27.5–37.4)
AST SERPL-CCNC: 34 U/L — HIGH
BILIRUB SERPL-MCNC: 0.4 MG/DL — SIGNIFICANT CHANGE UP (ref 0.4–2)
BUN SERPL-MCNC: 19 MG/DL — SIGNIFICANT CHANGE UP (ref 8–20)
CALCIUM SERPL-MCNC: 7.9 MG/DL — LOW (ref 8.6–10.2)
CHLORIDE SERPL-SCNC: 109 MMOL/L — HIGH (ref 98–107)
CO2 SERPL-SCNC: 23 MMOL/L — SIGNIFICANT CHANGE UP (ref 22–29)
CREAT SERPL-MCNC: 2.61 MG/DL — HIGH (ref 0.5–1.3)
GLUCOSE SERPL-MCNC: 84 MG/DL — SIGNIFICANT CHANGE UP (ref 70–115)
HCT VFR BLD CALC: 30.3 % — LOW (ref 37–47)
HGB BLD-MCNC: 10.1 G/DL — LOW (ref 12–16)
INR BLD: 2.67 RATIO — HIGH (ref 0.88–1.16)
MCHC RBC-ENTMCNC: 30.4 PG — SIGNIFICANT CHANGE UP (ref 27–31)
MCHC RBC-ENTMCNC: 33.3 G/DL — SIGNIFICANT CHANGE UP (ref 32–36)
MCV RBC AUTO: 91.3 FL — SIGNIFICANT CHANGE UP (ref 81–99)
PHOSPHATE SERPL-MCNC: 3.4 MG/DL — SIGNIFICANT CHANGE UP (ref 2.4–4.7)
PLATELET # BLD AUTO: 317 K/UL — SIGNIFICANT CHANGE UP (ref 150–400)
POTASSIUM SERPL-MCNC: 4.3 MMOL/L — SIGNIFICANT CHANGE UP (ref 3.5–5.3)
POTASSIUM SERPL-SCNC: 4.3 MMOL/L — SIGNIFICANT CHANGE UP (ref 3.5–5.3)
PROT SERPL-MCNC: 4.8 G/DL — LOW (ref 6.6–8.7)
PROTHROM AB SERPL-ACNC: 30 SEC — HIGH (ref 9.8–12.7)
RBC # BLD: 3.32 M/UL — LOW (ref 4.4–5.2)
RBC # FLD: 22.8 % — HIGH (ref 11–15.6)
SODIUM SERPL-SCNC: 144 MMOL/L — SIGNIFICANT CHANGE UP (ref 135–145)
WBC # BLD: 6.4 K/UL — SIGNIFICANT CHANGE UP (ref 4.8–10.8)
WBC # FLD AUTO: 6.4 K/UL — SIGNIFICANT CHANGE UP (ref 4.8–10.8)

## 2017-04-02 PROCEDURE — 99232 SBSQ HOSP IP/OBS MODERATE 35: CPT

## 2017-04-02 RX ORDER — FLUCONAZOLE 150 MG/1
200 TABLET ORAL
Qty: 0 | Refills: 0 | Status: DISCONTINUED | OUTPATIENT
Start: 2017-04-02 | End: 2017-04-04

## 2017-04-02 RX ORDER — WARFARIN SODIUM 2.5 MG/1
4 TABLET ORAL ONCE
Qty: 0 | Refills: 0 | Status: COMPLETED | OUTPATIENT
Start: 2017-04-02 | End: 2017-04-02

## 2017-04-02 RX ADMIN — Medication 325 MILLIGRAM(S): at 12:00

## 2017-04-02 RX ADMIN — WARFARIN SODIUM 4 MILLIGRAM(S): 2.5 TABLET ORAL at 21:10

## 2017-04-02 RX ADMIN — Medication 325 MILLIGRAM(S): at 17:14

## 2017-04-02 RX ADMIN — Medication 1 MILLIGRAM(S): at 12:00

## 2017-04-02 RX ADMIN — Medication 325 MILLIGRAM(S): at 09:45

## 2017-04-02 RX ADMIN — Medication 20 MILLIEQUIVALENT(S): at 17:14

## 2017-04-02 RX ADMIN — PANTOPRAZOLE SODIUM 40 MILLIGRAM(S): 20 TABLET, DELAYED RELEASE ORAL at 17:14

## 2017-04-02 RX ADMIN — NYSTATIN CREAM 1 APPLICATION(S): 100000 CREAM TOPICAL at 06:35

## 2017-04-02 RX ADMIN — MAGNESIUM OXIDE 400 MG ORAL TABLET 400 MILLIGRAM(S): 241.3 TABLET ORAL at 09:45

## 2017-04-02 RX ADMIN — NYSTATIN CREAM 1 APPLICATION(S): 100000 CREAM TOPICAL at 17:15

## 2017-04-02 RX ADMIN — MEROPENEM 200 MILLIGRAM(S): 1 INJECTION INTRAVENOUS at 17:14

## 2017-04-02 RX ADMIN — Medication 20 MILLIGRAM(S): at 06:35

## 2017-04-02 RX ADMIN — Medication 1 TABLET(S): at 06:35

## 2017-04-02 RX ADMIN — Medication 12.5 MILLIGRAM(S): at 17:14

## 2017-04-02 RX ADMIN — Medication 12.5 MILLIGRAM(S): at 06:34

## 2017-04-02 RX ADMIN — Medication 1 TABLET(S): at 17:14

## 2017-04-02 RX ADMIN — PANTOPRAZOLE SODIUM 40 MILLIGRAM(S): 20 TABLET, DELAYED RELEASE ORAL at 06:34

## 2017-04-02 RX ADMIN — MEROPENEM 200 MILLIGRAM(S): 1 INJECTION INTRAVENOUS at 06:35

## 2017-04-02 RX ADMIN — Medication 1 TABLET(S): at 12:00

## 2017-04-02 RX ADMIN — MAGNESIUM OXIDE 400 MG ORAL TABLET 400 MILLIGRAM(S): 241.3 TABLET ORAL at 17:14

## 2017-04-02 RX ADMIN — DAPTOMYCIN 130 MILLIGRAM(S): 500 INJECTION, POWDER, LYOPHILIZED, FOR SOLUTION INTRAVENOUS at 14:39

## 2017-04-02 RX ADMIN — Medication 20 MILLIEQUIVALENT(S): at 06:35

## 2017-04-02 NOTE — PROGRESS NOTE ADULT - SUBJECTIVE AND OBJECTIVE BOX
Patient seen and examined    Feels better, more rested  RKJ pain much less  c/o pain under R breast  no c/o CP SOB NV   + swelling feet    Vital Signs Last 24 Hrs  T(C): 36.6, Max: 37 (04-01 @ 23:35)  T(F): 97.8, Max: 98.6 (04-01 @ 23:35)  HR: 88 (74 - 94)  BP: 144/82 (110/54 - 144/82)  BP(mean): --  RR: 16 (16 - 19)  SpO2: 98% (96% - 98%)    Awake/alert; NAD  chest Clear  No JVD  No murmer  abd soft, NT BS +  rash under R Breast noted, likely fungal  ++edema RLE > L; Vac dressing R thigh noted      02 Apr 2017 07:25    144    |  109    |  19.0   ----------------------------<  84     4.3     |  23.0   |  2.61     Ca    7.9        02 Apr 2017 07:25  Phos  3.4       02 Apr 2017 07:25    TPro  4.8    /  Alb  1.2    /  TBili  0.4    /  DBili  x      /  AST  34     /  ALT  13     /  AlkPhos  116    02 Apr 2017 07:25                          10.1   6.4   )-----------( 317      ( 02 Apr 2017 07:25 )             30.3       Impression  patient stable but creat rising  could be d/t Daptomycin or Torsemide- watch  add Fluconazol 300 mg weekly x 2 doses for tinea versicolor    Continue all other treatment

## 2017-04-02 NOTE — PROGRESS NOTE ADULT - PROBLEM SELECTOR PLAN 1
Continue Daptomycin and Meropenem thru 4/8 via PICC  Ortho and plastics following, s/p SIGRID drain removal, now with wound vac  per ortho - pt to ct Vac on d/c till f/u with plastic sx

## 2017-04-02 NOTE — PROGRESS NOTE ADULT - SUBJECTIVE AND OBJECTIVE BOX
[facility] [place] [facility]  RAÚL HARRIS MD FACP   DESIRE METZGER MD  Diplomates American Board of Internal Medicine and Infectious Diseases      SHYAM LAL  MRN-[id number]  72y    INTERVAL HPI/OVERNIGHT EVENTS:  AFEBRILE NON TOXIC   dressing in place  or debridement on hold        ANTIBIOTICS  meropenem IVPB 500milliGRAM(s) IV Intermittent every 12 hours  DAPTOmycin IVPB 750milliGRAM(s) IV Intermittent every 24 hours  epoetin mleany Injectable [id number](s) SubCutaneous <User Schedule>      Allergies    No Known Allergies    Intolerances        REVIEW OF SYSTEMS:    PHYSICAL EXAM:  Vital Signs Last 24 Hrs  T(C): 36.8, Max: 36.9 (03-19 @ 15:50)  T(F): 98.3, Max: 98.4 (03-19 @ 15:50)  HR: 81 (81 - 90)  BP: 141/77 (122/82 - 141/77)  BP(mean): --  RR: 18 (18 - 18)  SpO2: 100% (98% - 100%)      GEN: NAD, pleasant  HEENT: normocephalic and atraumatic. EOMI. CHIDI. Moist mucosa. Clear Posterior pharynx.  NECK: Supple. No carotid bruits.  No lymphadenopathy or thyromegaly.  LUNGS: Clear to auscultation.  HEART: Regular rate and rhythm without murmur.  ABDOMEN: Soft, nontender, and nondistended.  Positive bowel sounds.  No hepatosplenomegaly was noted.  EXTREMITIES: Without any cyanosis, clubbing, rash, lesions or edema.knees no change  NEUROLOGIC: Cranial nerves II through XII are grossly intact.  MUSCULOSKELETAL:KNEE BANDAGED -   SKIN: No ulceration or induration present    LABS:                        9.6    8.0   )-----------( 384      ( 20 Mar 2017 09:51 )             27.9       20 Mar 2017 09:51    142    |  106    |  25.0   ----------------------------<  82     3.6     |  25.0   |  2.08     Ca    7.6        20 Mar 2017 09:51  Mg     1.9       20 Mar 2017 09:51                                10.0   8.6   )-----------( 382      ( 23 Mar 2017 06:59 )             29.5   03-20 @ 09:51  hct 27.9 % hgb 9.6 g/dL    03-20 @ 09:51  plat 384 K/uL wbc 8.0 K/uL    03-19 @ 08:13  hct 27.9 % hgb 9.4 g/dL    03-19 @ 08:13  plat 363 K/uL wbc 7.9 K/uL    03-18 @ 10:26  hct 28.2 % hgb 9.7 g/dL    03-18 @ 10:26  plat 357 K/uL wbc 9.0 K/uL      03-20-17  creat 2.08 mg/dL gfr 27 mL/min/1.73M2 bun 25.0 mg/dL k 3.6 mmol/L  03-19-17  creat 2.12 mg/dL gfr 26 mL/min/1.73M2 bun 27.0 mg/dL k 3.5 mmol/L  03-18-17  creat 2.17 mg/dL gfr 25 mL/min/1.73M2 bun 28.0 mg/dL k 3.6 mmol/L

## 2017-04-02 NOTE — PROGRESS NOTE ADULT - SUBJECTIVE AND OBJECTIVE BOX
Ortho PA note  Wound vac removed, no current active discharge  Compressive wrap applied, will follow

## 2017-04-02 NOTE — PROGRESS NOTE ADULT - ASSESSMENT
VRE/PSEUDOMONAS   VAC IN PLACE   PLAN WAS TO CONTINUE TREATMENT THROUGH 4/8   BUT CONCERN FOR CONTINUE DRAINAGE AND WOULD CONTINUE FOR ADDITIONAL 2 WEEKS THOUGH 4/22  DISCUSSED WITH ORTHO  TO COMPLETE 8 WEEKS   FOLLOW UP WITH ORTHO   WILL FOLLOW UP AS NEEDED  PLEASE CALL IF QUESTIONS

## 2017-04-02 NOTE — PROGRESS NOTE ADULT - SUBJECTIVE AND OBJECTIVE BOX
Pt Name: SHYAM LAL    MRN: [id number]      Patient is a being follwed for a static cement spacer following an explant of a right total knee replacement and previous dynamic knee spacer 2/27/2017, POD #34. She is comfortable in bed. She has not been out of bed. She has 6 days remaining with IV antibiotics. No acute complaints.        PAST MEDICAL & SURGICAL HISTORY:  PAST MEDICAL & SURGICAL HISTORY:  Hypercholesterolemia  Deficiency of vitamin K  Chronic pain  COPD (chronic obstructive pulmonary disease)  Urine retention  Pressure ulcer  Atrial fibrillation  Constipation  Breast cancer  HLD (hyperlipidemia)  HTN (hypertension)  GERD (gastroesophageal reflux disease)  DM (diabetes mellitus)  Obesity  LIN on CPAP  History of incision and drainage: rt knee  S/p total knee replacement, bilateral  S/P knee replacement, right  S/P hysterectomy  S/P mastectomy: Left  Breast cancer      Allergies: No Known Allergies      Medications: acetaminophen   Tablet 650milliGRAM(s) Oral every 6 hours PRN  aluminum hydroxide/magnesium hydroxide/simethicone Suspension 30milliLiter(s) Oral four times a day PRN  ondansetron Injectable 4milliGRAM(s) IV Push every 6 hours PRN  ferrous    sulfate 325milliGRAM(s) Oral three times a day with meals  folic acid 1milliGRAM(s) Oral daily  multivitamin 1Tablet(s) Oral daily  dextrose Gel 1Dose(s) Oral once PRN  dextrose 50% Injectable 12.5Gram(s) IV Push once  dextrose 50% Injectable 25Gram(s) IV Push once  dextrose 50% Injectable 25Gram(s) IV Push once  glucagon  Injectable 1milliGRAM(s) IntraMuscular once PRN  meropenem IVPB 500milliGRAM(s) IV Intermittent every 12 hours  polyethylene glycol 3350 17Gram(s) Oral daily PRN  pantoprazole    Tablet 40milliGRAM(s) Oral two times a day before meals  metoprolol 12.5milliGRAM(s) Oral every 12 hours  DAPTOmycin IVPB 750milliGRAM(s) IV Intermittent every 24 hours  acetaminophen   Tablet. 650milliGRAM(s) Oral every 6 hours PRN  epoetin melany Injectable 87883Smcl(s) SubCutaneous <User Schedule>  magnesium oxide 400milliGRAM(s) Oral two times a day with meals  potassium chloride    Tablet ER 20milliEquivalent(s) Oral two times a day  torsemide 20milliGRAM(s) Oral daily  calcium carbonate 500 mG (Tums) Chewable 1Tablet(s) Chew two times a day  nystatin Powder 1Application(s) Topical two times a day        Ambulation: Walking independently [ ] With Cane [ ] With Walker [ ]  Bedbound [ ]                           10.1   6.4   )-----------( 317      ( 02 Apr 2017 07:25 )             30.3     02 Apr 2017 07:25    144    |  109    |  19.0   ----------------------------<  84     4.3     |  23.0   |  2.61     Ca    7.9        02 Apr 2017 07:25  Phos  3.4       02 Apr 2017 07:25    TPro  4.8    /  Alb  1.2    /  TBili  0.4    /  DBili  x      /  AST  34     /  ALT  13     /  AlkPhos  116    02 Apr 2017 07:25      VAC CHAMBER: APPROXIMATELY 275ML IN 5 DAYS (STARTED ON TUESDAY)    PHYSICAL EXAM:    Vital Signs Last 24 Hrs  T(C): 37, Max: 37 (04-01 @ 16:13)  T(F): 98.6, Max: 98.6 (04-01 @ 16:13)  HR: 74 (74 - 95)  BP: 110/54 (110/54 - 139/76)  BP(mean): --  RR: 18 (18 - 19)  SpO2: 96% (95% - 96%)  Daily     Daily     Appearance: Alert, responsive, in no acute distress.    Neurological: Sensation is grossly intact to light touch. 5/5 motor function of all extremities. No focal deficits or weaknesses found.    Skin: no rash on visible skin. Skin is clean, dry and intact.  No abrasions. No heel  ulcerations.    Vascular: 2+ distal pulses. Cap refill < 2 sec. No signs of venous insufficiency or stasis. No extremity ulcerations. No cyanosis.    Musculoskeletal:       Right Lower Extremity: DRESSING IS CLEAN AND DRY. + bloody drainage noted in vac tubing. + PRAFO's noted. No calf tenderness.      A/P:  Pt is a  72y Female with current static cement spacer of the right knee after removal of the infected right total knee replacement POD # 34    PLAN:   * vac dressing change on Monday 4/3/2017  * consideration of suture removal tomorrow in coordination with Dr. Pinsky  * continue IV antibiotics until 4/8/2017

## 2017-04-02 NOTE — PROGRESS NOTE ADULT - ASSESSMENT
71 yr old female with hypertension, hyperlipidemia, LIN, CKD, COPD, paroxysmal A fib sent from rehab for right knee wound dehiscence. She was seen by orthopedics, ID. Local wound cultures growing resistant and Pseudomonas and VRE. She was started on Meropenem and Zyvox. Her renal function was slightly worse from baseline and hence started on IVF. No obstructive disease on renal ultrasound. Cardiology consulted for clearance. Renal was consulted.  She is scheduled for OR I&D, she underwent wound closure and insertion of antibiotic spacer. RRT was called on 3/2 for hypotension and lethargy. Labs revealed elevated lactate. She was transferred to ICU for fluid resuscitation and monitoring. Narcotics were discontinued. She was noted to be anemic 6.9, she was ordered for PRBC transfusion and transferred out of ICU. ID follow up requested for medication adjustment, advised to stop Linezolid and start Daptomycin. GI consulted for anemia, no endoscopic intervention advised at this time. Hb remained stable. Noted to have lethargy, hence narcotics stopped. ABG done, which revealed normal pH and CO2. CT brain showed no stroke. Mental status improved. Hb remained stable. After discussion with orthopedics, coumadin was started for atrial fibrillation. She was noted to have bilateral upper extremity swelling, hence ultrasound was done on 3/9, negative for DVT. Given low platelets, Linezolid was discontinued by ID and Daptomycin was started. Heparin antibody negative. Plastics and orthopedic follow up was done, felt need for repeat I&D. Coumadin was held in view of procedure. Noted to be anemic, improved with 2 units of PRBC on 3/17/17. She was noted to have right arm swelling, doppler was repeated and showed subclavian vein DVT. She had PICC line placed on 3/18/17. Orthopedic and plastic surgery follow up done, given wound healing, further I&D deferred for now. PICC line replaced as it was dislodged. SIGRID drain in knee replaced to wound vac by plastics/ortho  Awaiting LUZ placement.

## 2017-04-02 NOTE — PROGRESS NOTE ADULT - SUBJECTIVE AND OBJECTIVE BOX
seen for dvt, right knee infection    no acute complaints.  no CP/SOB  ROS otherwise negative       MEDICATIONS  (STANDING):  ferrous    sulfate 325milliGRAM(s) Oral three times a day with meals  folic acid 1milliGRAM(s) Oral daily  multivitamin 1Tablet(s) Oral daily  dextrose 50% Injectable 12.5Gram(s) IV Push once  dextrose 50% Injectable 25Gram(s) IV Push once  dextrose 50% Injectable 25Gram(s) IV Push once  meropenem IVPB 500milliGRAM(s) IV Intermittent every 12 hours  pantoprazole    Tablet 40milliGRAM(s) Oral two times a day before meals  metoprolol 12.5milliGRAM(s) Oral every 12 hours  DAPTOmycin IVPB 750milliGRAM(s) IV Intermittent every 24 hours  epoetin melany Injectable [id number](s) SubCutaneous <User Schedule>  magnesium oxide 400milliGRAM(s) Oral two times a day with meals  potassium chloride    Tablet ER 20milliEquivalent(s) Oral two times a day  torsemide 20milliGRAM(s) Oral daily  calcium carbonate 500 mG (Tums) Chewable 1Tablet(s) Chew two times a day  warfarin 4milliGRAM(s) Oral once    MEDICATIONS  (PRN):  acetaminophen   Tablet 650milliGRAM(s) Oral every 6 hours PRN For Temp over 38.3 C (100.94 F)  aluminum hydroxide/magnesium hydroxide/simethicone Suspension 30milliLiter(s) Oral four times a day PRN Indigestion  ondansetron Injectable 4milliGRAM(s) IV Push every 6 hours PRN Nausea and/or Vomiting  dextrose Gel 1Dose(s) Oral once PRN Blood Glucose LESS THAN 70 milliGRAM(s)/deciliter  glucagon  Injectable 1milliGRAM(s) IntraMuscular once PRN Glucose LESS THAN 70 milligrams/deciliter  polyethylene glycol 3350 17Gram(s) Oral daily PRN Constipation  acetaminophen   Tablet. 650milliGRAM(s) Oral every 6 hours PRN mild to mod    Vital Signs Last 24 Hrs  T(C): 37, Max: 37 (04-01 @ 16:13)  T(F): 98.6, Max: 98.6 (04-01 @ 16:13)  HR: 74 (74 - 95)  BP: 110/54 (110/54 - 139/76)  BP(mean): --  RR: 18 (18 - 19)  SpO2: 96% (95% - 96%)    PHYSICAL EXAM:    GENERAL: NAD obese  CHEST/LUNG: Clear to percussion bilaterally; No rales, rhonchi, wheezing, or rubs  HEART: Regular rate and rhythm; S1 S2; no murmurs noted  ABDOMEN: Soft, Nontender, Nondistended; Bowel sounds present  EXTREMITIES: edema x 3 extremities. LUE - improved   Rt knee with wound vac  NERVOUS SYSTEM:  Alert & Oriented X3, Good concentration; nonfocal  PSYCH: normal mood, appropriate response.    LABS:                    prior labs reviewed    CAPILLARY BLOOD GLUCOSE        RADIOLOGY & ADDITIONAL TESTS:

## 2017-04-03 LAB
ALBUMIN SERPL ELPH-MCNC: 1.6 G/DL — LOW (ref 3.3–5.2)
ALP SERPL-CCNC: 117 U/L — SIGNIFICANT CHANGE UP (ref 40–120)
ALT FLD-CCNC: 13 U/L — SIGNIFICANT CHANGE UP
ANION GAP SERPL CALC-SCNC: 10 MMOL/L — SIGNIFICANT CHANGE UP (ref 5–17)
APTT BLD: 56.1 SEC — HIGH (ref 27.5–37.4)
AST SERPL-CCNC: 39 U/L — HIGH
BILIRUB SERPL-MCNC: 0.5 MG/DL — SIGNIFICANT CHANGE UP (ref 0.4–2)
BUN SERPL-MCNC: 19 MG/DL — SIGNIFICANT CHANGE UP (ref 8–20)
CALCIUM SERPL-MCNC: 8.1 MG/DL — LOW (ref 8.6–10.2)
CHLORIDE SERPL-SCNC: 106 MMOL/L — SIGNIFICANT CHANGE UP (ref 98–107)
CO2 SERPL-SCNC: 24 MMOL/L — SIGNIFICANT CHANGE UP (ref 22–29)
CREAT SERPL-MCNC: 2.51 MG/DL — HIGH (ref 0.5–1.3)
CRP SERPL-MCNC: 1.4 MG/DL — HIGH (ref 0–0.4)
ERYTHROCYTE [SEDIMENTATION RATE] IN BLOOD: 19 MM/HR — SIGNIFICANT CHANGE UP (ref 0–20)
GLUCOSE SERPL-MCNC: 102 MG/DL — SIGNIFICANT CHANGE UP (ref 70–115)
HCT VFR BLD CALC: 31.8 % — LOW (ref 37–47)
HGB BLD-MCNC: 10.3 G/DL — LOW (ref 12–16)
INR BLD: 3.54 RATIO — HIGH (ref 0.88–1.16)
MCHC RBC-ENTMCNC: 29.5 PG — SIGNIFICANT CHANGE UP (ref 27–31)
MCHC RBC-ENTMCNC: 32.4 G/DL — SIGNIFICANT CHANGE UP (ref 32–36)
MCV RBC AUTO: 91.1 FL — SIGNIFICANT CHANGE UP (ref 81–99)
PLATELET # BLD AUTO: 296 K/UL — SIGNIFICANT CHANGE UP (ref 150–400)
POTASSIUM SERPL-MCNC: 4.4 MMOL/L — SIGNIFICANT CHANGE UP (ref 3.5–5.3)
POTASSIUM SERPL-SCNC: 4.4 MMOL/L — SIGNIFICANT CHANGE UP (ref 3.5–5.3)
PROT SERPL-MCNC: 4.8 G/DL — LOW (ref 6.6–8.7)
PROTHROM AB SERPL-ACNC: 40 SEC — HIGH (ref 9.8–12.7)
RBC # BLD: 3.49 M/UL — LOW (ref 4.4–5.2)
RBC # FLD: 22.6 % — HIGH (ref 11–15.6)
SODIUM SERPL-SCNC: 140 MMOL/L — SIGNIFICANT CHANGE UP (ref 135–145)
WBC # BLD: 6.7 K/UL — SIGNIFICANT CHANGE UP (ref 4.8–10.8)
WBC # FLD AUTO: 6.7 K/UL — SIGNIFICANT CHANGE UP (ref 4.8–10.8)

## 2017-04-03 PROCEDURE — 99233 SBSQ HOSP IP/OBS HIGH 50: CPT

## 2017-04-03 PROCEDURE — 93971 EXTREMITY STUDY: CPT | Mod: 26,RT

## 2017-04-03 RX ORDER — WARFARIN SODIUM 2.5 MG/1
4 TABLET ORAL ONCE
Qty: 0 | Refills: 0 | Status: COMPLETED | OUTPATIENT
Start: 2017-04-03 | End: 2017-04-03

## 2017-04-03 RX ADMIN — DAPTOMYCIN 130 MILLIGRAM(S): 500 INJECTION, POWDER, LYOPHILIZED, FOR SOLUTION INTRAVENOUS at 15:18

## 2017-04-03 RX ADMIN — Medication 650 MILLIGRAM(S): at 00:25

## 2017-04-03 RX ADMIN — MEROPENEM 200 MILLIGRAM(S): 1 INJECTION INTRAVENOUS at 17:52

## 2017-04-03 RX ADMIN — NYSTATIN CREAM 1 APPLICATION(S): 100000 CREAM TOPICAL at 06:25

## 2017-04-03 RX ADMIN — NYSTATIN CREAM 1 APPLICATION(S): 100000 CREAM TOPICAL at 17:53

## 2017-04-03 RX ADMIN — Medication 20 MILLIGRAM(S): at 06:25

## 2017-04-03 RX ADMIN — Medication 1 MILLIGRAM(S): at 12:17

## 2017-04-03 RX ADMIN — Medication 12.5 MILLIGRAM(S): at 06:25

## 2017-04-03 RX ADMIN — Medication 20 MILLIEQUIVALENT(S): at 17:52

## 2017-04-03 RX ADMIN — PANTOPRAZOLE SODIUM 40 MILLIGRAM(S): 20 TABLET, DELAYED RELEASE ORAL at 17:51

## 2017-04-03 RX ADMIN — Medication 20 MILLIEQUIVALENT(S): at 06:25

## 2017-04-03 RX ADMIN — Medication 1 TABLET(S): at 12:17

## 2017-04-03 RX ADMIN — MEROPENEM 200 MILLIGRAM(S): 1 INJECTION INTRAVENOUS at 06:25

## 2017-04-03 RX ADMIN — Medication 650 MILLIGRAM(S): at 00:55

## 2017-04-03 RX ADMIN — PANTOPRAZOLE SODIUM 40 MILLIGRAM(S): 20 TABLET, DELAYED RELEASE ORAL at 06:25

## 2017-04-03 RX ADMIN — Medication 1 TABLET(S): at 06:25

## 2017-04-03 RX ADMIN — MAGNESIUM OXIDE 400 MG ORAL TABLET 400 MILLIGRAM(S): 241.3 TABLET ORAL at 17:50

## 2017-04-03 RX ADMIN — Medication 12.5 MILLIGRAM(S): at 17:50

## 2017-04-03 RX ADMIN — MAGNESIUM OXIDE 400 MG ORAL TABLET 400 MILLIGRAM(S): 241.3 TABLET ORAL at 08:23

## 2017-04-03 RX ADMIN — Medication 325 MILLIGRAM(S): at 17:50

## 2017-04-03 RX ADMIN — FLUCONAZOLE 200 MILLIGRAM(S): 150 TABLET ORAL at 12:19

## 2017-04-03 RX ADMIN — Medication 1 TABLET(S): at 17:50

## 2017-04-03 RX ADMIN — WARFARIN SODIUM 4 MILLIGRAM(S): 2.5 TABLET ORAL at 21:57

## 2017-04-03 RX ADMIN — Medication 325 MILLIGRAM(S): at 08:23

## 2017-04-03 RX ADMIN — Medication 325 MILLIGRAM(S): at 12:17

## 2017-04-03 NOTE — PROGRESS NOTE ADULT - PROBLEM SELECTOR PROBLEM 6
Left foot pain
Type 2 diabetes mellitus without complication, unspecified long term insulin use status
Essential hypertension
Left foot pain

## 2017-04-03 NOTE — PROGRESS NOTE ADULT - SUBJECTIVE AND OBJECTIVE BOX
SHYAM LUKASZ    [id number]    History: 72y Female is status post right total knee revision/ explant with antibiotic spacer, POD#35. . Patient is doing well and is comfortable. The patient's pain is controlled using the prescribed pain medications. Denies nausea, vomiting, chest pain, shortness of breath, abdominal pain or fever. No new complaints.                              10.3   6.7   )-----------( 296      ( 03 Apr 2017 09:04 )             31.8     03 Apr 2017 09:04    140    |  106    |  19.0   ----------------------------<  102    4.4     |  24.0   |  2.51     Ca    8.1        03 Apr 2017 09:04  Phos  3.4       02 Apr 2017 07:25    TPro  4.8    /  Alb  1.6    /  TBili  0.5    /  DBili  x      /  AST  39     /  ALT  13     /  AlkPhos  117    03 Apr 2017 09:04      MEDICATIONS  (STANDING):  ferrous    sulfate 325milliGRAM(s) Oral three times a day with meals  folic acid 1milliGRAM(s) Oral daily  multivitamin 1Tablet(s) Oral daily  dextrose 50% Injectable 12.5Gram(s) IV Push once  dextrose 50% Injectable 25Gram(s) IV Push once  dextrose 50% Injectable 25Gram(s) IV Push once  meropenem IVPB 500milliGRAM(s) IV Intermittent every 12 hours  pantoprazole    Tablet 40milliGRAM(s) Oral two times a day before meals  metoprolol 12.5milliGRAM(s) Oral every 12 hours  DAPTOmycin IVPB 750milliGRAM(s) IV Intermittent every 24 hours  epoetin melany Injectable [id number](s) SubCutaneous <User Schedule>  magnesium oxide 400milliGRAM(s) Oral two times a day with meals  potassium chloride    Tablet ER 20milliEquivalent(s) Oral two times a day  torsemide 20milliGRAM(s) Oral daily  calcium carbonate 500 mG (Tums) Chewable 1Tablet(s) Chew two times a day  nystatin Powder 1Application(s) Topical two times a day  fluconAZOLE   Tablet 200milliGRAM(s) Oral every 7 days  warfarin 4milliGRAM(s) Oral once    MEDICATIONS  (PRN):  acetaminophen   Tablet 650milliGRAM(s) Oral every 6 hours PRN For Temp over 38.3 C (100.94 F)  aluminum hydroxide/magnesium hydroxide/simethicone Suspension 30milliLiter(s) Oral four times a day PRN Indigestion  ondansetron Injectable 4milliGRAM(s) IV Push every 6 hours PRN Nausea and/or Vomiting  dextrose Gel 1Dose(s) Oral once PRN Blood Glucose LESS THAN 70 milliGRAM(s)/deciliter  glucagon  Injectable 1milliGRAM(s) IntraMuscular once PRN Glucose LESS THAN 70 milligrams/deciliter  polyethylene glycol 3350 17Gram(s) Oral daily PRN Constipation  acetaminophen   Tablet. 650milliGRAM(s) Oral every 6 hours PRN mild to mod      Physical exam: The right knee dressing is clean, dry and intact. No drainage or discharge noted from insicion.  small area of dried blood at drain site. Sutures remain intact at mid incision.. No erythema is noted. No blistering. No ecchymosis. Medial aspect of incision has 5cm escar noted. dried.  The calf is supple nontender.. No calf tenderness. Sensation to light touch is grossly intact distally. Motor function distally is 5/5. Extensor hallucis longus and flexor hallucis longus are intact. No foot drop. 2+ dorsalis pedis pulse. Capillary refill is less than 2 seconds. No cyanosis.    Primary Orthopedic Assessment:  • s/p RIGHT total kneee explant with cement spacer    Secondary  Orthopedic Assessment(s):   •     Secondary  Medical Assessment(s):   •     Plan:   • DVT prophylaxis as prescribed, including use of compression devices and ankle pumps  • Weightbearing as tolerated of the right lower extremity with assistance of a walker  • Incentive spirometry encouraged  • Pain control as clinically indicated  • Discharge planning – anticipated discharge is  subacute rehabilitation

## 2017-04-03 NOTE — PROGRESS NOTE ADULT - ASSESSMENT
71 yr old female with hypertension, hyperlipidemia, LIN, CKD, COPD, paroxysmal A fib sent from rehab for right knee wound dehiscence. She was seen by orthopedics, ID. Local wound cultures growing resistant and Pseudomonas and VRE. She was started on Meropenem and Zyvox. Her renal function was slightly worse from baseline and hence started on IVF. No obstructive disease on renal ultrasound. Cardiology consulted for clearance. Renal was consulted.  She is scheduled for OR I&D, she underwent wound closure and insertion of antibiotic spacer. RRT was called on 3/2 for hypotension and lethargy. Labs revealed elevated lactate. She was transferred to ICU for fluid resuscitation and monitoring. Narcotics were discontinued. She was noted to be anemic 6.9, she was ordered for PRBC transfusion and transferred out of ICU. ID follow up requested for medication adjustment, advised to stop Linezolid and start Daptomycin. GI consulted for anemia, no endoscopic intervention advised at this time. Hb remained stable. Noted to have lethargy, hence narcotics stopped. ABG done, which revealed normal pH and CO2. CT brain showed no stroke. Mental status improved. Hb remained stable. After discussion with orthopedics, coumadin was started for atrial fibrillation. She was noted to have bilateral upper extremity swelling, hence ultrasound was done on 3/9, negative for DVT. Given low platelets, Linezolid was discontinued by ID and Daptomycin was started. Heparin antibody negative. Plastics and orthopedic follow up was done, felt need for repeat I&D. Coumadin was held in view of procedure. Noted to be anemic, improved with 2 units of PRBC on 3/17/17. She was noted to have right arm swelling, doppler was repeated and showed subclavian vein DVT. She had PICC line placed on 3/18/17. Orthopedic and plastic surgery follow up done, given wound healing, further I&D deferred for now. PICC line replaced as it was dislodged. SIGRID drain in knee replaced to wound vac by plastics/ortho. wound vac removed by ortho 4/2. RUE swelling worsening - discussed with IR  - advised to repeat Duplex RUE.   eventual LUZ placement.

## 2017-04-03 NOTE — PROGRESS NOTE ADULT - SUBJECTIVE AND OBJECTIVE BOX
NEPHROLOGY INTERVAL HPI/OVERNIGHT EVENTS: increasing edema  right arm.    MEDICATIONS  (STANDING):  ferrous    sulfate 325milliGRAM(s) Oral three times a day with meals  folic acid 1milliGRAM(s) Oral daily  multivitamin 1Tablet(s) Oral daily  dextrose 50% Injectable 12.5Gram(s) IV Push once  dextrose 50% Injectable 25Gram(s) IV Push once  dextrose 50% Injectable 25Gram(s) IV Push once  meropenem IVPB 500milliGRAM(s) IV Intermittent every 12 hours  pantoprazole    Tablet 40milliGRAM(s) Oral two times a day before meals  metoprolol 12.5milliGRAM(s) Oral every 12 hours  DAPTOmycin IVPB 750milliGRAM(s) IV Intermittent every 24 hours  epoetin melany Injectable [id number](s) SubCutaneous <User Schedule>  magnesium oxide 400milliGRAM(s) Oral two times a day with meals  potassium chloride    Tablet ER 20milliEquivalent(s) Oral two times a day  torsemide 20milliGRAM(s) Oral daily  calcium carbonate 500 mG (Tums) Chewable 1Tablet(s) Chew two times a day  nystatin Powder 1Application(s) Topical two times a day  fluconAZOLE   Tablet 200milliGRAM(s) Oral every 7 days  warfarin 4milliGRAM(s) Oral once    MEDICATIONS  (PRN):  acetaminophen   Tablet 650milliGRAM(s) Oral every 6 hours PRN For Temp over 38.3 C (100.94 F)  aluminum hydroxide/magnesium hydroxide/simethicone Suspension 30milliLiter(s) Oral four times a day PRN Indigestion  ondansetron Injectable 4milliGRAM(s) IV Push every 6 hours PRN Nausea and/or Vomiting  dextrose Gel 1Dose(s) Oral once PRN Blood Glucose LESS THAN 70 milliGRAM(s)/deciliter  glucagon  Injectable 1milliGRAM(s) IntraMuscular once PRN Glucose LESS THAN 70 milligrams/deciliter  polyethylene glycol 3350 17Gram(s) Oral daily PRN Constipation  acetaminophen   Tablet. 650milliGRAM(s) Oral every 6 hours PRN mild to mod      Allergies    No Known Allergies    Intolerances        Vital Signs Last 24 Hrs  T(C): 36.9, Max: 36.9 (04-03 @ 00:40)  T(F): 98.4, Max: 98.4 (04-03 @ 00:40)  HR: 85 (80 - 88)  BP: 128/66 (128/66 - 144/82)  BP(mean): --  RR: 18 (16 - 18)  SpO2: 98% (98% - 98%)  Daily     Daily     PHYSICAL EXAM:    GENERAL:   HEAD:    EYES: same  ENMT:   NECK: veins wnl  NERVOUS SYSTEM: same   CHEST/LUNG:  HEART: no rub nted  ABDOMEN: not tender  EXTREMITIES:  edema right upper ext. with pic noted, leg edema same, vac off  LYMPH:   SKIN: no rash   neg, no fioley  LABS:                        10.3   6.7   )-----------( 296      ( 03 Apr 2017 09:04 )             31.8     03 Apr 2017 09:04    140    |  106    |  19.0   ----------------------------<  102    4.4     |  24.0   |  2.51     Ca    8.1        03 Apr 2017 09:04  Phos  3.4       02 Apr 2017 07:25    TPro  4.8    /  Alb  1.6    /  TBili  0.5    /  DBili  x      /  AST  39     /  ALT  13     /  AlkPhos  117    03 Apr 2017 09:04    PT/INR - ( 03 Apr 2017 09:04 )   PT: 40.0 sec;   INR: 3.54 ratio         PTT - ( 03 Apr 2017 09:04 )  PTT:56.1 sec            RADIOLOGY & ADDITIONAL TESTS:

## 2017-04-03 NOTE — PROGRESS NOTE ADULT - NSHPATTENDINGPLANDISCUSS_GEN_ALL_CORE
Dr Carrillo
patient and Orthopedic PA at bedside.
patient and sister at bedside and Orthopedic PA.  ortho pa to discuss plan of care with pt/sister per request.
patient/sw.   d/w renal and ortho: stable for dc.   will plan for am discharge to [facility]
patient/sw.   d/w renal and ortho: stable for dc.   will plan for am discharge to [facility]
pt
pt, RN
Dr. Pablo
PT/HOSP AND ORTHOP
pt, RN

## 2017-04-03 NOTE — PROGRESS NOTE ADULT - PROBLEM/PLAN-4
DISPLAY PLAN FREE TEXT

## 2017-04-03 NOTE — PROGRESS NOTE ADULT - PROBLEM SELECTOR PLAN 6
Accucheck, sliding scale coverage.
Resolved  Xrays pending, tylenol for pain. hold NSAIDS  uric acid,  consider podiatry evaluation.
monitor
Discontinue Metoprolol and Hydralazine until patient BP is stable.
Xrays, tylenol for pain. hold NSAIDS  uric acid,   consider podiatry evaluation.
Accucheck, sliding scale coverage.

## 2017-04-03 NOTE — PROGRESS NOTE ADULT - PROBLEM/PLAN-1
DISPLAY PLAN FREE TEXT

## 2017-04-03 NOTE — PROGRESS NOTE ADULT - PROBLEM SELECTOR PROBLEM 3
VRE (vancomycin-resistant Enterococci) infection
DM (diabetes mellitus)
Anemia
Chronic renal failure
DM (diabetes mellitus)
Infection of prosthetic knee joint, sequela
Pseudomonas aeruginosa infection
Chronic renal failure
DM (diabetes mellitus)
DM (diabetes mellitus)
Infection of prosthetic knee joint, sequela
Infection of prosthetic knee joint, sequela
VRE (vancomycin-resistant Enterococci) infection

## 2017-04-03 NOTE — PROGRESS NOTE ADULT - SUBJECTIVE AND OBJECTIVE BOX
seen for dvt, right knee infection    no acute complaints. Right arm swelling - worsening   no CP/SOB  ROS otherwise negative         Vital Signs Last 24 Hrs  T(C): 36.9, Max: 36.9 (04-03 @ 00:40)  T(F): 98.4, Max: 98.4 (04-03 @ 00:40)  HR: 85 (80 - 88)  BP: 128/66 (128/66 - 144/82)  BP(mean): --  RR: 18 (16 - 18)  SpO2: 98% (98% - 98%)    PHYSICAL EXAM:    GENERAL: NAD obese  CHEST/LUNG: Clear to percussion bilaterally; No rales, rhonchi, wheezing, or rubs  HEART: Regular rate and rhythm; S1 S2; no murmurs noted  ABDOMEN: Soft, Nontender, Nondistended; Bowel sounds present  EXTREMITIES: edema x 3 extremities. LUE - improved, RUE - swelling + no improvement - is worsening   Rt knee with wrap   NERVOUS SYSTEM:  Alert & Oriented X3, Good concentration; nonfocal  PSYCH: normal mood, appropriate response.      MEDICATIONS  (STANDING):  ferrous    sulfate 325milliGRAM(s) Oral three times a day with meals  folic acid 1milliGRAM(s) Oral daily  multivitamin 1Tablet(s) Oral daily  dextrose 50% Injectable 12.5Gram(s) IV Push once  dextrose 50% Injectable 25Gram(s) IV Push once  dextrose 50% Injectable 25Gram(s) IV Push once  meropenem IVPB 500milliGRAM(s) IV Intermittent every 12 hours  pantoprazole    Tablet 40milliGRAM(s) Oral two times a day before meals  metoprolol 12.5milliGRAM(s) Oral every 12 hours  DAPTOmycin IVPB 750milliGRAM(s) IV Intermittent every 24 hours  epoetin melany Injectable [id number](s) SubCutaneous <User Schedule>  magnesium oxide 400milliGRAM(s) Oral two times a day with meals  potassium chloride    Tablet ER 20milliEquivalent(s) Oral two times a day  torsemide 20milliGRAM(s) Oral daily  calcium carbonate 500 mG (Tums) Chewable 1Tablet(s) Chew two times a day  nystatin Powder 1Application(s) Topical two times a day  fluconAZOLE   Tablet 200milliGRAM(s) Oral every 7 days  warfarin 4milliGRAM(s) Oral once    MEDICATIONS  (PRN):  acetaminophen   Tablet 650milliGRAM(s) Oral every 6 hours PRN For Temp over 38.3 C (100.94 F)  aluminum hydroxide/magnesium hydroxide/simethicone Suspension 30milliLiter(s) Oral four times a day PRN Indigestion  ondansetron Injectable 4milliGRAM(s) IV Push every 6 hours PRN Nausea and/or Vomiting  dextrose Gel 1Dose(s) Oral once PRN Blood Glucose LESS THAN 70 milliGRAM(s)/deciliter  glucagon  Injectable 1milliGRAM(s) IntraMuscular once PRN Glucose LESS THAN 70 milligrams/deciliter  polyethylene glycol 3350 17Gram(s) Oral daily PRN Constipation  acetaminophen   Tablet. 650milliGRAM(s) Oral every 6 hours PRN mild to mod      LABS:                                         10.3   6.7   )-----------( 296      ( 03 Apr 2017 09:04 )             31.8   03 Apr 2017 09:04    140    |  106    |  19.0   ----------------------------<  102    4.4     |  24.0   |  2.51     Ca    8.1        03 Apr 2017 09:04  Phos  3.4       02 Apr 2017 07:25    TPro  4.8    /  Alb  1.6    /  TBili  0.5    /  DBili  x      /  AST  39     /  ALT  13     /  AlkPhos  117    03 Apr 2017 09:04      CAPILLARY BLOOD GLUCOSE        RADIOLOGY & ADDITIONAL TESTS:

## 2017-04-03 NOTE — PROGRESS NOTE ADULT - PROBLEM SELECTOR PROBLEM 1
Infection of prosthetic knee joint, sequela
VRE (vancomycin-resistant Enterococci) infection
Hypotension, unspecified hypotension type
Infection of prosthetic knee joint, sequela
Joint infection
Platelets decreased
VRE (vancomycin-resistant Enterococci) infection
Postoperative wound infection, sequela
Hypotension, unspecified hypotension type
Hypotension, unspecified hypotension type
Infection of prosthetic knee joint, sequela
Infection of prosthetic knee joint, sequela
Joint infection
Platelets decreased
R/O Preop cardiovascular exam
R/O Preop cardiovascular exam
VRE (vancomycin-resistant Enterococci) infection

## 2017-04-03 NOTE — PROGRESS NOTE ADULT - PROBLEM SELECTOR PROBLEM 2
KARMEN (acute kidney injury)
Postoperative wound infection, sequela
Acute deep vein thrombosis (DVT) of other vein of right upper extremity
Anemia
KARMEN (acute kidney injury)
KARMEN (acute kidney injury)
Surgical wound infection
Infection of prosthetic knee joint, sequela
Anemia
Drop in hemoglobin
Drop in hemoglobin
KARMEN (acute kidney injury)
Postoperative wound infection, sequela
R/O Atrial fibrillation
R/O Atrial fibrillation
Surgical wound infection

## 2017-04-03 NOTE — PROGRESS NOTE ADULT - PROBLEM/PLAN-3
DISPLAY PLAN FREE TEXT

## 2017-04-03 NOTE — PROGRESS NOTE ADULT - PROBLEM SELECTOR PLAN 1
Continue Daptomycin and Meropenem thru 4/8 via PICC  Ortho and plastics following, s/p SIGRID drain removal, now with wound vac  wound vac discontinued  f/u Wound care recs

## 2017-04-03 NOTE — PROGRESS NOTE ADULT - PROBLEM SELECTOR PROBLEM 4
Pseudomonas aeruginosa infection
HTN (hypertension)
Chronic renal failure
DM (diabetes mellitus)
Dvt femoral (deep venous thrombosis)
HTN (hypertension)
HTN (hypertension)
KARMEN (acute kidney injury)
Paroxysmal atrial fibrillation
KARMEN (acute kidney injury)
HTN (hypertension)
KARMEN (acute kidney injury)
KARMEN (acute kidney injury)
Paroxysmal atrial fibrillation
Pseudomonas aeruginosa infection
DM (diabetes mellitus)

## 2017-04-03 NOTE — PROVIDER CONTACT NOTE (OTHER) - SITUATION
Double lumen PICC, right arm swollen. red port unable to flush and no blood return, Dr. Marie aware. Blue line patent, positive blood return.

## 2017-04-03 NOTE — PROGRESS NOTE ADULT - PROBLEM SELECTOR PROBLEM 7
Anasarca
Paroxysmal atrial fibrillation
Anasarca
Electrolyte abnormality

## 2017-04-04 VITALS
RESPIRATION RATE: 19 BRPM | OXYGEN SATURATION: 98 % | HEART RATE: 92 BPM | SYSTOLIC BLOOD PRESSURE: 132 MMHG | DIASTOLIC BLOOD PRESSURE: 76 MMHG | TEMPERATURE: 98 F

## 2017-04-04 LAB
INR BLD: 4.08 RATIO — HIGH (ref 0.88–1.16)
PROTHROM AB SERPL-ACNC: 46.2 SEC — HIGH (ref 9.8–12.7)

## 2017-04-04 PROCEDURE — 97530 THERAPEUTIC ACTIVITIES: CPT

## 2017-04-04 PROCEDURE — 73630 X-RAY EXAM OF FOOT: CPT

## 2017-04-04 PROCEDURE — P9016: CPT

## 2017-04-04 PROCEDURE — 82553 CREATINE MB FRACTION: CPT

## 2017-04-04 PROCEDURE — 87075 CULTR BACTERIA EXCEPT BLOOD: CPT

## 2017-04-04 PROCEDURE — 36430 TRANSFUSION BLD/BLD COMPNT: CPT

## 2017-04-04 PROCEDURE — 93970 EXTREMITY STUDY: CPT

## 2017-04-04 PROCEDURE — 76000 FLUOROSCOPY <1 HR PHYS/QHP: CPT

## 2017-04-04 PROCEDURE — 85018 HEMOGLOBIN: CPT

## 2017-04-04 PROCEDURE — 87205 SMEAR GRAM STAIN: CPT

## 2017-04-04 PROCEDURE — 82803 BLOOD GASES ANY COMBINATION: CPT

## 2017-04-04 PROCEDURE — 81001 URINALYSIS AUTO W/SCOPE: CPT

## 2017-04-04 PROCEDURE — 76942 ECHO GUIDE FOR BIOPSY: CPT

## 2017-04-04 PROCEDURE — 70450 CT HEAD/BRAIN W/O DYE: CPT

## 2017-04-04 PROCEDURE — 80048 BASIC METABOLIC PNL TOTAL CA: CPT

## 2017-04-04 PROCEDURE — 97163 PT EVAL HIGH COMPLEX 45 MIN: CPT

## 2017-04-04 PROCEDURE — 84132 ASSAY OF SERUM POTASSIUM: CPT

## 2017-04-04 PROCEDURE — 76770 US EXAM ABDO BACK WALL COMP: CPT

## 2017-04-04 PROCEDURE — 87040 BLOOD CULTURE FOR BACTERIA: CPT

## 2017-04-04 PROCEDURE — 85610 PROTHROMBIN TIME: CPT

## 2017-04-04 PROCEDURE — 87186 SC STD MICRODIL/AGAR DIL: CPT

## 2017-04-04 PROCEDURE — 86901 BLOOD TYPING SEROLOGIC RH(D): CPT

## 2017-04-04 PROCEDURE — 86920 COMPATIBILITY TEST SPIN: CPT

## 2017-04-04 PROCEDURE — 86140 C-REACTIVE PROTEIN: CPT

## 2017-04-04 PROCEDURE — 99285 EMERGENCY DEPT VISIT HI MDM: CPT | Mod: 25

## 2017-04-04 PROCEDURE — 83550 IRON BINDING TEST: CPT

## 2017-04-04 PROCEDURE — 83010 ASSAY OF HAPTOGLOBIN QUANT: CPT

## 2017-04-04 PROCEDURE — 80076 HEPATIC FUNCTION PANEL: CPT

## 2017-04-04 PROCEDURE — 97112 NEUROMUSCULAR REEDUCATION: CPT

## 2017-04-04 PROCEDURE — 80053 COMPREHEN METABOLIC PANEL: CPT

## 2017-04-04 PROCEDURE — C1713: CPT

## 2017-04-04 PROCEDURE — 71045 X-RAY EXAM CHEST 1 VIEW: CPT

## 2017-04-04 PROCEDURE — 97116 GAIT TRAINING THERAPY: CPT

## 2017-04-04 PROCEDURE — 84550 ASSAY OF BLOOD/URIC ACID: CPT

## 2017-04-04 PROCEDURE — 84100 ASSAY OF PHOSPHORUS: CPT

## 2017-04-04 PROCEDURE — 83735 ASSAY OF MAGNESIUM: CPT

## 2017-04-04 PROCEDURE — 82728 ASSAY OF FERRITIN: CPT

## 2017-04-04 PROCEDURE — 88302 TISSUE EXAM BY PATHOLOGIST: CPT

## 2017-04-04 PROCEDURE — 83605 ASSAY OF LACTIC ACID: CPT

## 2017-04-04 PROCEDURE — 82272 OCCULT BLD FECES 1-3 TESTS: CPT

## 2017-04-04 PROCEDURE — 85652 RBC SED RATE AUTOMATED: CPT

## 2017-04-04 PROCEDURE — 84466 ASSAY OF TRANSFERRIN: CPT

## 2017-04-04 PROCEDURE — 84134 ASSAY OF PREALBUMIN: CPT

## 2017-04-04 PROCEDURE — 74176 CT ABD & PELVIS W/O CONTRAST: CPT

## 2017-04-04 PROCEDURE — 87070 CULTURE OTHR SPECIMN AEROBIC: CPT

## 2017-04-04 PROCEDURE — 99239 HOSP IP/OBS DSCHRG MGMT >30: CPT

## 2017-04-04 PROCEDURE — 82140 ASSAY OF AMMONIA: CPT

## 2017-04-04 PROCEDURE — C1894: CPT

## 2017-04-04 PROCEDURE — 97110 THERAPEUTIC EXERCISES: CPT

## 2017-04-04 PROCEDURE — 87493 C DIFF AMPLIFIED PROBE: CPT

## 2017-04-04 PROCEDURE — 83036 HEMOGLOBIN GLYCOSYLATED A1C: CPT

## 2017-04-04 PROCEDURE — 82550 ASSAY OF CK (CPK): CPT

## 2017-04-04 PROCEDURE — 85027 COMPLETE CBC AUTOMATED: CPT

## 2017-04-04 PROCEDURE — 93005 ELECTROCARDIOGRAM TRACING: CPT

## 2017-04-04 PROCEDURE — 86160 COMPLEMENT ANTIGEN: CPT

## 2017-04-04 PROCEDURE — 93971 EXTREMITY STUDY: CPT

## 2017-04-04 PROCEDURE — 85045 AUTOMATED RETICULOCYTE COUNT: CPT

## 2017-04-04 PROCEDURE — 36415 COLL VENOUS BLD VENIPUNCTURE: CPT

## 2017-04-04 PROCEDURE — 73562 X-RAY EXAM OF KNEE 3: CPT

## 2017-04-04 PROCEDURE — 86900 BLOOD TYPING SEROLOGIC ABO: CPT

## 2017-04-04 PROCEDURE — 86850 RBC ANTIBODY SCREEN: CPT

## 2017-04-04 PROCEDURE — 86022 PLATELET ANTIBODIES: CPT

## 2017-04-04 PROCEDURE — 94640 AIRWAY INHALATION TREATMENT: CPT

## 2017-04-04 PROCEDURE — 92610 EVALUATE SWALLOWING FUNCTION: CPT

## 2017-04-04 PROCEDURE — 83615 LACTATE (LD) (LDH) ENZYME: CPT

## 2017-04-04 PROCEDURE — C1751: CPT

## 2017-04-04 PROCEDURE — 89051 BODY FLUID CELL COUNT: CPT

## 2017-04-04 PROCEDURE — 89060 EXAM SYNOVIAL FLUID CRYSTALS: CPT

## 2017-04-04 PROCEDURE — 85730 THROMBOPLASTIN TIME PARTIAL: CPT

## 2017-04-04 RX ORDER — NYSTATIN CREAM 100000 [USP'U]/G
1 CREAM TOPICAL
Qty: 0 | Refills: 0 | COMMUNITY
Start: 2017-04-04

## 2017-04-04 RX ORDER — WARFARIN SODIUM 2.5 MG/1
1 TABLET ORAL
Qty: 0 | Refills: 0 | COMMUNITY

## 2017-04-04 RX ADMIN — MAGNESIUM OXIDE 400 MG ORAL TABLET 400 MILLIGRAM(S): 241.3 TABLET ORAL at 09:24

## 2017-04-04 RX ADMIN — Medication 20 MILLIGRAM(S): at 05:31

## 2017-04-04 RX ADMIN — Medication 20 MILLIEQUIVALENT(S): at 05:31

## 2017-04-04 RX ADMIN — NYSTATIN CREAM 1 APPLICATION(S): 100000 CREAM TOPICAL at 05:31

## 2017-04-04 RX ADMIN — Medication 1 TABLET(S): at 05:31

## 2017-04-04 RX ADMIN — Medication 12.5 MILLIGRAM(S): at 05:31

## 2017-04-04 RX ADMIN — PANTOPRAZOLE SODIUM 40 MILLIGRAM(S): 20 TABLET, DELAYED RELEASE ORAL at 09:24

## 2017-04-04 RX ADMIN — Medication 325 MILLIGRAM(S): at 09:24

## 2017-04-04 RX ADMIN — MEROPENEM 200 MILLIGRAM(S): 1 INJECTION INTRAVENOUS at 05:30

## 2017-04-04 NOTE — PROGRESS NOTE ADULT - PROVIDER SPECIALTY LIST ADULT
Anesthesia
Cardiology
Cardiology
Gastroenterology
Hospitalist
Infectious Disease
Intervent Cardiology
Nephrology
Orthopedics
Plastic Surgery
Hospitalist
Orthopedics
Hospitalist

## 2017-04-04 NOTE — PROGRESS NOTE ADULT - SUBJECTIVE AND OBJECTIVE BOX
SHYAM LUKASZ    [id number]    History: 72y Female is status post right total knee revision/explant antibiotic spacer, POD # 36. Patient is doing well and is comfortable. The patient's pain is controlled using the prescribed pain medications. The patient is participating in physical therapy and is OOB. Denies nausea, vomiting, chest pain, shortness of breath, abdominal pain or fever. No new complaints.                              10.3   6.7   )-----------( 296      ( 03 Apr 2017 09:04 )             31.8     03 Apr 2017 09:04    140    |  106    |  19.0   ----------------------------<  102    4.4     |  24.0   |  2.51     Ca    8.1        03 Apr 2017 09:04    TPro  4.8    /  Alb  1.6    /  TBili  0.5    /  DBili  x      /  AST  39     /  ALT  13     /  AlkPhos  117    03 Apr 2017 09:04      MEDICATIONS  (STANDING):  ferrous    sulfate 325milliGRAM(s) Oral three times a day with meals  folic acid 1milliGRAM(s) Oral daily  multivitamin 1Tablet(s) Oral daily  dextrose 50% Injectable 12.5Gram(s) IV Push once  dextrose 50% Injectable 25Gram(s) IV Push once  dextrose 50% Injectable 25Gram(s) IV Push once  meropenem IVPB 500milliGRAM(s) IV Intermittent every 12 hours  pantoprazole    Tablet 40milliGRAM(s) Oral two times a day before meals  metoprolol 12.5milliGRAM(s) Oral every 12 hours  DAPTOmycin IVPB 750milliGRAM(s) IV Intermittent every 24 hours  epoetin melany Injectable [id number](s) SubCutaneous <User Schedule>  magnesium oxide 400milliGRAM(s) Oral two times a day with meals  potassium chloride    Tablet ER 20milliEquivalent(s) Oral two times a day  torsemide 20milliGRAM(s) Oral daily  calcium carbonate 500 mG (Tums) Chewable 1Tablet(s) Chew two times a day  nystatin Powder 1Application(s) Topical two times a day  fluconAZOLE   Tablet 200milliGRAM(s) Oral every 7 days    MEDICATIONS  (PRN):  acetaminophen   Tablet 650milliGRAM(s) Oral every 6 hours PRN For Temp over 38.3 C (100.94 F)  aluminum hydroxide/magnesium hydroxide/simethicone Suspension 30milliLiter(s) Oral four times a day PRN Indigestion  ondansetron Injectable 4milliGRAM(s) IV Push every 6 hours PRN Nausea and/or Vomiting  dextrose Gel 1Dose(s) Oral once PRN Blood Glucose LESS THAN 70 milliGRAM(s)/deciliter  glucagon  Injectable 1milliGRAM(s) IntraMuscular once PRN Glucose LESS THAN 70 milligrams/deciliter  polyethylene glycol 3350 17Gram(s) Oral daily PRN Constipation  acetaminophen   Tablet. 650milliGRAM(s) Oral every 6 hours PRN mild to mod    Vital Signs Last 24 Hrs  T(C): 36.9, Max: 36.9 (04-04 @ 00:12)  T(F): 98.5, Max: 98.5 (04-04 @ 08:01)  HR: 86 (82 - 87)  BP: 118/72 (118/72 - 130/72)  BP(mean): --  RR: 20 (20 - 20)  SpO2: 97% (97% - 99%)    Physical exam: Right lower extremity- The right knee dressing is clean, dry and intact.Ace wrap slips down leg.  No drainage or discharge. No erythema is noted. No blistering. No ecchymosis. Wound is dry. drain site is dry. Mid wound is dry eschar still intact.  The calf is supple nontender. Sensation to light touch is grossly intact distally.  Extensor hallucis longus and flexor hallucis longus are intact but weak. No foot drop. 2+ dorsalis pedis pulse. Capillary refill is less than 2 seconds. No cyanosis.    Primary Orthopedic Assessment:  • s/p RIGHT knee revision/explant/antibiotic spacer, POD#36    Secondary  Medical Assessment(s):   • RUE dvt    Plan:   - Dsg changed  - second half of sutures d/c'd without any issues  - Continue dsg changes every other day if dry, compression wrap prn- was causing some discomfort for patient and not staying in place  • DVT prophylaxis as prescribed, including use of compression devices and ankle pumps  • Continue physical therapy  • Weightbearing as tolerated of the right lower extremity with assistance of a walker  • Incentive spirometry encouraged  • Pain control as clinically indicated  -Continue abx per ID  -Follow up with Dr Gomes after dc and Dr Rey for wound check. Follow up with Dr Ritchie in office  -Medical management   • Discharge planning – anticipated discharge is subacute rehabilitation

## 2017-04-06 RX ORDER — WARFARIN SODIUM 2.5 MG/1
1 TABLET ORAL
Qty: 0 | Refills: 0 | COMMUNITY
Start: 2017-04-06

## 2017-04-07 ENCOUNTER — EMERGENCY (EMERGENCY)
Facility: HOSPITAL | Age: 72
LOS: 1 days | Discharge: HOME | End: 2017-04-07
Attending: EMERGENCY MEDICINE
Payer: MEDICARE

## 2017-04-07 VITALS
HEIGHT: 68 IN | HEART RATE: 80 BPM | WEIGHT: 175.05 LBS | SYSTOLIC BLOOD PRESSURE: 136 MMHG | RESPIRATION RATE: 16 BRPM | OXYGEN SATURATION: 99 % | TEMPERATURE: 97 F | DIASTOLIC BLOOD PRESSURE: 74 MMHG

## 2017-04-07 VITALS
HEART RATE: 80 BPM | DIASTOLIC BLOOD PRESSURE: 56 MMHG | SYSTOLIC BLOOD PRESSURE: 99 MMHG | TEMPERATURE: 97 F | RESPIRATION RATE: 20 BRPM | OXYGEN SATURATION: 98 %

## 2017-04-07 DIAGNOSIS — E78.5 HYPERLIPIDEMIA, UNSPECIFIED: ICD-10-CM

## 2017-04-07 DIAGNOSIS — Z90.12 ACQUIRED ABSENCE OF LEFT BREAST AND NIPPLE: ICD-10-CM

## 2017-04-07 DIAGNOSIS — J44.9 CHRONIC OBSTRUCTIVE PULMONARY DISEASE, UNSPECIFIED: ICD-10-CM

## 2017-04-07 DIAGNOSIS — Z96.653 PRESENCE OF ARTIFICIAL KNEE JOINT, BILATERAL: Chronic | ICD-10-CM

## 2017-04-07 DIAGNOSIS — Z98.890 OTHER SPECIFIED POSTPROCEDURAL STATES: Chronic | ICD-10-CM

## 2017-04-07 DIAGNOSIS — Z90.710 ACQUIRED ABSENCE OF BOTH CERVIX AND UTERUS: ICD-10-CM

## 2017-04-07 DIAGNOSIS — Z45.2 ENCOUNTER FOR ADJUSTMENT AND MANAGEMENT OF VASCULAR ACCESS DEVICE: ICD-10-CM

## 2017-04-07 DIAGNOSIS — Z90.10 ACQUIRED ABSENCE OF UNSPECIFIED BREAST AND NIPPLE: Chronic | ICD-10-CM

## 2017-04-07 DIAGNOSIS — E78.00 PURE HYPERCHOLESTEROLEMIA, UNSPECIFIED: ICD-10-CM

## 2017-04-07 DIAGNOSIS — Z96.651 PRESENCE OF RIGHT ARTIFICIAL KNEE JOINT: ICD-10-CM

## 2017-04-07 DIAGNOSIS — I10 ESSENTIAL (PRIMARY) HYPERTENSION: ICD-10-CM

## 2017-04-07 DIAGNOSIS — Z79.01 LONG TERM (CURRENT) USE OF ANTICOAGULANTS: ICD-10-CM

## 2017-04-07 PROCEDURE — 99283 EMERGENCY DEPT VISIT LOW MDM: CPT | Mod: 25

## 2017-04-07 PROCEDURE — 99283 EMERGENCY DEPT VISIT LOW MDM: CPT

## 2017-04-07 PROCEDURE — 71010: CPT | Mod: 26

## 2017-04-07 PROCEDURE — 71045 X-RAY EXAM CHEST 1 VIEW: CPT

## 2017-04-07 NOTE — ED PROVIDER NOTE - OBJECTIVE STATEMENT
CC: PICC line complication  Presenting symptoms: 71yo female sent to ED for eval of PICC line.  Patient states nurse last night "pulled her PICC out".  PICC dressing it currently loose, but PICC seems to be in place.  Patient on IV abx for pseudomonas knee infection  Pertinent Positives: none  Pertinent Negatives: none  Timing: last night  Quality: no symptoms  Radiation: none  Severity: no pain in area  Aggravating Factors: none  Relieving Factors: nnoe

## 2017-04-07 NOTE — ED ADULT NURSE NOTE - OBJECTIVE STATEMENT
71y/o female c/o PICC line being pulled out. Pt is from [facility]. PICC line place in H for IV abx 2 weeks ago for right knee surgery. tegaderm out of place, site exposed. PICC line in place, +Flush. Pt denies fevers, chills. will continue to monitor

## 2017-04-07 NOTE — ED BEHAVIORAL HEALTH NOTE - BEHAVIORAL HEALTH NOTE
HANNYNotreinaldo: phone call from pt's nurse Jenny states pt has been waiting for her oralia and transportation not here. Worker called [facility]) no transport arrangements on system. Worker requested oralia for pt to return to [facility]. Bill to send first oralia available . No other concerns reported. Worker informed pt and pt's nurse, verbalized understanding.

## 2017-04-12 ENCOUNTER — EMERGENCY (EMERGENCY)
Facility: HOSPITAL | Age: 72
LOS: 1 days | Discharge: HOME | End: 2017-04-12
Attending: STUDENT IN AN ORGANIZED HEALTH CARE EDUCATION/TRAINING PROGRAM
Payer: MEDICARE

## 2017-04-12 VITALS
RESPIRATION RATE: 18 BRPM | OXYGEN SATURATION: 99 % | DIASTOLIC BLOOD PRESSURE: 79 MMHG | HEART RATE: 81 BPM | SYSTOLIC BLOOD PRESSURE: 115 MMHG | TEMPERATURE: 98 F

## 2017-04-12 VITALS
DIASTOLIC BLOOD PRESSURE: 71 MMHG | SYSTOLIC BLOOD PRESSURE: 111 MMHG | OXYGEN SATURATION: 99 % | HEART RATE: 86 BPM | RESPIRATION RATE: 16 BRPM | WEIGHT: 209 LBS | HEIGHT: 65 IN | TEMPERATURE: 97 F

## 2017-04-12 DIAGNOSIS — M25.562 PAIN IN LEFT KNEE: ICD-10-CM

## 2017-04-12 DIAGNOSIS — E78.00 PURE HYPERCHOLESTEROLEMIA, UNSPECIFIED: ICD-10-CM

## 2017-04-12 DIAGNOSIS — Z96.653 PRESENCE OF ARTIFICIAL KNEE JOINT, BILATERAL: Chronic | ICD-10-CM

## 2017-04-12 DIAGNOSIS — Z98.890 OTHER SPECIFIED POSTPROCEDURAL STATES: Chronic | ICD-10-CM

## 2017-04-12 DIAGNOSIS — E11.9 TYPE 2 DIABETES MELLITUS WITHOUT COMPLICATIONS: ICD-10-CM

## 2017-04-12 DIAGNOSIS — K21.9 GASTRO-ESOPHAGEAL REFLUX DISEASE WITHOUT ESOPHAGITIS: ICD-10-CM

## 2017-04-12 DIAGNOSIS — Z79.01 LONG TERM (CURRENT) USE OF ANTICOAGULANTS: ICD-10-CM

## 2017-04-12 DIAGNOSIS — I10 ESSENTIAL (PRIMARY) HYPERTENSION: ICD-10-CM

## 2017-04-12 DIAGNOSIS — J44.9 CHRONIC OBSTRUCTIVE PULMONARY DISEASE, UNSPECIFIED: ICD-10-CM

## 2017-04-12 DIAGNOSIS — E78.5 HYPERLIPIDEMIA, UNSPECIFIED: ICD-10-CM

## 2017-04-12 DIAGNOSIS — Z90.10 ACQUIRED ABSENCE OF UNSPECIFIED BREAST AND NIPPLE: Chronic | ICD-10-CM

## 2017-04-12 DIAGNOSIS — Z79.899 OTHER LONG TERM (CURRENT) DRUG THERAPY: ICD-10-CM

## 2017-04-12 LAB
ALBUMIN SERPL ELPH-MCNC: 1.9 G/DL — LOW (ref 3.3–5.2)
ALP SERPL-CCNC: 121 U/L — HIGH (ref 40–120)
ALT FLD-CCNC: 14 U/L — SIGNIFICANT CHANGE UP
ANION GAP SERPL CALC-SCNC: 13 MMOL/L — SIGNIFICANT CHANGE UP (ref 5–17)
AST SERPL-CCNC: 61 U/L — HIGH
BILIRUB SERPL-MCNC: 0.6 MG/DL — SIGNIFICANT CHANGE UP (ref 0.4–2)
BUN SERPL-MCNC: 24 MG/DL — HIGH (ref 8–20)
CALCIUM SERPL-MCNC: 8.2 MG/DL — LOW (ref 8.6–10.2)
CHLORIDE SERPL-SCNC: 106 MMOL/L — SIGNIFICANT CHANGE UP (ref 98–107)
CO2 SERPL-SCNC: 22 MMOL/L — SIGNIFICANT CHANGE UP (ref 22–29)
CREAT SERPL-MCNC: 2.8 MG/DL — HIGH (ref 0.5–1.3)
CRP SERPL-MCNC: 3.2 MG/DL — HIGH (ref 0–0.4)
EOSINOPHIL NFR BLD AUTO: 3 % — SIGNIFICANT CHANGE UP (ref 0–5)
ERYTHROCYTE [SEDIMENTATION RATE] IN BLOOD: 21 MM/HR — HIGH (ref 0–20)
GLUCOSE SERPL-MCNC: 100 MG/DL — SIGNIFICANT CHANGE UP (ref 70–115)
HCT VFR BLD CALC: 37.2 % — SIGNIFICANT CHANGE UP (ref 37–47)
HGB BLD-MCNC: 12.2 G/DL — SIGNIFICANT CHANGE UP (ref 12–16)
LYMPHOCYTES # BLD AUTO: 25 % — SIGNIFICANT CHANGE UP (ref 20–55)
MCHC RBC-ENTMCNC: 30.5 PG — SIGNIFICANT CHANGE UP (ref 27–31)
MCHC RBC-ENTMCNC: 32.8 G/DL — SIGNIFICANT CHANGE UP (ref 32–36)
MCV RBC AUTO: 93 FL — SIGNIFICANT CHANGE UP (ref 81–99)
MONOCYTES NFR BLD AUTO: 3 % — SIGNIFICANT CHANGE UP (ref 3–10)
NEUTROPHILS NFR BLD AUTO: 66 % — SIGNIFICANT CHANGE UP (ref 37–73)
PLATELET # BLD AUTO: 331 K/UL — SIGNIFICANT CHANGE UP (ref 150–400)
POTASSIUM SERPL-MCNC: 3.6 MMOL/L — SIGNIFICANT CHANGE UP (ref 3.5–5.3)
POTASSIUM SERPL-SCNC: 3.6 MMOL/L — SIGNIFICANT CHANGE UP (ref 3.5–5.3)
PROT SERPL-MCNC: 5.4 G/DL — LOW (ref 6.6–8.7)
RBC # BLD: 4 M/UL — LOW (ref 4.4–5.2)
RBC # FLD: 22.8 % — HIGH (ref 11–15.6)
SODIUM SERPL-SCNC: 141 MMOL/L — SIGNIFICANT CHANGE UP (ref 135–145)
WBC # BLD: 6.8 K/UL — SIGNIFICANT CHANGE UP (ref 4.8–10.8)
WBC # FLD AUTO: 6.8 K/UL — SIGNIFICANT CHANGE UP (ref 4.8–10.8)

## 2017-04-12 PROCEDURE — 99284 EMERGENCY DEPT VISIT MOD MDM: CPT | Mod: 25

## 2017-04-12 PROCEDURE — 85652 RBC SED RATE AUTOMATED: CPT

## 2017-04-12 PROCEDURE — 85027 COMPLETE CBC AUTOMATED: CPT

## 2017-04-12 PROCEDURE — 86140 C-REACTIVE PROTEIN: CPT

## 2017-04-12 PROCEDURE — 99284 EMERGENCY DEPT VISIT MOD MDM: CPT

## 2017-04-12 PROCEDURE — 80053 COMPREHEN METABOLIC PANEL: CPT

## 2017-04-12 NOTE — ED PROVIDER NOTE - OBJECTIVE STATEMENT
73 yo female presents from rehab due to concern of possible wound infection. Patient was being bathed today and noted discharge from the knee wound. She was sent to the ED.

## 2017-04-12 NOTE — ED ADULT NURSE NOTE - OBJECTIVE STATEMENT
pt received in er bed a 7 r. pt is from rehab. pt presents to er to rule out infection of her right knee. she recently had right knee surgery. today while being bathed, she noticed discharge to her knee and was sent to er. pt denies nausea vomiting fever chills chest pain headache abdominal pain and urinary problems. wound is clean and linear in appearance with slight clear discharge noted. no foul smell, no redness, no pus, no s/s of infection.

## 2017-04-12 NOTE — ED PROVIDER NOTE - PROGRESS NOTE DETAILS
Wound appears to be healing with obvious granulation tissue and serous draining from generalized edema. Dr. Shankar called and will come to the ED to evaluate the patient's wound. He request labs to be held at this time. Patient has been evaluated bu Dr. Ritchie and his Pa Mr Howell. He request lads at this time and he will call Dr. Rey to evaluate the wound in the ED. Wound appears to be healing with obvious granulation tissue and serous draining from generalized edema. Dr. Shankar, whom operated on the patient, called and will come to the ED to evaluate the patient's wound. He request labs to be held at this time. Patient seen by Dr. Rey in the ED. He recommends discharge to nursing home and continued wound care.

## 2017-04-12 NOTE — ED PROVIDER NOTE - PHYSICAL EXAMINATION
patient with anterior around along the incision. It is 6 cm incisional wound with grayish base involving the proximal 3 cm with scant amount of clear-rik liquid draining. the remaining 3 cm with distal eschar, without purulent discharge, erythema, significant tenderness, or foul odor.

## 2017-04-12 NOTE — PROGRESS NOTE ADULT - SUBJECTIVE AND OBJECTIVE BOX
Pt well known to plastic surgery from prioradmission.  She is not 2 mo s/p right knee prothesis removal, placement of antibiotic spacer and closure.  Procedure c/b wound eschar.  Brought back to ER to evaluate the wound.    Right knee eschar is dry and roseanna.  Small area of dehiscence.  4x3cm eschar debrided down through subcutaeous tissue.  Deep tissue appears viable.  No sign of active infection.      A/P;  OK to d/c back to rehab.  Recopmmend wet to dry BID with Dakins for now  Recommend wound VAC when available  F/u as outpt next week

## 2017-04-12 NOTE — CONSULT NOTE ADULT - SUBJECTIVE AND OBJECTIVE BOX
Patient is 72 year old female who presented to [facility] ER from Rehab due to rehab being concerned about the wound. patient is s/p explant with spacer and complex wound closure (wound closure done by [facility]) to right knee.  patient denies fevers or chills. Patient seen by Dr. Ritchie    PE   Right leg positive Eschar to mid wound, no surrounding erythema, mild warmth, no discharge.

## 2017-04-12 NOTE — ED ADULT TRIAGE NOTE - CHIEF COMPLAINT QUOTE
BIBA, patient is awake and oriented times 3, complains of her knee bleeding, sent by NH for eval of wound check, patient denies any injury

## 2017-04-12 NOTE — ED ADULT NURSE REASSESSMENT NOTE - NS ED NURSE REASSESS COMMENT FT1
pt to be discharged, awaiting transport at this time, pt aware of plan of care and will continue to monitor

## 2017-04-14 ENCOUNTER — TRANSCRIPTION ENCOUNTER (OUTPATIENT)
Age: 72
End: 2017-04-14

## 2017-04-26 ENCOUNTER — APPOINTMENT (OUTPATIENT)
Dept: ORTHOPEDIC SURGERY | Facility: CLINIC | Age: 72
End: 2017-04-26

## 2017-04-26 DIAGNOSIS — Z47.1 AFTERCARE FOLLOWING JOINT REPLACEMENT SURGERY: ICD-10-CM

## 2017-05-05 ENCOUNTER — APPOINTMENT (OUTPATIENT)
Dept: INTERNAL MEDICINE | Facility: CLINIC | Age: 72
End: 2017-05-05

## 2017-05-09 ENCOUNTER — EMERGENCY (EMERGENCY)
Facility: HOSPITAL | Age: 72
LOS: 1 days | Discharge: HOME | End: 2017-05-09
Attending: EMERGENCY MEDICINE
Payer: MEDICARE

## 2017-05-09 VITALS
TEMPERATURE: 98 F | DIASTOLIC BLOOD PRESSURE: 86 MMHG | RESPIRATION RATE: 18 BRPM | HEART RATE: 86 BPM | OXYGEN SATURATION: 96 % | SYSTOLIC BLOOD PRESSURE: 152 MMHG

## 2017-05-09 VITALS
WEIGHT: 253.97 LBS | TEMPERATURE: 98 F | DIASTOLIC BLOOD PRESSURE: 57 MMHG | RESPIRATION RATE: 16 BRPM | SYSTOLIC BLOOD PRESSURE: 104 MMHG | HEART RATE: 82 BPM | HEIGHT: 65 IN | OXYGEN SATURATION: 96 %

## 2017-05-09 DIAGNOSIS — Z90.10 ACQUIRED ABSENCE OF UNSPECIFIED BREAST AND NIPPLE: Chronic | ICD-10-CM

## 2017-05-09 DIAGNOSIS — Z98.890 OTHER SPECIFIED POSTPROCEDURAL STATES: Chronic | ICD-10-CM

## 2017-05-09 DIAGNOSIS — I10 ESSENTIAL (PRIMARY) HYPERTENSION: ICD-10-CM

## 2017-05-09 DIAGNOSIS — E78.5 HYPERLIPIDEMIA, UNSPECIFIED: ICD-10-CM

## 2017-05-09 DIAGNOSIS — Z90.710 ACQUIRED ABSENCE OF BOTH CERVIX AND UTERUS: ICD-10-CM

## 2017-05-09 DIAGNOSIS — L97.429 NON-PRESSURE CHRONIC ULCER OF LEFT HEEL AND MIDFOOT WITH UNSPECIFIED SEVERITY: ICD-10-CM

## 2017-05-09 DIAGNOSIS — Z79.01 LONG TERM (CURRENT) USE OF ANTICOAGULANTS: ICD-10-CM

## 2017-05-09 DIAGNOSIS — R22.42 LOCALIZED SWELLING, MASS AND LUMP, LEFT LOWER LIMB: ICD-10-CM

## 2017-05-09 DIAGNOSIS — Z96.653 PRESENCE OF ARTIFICIAL KNEE JOINT, BILATERAL: ICD-10-CM

## 2017-05-09 DIAGNOSIS — Z90.12 ACQUIRED ABSENCE OF LEFT BREAST AND NIPPLE: ICD-10-CM

## 2017-05-09 DIAGNOSIS — J45.909 UNSPECIFIED ASTHMA, UNCOMPLICATED: ICD-10-CM

## 2017-05-09 DIAGNOSIS — E11.9 TYPE 2 DIABETES MELLITUS WITHOUT COMPLICATIONS: ICD-10-CM

## 2017-05-09 DIAGNOSIS — Z96.653 PRESENCE OF ARTIFICIAL KNEE JOINT, BILATERAL: Chronic | ICD-10-CM

## 2017-05-09 LAB
ALBUMIN SERPL ELPH-MCNC: 2.4 G/DL — LOW (ref 3.3–5.2)
ALP SERPL-CCNC: 122 U/L — HIGH (ref 40–120)
ALT FLD-CCNC: <4 U/L — SIGNIFICANT CHANGE UP
ANION GAP SERPL CALC-SCNC: 19 MMOL/L — HIGH (ref 5–17)
AST SERPL-CCNC: 23 U/L — SIGNIFICANT CHANGE UP
BASOPHILS # BLD AUTO: 0 K/UL — SIGNIFICANT CHANGE UP (ref 0–0.2)
BASOPHILS NFR BLD AUTO: 0.2 % — SIGNIFICANT CHANGE UP (ref 0–2)
BILIRUB SERPL-MCNC: 0.9 MG/DL — SIGNIFICANT CHANGE UP (ref 0.4–2)
BUN SERPL-MCNC: 54 MG/DL — HIGH (ref 8–20)
CALCIUM SERPL-MCNC: 8.7 MG/DL — SIGNIFICANT CHANGE UP (ref 8.6–10.2)
CHLORIDE SERPL-SCNC: 103 MMOL/L — SIGNIFICANT CHANGE UP (ref 98–107)
CO2 SERPL-SCNC: 24 MMOL/L — SIGNIFICANT CHANGE UP (ref 22–29)
CREAT SERPL-MCNC: 6.68 MG/DL — HIGH (ref 0.5–1.3)
EOSINOPHIL # BLD AUTO: 0.2 K/UL — SIGNIFICANT CHANGE UP (ref 0–0.5)
EOSINOPHIL NFR BLD AUTO: 2.4 % — SIGNIFICANT CHANGE UP (ref 0–6)
GLUCOSE SERPL-MCNC: 72 MG/DL — SIGNIFICANT CHANGE UP (ref 70–115)
HCT VFR BLD CALC: 41.7 % — SIGNIFICANT CHANGE UP (ref 37–47)
HGB BLD-MCNC: 14 G/DL — SIGNIFICANT CHANGE UP (ref 12–16)
LYMPHOCYTES # BLD AUTO: 2.4 K/UL — SIGNIFICANT CHANGE UP (ref 1–4.8)
LYMPHOCYTES # BLD AUTO: 28.5 % — SIGNIFICANT CHANGE UP (ref 20–55)
MCHC RBC-ENTMCNC: 30.3 PG — SIGNIFICANT CHANGE UP (ref 27–31)
MCHC RBC-ENTMCNC: 33.6 G/DL — SIGNIFICANT CHANGE UP (ref 32–36)
MCV RBC AUTO: 90.3 FL — SIGNIFICANT CHANGE UP (ref 81–99)
MONOCYTES # BLD AUTO: 0.9 K/UL — HIGH (ref 0–0.8)
MONOCYTES NFR BLD AUTO: 11 % — HIGH (ref 3–10)
NEUTROPHILS # BLD AUTO: 4.8 K/UL — SIGNIFICANT CHANGE UP (ref 1.8–8)
NEUTROPHILS NFR BLD AUTO: 57.7 % — SIGNIFICANT CHANGE UP (ref 37–73)
PLATELET # BLD AUTO: 331 K/UL — SIGNIFICANT CHANGE UP (ref 150–400)
POTASSIUM SERPL-MCNC: 3.8 MMOL/L — SIGNIFICANT CHANGE UP (ref 3.5–5.3)
POTASSIUM SERPL-SCNC: 3.8 MMOL/L — SIGNIFICANT CHANGE UP (ref 3.5–5.3)
PROT SERPL-MCNC: 6.9 G/DL — SIGNIFICANT CHANGE UP (ref 6.6–8.7)
RBC # BLD: 4.62 M/UL — SIGNIFICANT CHANGE UP (ref 4.4–5.2)
RBC # FLD: 18.3 % — HIGH (ref 11–15.6)
SODIUM SERPL-SCNC: 146 MMOL/L — HIGH (ref 135–145)
WBC # BLD: 8.4 K/UL — SIGNIFICANT CHANGE UP (ref 4.8–10.8)
WBC # FLD AUTO: 8.4 K/UL — SIGNIFICANT CHANGE UP (ref 4.8–10.8)

## 2017-05-09 PROCEDURE — 99284 EMERGENCY DEPT VISIT MOD MDM: CPT

## 2017-05-09 PROCEDURE — 71010: CPT | Mod: 26

## 2017-05-09 PROCEDURE — 93970 EXTREMITY STUDY: CPT | Mod: 26

## 2017-05-09 NOTE — ED PROVIDER NOTE - MUSCULOSKELETAL, MLM
Pitting edema to bilateral lower extremities. Pitting edema to bilateral lower extremities. distal pulses intact.

## 2017-05-09 NOTE — ED PROVIDER NOTE - NS ED MD SCRIBE ATTENDING SCRIBE SECTIONS
PAST MEDICAL/SURGICAL/SOCIAL HISTORY/HISTORY OF PRESENT ILLNESS/REVIEW OF SYSTEMS/DISPOSITION/VITAL SIGNS( Pullset)/PHYSICAL EXAM

## 2017-05-09 NOTE — ED PROVIDER NOTE - OBJECTIVE STATEMENT
A 72 year old female pt from [facility] presents to the ED c/o increased swelling in her lower extremities. The pt has a hx of lower extremity edema and a left foot ulcer that is being cared for at the nursing home. She reports that she is not sure why she was sent from the nursing home, but states that she is having increasing pain at the site of her ulcer. Pt has an extensive medical hx but denies any other acute issues. No further complaints at this time.

## 2017-05-09 NOTE — ED ADULT NURSE REASSESSMENT NOTE - NS ED NURSE REASSESS COMMENT FT1
At this time unable to draw blood and place iv site due to pt general edema and she is uncooperative, dose not want to be stuck again, multiple nurses tried to place iv.

## 2017-05-09 NOTE — ED PROVIDER NOTE - MEDICAL DECISION MAKING DETAILS
A 72 year old female pt presents to the ED c/o edema to the lower extremities. Will order US lower extremities, r/o DVT.

## 2017-05-12 ENCOUNTER — INPATIENT (INPATIENT)
Facility: HOSPITAL | Age: 72
LOS: 11 days | DRG: 682 | End: 2017-05-24
Attending: INTERNAL MEDICINE | Admitting: HOSPITALIST
Payer: MEDICARE

## 2017-05-12 VITALS
HEART RATE: 94 BPM | DIASTOLIC BLOOD PRESSURE: 84 MMHG | OXYGEN SATURATION: 99 % | SYSTOLIC BLOOD PRESSURE: 119 MMHG | TEMPERATURE: 100 F | RESPIRATION RATE: 16 BRPM

## 2017-05-12 DIAGNOSIS — N17.9 ACUTE KIDNEY FAILURE, UNSPECIFIED: ICD-10-CM

## 2017-05-12 DIAGNOSIS — Z98.890 OTHER SPECIFIED POSTPROCEDURAL STATES: Chronic | ICD-10-CM

## 2017-05-12 DIAGNOSIS — I48.2 CHRONIC ATRIAL FIBRILLATION: ICD-10-CM

## 2017-05-12 DIAGNOSIS — R60.1 GENERALIZED EDEMA: ICD-10-CM

## 2017-05-12 DIAGNOSIS — Z90.10 ACQUIRED ABSENCE OF UNSPECIFIED BREAST AND NIPPLE: Chronic | ICD-10-CM

## 2017-05-12 DIAGNOSIS — Z96.653 PRESENCE OF ARTIFICIAL KNEE JOINT, BILATERAL: Chronic | ICD-10-CM

## 2017-05-12 LAB
ALBUMIN SERPL ELPH-MCNC: 1.8 G/DL — LOW (ref 3.3–5.2)
ALP SERPL-CCNC: 109 U/L — SIGNIFICANT CHANGE UP (ref 40–120)
ALT FLD-CCNC: <5 U/L — SIGNIFICANT CHANGE UP
ANION GAP SERPL CALC-SCNC: 17 MMOL/L — SIGNIFICANT CHANGE UP (ref 5–17)
ANISOCYTOSIS BLD QL: SLIGHT — SIGNIFICANT CHANGE UP
APPEARANCE UR: CLEAR — SIGNIFICANT CHANGE UP
APTT BLD: 41.8 SEC — HIGH (ref 27.5–37.4)
AST SERPL-CCNC: 20 U/L — SIGNIFICANT CHANGE UP
BACTERIA # UR AUTO: ABNORMAL
BASE EXCESS BLDA CALC-SCNC: -0.9 MMOL/L — SIGNIFICANT CHANGE UP (ref -3–3)
BILIRUB SERPL-MCNC: 0.8 MG/DL — SIGNIFICANT CHANGE UP (ref 0.4–2)
BILIRUB UR-MCNC: ABNORMAL
BUN SERPL-MCNC: 63 MG/DL — HIGH (ref 8–20)
CALCIUM SERPL-MCNC: 8 MG/DL — LOW (ref 8.6–10.2)
CHLORIDE SERPL-SCNC: 105 MMOL/L — SIGNIFICANT CHANGE UP (ref 98–107)
CO2 SERPL-SCNC: 23 MMOL/L — SIGNIFICANT CHANGE UP (ref 22–29)
COLOR SPEC: YELLOW — SIGNIFICANT CHANGE UP
COMMENT - URINE: SIGNIFICANT CHANGE UP
CREAT ?TM UR-MCNC: 116 MG/DL — SIGNIFICANT CHANGE UP
CREAT SERPL-MCNC: 8.05 MG/DL — HIGH (ref 0.5–1.3)
DIFF PNL FLD: ABNORMAL
EOSINOPHIL NFR BLD AUTO: 3 % — SIGNIFICANT CHANGE UP (ref 0–5)
EPI CELLS # UR: SIGNIFICANT CHANGE UP
GAS PNL BLDA: SIGNIFICANT CHANGE UP
GLUCOSE SERPL-MCNC: 128 MG/DL — HIGH (ref 70–115)
GLUCOSE UR QL: NEGATIVE MG/DL — SIGNIFICANT CHANGE UP
HCO3 BLDA-SCNC: 24 MMOL/L — SIGNIFICANT CHANGE UP (ref 20–26)
HCT VFR BLD CALC: 33.1 % — LOW (ref 37–47)
HGB BLD-MCNC: 11.5 G/DL — LOW (ref 12–16)
HOROWITZ INDEX BLDA+IHG-RTO: SIGNIFICANT CHANGE UP
HYALINE CASTS # UR AUTO: ABNORMAL /LPF
HYPOCHROMIA BLD QL: SLIGHT — SIGNIFICANT CHANGE UP
INR BLD: 3.61 RATIO — HIGH (ref 0.88–1.16)
KETONES UR-MCNC: NEGATIVE — SIGNIFICANT CHANGE UP
LACTATE BLDV-MCNC: 1.3 MMOL/L — SIGNIFICANT CHANGE UP (ref 0.7–2)
LEUKOCYTE ESTERASE UR-ACNC: ABNORMAL
LYMPHOCYTES # BLD AUTO: 20 % — SIGNIFICANT CHANGE UP (ref 20–55)
MACROCYTES BLD QL: SLIGHT — SIGNIFICANT CHANGE UP
MCHC RBC-ENTMCNC: 30.4 PG — SIGNIFICANT CHANGE UP (ref 27–31)
MCHC RBC-ENTMCNC: 34.7 G/DL — SIGNIFICANT CHANGE UP (ref 32–36)
MCV RBC AUTO: 87.6 FL — SIGNIFICANT CHANGE UP (ref 81–99)
MICROCYTES BLD QL: SLIGHT — SIGNIFICANT CHANGE UP
MONOCYTES NFR BLD AUTO: 9 % — SIGNIFICANT CHANGE UP (ref 3–10)
NEUTROPHILS NFR BLD AUTO: 64 % — SIGNIFICANT CHANGE UP (ref 37–73)
NEUTS BAND # BLD: 4 % — SIGNIFICANT CHANGE UP (ref 0–8)
NITRITE UR-MCNC: NEGATIVE — SIGNIFICANT CHANGE UP
NT-PROBNP SERPL-SCNC: HIGH PG/ML (ref 0–300)
OVALOCYTES BLD QL SMEAR: SLIGHT — SIGNIFICANT CHANGE UP
PCO2 BLDA: 37 MMHG — SIGNIFICANT CHANGE UP (ref 35–45)
PH BLDA: 7.41 — SIGNIFICANT CHANGE UP (ref 7.35–7.45)
PH UR: 5 — SIGNIFICANT CHANGE UP (ref 5–8)
PLAT MORPH BLD: NORMAL — SIGNIFICANT CHANGE UP
PLATELET # BLD AUTO: 322 K/UL — SIGNIFICANT CHANGE UP (ref 150–400)
PO2 BLDA: 74 MMHG — LOW (ref 83–108)
POIKILOCYTOSIS BLD QL AUTO: SLIGHT — SIGNIFICANT CHANGE UP
POTASSIUM SERPL-MCNC: 3.6 MMOL/L — SIGNIFICANT CHANGE UP (ref 3.5–5.3)
POTASSIUM SERPL-SCNC: 3.6 MMOL/L — SIGNIFICANT CHANGE UP (ref 3.5–5.3)
PROT ?TM UR-MCNC: 43 MG/DL — HIGH (ref 0–12)
PROT SERPL-MCNC: 5.6 G/DL — LOW (ref 6.6–8.7)
PROT UR-MCNC: 30 MG/DL
PROT/CREAT UR-RTO: 0.37 RATIO — SIGNIFICANT CHANGE UP
PROTHROM AB SERPL-ACNC: 40.8 SEC — HIGH (ref 9.8–12.7)
RBC # BLD: 3.78 M/UL — LOW (ref 4.4–5.2)
RBC # FLD: 17.6 % — HIGH (ref 11–15.6)
RBC BLD AUTO: ABNORMAL
RBC CASTS # UR COMP ASSIST: ABNORMAL /HPF (ref 0–4)
SAO2 % BLDA: 94 % — LOW (ref 95–99)
SODIUM SERPL-SCNC: 145 MMOL/L — SIGNIFICANT CHANGE UP (ref 135–145)
SP GR SPEC: 1.01 — SIGNIFICANT CHANGE UP (ref 1.01–1.02)
TROPONIN T SERPL-MCNC: 0.94 NG/ML — HIGH (ref 0–0.06)
URATE UR-MCNC: 7.2 MG/DL — LOW (ref 37–92)
UROBILINOGEN FLD QL: NEGATIVE MG/DL — SIGNIFICANT CHANGE UP
WBC # BLD: 10.8 K/UL — SIGNIFICANT CHANGE UP (ref 4.8–10.8)
WBC # FLD AUTO: 10.8 K/UL — SIGNIFICANT CHANGE UP (ref 4.8–10.8)
WBC UR QL: SIGNIFICANT CHANGE UP

## 2017-05-12 PROCEDURE — 36556 INSERT NON-TUNNEL CV CATH: CPT

## 2017-05-12 PROCEDURE — 70450 CT HEAD/BRAIN W/O DYE: CPT | Mod: 26

## 2017-05-12 PROCEDURE — 73700 CT LOWER EXTREMITY W/O DYE: CPT | Mod: 26,RT

## 2017-05-12 PROCEDURE — 76775 US EXAM ABDO BACK WALL LIM: CPT | Mod: 26

## 2017-05-12 PROCEDURE — 71010: CPT | Mod: 26

## 2017-05-12 PROCEDURE — 99285 EMERGENCY DEPT VISIT HI MDM: CPT | Mod: 25

## 2017-05-12 PROCEDURE — 71010: CPT | Mod: 26,77

## 2017-05-12 PROCEDURE — 74176 CT ABD & PELVIS W/O CONTRAST: CPT | Mod: 26

## 2017-05-12 RX ORDER — DEXTROSE MONOHYDRATE, SODIUM CHLORIDE, AND POTASSIUM CHLORIDE 50; .745; 4.5 G/1000ML; G/1000ML; G/1000ML
1000 INJECTION, SOLUTION INTRAVENOUS
Qty: 0 | Refills: 0 | Status: DISCONTINUED | OUTPATIENT
Start: 2017-05-12 | End: 2017-05-13

## 2017-05-12 RX ORDER — DEXTROSE 50 % IN WATER 50 %
25 SYRINGE (ML) INTRAVENOUS ONCE
Qty: 0 | Refills: 0 | Status: DISCONTINUED | OUTPATIENT
Start: 2017-05-12 | End: 2017-05-24

## 2017-05-12 RX ORDER — SODIUM CHLORIDE 9 MG/ML
1000 INJECTION INTRAMUSCULAR; INTRAVENOUS; SUBCUTANEOUS ONCE
Qty: 0 | Refills: 0 | Status: DISCONTINUED | OUTPATIENT
Start: 2017-05-12 | End: 2017-05-12

## 2017-05-12 RX ORDER — SODIUM CHLORIDE 9 MG/ML
3 INJECTION INTRAMUSCULAR; INTRAVENOUS; SUBCUTANEOUS ONCE
Qty: 0 | Refills: 0 | Status: COMPLETED | OUTPATIENT
Start: 2017-05-12 | End: 2017-05-12

## 2017-05-12 RX ORDER — MINERAL OIL
133 OIL (ML) MISCELLANEOUS ONCE
Qty: 0 | Refills: 0 | Status: COMPLETED | OUTPATIENT
Start: 2017-05-12 | End: 2017-05-12

## 2017-05-12 RX ORDER — DEXTROSE 50 % IN WATER 50 %
12.5 SYRINGE (ML) INTRAVENOUS ONCE
Qty: 0 | Refills: 0 | Status: DISCONTINUED | OUTPATIENT
Start: 2017-05-12 | End: 2017-05-21

## 2017-05-12 RX ORDER — DEXTROSE 50 % IN WATER 50 %
1 SYRINGE (ML) INTRAVENOUS ONCE
Qty: 0 | Refills: 0 | Status: DISCONTINUED | OUTPATIENT
Start: 2017-05-12 | End: 2017-05-21

## 2017-05-12 RX ORDER — SODIUM CHLORIDE 9 MG/ML
1000 INJECTION, SOLUTION INTRAVENOUS
Qty: 0 | Refills: 0 | Status: DISCONTINUED | OUTPATIENT
Start: 2017-05-12 | End: 2017-05-20

## 2017-05-12 RX ORDER — GLUCAGON INJECTION, SOLUTION 0.5 MG/.1ML
1 INJECTION, SOLUTION SUBCUTANEOUS ONCE
Qty: 0 | Refills: 0 | Status: DISCONTINUED | OUTPATIENT
Start: 2017-05-12 | End: 2017-05-21

## 2017-05-12 RX ORDER — INSULIN LISPRO 100/ML
VIAL (ML) SUBCUTANEOUS
Qty: 0 | Refills: 0 | Status: DISCONTINUED | OUTPATIENT
Start: 2017-05-12 | End: 2017-05-16

## 2017-05-12 RX ORDER — NALOXONE HYDROCHLORIDE 4 MG/.1ML
0.2 SPRAY NASAL ONCE
Qty: 0 | Refills: 0 | Status: COMPLETED | OUTPATIENT
Start: 2017-05-12 | End: 2017-05-12

## 2017-05-12 RX ORDER — ASPIRIN/CALCIUM CARB/MAGNESIUM 324 MG
325 TABLET ORAL ONCE
Qty: 0 | Refills: 0 | Status: DISCONTINUED | OUTPATIENT
Start: 2017-05-12 | End: 2017-05-12

## 2017-05-12 RX ORDER — FUROSEMIDE 40 MG
40 TABLET ORAL ONCE
Qty: 0 | Refills: 0 | Status: COMPLETED | OUTPATIENT
Start: 2017-05-12 | End: 2017-05-12

## 2017-05-12 RX ADMIN — Medication 40 MILLIGRAM(S): at 19:33

## 2017-05-12 RX ADMIN — NALOXONE HYDROCHLORIDE 0.2 MILLIGRAM(S): 4 SPRAY NASAL at 19:30

## 2017-05-12 RX ADMIN — Medication 2 MILLIGRAM(S): at 12:44

## 2017-05-12 RX ADMIN — SODIUM CHLORIDE 3 MILLILITER(S): 9 INJECTION INTRAMUSCULAR; INTRAVENOUS; SUBCUTANEOUS at 15:16

## 2017-05-12 RX ADMIN — DEXTROSE MONOHYDRATE, SODIUM CHLORIDE, AND POTASSIUM CHLORIDE 125 MILLILITER(S): 50; .745; 4.5 INJECTION, SOLUTION INTRAVENOUS at 20:22

## 2017-05-12 NOTE — ED PROVIDER NOTE - OBJECTIVE STATEMENT
CC: diffuse swelling  Presenting symptoms: 71yo female BIBA with diffuse edema and confusion with baseline dementia  Pertinent Positives: edema  Pertinent Negatives: no CP, no HA, no fever  Timing: unknown onset  Radiation: none  Severity: severe  Aggravating Factors: none  Relieving Factors: none

## 2017-05-12 NOTE — PROGRESS NOTE ADULT - SUBJECTIVE AND OBJECTIVE BOX
REASON FOR CONSULT: renal failure    CONSULT REQUESTED BY: hospitalist    Patient is a 72y old  Female who presents with a chief complaint of mental status changes & anasarca. (12 May 2017 18:13)      BRIEF HOSPITAL COURSE: 72F with extensive medical hx who lives in NH, severe dementia and chronic LE venous disease, CKD sent to ED by NH for worsening swelling of LE. While in ED found to have worsening renal function.  Called to evaluate by medical team.  Renal consulted.  Pt w/o complaints SOB, and remains hemodynamically stable. Brother present at bedside who states that mental status and LE swelling are at her baseline.  Pt nonverbal, history obtained from brother and charts.     PAST MEDICAL & SURGICAL HISTORY:  Hypercholesterolemia  Deficiency of vitamin K  Chronic pain  COPD (chronic obstructive pulmonary disease)  Urine retention  Pressure ulcer  Atrial fibrillation  Constipation  Breast cancer  HLD (hyperlipidemia)  HTN (hypertension)  GERD (gastroesophageal reflux disease)  DM (diabetes mellitus)  Obesity  LIN on CPAP  History of incision and drainage: rt knee  S/p total knee replacement, bilateral  S/P knee replacement, right  S/P hysterectomy  S/P mastectomy: Left  Breast cancer    Allergies    No Known Allergies    Intolerances      FAMILY HISTORY:  Family history of diabetes mellitus      Review of Systems:  CONSTITUTIONAL: No fever, chills  EYES: No eye pain, visual disturbances, or discharge  ENMT:  No difficulty hearing, tinnitus, vertigo; No sinus or throat pain  NECK: No pain or stiffness  RESPIRATORY: No cough, wheezing, chills or hemoptysis; No shortness of breath  CARDIOVASCULAR: No chest pain, palpitations, dizziness, or leg swelling  GASTROINTESTINAL: No abdominal or epigastric pain. No nausea, vomiting, or hematemesis; No diarrhea or constipation. No melena or hematochezia.  GENITOURINARY: No dysuria, frequency, hematuria, or incontinence  NEUROLOGICAL: No headaches, memory loss, loss of strength, numbness, or tremors  SKIN: No itching, burning, rashes, or lesions   MUSCULOSKELETAL: chronic LE swelling  PSYCHIATRIC: No depression, anxiety, mood swings, or difficulty sleeping      Medications:                  insulin lispro (HumaLOG) corrective regimen sliding scale  SubCutaneous three times a day before meals  dextrose Gel 1Dose(s) Oral once PRN  dextrose 50% Injectable 12.5Gram(s) IV Push once  dextrose 50% Injectable 25Gram(s) IV Push once  dextrose 50% Injectable 25Gram(s) IV Push once  glucagon  Injectable 1milliGRAM(s) IntraMuscular once PRN    dextrose 5%. 1000milliLiter(s) IV Continuous <Continuous>  sodium chloride 0.45% with potassium chloride 20 mEq/L 1000milliLiter(s) IV Continuous <Continuous>                ICU Vital Signs Last 24 Hrs  T(C): 37.6, Max: 37.6 (05-12 @ 09:49)  T(F): 99.7, Max: 99.7 (05- @ 09:49)  HR: 97 (92 - 97)  BP: 135/71 (115/69 - 135/71)  BP(mean): --  ABP: --  ABP(mean): --  RR: 16 (16 - 16)  SpO2: 96% (96% - 99%)    Vital Signs Last 24 Hrs  T(C): 37.6, Max: 37.6 (05- @ 09:49)  T(F): 99.7, Max: 99.7 (05- @ 09:49)  HR: 97 (92 - 97)  BP: 135/71 (115/69 - 135/71)  BP(mean): --  RR: 16 (16 - 16)  SpO2: 96% (96% - 99%)        I&O's Detail    I & Os for current day (as of 12 May 2017 20:39)  =============================================  IN:    Total IN: 0 ml  ---------------------------------------------  OUT:    Voided: 350 ml    Total OUT: 350 ml  ---------------------------------------------  Total NET: -350 ml        LABS:                        11.5   10.8  )-----------( 322      ( 12 May 2017 15:46 )             33.1         145  |  105  |  63.0<H>  ----------------------------<  128<H>  3.6   |  23.0  |  8.05<H>    Ca    8.0<L>      12 May 2017 15:46    TPro  5.6<L>  /  Alb  1.8<L>  /  TBili  0.8  /  DBili  x   /  AST  20  /  ALT  <5  /  AlkPhos  109  05-12      CARDIAC MARKERS ( 12 May 2017 15:46 )  x     / 0.94 ng/mL / x     / x     / x          CAPILLARY BLOOD GLUCOSE  102 (12 May 2017 09:53)    PT/INR - ( 12 May 2017 15:46 )   PT: 40.8 sec;   INR: 3.61 ratio         PTT - ( 12 May 2017 15:46 )  PTT:41.8 sec  Urinalysis Basic - ( 12 May 2017 19:09 )    Color: Yellow / Appearance: Clear / S.015 / pH: x  Gluc: x / Ketone: Negative  / Bili: Small / Urobili: Negative mg/dL   Blood: x / Protein: 30 mg/dL / Nitrite: Negative   Leuk Esterase: Moderate / RBC: x / WBC x   Sq Epi: x / Non Sq Epi: x / Bacteria: x      CULTURES:pending  anna catheter placed in ED:    Physical Examination:    General: No acute distress. appears comfortable, opens her eyes to voice and follows simple commands    HEENT: Pupils equal, reactive to light.  Symmetric.    PULM: Clear to auscultation bilaterally, no significant sputum production    CVS: irregular rate    ABD: Soft, nondistended, nontender, normoactive bowel sounds, no masses    EXT: bilateral LE swelling with evidence of severe chronic venous insufficiency      RADIOLOGY:  EXAM:  CHEST SINGLE VIEW FRONTAL                          PROCEDURE DATE:  2017        INTERPRETATION:  Chest portable semierect single AP view:    Clinical history:    Altered mental status. Chest pain.    Findings:    Cardiac size appears normal. Aorta within normal limits. Lungs appear   normal. No acute pulmonary process. CP angles appear normal. No   pneumothorax. Spondylosis. Right humeral head changes of uncertain   etiology. Left mastectomy.    Impression:    No radiologically active cardiopulmonary process seen.    Right humeral bone changes of uncertain etiology. Further evaluation   requested.      CRITICAL CARE TIME SPENT: approximately 25 minutes spent reviewing charts, discussion with family and care team, direct patient care REASON FOR CONSULT: renal failure    CONSULT REQUESTED BY: hospitalist    Patient is a 72y old  Female who presents with a chief complaint of mental status changes & anasarca. (12 May 2017 18:13)      BRIEF HOSPITAL COURSE: 72F with extensive medical hx who lives in NH, severe dementia and chronic LE venous disease, CKD sent to ED by NH for worsening swelling of LE. While in ED found to have worsening renal function.  Called to evaluate by medical team.  Renal consulted.  Pt w/o complaints SOB, and remains hemodynamically stable. Brother present at bedside who states that mental status and LE swelling are at her baseline.  Pt nonverbal, history obtained from brother and charts.     PAST MEDICAL & SURGICAL HISTORY:  Hypercholesterolemia  Deficiency of vitamin K  Chronic pain  COPD (chronic obstructive pulmonary disease)  Urine retention  Pressure ulcer  Atrial fibrillation  Constipation  Breast cancer  HLD (hyperlipidemia)  HTN (hypertension)  GERD (gastroesophageal reflux disease)  DM (diabetes mellitus)  Obesity  LIN on CPAP  History of incision and drainage: rt knee  S/p total knee replacement, bilateral  S/P knee replacement, right  S/P hysterectomy  S/P mastectomy: Left  Breast cancer    Allergies    No Known Allergies    Intolerances      FAMILY HISTORY:  Family history of diabetes mellitus      Review of Systems:  CONSTITUTIONAL: No fever, chills  EYES: No eye pain, visual disturbances, or discharge  ENMT:  No difficulty hearing, tinnitus, vertigo; No sinus or throat pain  NECK: No pain or stiffness  RESPIRATORY: No cough, wheezing, chills or hemoptysis; No shortness of breath  CARDIOVASCULAR: No chest pain, palpitations, dizziness, or leg swelling  GASTROINTESTINAL: No abdominal or epigastric pain. No nausea, vomiting, or hematemesis; No diarrhea or constipation. No melena or hematochezia.  GENITOURINARY: No dysuria, frequency, hematuria, or incontinence  NEUROLOGICAL: No headaches, memory loss, loss of strength, numbness, or tremors  SKIN: No itching, burning, rashes, or lesions   MUSCULOSKELETAL: chronic LE swelling  PSYCHIATRIC: No depression, anxiety, mood swings, or difficulty sleeping      Medications:                  insulin lispro (HumaLOG) corrective regimen sliding scale  SubCutaneous three times a day before meals  dextrose Gel 1Dose(s) Oral once PRN  dextrose 50% Injectable 12.5Gram(s) IV Push once  dextrose 50% Injectable 25Gram(s) IV Push once  dextrose 50% Injectable 25Gram(s) IV Push once  glucagon  Injectable 1milliGRAM(s) IntraMuscular once PRN    dextrose 5%. 1000milliLiter(s) IV Continuous <Continuous>  sodium chloride 0.45% with potassium chloride 20 mEq/L 1000milliLiter(s) IV Continuous <Continuous>                ICU Vital Signs Last 24 Hrs  T(C): 37.6, Max: 37.6 (05-12 @ 09:49)  T(F): 99.7, Max: 99.7 (05- @ 09:49)  HR: 97 (92 - 97)  BP: 135/71 (115/69 - 135/71)  BP(mean): --  ABP: --  ABP(mean): --  RR: 16 (16 - 16)  SpO2: 96% (96% - 99%)    Vital Signs Last 24 Hrs  T(C): 37.6, Max: 37.6 (05- @ 09:49)  T(F): 99.7, Max: 99.7 (05- @ 09:49)  HR: 97 (92 - 97)  BP: 135/71 (115/69 - 135/71)  BP(mean): --  RR: 16 (16 - 16)  SpO2: 96% (96% - 99%)        I&O's Detail    I & Os for current day (as of 12 May 2017 20:39)  =============================================  IN:    Total IN: 0 ml  ---------------------------------------------  OUT:    Voided: 350 ml    Total OUT: 350 ml  ---------------------------------------------  Total NET: -350 ml        LABS:                        11.5   10.8  )-----------( 322      ( 12 May 2017 15:46 )             33.1         145  |  105  |  63.0<H>  ----------------------------<  128<H>  3.6   |  23.0  |  8.05<H>    Ca    8.0<L>      12 May 2017 15:46    TPro  5.6<L>  /  Alb  1.8<L>  /  TBili  0.8  /  DBili  x   /  AST  20  /  ALT  <5  /  AlkPhos  109  05-12      CARDIAC MARKERS ( 12 May 2017 15:46 )  x     / 0.94 ng/mL / x     / x     / x          CAPILLARY BLOOD GLUCOSE  102 (12 May 2017 09:53)    PT/INR - ( 12 May 2017 15:46 )   PT: 40.8 sec;   INR: 3.61 ratio         PTT - ( 12 May 2017 15:46 )  PTT:41.8 sec  Urinalysis Basic - ( 12 May 2017 19:09 )    Color: Yellow / Appearance: Clear / S.015 / pH: x  Gluc: x / Ketone: Negative  / Bili: Small / Urobili: Negative mg/dL   Blood: x / Protein: 30 mg/dL / Nitrite: Negative   Leuk Esterase: Moderate / RBC: x / WBC x   Sq Epi: x / Non Sq Epi: x / Bacteria: x      CULTURES:pending  anna catheter placed in ED:    Physical Examination:    General: No acute distress. appears comfortable, opens her eyes to voice and follows simple commands    HEENT: Pupils equal, reactive to light.  Symmetric.    PULM: Clear to auscultation bilaterally, no significant sputum production    CVS: irregular rate    ABD: Soft, nondistended, nontender, normoactive bowel sounds, no masses    EXT: bilateral LE swelling with evidence of severe chronic venous insufficiency    EKG: no evidence of acute ST-T wave changes    RADIOLOGY:  EXAM:  CHEST SINGLE VIEW FRONTAL                          PROCEDURE DATE:  2017        INTERPRETATION:  Chest portable semierect single AP view:    Clinical history:    Altered mental status. Chest pain.    Findings:    Cardiac size appears normal. Aorta within normal limits. Lungs appear   normal. No acute pulmonary process. CP angles appear normal. No   pneumothorax. Spondylosis. Right humeral head changes of uncertain   etiology. Left mastectomy.    Impression:    No radiologically active cardiopulmonary process seen.    Right humeral bone changes of uncertain etiology. Further evaluation   requested.      CRITICAL CARE TIME SPENT: approximately 25 minutes spent reviewing charts, discussion with family and care team, direct patient care

## 2017-05-12 NOTE — CONSULT NOTE ADULT - ASSESSMENT
KARMEN on CKD h/o HTN DM Obesity. I suspect hypoperfusion from decreased fluid intake   pt also third spacing from hypoAlbuminemia alb 1.8  Will check 24hr urine for volume and proteinuria  will give IVF next 12- 16 hrs if no improvement in renal function may need HD  Consent for HD obtained from pt brother should need arise.  No urgent need currently electrolytes and BP acceptable

## 2017-05-12 NOTE — ED ADULT NURSE REASSESSMENT NOTE - NS ED NURSE REASSESS COMMENT FT1
IV fluids hung per orders. ICU PA at the bedside to evaluate pt. pt remains on cardiac monitor. will continue to monitor.

## 2017-05-12 NOTE — CONSULT NOTE ADULT - SUBJECTIVE AND OBJECTIVE BOX
[place]                                   [address] [place] [address]                                                      PHONE: [phone]                                                         FAX: [phone]  [email or web address]  ---------------------------------------------------------------------------------------------------------------------------------    Reason for Consult: + trop     HPI:  SHYAM LAL is an 72y Female PMH of Severe dementia none verbal bed bound CKD ~ 2.0 HTN HLD DM DVT COPD ? PAF that comes in today subacute rehab due to increase swelling anasarca.  History obtain from the chart and brother who is at bed side.      PAST MEDICAL & SURGICAL HISTORY:  Hypercholesterolemia  Deficiency of vitamin K  Chronic pain  COPD (chronic obstructive pulmonary disease)  Urine retention  Pressure ulcer  Atrial fibrillation  Constipation  Breast cancer  HLD (hyperlipidemia)  HTN (hypertension)  GERD (gastroesophageal reflux disease)  DM (diabetes mellitus)  Obesity  LIN on CPAP  History of incision and drainage: rt knee  S/p total knee replacement, bilateral  S/P knee replacement, right  S/P hysterectomy  S/P mastectomy: Left  Breast cancer      No Known Allergies      MEDICATIONS  (STANDING):  furosemide   Injectable 40milliGRAM(s) IV Push Once  aspirin 325milliGRAM(s) Oral Once  sodium chloride 0.9% Bolus 1000milliLiter(s) IV Bolus once    MEDICATIONS  (PRN):      Social History:  Cigarettes:      none              Alchohol:       none          Illicit Drug Abuse:  none    ROS: Negative other than as mentioned in HPI.    Vital Signs Last 24 Hrs  T(C): 37.6, Max: 37.6 (05-12 @ 09:49)  T(F): 99.7, Max: 99.7 (05-12 @ 09:49)  HR: 92 (92 - 94)  BP: 115/69 (115/69 - 125/78)  BP(mean): --  RR: 16 (16 - 16)  SpO2: 98% (98% - 99%)  ICU Vital Signs Last 24 Hrs  SHYAM LAL  I&O's Detail    I&O's Summary    Drug Dosing Weight  SHYAM LAL      PHYSICAL EXAM:  General: NAD   HEENT: Head; normocephalic, atraumatic.  Eyes: Pupils reactive, cornea wnl.  Neck: Supple, no nodes adenopathy, no NVD or carotid bruit or thyromegaly.  CARDIOVASCULAR: Normal S1 and S2, 2/6 murmur, rub, gallop or lift.   LUNGS: No rales, rhonchi or wheeze. Normal breath sounds bilaterally.  ABDOMEN: Soft, nontender without mass or organomegaly. bowel sounds normoactive.  EXTREMITIES: No clubbing, cyanosis + edema. Distal pulses wnl.   SKIN: warm and dry with normal turgor.  NEURO: Alert/oriented x 0      LABS:                        11.5   10.8  )-----------( 322      ( 12 May 2017 15:46 )             33.1     05-12    145  |  105  |  63.0<H>  ----------------------------<  128<H>  3.6   |  23.0  |  8.05<H>    Ca    8.0<L>      12 May 2017 15:46    TPro  5.6<L>  /  Alb  1.8<L>  /  TBili  0.8  /  DBili  x   /  AST  20  /  ALT  <5  /  AlkPhos  109  05-12    SHYAM LAL  CARDIAC MARKERS ( 12 May 2017 15:46 )  x     / 0.94 ng/mL / x     / x     / x          PT/INR - ( 12 May 2017 15:46 )   PT: 40.8 sec;   INR: 3.61 ratio         PTT - ( 12 May 2017 15:46 )  PTT:41.8 sec      RADIOLOGY & ADDITIONAL STUDIES:    INTERPRETATION OF TELEMETRY (personally reviewed):    ECG: NSR without ischemic changes     ECHO:   Summary:   1. Left ventricular ejection fraction, by visual estimation, is 55 to   60%.   2. Normal global left ventricular systolic function.   3. The mitral in-flow pattern reveals no discernable A-wave, therefore   no comment on diastolic function can be made.   4. Normal left ventricular internal cavity size.   5. There is mild concentric left ventricular hypertrophy.   6. The left atrium is normal in size.   7.Mildly dilated right atrium.   8. Mild to moderate mitral annular calcification.   9. Thickening and calcification of the anterior and posterior mitral   valve leaflets.  10. Mild mitral valve regurgitation.  11. Sclerotic aortic valve with normal opening.  12. Estimated pulmonary artery systolic pressure is 38.8 mmHg assuming a   right atrial pressure of 10 mmHg, which is consistent with borderline   pulmonary hypertension.  13. There is no evidence of pericardial effusion.      Assessment and Plan:  In summary, SHYAM LAL is an 72y Female with past medical history significant for Severe dementia none-verbal bed bound CKD ~ 2.0 HTN HLD DM DVT on warfarin COPD ? PAF LIN on CPAP that comes in today from subacute rehab due to increase swelling anasarca chronic in nature found to have KARMEN on CKD.  + trop likely due to renal clearness; no evidence of decompensated heart failure appears intravascular volume depletion; anasarca due to severe hypoalbuminemia       Plan  1.  Consider renal Consult   2.  Serial trop to rule out AMI unlikely ACS   3.  Consider TTE to evaluate LV function  4.  Ella monitoring     Consider palliative care consult poor prognosis

## 2017-05-12 NOTE — ED PROCEDURE NOTE - CPROC ED INFUS LINE DETAIL1
Ultrasound guidance was used during placement./The guidewire was recovered./The location was identified, and the area was draped and prepped./All lumen(s) aspirated and flushed without difficulty./The catheter was placed using sterile technique.

## 2017-05-12 NOTE — PROGRESS NOTE ADULT - PROBLEM SELECTOR PLAN 2
Currently with elevated INR likely secondary to vitamin K deficiency. Would get 2D Echo in am.  Suspect troponin elevation secondary to renal failure. Currently with elevated INR likely secondary to vitamin K deficiency. No acute EKG changes. Suspect troponin elevation secondary to renal failure. Plan as per cardio.

## 2017-05-12 NOTE — ED ADULT NURSE REASSESSMENT NOTE - NS ED NURSE REASSESS COMMENT FT1
pt. changed and positioned. pt. has a large 4.5x5 2cm deep with slough noted to RT. knee.  a stasis ulcer noted to her upper calf 10x2. pt. changed and positioned. pt. has a large 4.5x5 2cm deep with slough noted to RT. knee.  a stasis ulcer noted to her upper calf 10x2. +. pitting edema to bilateral lower extremities.

## 2017-05-12 NOTE — ED ADULT TRIAGE NOTE - CHIEF COMPLAINT QUOTE
Patient comes to ED from [place] for eval of altered mental status since yesterday and generalized edema to all 4 extremities. Patient is minimally verbal; awake shaking head yes or no but not answering any questions. Patient alert and oriented x0. .

## 2017-05-12 NOTE — PROGRESS NOTE ADULT - ASSESSMENT
72F with severe dementia, KARMEN on CKD, chronic venous insufficiency who at this time does not have signs of difficulty breathing and remains hemodynamically stable

## 2017-05-12 NOTE — ED ADULT NURSE NOTE - CHIEF COMPLAINT QUOTE
Patient comes to ED from [place] for eval of altered mental status since yesterday and generalized edema to all 4 extremities. Patient is minimally verbal; awake shaking head yes or no but not answering any questions. Patient alert and oriented x0. .

## 2017-05-12 NOTE — ED ADULT NURSE NOTE - OBJECTIVE STATEMENT
73 y/o female awake, alert x1 comes to ED c/o of AMS. 73 y/o female awake, alert x1 comes to ED c/[facility] of [facility] pt. does not answer questions properly. upper and lower edema noted to patient's extremities. 73 y/o female awake, alert x1 comes to ED c/o of AMS [facility] [place] [place] pt. does not answer questions properly. upper and lower edema noted to patient's extremities. pt. unable to really answer questions. pt. on cm. 71 y/o female awake, alert x1 comes to ED c/o of [facility] for increased confusion. pt. does not answer questions properly. upper and lower edema noted to patient's extremities. pt. unable to really answer questions. pt. on cm. vs as charted.

## 2017-05-12 NOTE — ED ADULT NURSE REASSESSMENT NOTE - NS ED NURSE REASSESS COMMENT FT1
as per ER MD Richardson, awaiting PA for PICC line placement. no bloods drawn at this time, no IV access. ER MD aware.

## 2017-05-12 NOTE — ED PROVIDER NOTE - CARE PLAN
Principal Discharge DX:	Anasarca  Secondary Diagnosis:	Elevated troponin  Secondary Diagnosis:	CHF (congestive heart failure)

## 2017-05-12 NOTE — H&P ADULT - ASSESSMENT
71 yr old female with hypertension, hyperlipidemia, LIN, CKD, COPD, paroxysmal A fib, septic arthritis, CKD baseline Cr 2.8 in April '17 sent in from [facility] due to mental status changes & anasarca.  Pt's Cr increased from baseline 2.8 to 8 today.       Significant KARMEN on CKD- Check for urinary retention, Insert Gaytan to check UO, Renal US stat, CPK, Renal consult Dr. Underwood called, may need urgent HD, hold torsemide    Elevated INR 2/2 xarelto & possible vitamin K def- c/w xarelto when taking PO, will give vitamin K IV (has hx of vitamin K def per chart),no hx of  liver dz as per chart    Altered mental status- likely from uremia, gets oxycodone at NH, will give stat Narcan to check for reversal, Ct head ordered, neuro checks, NPO,  ICU consult called, pt Full code  DM- ISS< f/u A1  Afib- c/w metoprolol, xarelto when resume PO  LIN- not on CPAP?, pulm consult called  DVT ppx- on xarelto 71 yr old female with hypertension, hyperlipidemia, LIN, CKD, COPD, paroxysmal A fib, septic arthritis, CKD baseline Cr 2.8 in April '17 sent in from [facility] due to mental status changes & anasarca.  Pt's Cr increased from baseline 2.8 to 8 today.       Significant KARMEN on CKD- Check for urinary retention, Insert Gaytan to check UO, Renal US stat, CPK, Renal consult Dr. Underwood called, may need urgent HD, hold torsemide    Elevated INR 2/2 xarelto & possible vitamin K def- no overt bleeding observed, c/w xarelto when taking PO, trial PCC if life threatening bleeding occurs ,no hx of  liver dz as per chart    Altered mental status- likely from uremia, gets oxycodone q4 at NH, will give stat Narcan to check for reversal, Ct head ordered, neuro checks, NPO,  ICU consult called, pt Full code  DM- ISS, f/u A1  Afib- c/w metoprolol, xarelto when resume PO  LIN- not on CPAP?, pulm consult called  DVT ppx- on xarelto 71 yr old female with hypertension, hyperlipidemia, LIN, CKD, COPD, paroxysmal A fib, septic arthritis, CKD baseline Cr 2.8 in April '17 sent in from [facility] due to mental status changes & anasarca.  Pt's Cr increased from baseline 2.8 to 8 today.       Significant KARMEN on CKD- Check for urinary retention, Insert Gaytan to check UO, Renal US stat, CPK, Renal consult Dr. Underwood called, may need urgent HD, hold torsemide    Elevated INR 2/2 xarelto & possible vitamin K def- no overt bleeding observed, c/w xarelto when taking PO, trial PCC if life threatening bleeding occurs ,no hx of  liver dz as per chart    Altered mental status- likely from uremia, gets oxycodone q4 at NH, will give stat Narcan to check for reversal, Ct head ordered, neuro checks, NPO,  ICU consult called, pt Full code, add all NH meds when taking PO  DM- ISS, f/u A1  Afib- c/w metoprolol, xarelto when resume PO  LIN- not on CPAP?, pulm consult called  DVT ppx- on xarelto 71 yr old female with hypertension, hyperlipidemia, LIN, CKD, COPD, paroxysmal A fib, septic arthritis, CKD baseline Cr 2.8 in April '17 sent in from [facility] due to mental status changes & anasarca.  Pt's Cr increased from baseline 2.8 to 8 today.       Significant KARMEN on CKD- Check for urinary retention, Inserted Gaytan with minimal urine output , Renal US stat, CPK, Renal consult Dr. Underwood called, may need urgent HD, hold torsemide    Elevated INR 2/2 xarelto & possible vitamin K def- no overt bleeding observed, hold xarelto tonight, f/u INR in am, PCC if life threatening bleeding occurs ,no hx of  liver dz as per chart    Altered mental status- likely from uremia, gets oxycodone q4 at NH, will give stat Narcan to check for reversal, ABG, Ct head ordered, neuro checks, NPO,  ICU consult called, pt Full code, add all NH meds when taking PO  DM- ISS, f/u A1  Afib- c/w metoprolol, xarelto on hold tonight  LIN- not on CPAP?, pulm consult called  Ulcer over right knee- CT knee, Vascular surgery called (Trauma resident)  Sacral decub stage 3- mepilex, wound care consult  Mild TnI elevation-likely from KARMEN & poor clearence, EKG with no changes, F/U TnI trend, Echo  DVT ppx- on xarelto 71 yr old female with Severe dementia non verbal bed bound, hypertension, hyperlipidemia, LIN, CKD, COPD, paroxysmal A fib, septic arthritis, CKD baseline Cr 2.8 in April '17 sent in from [facility] due to mental status changes & anasarca.  Pt's Cr increased from baseline 2.8 to 8 today.       *Significant KARMEN on CKD- Check for urinary retention, Inserted Gaytan with minimal urine output , Renal US stat, CPK, Renal consult Dr. Underwood called, may need urgent HD, hold torsemide    *Elevated INR 2/2 xarelto & possible vitamin K def- no overt bleeding observed, hold xarelto tonight, f/u INR in am, PCC if life threatening bleeding occurs ,no hx of  liver dz as per chart    *Altered mental status- likely from uremia, gets oxycodone q4 at NH, will give stat Narcan to check for reversal, ABG, Ct head ordered, neuro checks, NPO,  ICU consult called, pt Full code, add all NH meds when taking PO  *DM- ISS, f/u A1  *Afib- c/w metoprolol, xarelto on hold tonight  *LIN- not on CPAP?, pulm consult   *Ulcer over right knee- CT knee, Vascular surgery called (Trauma resident)  *Sacral decub stage 3- mepilex, wound care consult  *Mild TnI elevation-likely from KARMEN & poor clearence, EKG with no changes, F/U TnI trend, Echo  *DVT ppx- on xarelto 71 yr old female with Severe dementia non verbal bed bound, hypertension, hyperlipidemia, LIN, CKD, COPD, paroxysmal A fib, septic arthritis, CKD baseline Cr 2.8 in April '17 sent in from [facility] due to mental status changes & anasarca.  Pt's Cr increased from baseline 2.8 to 8 today.       *Significant KARMEN on CKD- Check for urinary retention, Inserted Gaytan with minimal urine output , Renal US stat, CPK, Renal consult Dr. Underwood called, may need urgent HD, hold torsemide    *Elevated INR 2/2 xarelto & possible vitamin K def- no overt bleeding observed, hold xarelto tonight, f/u INR in am, PCC if life threatening bleeding occurs ,no hx of  liver dz as per chart    *Altered mental status- likely from uremia, gets oxycodone q4 at NH, will give stat Narcan to check for reversal, ABG, Ct head ordered, neuro checks, NPO,  ICU consult called, pt Full code, add all NH meds when taking PO  *DM- ISS, f/u A1  *Afib- c/w metoprolol, xarelto on hold tonight  *LIN- not on CPAP?, pulm consult   *Ulcer over right knee- CT knee, Vascular surgery called (Trauma resident)  *Sacral decub stage 3- mepilex, wound care consult  *Mild TnI elevation-likely from KARMEN & poor clearence, EKG with no changes, F/U TnI trend, Echo, Cardio consult called by ED attending?  *DVT ppx- on xarelto 71 yr old female with Severe dementia non verbal bed bound, hypertension, hyperlipidemia, LIN, CKD, COPD, paroxysmal A fib, septic arthritis, CKD baseline Cr 2.8 in April '17 sent in from [facility] due to mental status changes & anasarca.  Pt's Cr increased from baseline 2.8 to 8 today.       *Significant KARMEN on CKD with volume overload- Check for urinary retention, Inserted Gaytan with minimal urine output , Renal US stat, CPK, Renal consult Dr. Underwood called, may need urgent HD, hold torsemide, avoid nephrotoxics    *Elevated INR 2/2 xarelto & possible vitamin K def- no overt bleeding observed, hold xarelto tonight, f/u INR in am, PCC if life threatening bleeding occurs ,no hx of  liver dz as per chart    *Altered mental status- likely from uremia, gets oxycodone q4 at NH, will give stat Narcan to check for reversal, ABG, Ct head ordered, neuro checks, NPO,  ICU consult called, pt Full code, add all NH meds when taking PO  *DM- ISS, f/u A1  *Afib- c/w metoprolol, xarelto on hold tonight  *LIN- not on CPAP?, pulm consult   *Ulcer over right knee- CT knee, Vascular surgery called (Trauma resident)  *Sacral decub stage 3- mepilex, wound care consult  *Mild TnI elevation-likely from KARMEN & poor clearence, EKG with no changes, F/U TnI trend, Echo, Cardio consult called by ED attending?  *DVT ppx- on xarelto 71 yr old female with Severe dementia non verbal bed bound, hypertension, hyperlipidemia, LIN, CKD, COPD, paroxysmal A fib, septic arthritis, CKD baseline Cr 2.8 in April '17 sent in from [facility] due to mental status changes & anasarca.  Pt's Cr increased from baseline 2.8 to 8 today.       *Significant KARMEN on CKD with volume overload- Check for urinary retention, Inserted Gaytan with minimal urine output , Renal US stat, CPK, Renal consult Dr. Underwood called, may need urgent HD, hold torsemide, avoid nephrotoxics    *Elevated INR 2/2 xarelto & possible vitamin K def- no overt bleeding observed, hold xarelto tonight, f/u INR in am, PCC if life threatening bleeding occurs ,no hx of  liver dz as per chart    *Altered mental status- likely from uremia, gets oxycodone q4 at NH, will give stat Narcan to check for reversal, ABG, Ct head ordered, neuro checks, NPO,  ICU consult called, pt Full code, add all NH meds when taking PO  *DM- ISS, f/u A1  *Afib- c/w metoprolol, xarelto on hold tonight  *LIN- not on CPAP?, pulm consult   *Right knee eschar- prior hx of Right knee eschar with dehiscence, pt was supposed to get wound vac as outpatient with Dr. Rey, wet to dry BID with Dakins for now, CT knee, Vascular surgery called (Trauma resident)  *Sacral decub stage 3- mepilex, wound care consult  *Mild TnI elevation-likely from KARMEN & poor clearence, EKG with no changes, F/U TnI trend, Echo, Cardio consult called by ED attending?  *DVT ppx- on xarelto 71 yr old female with Severe dementia non verbal bed bound, hypertension, hyperlipidemia, LIN, CKD, COPD, paroxysmal A fib, septic arthritis, CKD baseline Cr 2.8 in April '17 sent in from [facility] due to mental status changes & anasarca.  Pt's Cr increased from baseline 2.8 to 8 today.       *Significant KARMEN on CKD with volume overload- increased extravascular volume due to hypoalbuminemia, Check for urinary retention, Inserted Gaytan with minimal urine output , Renal US stat, CPK, Renal consult Dr. Underwood called, may need urgent HD, hold torsemide, avoid nephrotoxics    *Elevated INR 2/2 xarelto & possible vitamin K def- no overt bleeding observed, hold xarelto tonight, f/u INR in am, PCC if life threatening bleeding occurs ,no hx of  liver dz as per chart    *Altered mental status/encephalopathy- likely from uremia, gets oxycodone q4 at NH, will give stat Narcan to check for reversal, ABG, Ct head ordered, neuro checks, NPO,  ICU consult called, pt Full code, add all NH meds when taking PO  *DM- ISS, f/u A1  *Afib- c/w metoprolol, xarelto on hold tonight  *LIN- not on CPAP?, pulm consult   *Right knee eschar- prior hx of Right knee eschar with dehiscence, pt was supposed to get wound vac as outpatient with Dr. Rey, wet to dry BID with Dakins for now, CT knee, Vascular surgery called (Trauma resident)  *Sacral decub stage 3- mepilex, wound care consult  *Mild TnI elevation-likely from KARMEN & poor clearence, EKG with no changes, F/U TnI trend, Echo, Cardio consult called by ED attending?  *DVT ppx- on xarelto 71 yr old female with Severe dementia non verbal bed bound, hypertension, hyperlipidemia, LIN, CKD, COPD, paroxysmal A fib, septic arthritis, CKD baseline Cr 2.8 in April '17 sent in from [facility] due to mental status changes & anasarca.  Pt's Cr increased from baseline 2.8 to 8 today.       *Significant KARMEN on CKD with volume overload- increased extravascular volume due to hypoalbuminemia, Check for urinary retention, Inserted Gaytan with minimal urine output , Renal US stat, CPK, Renal consult Dr. Underwood called, ?need for HD, hold torsemide, avoid nephrotoxics    *Elevated INR 2/2 xarelto & possible vitamin K def- no overt bleeding observed, hold xarelto tonight, f/u INR in am, PCC if life threatening bleeding occurs ,no hx of  liver dz as per chart    *Altered mental status/encephalopathy- likely from uremia, gets oxycodone q4 at NH, will give stat Narcan to check for reversal, ABG, Ct head ordered, neuro checks, NPO,  ICU consult called, pt Full code, add all NH meds when taking PO  *DM- ISS, f/u A1  *Afib- c/w metoprolol, xarelto on hold tonight  *LIN- not on CPAP?, pulm consult   *Right knee eschar- prior hx of Right knee eschar with dehiscence, pt was supposed to get wound vac as outpatient with Dr. Rey, wet to dry BID with Dakins for now, CT knee, Vascular surgery called (Trauma resident)  *Sacral decub stage 3- mepilex, wound care consult  *Mild TnI elevation-likely from KARMEN & poor clearence, EKG with no changes, F/U TnI trend, Echo, Cardio consult called by ED attending?  *DVT ppx- on xarelto 71 yr old female with Severe dementia non verbal bed bound, hypertension, hyperlipidemia, LIN, CKD, COPD, paroxysmal A fib, septic arthritis, CKD baseline Cr 2.8 in April '17 sent in from [facility] due to mental status changes & anasarca.  Pt's Cr increased from baseline 2.8 to 8 today.       *Significant KARMEN on CKD with volume overload- increased extravascular volume due to hypoalbuminemia, Inserted Gaytan with minimal urine output , Renal US stat, CPK, Renal consult Dr. Underwood called, ?need for HD, hold torsemide, avoid nephrotoxics    *Elevated INR 2/2 xarelto & possible vitamin K def- no overt bleeding observed, hold xarelto tonight, f/u INR in am, PCC if life threatening bleeding occurs ,no hx of  liver dz as per chart    *Altered mental status/encephalopathy- likely from uremia, gets oxycodone q4 at NH, will give stat Narcan to check for reversal, ABG, Ct head ordered, neuro checks, NPO,  ICU consult called, pt Full code, add all NH meds when taking PO  *DM- ISS, f/u A1  *Afib- metoprolol when taking PO, xarelto on hold tonight  *LIN- not on CPAP   *Right knee eschar- prior hx of Right knee eschar with dehiscence, pt was supposed to get wound vac as outpatient with Dr. Rey, wet to dry BID with Dakins for now, CT knee, Vascular surgery called (Trauma resident)  *Sacral decub stage 3- mepilex, wound care consult  *Mild TnI elevation-likely from KARMEN & poor clearence, EKG with no changes, F/U TnI trend, Echo, Cardio consult called by ED attending?  *DVT ppx- on xarelto

## 2017-05-12 NOTE — PROGRESS NOTE ADULT - SUBJECTIVE AND OBJECTIVE BOX
71 yr old female with hypertension, hyperlipidemia, LIN, CKD, COPD, paroxysmal A fib, septic arthritis, CKD baseline Cr 2.8 in April '17 sent in from [facility] due to mental status changes & anasarca. No other infomation co

## 2017-05-12 NOTE — PROGRESS NOTE ADULT - PROBLEM SELECTOR PLAN 1
at this time has no evidence of need for urgent HD.  Currently w/o respiratory insufficiency, is awake and follows simple commands, no evidence of hyperkalemia.  Clinical exam does not reflect failure.  Agree with Renal assessment, plan as per renal.  Does not meet ICU criteria at this time.

## 2017-05-12 NOTE — CONSULT NOTE ADULT - SUBJECTIVE AND OBJECTIVE BOX
HPI:  71 yr old female with hypertension, hyperlipidemia, LIN, CKD, COPD, paroxysmal A fib, septic arthritis, CKD baseline Cr 2.8 in April '17 sent in from [facility] due to mental status changes & anasarca. No information could be obtained from patient due to mental status. Tried calling [facility] & left VM. Pt's Cr increased from baseline 2.8 to 8 today. Minimal UOP with folley insertion pt has severe chronic venous insuficiency B/L L/E     ROS unable to obtain as pt is minimally reponsive    PAST MEDICAL & SURGICAL HISTORY:  Hypercholesterolemia  Deficiency of vitamin K  Chronic pain  COPD (chronic obstructive pulmonary disease)  Urine retention  Pressure ulcer  Atrial fibrillation  Constipation  Breast cancer  HLD (hyperlipidemia)  HTN (hypertension)  GERD (gastroesophageal reflux disease)  DM (diabetes mellitus)  Obesity  LIN on CPAP  History of incision and drainage: rt knee  S/p total knee replacement, bilateral  S/P knee replacement, right  S/P hysterectomy  S/P mastectomy: Left  Breast cancer   DM 2, MO, hypothyroidism, prior DVT and IVC filter; Cholecystectomy    FAMILY HISTORY:  Family history of diabetes mellitus (Mother)  NC    Social History:Non smoker    MEDICATIONS  (STANDING):  furosemide   Injectable 40milliGRAM(s) IV Push Once  naloxone Injectable 0.2milliGRAM(s) IV Push once  insulin lispro (HumaLOG) corrective regimen sliding scale  SubCutaneous three times a day before meals  dextrose 5%. 1000milliLiter(s) IV Continuous <Continuous>  dextrose 50% Injectable 12.5Gram(s) IV Push once  dextrose 50% Injectable 25Gram(s) IV Push once  dextrose 50% Injectable 25Gram(s) IV Push once    MEDICATIONS  (PRN):  dextrose Gel 1Dose(s) Oral once PRN Blood Glucose LESS THAN 70 milliGRAM(s)/deciliter  glucagon  Injectable 1milliGRAM(s) IntraMuscular once PRN Glucose LESS THAN 70 milligrams/deciliter   Meds reviewed        Vital Signs Last 24 Hrs  T(C): 37.6, Max: 37.6 (05-12 @ 09:49)  T(F): 99.7, Max: 99.7 (05-12 @ 09:49)  HR: 92 (92 - 94)  BP: 115/69 (115/69 - 125/78)  BP(mean): --  RR: 16 (16 - 16)  SpO2: 98% (98% - 99%)  Daily     Daily     PHYSICAL EXAM:    GENERAL: appears chronically ill  HEAD:  Atraumatic, Normocephalic dry mm  EYES: EOMI  NERVOUS SYSTEM:  Awake nods head appropriately on occation   CHEST/LUNG: CTA B/L  HEART: Regular rate and rhythm; No murmurs or rub  ABDOMEN: Soft, Nontender, Nondistended; Bowel sounds present  EXTREMITIES:  +2  leg edema with sligt erythema       LABS:                        11.5   10.8  )-----------( 322      ( 12 May 2017 15:46 )             33.1     05-12    145  |  105  |  63.0<H>  ----------------------------<  128<H>  3.6   |  23.0  |  8.05<H>    Ca    8.0<L>      12 May 2017 15:46    TPro  5.6<L>  /  Alb  1.8<L>  /  TBili  0.8  /  DBili  x   /  AST  20  /  ALT  <5  /  AlkPhos  109  05-12    PT/INR - ( 12 May 2017 15:46 )   PT: 40.8 sec;   INR: 3.61 ratio         PTT - ( 12 May 2017 15:46 )  PTT:41.8 sec            RADIOLOGY & ADDITIONAL TESTS:

## 2017-05-12 NOTE — ED PROCEDURE NOTE - CPROC ED INFORMED CONSENT1
Benefits, risks, and possible complications of procedure explained to patient/caregiver who verbalized understanding and gave written consent./2 physician sign, dementia, no access

## 2017-05-12 NOTE — ED ADULT NURSE REASSESSMENT NOTE - NS ED NURSE REASSESS COMMENT FT1
Pt care assumed at 1930, no apparent distress noted at this time, charting as noted. Pt received responsive to verbal stimuli, laying in bed with son at the bedside. Pt is Normal Sinus Rhythm on cardiac monitor, oxygen sat 98%. Plan of care explained to son. will continue to monitor. Pt care assumed at 1930, no apparent distress noted at this time, charting as noted. Pt received responsive to verbal stimuli, laying in bed with brother at the bedside. Pt is Normal Sinus Rhythm on cardiac monitor, oxygen sat 98%. Plan of care explained to brother. will continue to monitor.

## 2017-05-12 NOTE — ED PROVIDER NOTE - PHYSICAL EXAMINATION
Constitutional: Alert, NAD.   ENT: Airway patent. Nose clear. Mouth with normal mucosa.   Head: Atraumatic.   Eyes: Clear bilaterally. PERRL.   Cardiac: Normal rate. diffuse edema, anasarca  Respiratory: bibasilar rales  GI: Abdomen soft, non-tender, no guarding.   : No CVA or bladder tenderness.   Musculoskeletal: no muscle or joint tenderness  Neuro: alert, no focal deficits, no motor or sensory deficits. confused  Skin: Dry, multiple pressure ulcers on buttock and heels, no rash.

## 2017-05-12 NOTE — H&P ADULT - HISTORY OF PRESENT ILLNESS
71 yr old female with hypertension, hyperlipidemia, LIN, CKD, COPD, paroxysmal A fib, septic arthritis, CKD baseline Cr 2.8 in April '17 sent in from [facility] due to mental status changes & anasarca. No information could be obtained from patient due to mental status. Tried calling [facility] & left VM. Pt's Cr increased from baseline 2.8 to 8 today. Not known if pt making urine. Will insert anna for more information. Pt is currently lethargic barely arousable. Unable to answer any questions. Brother at bedside but does not know about pt's PMH or meds. 71 yr old female with Severe dementia non verbal bed bound, hypertension, hyperlipidemia, LIN, CKD, COPD, paroxysmal A fib, septic arthritis, CKD baseline Cr 2.8 in April '17 sent in from [facility] [place] [facility] due to mental status changes & anasarca. No information could be obtained from patient due to mental status. Tried calling [facility] & left VM. Pt's Cr increased from baseline 2.8 to 8 today. Not known if pt making urine. Will insert anna for more information. Pt is currently lethargic barely arousable. Unable to answer any questions. Brother at bedside but does not know about pt's PMH or meds. 71 yr old female with Severe dementia non verbal bed bound, hypertension, hyperlipidemia, LIN, CKD, COPD, paroxysmal A fib, septic arthritis, CKD baseline Cr 2.8 in April '17 sent in from [facility] due to mental status changes & anasarca. No information could be obtained from patient due to mental status. Tried calling [facility] & left VM. Pt's Cr increased from baseline 2.8 to 8 today. Not known if pt making urine. Will insert anna for more information. Pt is currently lethargic barely arousable. Unable to answer any questions. Brother at bedside but does not know about pt's PMH or meds.     Addendum :  pt's ct chest showed Unchanged 6 mm nodule along the right minor fissure compared with prior CT  of the chest from September 2016. Upon discharge discharging team will make recommendation for pt. to follow up on 6 mm nodule. Dr. Durand 5/13/17. 5:20 am.

## 2017-05-12 NOTE — ED PROVIDER NOTE - MEDICAL DECISION MAKING DETAILS
Patient with anasarca, renal failure, CHF, elevated trop, will admit.  [facility] seeing patient.

## 2017-05-13 DIAGNOSIS — L98.499 NON-PRESSURE CHRONIC ULCER OF SKIN OF OTHER SITES WITH UNSPECIFIED SEVERITY: ICD-10-CM

## 2017-05-13 DIAGNOSIS — M00.9 PYOGENIC ARTHRITIS, UNSPECIFIED: ICD-10-CM

## 2017-05-13 DIAGNOSIS — L89.90 PRESSURE ULCER OF UNSPECIFIED SITE, UNSPECIFIED STAGE: ICD-10-CM

## 2017-05-13 DIAGNOSIS — F03.90 UNSPECIFIED DEMENTIA, UNSPECIFIED SEVERITY, WITHOUT BEHAVIORAL DISTURBANCE, PSYCHOTIC DISTURBANCE, MOOD DISTURBANCE, AND ANXIETY: ICD-10-CM

## 2017-05-13 DIAGNOSIS — G93.40 ENCEPHALOPATHY, UNSPECIFIED: ICD-10-CM

## 2017-05-13 DIAGNOSIS — I48.91 UNSPECIFIED ATRIAL FIBRILLATION: ICD-10-CM

## 2017-05-13 DIAGNOSIS — E11.9 TYPE 2 DIABETES MELLITUS WITHOUT COMPLICATIONS: ICD-10-CM

## 2017-05-13 DIAGNOSIS — D68.9 COAGULATION DEFECT, UNSPECIFIED: ICD-10-CM

## 2017-05-13 DIAGNOSIS — N63 UNSPECIFIED LUMP IN BREAST: ICD-10-CM

## 2017-05-13 DIAGNOSIS — R60.1 GENERALIZED EDEMA: ICD-10-CM

## 2017-05-13 DIAGNOSIS — N18.3 CHRONIC KIDNEY DISEASE, STAGE 3 (MODERATE): ICD-10-CM

## 2017-05-13 LAB
ALBUMIN SERPL ELPH-MCNC: 1.7 G/DL — LOW (ref 3.3–5.2)
ALBUMIN SERPL ELPH-MCNC: 1.8 G/DL — LOW (ref 3.3–5.2)
ALP SERPL-CCNC: 103 U/L — SIGNIFICANT CHANGE UP (ref 40–120)
ALT FLD-CCNC: <4 U/L — SIGNIFICANT CHANGE UP
ANION GAP SERPL CALC-SCNC: 16 MMOL/L — SIGNIFICANT CHANGE UP (ref 5–17)
ANION GAP SERPL CALC-SCNC: 16 MMOL/L — SIGNIFICANT CHANGE UP (ref 5–17)
ANISOCYTOSIS BLD QL: SLIGHT — SIGNIFICANT CHANGE UP
APTT BLD: 38.3 SEC — HIGH (ref 27.5–37.4)
AST SERPL-CCNC: 17 U/L — SIGNIFICANT CHANGE UP
BASOPHILS # BLD AUTO: 0 K/UL — SIGNIFICANT CHANGE UP (ref 0–0.2)
BASOPHILS NFR BLD AUTO: 0 % — SIGNIFICANT CHANGE UP (ref 0–2)
BILIRUB SERPL-MCNC: 0.7 MG/DL — SIGNIFICANT CHANGE UP (ref 0.4–2)
BUN SERPL-MCNC: 64 MG/DL — HIGH (ref 8–20)
BUN SERPL-MCNC: 65 MG/DL — HIGH (ref 8–20)
CALCIUM SERPL-MCNC: 7.9 MG/DL — LOW (ref 8.6–10.2)
CALCIUM SERPL-MCNC: 7.9 MG/DL — LOW (ref 8.6–10.2)
CHLORIDE SERPL-SCNC: 104 MMOL/L — SIGNIFICANT CHANGE UP (ref 98–107)
CHLORIDE SERPL-SCNC: 105 MMOL/L — SIGNIFICANT CHANGE UP (ref 98–107)
CO2 SERPL-SCNC: 23 MMOL/L — SIGNIFICANT CHANGE UP (ref 22–29)
CO2 SERPL-SCNC: 23 MMOL/L — SIGNIFICANT CHANGE UP (ref 22–29)
CREAT SERPL-MCNC: 7.46 MG/DL — HIGH (ref 0.5–1.3)
CREAT SERPL-MCNC: 7.78 MG/DL — HIGH (ref 0.5–1.3)
EOSINOPHIL # BLD AUTO: 0.4 K/UL — SIGNIFICANT CHANGE UP (ref 0–0.5)
EOSINOPHIL NFR BLD AUTO: 2 % — SIGNIFICANT CHANGE UP (ref 0–5)
GLUCOSE SERPL-MCNC: 102 MG/DL — SIGNIFICANT CHANGE UP (ref 70–115)
GLUCOSE SERPL-MCNC: 103 MG/DL — SIGNIFICANT CHANGE UP (ref 70–115)
HBA1C BLD-MCNC: 4 % — SIGNIFICANT CHANGE UP (ref 4–5.6)
HCT VFR BLD CALC: 34.4 % — LOW (ref 37–47)
HGB BLD-MCNC: 11.9 G/DL — LOW (ref 12–16)
INR BLD: 2.4 RATIO — HIGH (ref 0.88–1.16)
LYMPHOCYTES # BLD AUTO: 14 % — LOW (ref 20–55)
LYMPHOCYTES # BLD AUTO: 2.9 K/UL — SIGNIFICANT CHANGE UP (ref 1–4.8)
MACROCYTES BLD QL: SLIGHT — SIGNIFICANT CHANGE UP
MAGNESIUM SERPL-MCNC: 1.8 MG/DL — SIGNIFICANT CHANGE UP (ref 1.6–2.6)
MCHC RBC-ENTMCNC: 30 PG — SIGNIFICANT CHANGE UP (ref 27–31)
MCHC RBC-ENTMCNC: 34.6 G/DL — SIGNIFICANT CHANGE UP (ref 32–36)
MCV RBC AUTO: 86.6 FL — SIGNIFICANT CHANGE UP (ref 81–99)
MICROCYTES BLD QL: SLIGHT — SIGNIFICANT CHANGE UP
MONOCYTES # BLD AUTO: 1.5 K/UL — HIGH (ref 0–0.8)
MONOCYTES NFR BLD AUTO: 7 % — SIGNIFICANT CHANGE UP (ref 3–10)
NEUTROPHILS # BLD AUTO: 10.4 K/UL — HIGH (ref 1.8–8)
NEUTROPHILS NFR BLD AUTO: 77 % — HIGH (ref 37–73)
NEUTS BAND # BLD: 0 % — SIGNIFICANT CHANGE UP (ref 0–8)
NRBC # BLD: 1 /100 — HIGH (ref 0–0)
OVALOCYTES BLD QL SMEAR: SLIGHT — SIGNIFICANT CHANGE UP
PHOSPHATE SERPL-MCNC: 4.4 MG/DL — SIGNIFICANT CHANGE UP (ref 2.4–4.7)
PLAT MORPH BLD: NORMAL — SIGNIFICANT CHANGE UP
PLATELET # BLD AUTO: 317 K/UL — SIGNIFICANT CHANGE UP (ref 150–400)
POIKILOCYTOSIS BLD QL AUTO: SLIGHT — SIGNIFICANT CHANGE UP
POTASSIUM SERPL-MCNC: 4.1 MMOL/L — SIGNIFICANT CHANGE UP (ref 3.5–5.3)
POTASSIUM SERPL-MCNC: 4.1 MMOL/L — SIGNIFICANT CHANGE UP (ref 3.5–5.3)
POTASSIUM SERPL-SCNC: 4.1 MMOL/L — SIGNIFICANT CHANGE UP (ref 3.5–5.3)
POTASSIUM SERPL-SCNC: 4.1 MMOL/L — SIGNIFICANT CHANGE UP (ref 3.5–5.3)
PROT SERPL-MCNC: 5.6 G/DL — LOW (ref 6.6–8.7)
PROTHROM AB SERPL-ACNC: 26.9 SEC — HIGH (ref 9.8–12.7)
RBC # BLD: 3.97 M/UL — LOW (ref 4.4–5.2)
RBC # FLD: 17 % — HIGH (ref 11–15.6)
RBC BLD AUTO: ABNORMAL
SODIUM SERPL-SCNC: 143 MMOL/L — SIGNIFICANT CHANGE UP (ref 135–145)
SODIUM SERPL-SCNC: 144 MMOL/L — SIGNIFICANT CHANGE UP (ref 135–145)
TROPONIN T SERPL-MCNC: 0.88 NG/ML — HIGH (ref 0–0.06)
WBC # BLD: 15.6 K/UL — HIGH (ref 4.8–10.8)
WBC # FLD AUTO: 15.6 K/UL — HIGH (ref 4.8–10.8)

## 2017-05-13 PROCEDURE — 99233 SBSQ HOSP IP/OBS HIGH 50: CPT

## 2017-05-13 PROCEDURE — 71250 CT THORAX DX C-: CPT | Mod: 26

## 2017-05-13 RX ORDER — COLLAGENASE CLOSTRIDIUM HIST. 250 UNIT/G
1 OINTMENT (GRAM) TOPICAL DAILY
Qty: 0 | Refills: 0 | Status: DISCONTINUED | OUTPATIENT
Start: 2017-05-13 | End: 2017-05-24

## 2017-05-13 RX ORDER — ALBUMIN HUMAN 25 %
100 VIAL (ML) INTRAVENOUS EVERY 12 HOURS
Qty: 0 | Refills: 0 | Status: COMPLETED | OUTPATIENT
Start: 2017-05-13 | End: 2017-05-14

## 2017-05-13 RX ORDER — FUROSEMIDE 40 MG
40 TABLET ORAL EVERY 12 HOURS
Qty: 0 | Refills: 0 | Status: COMPLETED | OUTPATIENT
Start: 2017-05-13 | End: 2017-05-14

## 2017-05-13 RX ORDER — SODIUM CHLORIDE 9 MG/ML
1000 INJECTION, SOLUTION INTRAVENOUS
Qty: 0 | Refills: 0 | Status: DISCONTINUED | OUTPATIENT
Start: 2017-05-13 | End: 2017-05-15

## 2017-05-13 RX ADMIN — DEXTROSE MONOHYDRATE, SODIUM CHLORIDE, AND POTASSIUM CHLORIDE 125 MILLILITER(S): 50; .745; 4.5 INJECTION, SOLUTION INTRAVENOUS at 12:48

## 2017-05-13 RX ADMIN — SODIUM CHLORIDE 125 MILLILITER(S): 9 INJECTION, SOLUTION INTRAVENOUS at 15:31

## 2017-05-13 RX ADMIN — DEXTROSE MONOHYDRATE, SODIUM CHLORIDE, AND POTASSIUM CHLORIDE 125 MILLILITER(S): 50; .745; 4.5 INJECTION, SOLUTION INTRAVENOUS at 04:55

## 2017-05-13 RX ADMIN — Medication 50 MILLILITER(S): at 18:12

## 2017-05-13 RX ADMIN — SODIUM CHLORIDE 125 MILLILITER(S): 9 INJECTION, SOLUTION INTRAVENOUS at 14:06

## 2017-05-13 RX ADMIN — Medication 40 MILLIGRAM(S): at 18:34

## 2017-05-13 RX ADMIN — SODIUM CHLORIDE 100 MILLILITER(S): 9 INJECTION, SOLUTION INTRAVENOUS at 18:13

## 2017-05-13 NOTE — ED ADULT NURSE REASSESSMENT NOTE - NS ED NURSE REASSESS COMMENT FT1
Awaiting Dr. Swanson to come to eval patient.  small amount of urine output noted, plan of care for wounds noted.  Awaiting plan of care.  patient has assigned bed, awaiting transport.  Will continue to monitor

## 2017-05-13 NOTE — PROGRESS NOTE ADULT - ASSESSMENT
72 year old female with a history of hypertension, ckd stage 3, dvt on coumadin who was sent to the ER for lethargy and acute change in mental status. Admitted for acute on chronic renal failure and encephalopathy.

## 2017-05-13 NOTE — PROGRESS NOTE ADULT - SUBJECTIVE AND OBJECTIVE BOX
CC: AMS    INTERVAL HPI/OVERNIGHT EVENTS:  Patient seen and examined,     Vital Signs Last 24 Hrs  T(C): 36.9, Max: 36.9 ( @ 08:21)  T(F): 98.4, Max: 98.4 ( @ 08:21)  HR: 94 (76 - 97)  BP: 118/54 (97/53 - 135/71)  BP(mean): --  RR: 14 (14 - 18)  SpO2: 96% (96% - 98%)    PHYSICAL EXAM:    GENERAL: NAD,  HEAD:  Atraumatic, Normocephalic  EYES: EOMI, PERRLA, conjunctiva and sclera clear  ENMT: Moist mucous membranes  NECK: Supple, No JVD  CHEST/LUNG: Clear to auscultation bilaterally; No rales, rhonchi, wheezing, or rubs  HEART: Regular rate and rhythm; No murmurs, rubs, or gallops  ABDOMEN: Soft, Nontender, Nondistended; Bowel sounds present  EXTREMITIES:  2+ pitting edema with generalized anasarca  SKIN: Stage 3 sacral wound, right patellar wound         MEDICATIONS  (STANDING):  insulin lispro (HumaLOG) corrective regimen sliding scale  SubCutaneous three times a day before meals  dextrose 5%. 1000milliLiter(s) IV Continuous <Continuous>  dextrose 50% Injectable 12.5Gram(s) IV Push once  dextrose 50% Injectable 25Gram(s) IV Push once  dextrose 50% Injectable 25Gram(s) IV Push once  mineral oil enema 133milliLiter(s) Rectal once  dextrose 5% + sodium chloride 0.45%. 1000milliLiter(s) IV Continuous <Continuous>  collagenase Ointment 1Application(s) Topical daily    MEDICATIONS  (PRN):  dextrose Gel 1Dose(s) Oral once PRN Blood Glucose LESS THAN 70 milliGRAM(s)/deciliter  glucagon  Injectable 1milliGRAM(s) IntraMuscular once PRN Glucose LESS THAN 70 milligrams/deciliter      Allergies    No Known Allergies    Intolerances          LABS:                          11.9   15.6  )-----------( 317      ( 13 May 2017 08:58 )             34.4     -    144  |  105  |  64.0<H>  ----------------------------<  103  4.1   |  23.0  |  7.78<H>    Ca    7.9<L>      13 May 2017 08:59  Phos  4.4     -  Mg     1.8     -    TPro  5.6<L>  /  Alb  1.7<L>  /  TBili  0.7  /  DBili  x   /  AST  17  /  ALT  <4  /  AlkPhos  103  05-13    PT/INR - ( 13 May 2017 08:58 )   PT: 26.9 sec;   INR: 2.40 ratio         PTT - ( 13 May 2017 08:58 )  PTT:38.3 sec  Urinalysis Basic - ( 12 May 2017 19:09 )    Color: Yellow / Appearance: Clear / S.015 / pH: x  Gluc: x / Ketone: Negative  / Bili: Small / Urobili: Negative mg/dL   Blood: x / Protein: 30 mg/dL / Nitrite: Negative   Leuk Esterase: Moderate / RBC: 6-10 /HPF / WBC 3-5   Sq Epi: x / Non Sq Epi: Occasional / Bacteria: Occasional        RADIOLOGY & ADDITIONAL TESTS: CC: AMS    INTERVAL HPI/OVERNIGHT EVENTS:  Patient seen and examined,  will awake painful stimuli. AOx0     Vital Signs Last 24 Hrs  T(C): 36.9, Max: 36.9 (- @ 08:21)  T(F): 98.4, Max: 98.4 (- @ 08:21)  HR: 94 (76 - 97)  BP: 118/54 (97/53 - 135/71)  BP(mean): --  RR: 14 (14 - 18)  SpO2: 96% (96% - 98%)    PHYSICAL EXAM:    GENERAL: NAD,AOX0 awakes to painful stimuli   HEAD:  Atraumatic, Normocephalic  EYES: EOMI, PERRLA, conjunctiva and sclera clear  ENMT: Moist mucous membranes  NECK: Supple, No JVD  CHEST/LUNG: Clear to auscultation bilaterally; No rales, rhonchi, wheezing, or rubs  HEART: Regular rate and rhythm; No murmurs, rubs, or gallops  ABDOMEN: Soft, Nontender, Nondistended; Bowel sounds present  EXTREMITIES:  2+ pitting edema with generalized anasarca  SKIN: Stage 3 sacral wound, right patellar wound open with drainage left heel stage 2 ulcer noted.        MEDICATIONS  (STANDING):  insulin lispro (HumaLOG) corrective regimen sliding scale  SubCutaneous three times a day before meals  dextrose 5%. 1000milliLiter(s) IV Continuous <Continuous>  dextrose 50% Injectable 12.5Gram(s) IV Push once  dextrose 50% Injectable 25Gram(s) IV Push once  dextrose 50% Injectable 25Gram(s) IV Push once  mineral oil enema 133milliLiter(s) Rectal once  dextrose 5% + sodium chloride 0.45%. 1000milliLiter(s) IV Continuous <Continuous>  collagenase Ointment 1Application(s) Topical daily    MEDICATIONS  (PRN):  dextrose Gel 1Dose(s) Oral once PRN Blood Glucose LESS THAN 70 milliGRAM(s)/deciliter  glucagon  Injectable 1milliGRAM(s) IntraMuscular once PRN Glucose LESS THAN 70 milligrams/deciliter      Allergies    No Known Allergies    Intolerances          LABS:                          11.9   15.6  )-----------( 317      ( 13 May 2017 08:58 )             34.4     -    144  |  105  |  64.0<H>  ----------------------------<  103  4.1   |  23.0  |  7.78<H>    Ca    7.9<L>      13 May 2017 08:59  Phos  4.4       Mg     1.8         TPro  5.6<L>  /  Alb  1.7<L>  /  TBili  0.7  /  DBili  x   /  AST  17  /  ALT  <4  /  AlkPhos  103      PT/INR - ( 13 May 2017 08:58 )   PT: 26.9 sec;   INR: 2.40 ratio         PTT - ( 13 May 2017 08:58 )  PTT:38.3 sec  Urinalysis Basic - ( 12 May 2017 19:09 )    Color: Yellow / Appearance: Clear / S.015 / pH: x  Gluc: x / Ketone: Negative  / Bili: Small / Urobili: Negative mg/dL   Blood: x / Protein: 30 mg/dL / Nitrite: Negative   Leuk Esterase: Moderate / RBC: 6-10 /HPF / WBC 3-5   Sq Epi: x / Non Sq Epi: Occasional / Bacteria: Occasional        RADIOLOGY & ADDITIONAL TESTS:

## 2017-05-13 NOTE — CONSULT NOTE ADULT - SUBJECTIVE AND OBJECTIVE BOX
HPI: Patient seen and examined at bedside. 71 yr old female with Severe dementia non verbal bed bound, hypertension, hyperlipidemia, LIN, CKD, COPD, paroxysmal A fib, septic arthritis, CKD baseline Cr 2.8 in  sent in from [facility] due to mental status changes & anasarca. No information could be obtained from patient due to mental status. Vascular surgery consulted for possible venous stasis ulcer.       MEDICATIONS  (STANDING):  insulin lispro (HumaLOG) corrective regimen sliding scale  SubCutaneous three times a day before meals  dextrose 5%. 1000milliLiter(s) IV Continuous <Continuous>  dextrose 50% Injectable 12.5Gram(s) IV Push once  dextrose 50% Injectable 25Gram(s) IV Push once  dextrose 50% Injectable 25Gram(s) IV Push once  sodium chloride 0.45% with potassium chloride 20 mEq/L 1000milliLiter(s) IV Continuous <Continuous>  mineral oil enema 133milliLiter(s) Rectal once    MEDICATIONS  (PRN):  dextrose Gel 1Dose(s) Oral once PRN Blood Glucose LESS THAN 70 milliGRAM(s)/deciliter  glucagon  Injectable 1milliGRAM(s) IntraMuscular once PRN Glucose LESS THAN 70 milligrams/deciliter      Vital Signs Last 24 Hrs  T(C): 37.6, Max: 37.6 (05-12 @ 09:49)  T(F): 99.7, Max: 99.7 (05-12 @ 09:49)  HR: 76 (76 - 97)  BP: 113/75 (113/75 - 135/71)  BP(mean): --  RR: 18 (16 - 18)  SpO2: 98% (96% - 99%)    ROS: patient with dementia, poor historian    Patient History:   Past Medical History:  Atrial fibrillation    Breast cancer    Chronic pain    Constipation    COPD (chronic obstructive pulmonary disease)    Deficiency of vitamin K    DM (diabetes mellitus)    GERD (gastroesophageal reflux disease)    HLD (hyperlipidemia)    HTN (hypertension)    Hypercholesterolemia    Obesity    LIN on CPAP    Pressure ulcer    Urine retention.    Past Surgical History:  History of incision and drainage  rt knee  S/P hysterectomy    S/P knee replacement, right    S/P mastectomy  Left  S/p total knee replacement, bilateral.    Family History:  Mother  Still living? Unknown  Family history of diabetes mellitus, Age at diagnosis: Age Unknown.    Social History:  Social History (marital status, living situation, occupation, tobacco use, alcohol and drug use, and sexual history): unobtainable due to mental status	      PE:  Gen: NAD  Pulm: CTAB  CV: RRR  Abd: soft, nontender  Extrem: right knee ulcer, [id number] with fibrinous exudate at base, nontender, DP/PT pulses intact, biphasic DP/PT signals bilaterally, 2+ pitting edema, warm, no skin changes, well heal surgical scar over knee      I&O's Detail    I & Os for current day (as of 13 May 2017 02:35)  =============================================  IN:    Total IN: 0 ml  ---------------------------------------------  OUT:    Voided: 400 ml    Total OUT: 400 ml  ---------------------------------------------  Total NET: -400 ml      LABS:                        11.5   10.8  )-----------( 322      ( 12 May 2017 15:46 )             33.1     05-12    145  |  105  |  63.0<H>  ----------------------------<  128<H>  3.6   |  23.0  |  8.05<H>    Ca    8.0<L>      12 May 2017 15:46    TPro  5.6<L>  /  Alb  1.8<L>  /  TBili  0.8  /  DBili  x   /  AST  20  /  ALT  <5  /  AlkPhos  109  05-12    PT/INR - ( 12 May 2017 15:46 )   PT: 40.8 sec;   INR: 3.61 ratio         PTT - ( 12 May 2017 15:46 )  PTT:41.8 sec  Urinalysis Basic - ( 12 May 2017 19:09 )    Color: Yellow / Appearance: Clear / S.015 / pH: x  Gluc: x / Ketone: Negative  / Bili: Small / Urobili: Negative mg/dL   Blood: x / Protein: 30 mg/dL / Nitrite: Negative   Leuk Esterase: Moderate / RBC: 6-10 /HPF / WBC 3-5   Sq Epi: x / Non Sq Epi: Occasional / Bacteria: Occasional

## 2017-05-13 NOTE — ED ADULT NURSE REASSESSMENT NOTE - NS ED NURSE REASSESS COMMENT FT1
Spoke with Dr. Swanson in regards to patient recent VS/hypotension and made aware.  Will continue fluids on pump.  Awaiting Dr. Swanson to come to ED and assess.  Will continue to monitor

## 2017-05-13 NOTE — PROGRESS NOTE ADULT - ASSESSMENT
CKD with severe KARMEN  cause unclear  Creat too high for simple pre renal; t/c ATN or AIN  already on IVF but UO only 50-70 cc over last 8 H  shall decrease IVF  check Ueos,sod/osm/UA  check C3 C4 and ANCA am  d/w brother; if not better , may need HD

## 2017-05-13 NOTE — PROGRESS NOTE ADULT - SUBJECTIVE AND OBJECTIVE BOX
d/w Dr. Turcios  patient was admitted in March/April with similar circumstances and creat of 6 which came down to 2 with IVF  shall cont to hydrate  shall give SPA and 1 dose Lasix 80 mg, now that pt hydrated  labs am

## 2017-05-13 NOTE — ED ADULT NURSE REASSESSMENT NOTE - NS ED NURSE REASSESS COMMENT FT1
Open would noted to right knee, and inner right thigh. draining noted to wound to right knee, Stage 3 ulcers noted to buttock, and coccyx area. edema noted to all 4 extremities.  Vascular surgeon at bedside to discuss plan of care with family.

## 2017-05-13 NOTE — PROGRESS NOTE ADULT - PROBLEM SELECTOR PLAN 4
SEcondary to uremia  Monitor neuro status  Bedside swallow evaluation when more awake  Baseline mentation aox3 per recent records

## 2017-05-13 NOTE — PROGRESS NOTE ADULT - PROBLEM SELECTOR PLAN 10
Was recently seen by Dr Rey in the hospital for a right knee infection  Re-consulted for evaluation/recommendations regarding wound care

## 2017-05-13 NOTE — ED ADULT NURSE REASSESSMENT NOTE - NS ED NURSE REASSESS COMMENT FT1
Received report from Alejandra RUTLEDGE.  Patient currently sleeping in no apparent distress, arousalable, responds to painful stimuli.   family states only speaking when stimulated.  IV fluids infusing without difficulty on pump.  Edema noted to all 4 extremities.  Gaytan catheter noted will monitor I and O.  Cardiac monitor and pulse ox continued.   Family at bedside, will continue to monitor

## 2017-05-13 NOTE — CONSULT NOTE ADULT - PROBLEM SELECTOR RECOMMENDATION 9
-obtain wound culture  -apply santyl to wound  -wet to dry dressing changes daily  -no acute surgical intervention indicated at this time  -will examine in AM

## 2017-05-13 NOTE — CONSULT NOTE ADULT - ASSESSMENT
71 yr old female with Severe dementia non verbal bed bound, hypertension, hyperlipidemia, LIN, CKD, COPD, paroxysmal A fib, septic arthritis, CKD baseline Cr 2.8 in April '17 sent in from [facility] due to mental status changes & anasarca. unlikely ulcer related to venous stasis

## 2017-05-13 NOTE — PROGRESS NOTE ADULT - SUBJECTIVE AND OBJECTIVE BOX
INTERVAL HPI/OVERNIGHT EVENTS:  Pt examined at the bedside with Dr. Fay, the pts brother and sister were also present.  Pt is difficult to arouse due to mental status, brother and sister answering questions on behalf of the pt regarding current/past medical history.      PAST MEDICAL & SURGICAL HISTORY:  Hypercholesterolemia  Deficiency of vitamin K  Chronic pain  COPD (chronic obstructive pulmonary disease)  Urine retention  Pressure ulcer  Atrial fibrillation  Constipation  Breast cancer  HLD (hyperlipidemia)  HTN (hypertension)  GERD (gastroesophageal reflux disease)  DM (diabetes mellitus)  Obesity  LIN on CPAP  History of incision and drainage: rt knee  S/p total knee replacement, bilateral  S/P knee replacement, right  S/P hysterectomy  S/P mastectomy: Left  Breast cancer      SUBJECTIVE:  Flatus:  unknown due to mental status            Bowel Movement:  unknown due to mental status    Pain control adequate: unknown due to mental status    Nausea: unknown due to mental status           Vomiting:  unknown due to mental status    Diarrhea:  unknown due to mental status        Constipation:  unknown due to mental status      Chest Pain:  unknown due to mental status    SOB:  unknown due to mental status      MEDICATIONS  (STANDING):  insulin lispro (HumaLOG) corrective regimen sliding scale  SubCutaneous three times a day before meals  dextrose 5%. 1000milliLiter(s) IV Continuous <Continuous>  dextrose 50% Injectable 12.5Gram(s) IV Push once  dextrose 50% Injectable 25Gram(s) IV Push once  dextrose 50% Injectable 25Gram(s) IV Push once  mineral oil enema 133milliLiter(s) Rectal once  dextrose 5% + sodium chloride 0.45%. 1000milliLiter(s) IV Continuous <Continuous>  collagenase Ointment 1Application(s) Topical daily    MEDICATIONS  (PRN):  dextrose Gel 1Dose(s) Oral once PRN Blood Glucose LESS THAN 70 milliGRAM(s)/deciliter  glucagon  Injectable 1milliGRAM(s) IntraMuscular once PRN Glucose LESS THAN 70 milligrams/deciliter      Vital Signs Last 24 Hrs  T(C): 37.3, Max: 37.3 (05-13 @ 15:05)  T(F): 99.1, Max: 99.1 (05-13 @ 15:05)  HR: 100 (76 - 100)  BP: 89/50 (89/50 - 135/71)  BP(mean): 65 (65 - 82)  RR: 13 (13 - 18)  SpO2: 93% (93% - 98%)    PHYSICAL EXAM:    Constitutional: pt resting on stretcher, not easily arousable,     Respiratory: rales b/l     Cardiovascular: irregular rate and rhythm, no murmurs/rubs    Gastrointestinal: obese, +BSx4    Extremities: 4+ pedal edema    Vascular: decreased L>R DP+PT pulses     Neurological: AOx0    Skin: [id number] ulcer over R knee oozing clear serous drainage, bne not visible     I&O's Detail  I & Os for 24h ending 13 May 2017 07:00  =============================================  IN:    Total IN: 0 ml  ---------------------------------------------  OUT:    Voided: 400 ml    Total OUT: 400 ml  ---------------------------------------------  Total NET: -400 ml    I & Os for current day (as of 13 May 2017 15:22)  =============================================  IN:    Total IN: 0 ml  ---------------------------------------------  OUT:    Voided: 50 ml    Total OUT: 50 ml  ---------------------------------------------  Total NET: -50 ml      LABS:                        11.9   15.6  )-----------( 317      ( 13 May 2017 08:58 )             34.4     05-13    144  |  105  |  64.0<H>  ----------------------------<  103  4.1   |  23.0  |  7.78<H>    Ca    7.9<L>      13 May 2017 08:59  Phos  4.4       Mg     1.8         TPro  5.6<L>  /  Alb  1.7<L>  /  TBili  0.7  /  DBili  x   /  AST  17  /  ALT  <4  /  AlkPhos  103  05-13    PT/INR - ( 13 May 2017 08:58 )   PT: 26.9 sec;   INR: 2.40 ratio         PTT - ( 13 May 2017 08:58 )  PTT:38.3 sec  Urinalysis Basic - ( 12 May 2017 19:09 )    Color: Yellow / Appearance: Clear / S.015 / pH: x  Gluc: x / Ketone: Negative  / Bili: Small / Urobili: Negative mg/dL   Blood: x / Protein: 30 mg/dL / Nitrite: Negative   Leuk Esterase: Moderate / RBC: 6-10 /HPF / WBC 3-5   Sq Epi: x / Non Sq Epi: Occasional / Bacteria: Occasional        RADIOLOGY & ADDITIONAL STUDIES:  CT Right Knee : Status post removal of hardware with placement of antibiotic impregnated  acrylic cement . Postoperative changes of the distal femur,proximal tibia, and patella as above. Broad cutaneous wound at the level of the patella with a gap of isoattenuation soft tissue between the wound and the patella of approximately 1.5 cm likely related to granulation tissue.  There is confluent low attenuation adjacent to the anterolateral aspect of  the patella which may be related to confluent subcutaneous edema, however a  fluid collection cannot be entirely excluded as the examination is limited by the lack of intravenous contrast.

## 2017-05-13 NOTE — PROGRESS NOTE ADULT - SUBJECTIVE AND OBJECTIVE BOX
[facility] - [facility]                                   [address] [place] [address]                                                      PHONE: [phone]                                                         FAX: [phone]  [email or web address]  ---------------------------------------------------------------------------------------------------------------------------------    Overnight events/patient complaints:  Pt difficult to arouse. Does not appear grossly  overloaded    No Known Allergies    MEDICATIONS  (STANDING):  insulin lispro (HumaLOG) corrective regimen sliding scale  SubCutaneous three times a day before meals  dextrose 5%. 1000milliLiter(s) IV Continuous <Continuous>  dextrose 50% Injectable 12.5Gram(s) IV Push once  dextrose 50% Injectable 25Gram(s) IV Push once  dextrose 50% Injectable 25Gram(s) IV Push once  sodium chloride 0.45% with potassium chloride 20 mEq/L 1000milliLiter(s) IV Continuous <Continuous>  mineral oil enema 133milliLiter(s) Rectal once    MEDICATIONS  (PRN):  dextrose Gel 1Dose(s) Oral once PRN Blood Glucose LESS THAN 70 milliGRAM(s)/deciliter  glucagon  Injectable 1milliGRAM(s) IntraMuscular once PRN Glucose LESS THAN 70 milligrams/deciliter      Vital Signs Last 24 Hrs  T(C): 36.9, Max: 36.9 ( @ 08:21)  T(F): 98.4, Max: 98.4 ( @ 08:21)  HR: 97 (76 - 97)  BP: 99/57 (99/57 - 135/71)  BP(mean): --  RR: 18 (16 - 18)  SpO2: 97% (96% - 98%)  ICU Vital Signs Last 24 Hrs  SHYAM LAL  I&O's Detail  I & Os for 24h ending 13 May 2017 07:00  =============================================  IN:    Total IN: 0 ml  ---------------------------------------------  OUT:    Voided: 400 ml    Total OUT: 400 ml  ---------------------------------------------  Total NET: -400 ml    I & Os for current day (as of 13 May 2017 11:12)  =============================================  IN:    Total IN: 0 ml  ---------------------------------------------  OUT:    Voided: 50 ml    Total OUT: 50 ml  ---------------------------------------------  Total NET: -50 ml    Drug Dosing Weight  SHYAM LAL      PHYSICAL EXAM:  General: Appears well developed, well nourished alert and cooperative.  HEENT: Head; normocephalic, atraumatic.  Eyes: Pupils reactive, cornea wnl.  Neck: Supple, no nodes adenopathy, no NVD or carotid bruit or thyromegaly.  CARDIOVASCULAR: Normal S1 and S2, No murmur, rub, gallop or lift.   LUNGS: No rales, rhonchi or wheeze. Normal breath sounds bilaterally.  ABDOMEN: Soft, nontender without mass or organomegaly. bowel sounds normoactive.  EXTREMITIES: trace edema. Distal pulses wnl.   SKIN: warm and dry with normal turgor.  NEURO: Alert/oriented x 3/normal motor exam. No pathologic reflexes.    PSYCH: normal affect.        LABS:                        11.9   15.6  )-----------( 317      ( 13 May 2017 08:58 )             34.4     05-13    144  |  105  |  64.0<H>  ----------------------------<  103  4.1   |  23.0  |  7.78<H>    Ca    7.9<L>      13 May 2017 08:59  Phos  4.4     -  Mg     1.8         TPro  5.6<L>  /  Alb  1.7<L>  /  TBili  0.7  /  DBili  x   /  AST  17  /  ALT  <4  /  AlkPhos  103  -13    SHYAM Mcalister  CARDIAC MARKERS ( 13 May 2017 08:59 )  x     / 0.88 ng/mL / x     / x     / x      CARDIAC MARKERS ( 12 May 2017 15:46 )  x     / 0.94 ng/mL / x     / x     / x          PT/INR - ( 13 May 2017 08:58 )   PT: 26.9 sec;   INR: 2.40 ratio         PTT - ( 13 May 2017 08:58 )  PTT:38.3 sec  Urinalysis Basic - ( 12 May 2017 19:09 )    Color: Yellow / Appearance: Clear / S.015 / pH: x  Gluc: x / Ketone: Negative  / Bili: Small / Urobili: Negative mg/dL   Blood: x / Protein: 30 mg/dL / Nitrite: Negative   Leuk Esterase: Moderate / RBC: 6-10 /HPF / WBC 3-5   Sq Epi: x / Non Sq Epi: Occasional / Bacteria: Occasional        RADIOLOGY & ADDITIONAL STUDIES:    INTERPRETATION OF TELEMETRY (personally reviewed): no events      Summary:   1. Left ventricular ejection fraction, by visual estimation, is 55 to   60%.   2. Normal global left ventricular systolic function.   3. The mitral in-flow pattern reveals no discernable A-wave, therefore   no comment on diastolic function can be made.   4. Normal left ventricular internal cavity size.   5. There is mild concentric left ventricular hypertrophy.   6. The left atrium is normal in size.   7.Mildly dilated right atrium.   8. Mild to moderate mitral annular calcification.   9. Thickening and calcification of the anterior and posterior mitral   valve leaflets.  10. Mild mitral valve regurgitation.  11. Sclerotic aortic valve with normal opening.  12. Estimated pulmonary artery systolic pressure is 38.8 mmHg assuming a   right atrial pressure of 10 mmHg, which is consistent with borderline   pulmonary hypertension.  13. There is no evidence of pericardial effusion.     MD Parmjit Electronically signed on 2016 at 3:06:40 PM       ASSESSMENT AND PLAN:  In summary, SHYAM LAL is an 72y Female with past medical history significant for Severe dementia none-verbal bed bound CKD ~ 2.0 HTN, HLD, DM, DVT on warfarin, COPD ? PAF, LIN on CPAP that comes in from subacute rehab due to increase swelling anasarca chronic in nature found to have KARMEN on CKD.  + trop likely due to renal clearness; no evidence of decompensated heart failure appears intravascular volume depletion; anasarca due to severe hypoalbuminemia       1) Appreciate renal input  2) Downtrending troponins not consistent with ACS  3) Recent echo  therefore no need to repeat  4) Please recall with any questions or concerns

## 2017-05-13 NOTE — PROGRESS NOTE ADULT - SUBJECTIVE AND OBJECTIVE BOX
Patient seen and examined  Lying quietly in bed    I&O's Summary  I & Os for 24h ending 13 May 2017 07:00  =============================================  IN: 0 ml / OUT: 400 ml / NET: -400 ml    I & Os for current day (as of 13 May 2017 17:02)  =============================================  IN: 375 ml / OUT: 75 ml / NET: 300 ml      REVIEW OF SYSTEMS:    CONSTITUTIONAL: No F/C  RESPIRATORY: No cough or SOB  CARDIOVASCULAR: No CP/palpitations,    GASTROINTESTINAL: No abdominal pain , NVD   GENITOURINARY: No UTI sx  NEUROLOGICAL: No headaches/wk/numbness  MUSCULOSKELETAL:  No joint pain/swelling; No LBP  EXTREMITIES : mild swelling, Has draiage from R KJ, prev TKR site    Vital Signs Last 24 Hrs  T(C): 37.3, Max: 37.3 (05-13 @ 15:05)  T(F): 99.1, Max: 99.1 (05-13 @ 15:05)  HR: 100 (76 - 113)  BP: 102/56 (89/41 - 135/71)  BP(mean): 75 (59 - 82)  RR: 12 (11 - 18)  SpO2: 98% (91% - 98%)    PHYSICAL EXAM:    GENERAL: NAD,   EYES:  conjunctiva and sclera clear  NECK: Supple, No JVD/Bruit  NERVOUS SYSTEM:  A/a but not following much   CHEST:  CTA ,No rales or rhonchi  HEART:  RRR, No murmurs  ABDOMEN: Soft, NT/ND BS+  EXTREMITIES:  mild Edema;R KJ area in dressing  SKIN: No rashes    LABS:                        11.9   15.6  )-----------( 317      ( 13 May 2017 08:58 )             34.4     05-13    144  |  105  |  64.0<H>  ----------------------------<  103  4.1   |  23.0  |  7.78<H>    Ca    7.9<L>      13 May 2017 08:59  Phos  4.4     05-13  Mg     1.8     05-13    TPro  5.6<L>  /  Alb  1.7<L>  /  TBili  0.7  /  DBili  x   /  AST  17  /  ALT  <4  /  AlkPhos  103  05-13      MEDICATIONS  (STANDING):  insulin lispro (HumaLOG) corrective regimen sliding scale  dextrose 5%.  dextrose Gel PRN  dextrose 50% Injectable  dextrose 50% Injectable  dextrose 50% Injectable  glucagon  Injectable PRN  mineral oil enema  dextrose 5% + sodium chloride 0.45%.  collagenase Ointment

## 2017-05-13 NOTE — ED ADULT NURSE REASSESSMENT NOTE - NS ED NURSE REASSESS COMMENT FT1
Pt is resting in bed comfortably at this time, no apparent distress noted at this time. Pt remains in Normal Sinus Rhythm on cardiac monitor. Brother and sister remain at bedside. Family expresses want to get bed upstairs. Plan of care explained, will continue to monitor.

## 2017-05-14 DIAGNOSIS — I10 ESSENTIAL (PRIMARY) HYPERTENSION: ICD-10-CM

## 2017-05-14 LAB
ANION GAP SERPL CALC-SCNC: 15 MMOL/L — SIGNIFICANT CHANGE UP (ref 5–17)
BUN SERPL-MCNC: 63 MG/DL — HIGH (ref 8–20)
CALCIUM SERPL-MCNC: 8.3 MG/DL — LOW (ref 8.6–10.2)
CHLORIDE SERPL-SCNC: 105 MMOL/L — SIGNIFICANT CHANGE UP (ref 98–107)
CO2 SERPL-SCNC: 23 MMOL/L — SIGNIFICANT CHANGE UP (ref 22–29)
CREAT SERPL-MCNC: 7.77 MG/DL — HIGH (ref 0.5–1.3)
CULTURE RESULTS: SIGNIFICANT CHANGE UP
ERYTHROCYTE [SEDIMENTATION RATE] IN BLOOD: 25 MM/HR — HIGH (ref 0–20)
GLUCOSE SERPL-MCNC: 131 MG/DL — HIGH (ref 70–115)
HCT VFR BLD CALC: 31.7 % — LOW (ref 37–47)
HGB BLD-MCNC: 10.6 G/DL — LOW (ref 12–16)
INR BLD: 2.23 RATIO — HIGH (ref 0.88–1.16)
MCHC RBC-ENTMCNC: 29.4 PG — SIGNIFICANT CHANGE UP (ref 27–31)
MCHC RBC-ENTMCNC: 33.4 G/DL — SIGNIFICANT CHANGE UP (ref 32–36)
MCV RBC AUTO: 87.8 FL — SIGNIFICANT CHANGE UP (ref 81–99)
PHOSPHATE 24H UR-MCNC: 28 MG/DL — SIGNIFICANT CHANGE UP
PLATELET # BLD AUTO: 285 K/UL — SIGNIFICANT CHANGE UP (ref 150–400)
POTASSIUM SERPL-MCNC: 4.1 MMOL/L — SIGNIFICANT CHANGE UP (ref 3.5–5.3)
POTASSIUM SERPL-SCNC: 4.1 MMOL/L — SIGNIFICANT CHANGE UP (ref 3.5–5.3)
PROTHROM AB SERPL-ACNC: 24.9 SEC — HIGH (ref 9.8–12.7)
RBC # BLD: 3.61 M/UL — LOW (ref 4.4–5.2)
RBC # FLD: 16.9 % — HIGH (ref 11–15.6)
SODIUM SERPL-SCNC: 143 MMOL/L — SIGNIFICANT CHANGE UP (ref 135–145)
SPECIMEN SOURCE: SIGNIFICANT CHANGE UP
WBC # BLD: 17.6 K/UL — HIGH (ref 4.8–10.8)
WBC # FLD AUTO: 17.6 K/UL — HIGH (ref 4.8–10.8)

## 2017-05-14 PROCEDURE — 93010 ELECTROCARDIOGRAM REPORT: CPT

## 2017-05-14 PROCEDURE — 99233 SBSQ HOSP IP/OBS HIGH 50: CPT

## 2017-05-14 RX ORDER — FUROSEMIDE 40 MG
40 TABLET ORAL EVERY 8 HOURS
Qty: 0 | Refills: 0 | Status: COMPLETED | OUTPATIENT
Start: 2017-05-14 | End: 2017-05-15

## 2017-05-14 RX ORDER — METOPROLOL TARTRATE 50 MG
5 TABLET ORAL ONCE
Qty: 0 | Refills: 0 | Status: COMPLETED | OUTPATIENT
Start: 2017-05-14 | End: 2017-05-14

## 2017-05-14 RX ORDER — ALBUMIN HUMAN 25 %
100 VIAL (ML) INTRAVENOUS EVERY 8 HOURS
Qty: 0 | Refills: 0 | Status: COMPLETED | OUTPATIENT
Start: 2017-05-14 | End: 2017-05-15

## 2017-05-14 RX ORDER — SODIUM CHLORIDE 9 MG/ML
500 INJECTION INTRAMUSCULAR; INTRAVENOUS; SUBCUTANEOUS ONCE
Qty: 0 | Refills: 0 | Status: COMPLETED | OUTPATIENT
Start: 2017-05-14 | End: 2017-05-14

## 2017-05-14 RX ADMIN — Medication 50 MILLILITER(S): at 05:41

## 2017-05-14 RX ADMIN — Medication 1 APPLICATION(S): at 07:00

## 2017-05-14 RX ADMIN — SODIUM CHLORIDE 2000 MILLILITER(S): 9 INJECTION INTRAMUSCULAR; INTRAVENOUS; SUBCUTANEOUS at 12:44

## 2017-05-14 RX ADMIN — Medication 5 MILLIGRAM(S): at 19:18

## 2017-05-14 RX ADMIN — Medication 50 MILLILITER(S): at 21:43

## 2017-05-14 RX ADMIN — SODIUM CHLORIDE 100 MILLILITER(S): 9 INJECTION, SOLUTION INTRAVENOUS at 12:34

## 2017-05-14 NOTE — PROGRESS NOTE ADULT - SUBJECTIVE AND OBJECTIVE BOX
Patient seen and examined  sl more awake  opens eyes when called; followed few simple Vc  UO only about 120 cc in bag    I&O's Summary  I & Os for 24h ending 14 May 2017 07:00  =============================================  IN: 1750 ml / OUT: 110 ml / NET: 1640 ml    I & Os for current day (as of 14 May 2017 15:48)  =============================================  IN: 900 ml / OUT: 25 ml / NET: 875 ml      REVIEW OF SYSTEMS:    CONSTITUTIONAL: No F/C  NEUROLOGICAL: follows occ vc  MUSCULOSKELETAL:  + R knee joint d/c  EXTREMITIES : + swelling,    Vital Signs Last 24 Hrs  T(C): 36.3, Max: 36.8 (05-13 @ 18:00)  T(F): 97.3, Max: 98.2 (05-13 @ 18:00)  HR: 112 (92 - 135)  BP: 90/56 (81/50 - 118/55)  BP(mean): 68 (60 - 91)  RR: 15 (12 - 20)  SpO2: 97% (96% - 100%)    PHYSICAL EXAM:    GENERAL: NAD, Obese  EYES:  conjunctiva and sclera clear  NECK: Supple, No JVD/Bruit  NERVOUS SYSTEM:  more awake   CHEST:  CTA ,No rales or rhonchi  HEART:  RRR, No murmurs  ABDOMEN: Soft, NT/ND BS+  EXTREMITIES:  + Edema; RKJ in dressing  SKIN: No rashes    LABS:                        10.6   17.6  )-----------( 285      ( 14 May 2017 04:20 )             31.7     05-14    143  |  105  |  63.0<H>  ----------------------------<  131<H>  4.1   |  23.0  |  7.77<H>    Ca    8.3<L>      14 May 2017 04:20  Phos  4.4     05-13  Mg     1.8     05-13    TPro  5.6<L>  /  Alb  1.7<L>  /  TBili  0.7  /  DBili  x   /  AST  17  /  ALT  <4  /  AlkPhos  103  05-13      MEDICATIONS  (STANDING):  insulin lispro (HumaLOG) corrective regimen sliding scale  dextrose 5%.  dextrose Gel PRN  dextrose 50% Injectable  dextrose 50% Injectable  dextrose 50% Injectable  glucagon  Injectable PRN  mineral oil enema  dextrose 5% + sodium chloride 0.45%.  collagenase Ointment

## 2017-05-14 NOTE — DIETITIAN INITIAL EVALUATION ADULT. - NS FNS SUPPLEMENTS
advance diet as medically feasible to clear liquid, cho cons -> cho cons dash/tlc with consistency per SLP. Rx: 1 packet Santos BID po

## 2017-05-14 NOTE — PROGRESS NOTE ADULT - ASSESSMENT
72 yr old female with atrial fibrillation, hypertension, hyperlipidemia, COPD, breast cancer, diabetes mellitus, right knee surgeries, pressure ulcer, CKD, h/o right UE DVT admitted with altered mental status. She was discharged to rehab in April 2017 and completed course of antibiotics for infected right knee. Patient has no known history of dementia. Per sister, she was becoming increasingly lethargic for a week prior to admission with increasingly poor oral intake. On admission she was noted to have acute on chronic renal failure. CT brain was done, negative for acute stroke. Renal consulted, she was started on IV fluids. Plastics consulted.

## 2017-05-14 NOTE — PROGRESS NOTE ADULT - ASSESSMENT
CKD with severe KARMEN- cause unclear- has hx RKJ hardware removal with Abx cement   uncler if has any active infectin causing ATN/AIN  Likely has some element of pre-renal  d/w PMD; in view of prev hx of severe AKIand spont resolving over few days with IVF, shall watch for now  pt more awake and lytes in acceptable range  Hd in not better in 1-2 days- closely watch  cont SPA 25g q 8 H- missed am dose and IVFs today so far

## 2017-05-14 NOTE — PROGRESS NOTE ADULT - SUBJECTIVE AND OBJECTIVE BOX
INTERVAL HPI/OVERNIGHT EVENTS:    CC: acute on chronic renal failure, encephalopathy, atrial fibrillation, hypotension    Chart and course reviewed. Low urine output, fluid bolus given. Arousable to verbal commands. Sister Anisa at bedside.     Vital Signs Last 24 Hrs  T(C): 36.3, Max: 36.8 (05-13 @ 18:00)  T(F): 97.3, Max: 98.2 (05-13 @ 18:00)  HR: 112 (92 - 135)  BP: 90/56 (81/50 - 118/55)  BP(mean): 68 (60 - 91)  RR: 15 (12 - 20)  SpO2: 97% (96% - 100%)    PHYSICAL EXAM:    GENERAL: Not in distress, drowsy but arousable  CHEST/LUNG: Clear to auscultation bilaterally; No rales, rhonchi, wheezing, or rubs  HEART: Regular rate and rhythm; No murmurs, rubs, or gallops  ABDOMEN: Soft, Nontender, Nondistended; Bowel sounds present  EXTREMITIES:  2+ Peripheral Pulses, No clubbing, cyanosis, or edema    MEDICATIONS  (STANDING):  insulin lispro (HumaLOG) corrective regimen sliding scale  SubCutaneous three times a day before meals  dextrose 5%. 1000milliLiter(s) IV Continuous <Continuous>  dextrose 50% Injectable 12.5Gram(s) IV Push once  dextrose 50% Injectable 25Gram(s) IV Push once  dextrose 50% Injectable 25Gram(s) IV Push once  mineral oil enema 133milliLiter(s) Rectal once  dextrose 5% + sodium chloride 0.45%. 1000milliLiter(s) IV Continuous <Continuous>  collagenase Ointment 1Application(s) Topical daily    MEDICATIONS  (PRN):  dextrose Gel 1Dose(s) Oral once PRN Blood Glucose LESS THAN 70 milliGRAM(s)/deciliter  glucagon  Injectable 1milliGRAM(s) IntraMuscular once PRN Glucose LESS THAN 70 milligrams/deciliter      Allergies    No Known Allergies    Intolerances          LABS:                          10.6   17.6  )-----------( 285      ( 14 May 2017 04:20 )             31.7     05-14    143  |  105  |  63.0<H>  ----------------------------<  131<H>  4.1   |  23.0  |  7.77<H>    Ca    8.3<L>      14 May 2017 04:20  Phos  4.4     -  Mg     1.8     -    TPro  5.6<L>  /  Alb  1.7<L>  /  TBili  0.7  /  DBili  x   /  AST  17  /  ALT  <4  /  AlkPhos  103  05-13    PT/INR - ( 14 May 2017 04:20 )   PT: 24.9 sec;   INR: 2.23 ratio         PTT - ( 13 May 2017 08:58 )  PTT:38.3 sec  Urinalysis Basic - ( 12 May 2017 19:09 )    Color: Yellow / Appearance: Clear / S.015 / pH: x  Gluc: x / Ketone: Negative  / Bili: Small / Urobili: Negative mg/dL   Blood: x / Protein: 30 mg/dL / Nitrite: Negative   Leuk Esterase: Moderate / RBC: 6-10 /HPF / WBC 3-5   Sq Epi: x / Non Sq Epi: Occasional / Bacteria: Occasional        RADIOLOGY & ADDITIONAL TESTS: INTERVAL HPI/OVERNIGHT EVENTS:    CC: acute on chronic renal failure, encephalopathy, atrial fibrillation, hypotension    Chart and course reviewed. Low urine output, fluid bolus given. Arousable to verbal commands. Sister Anisa at bedside.     Vital Signs Last 24 Hrs  T(C): 36.3, Max: 36.8 ( @ 18:00)  T(F): 97.3, Max: 98.2 ( @ 18:00)  HR: 112 (92 - 135)  BP: 90/56 (81/50 - 118/55)  BP(mean): 68 (60 - 91)  RR: 15 (12 - 20)  SpO2: 97% (96% - 100%)    PHYSICAL EXAM:    GENERAL: Not in distress, drowsy but arousable  CHEST/LUNG: b/l air entry  HEART: irregular  ABDOMEN: Soft, BS+  EXTREMITIES: 3+ edema all extremities    MEDICATIONS  (STANDING):  insulin lispro (HumaLOG) corrective regimen sliding scale  SubCutaneous three times a day before meals  dextrose 5%. 1000milliLiter(s) IV Continuous <Continuous>  dextrose 50% Injectable 12.5Gram(s) IV Push once  dextrose 50% Injectable 25Gram(s) IV Push once  dextrose 50% Injectable 25Gram(s) IV Push once  mineral oil enema 133milliLiter(s) Rectal once  dextrose 5% + sodium chloride 0.45%. 1000milliLiter(s) IV Continuous <Continuous>  collagenase Ointment 1Application(s) Topical daily    MEDICATIONS  (PRN):  dextrose Gel 1Dose(s) Oral once PRN Blood Glucose LESS THAN 70 milliGRAM(s)/deciliter  glucagon  Injectable 1milliGRAM(s) IntraMuscular once PRN Glucose LESS THAN 70 milligrams/deciliter      Allergies    No Known Allergies    Intolerances          LABS:                          10.6   17.6  )-----------( 285      ( 14 May 2017 04:20 )             31.7     05-14    143  |  105  |  63.0<H>  ----------------------------<  131<H>  4.1   |  23.0  |  7.77<H>    Ca    8.3<L>      14 May 2017 04:20  Phos  4.4     05-13  Mg     1.8     05-13    TPro  5.6<L>  /  Alb  1.7<L>  /  TBili  0.7  /  DBili  x   /  AST  17  /  ALT  <4  /  AlkPhos  103  05-13    PT/INR - ( 14 May 2017 04:20 )   PT: 24.9 sec;   INR: 2.23 ratio         PTT - ( 13 May 2017 08:58 )  PTT:38.3 sec  Urinalysis Basic - ( 12 May 2017 19:09 )    Color: Yellow / Appearance: Clear / S.015 / pH: x  Gluc: x / Ketone: Negative  / Bili: Small / Urobili: Negative mg/dL   Blood: x / Protein: 30 mg/dL / Nitrite: Negative   Leuk Esterase: Moderate / RBC: 6-10 /HPF / WBC 3-5   Sq Epi: x / Non Sq Epi: Occasional / Bacteria: Occasional        RADIOLOGY & ADDITIONAL TESTS:

## 2017-05-14 NOTE — CHART NOTE - NSCHARTNOTEFT_GEN_A_CORE
72yr old female with Severe dementia non verbal bed bound, hypertension, hyperlipidemia, LIN, CKD, COPD, paroxysmal A fib, septic arthritis, CKD baseline Cr 2.8 in April '17 sent in from [facility] due to mental status changes & anasarca. No information could be obtained from patient due to mental status. Pt is currently lethargic barely arousable. Unable to answer any questions. Examined at the bedside with Dr. Fay.  Exam very limited due to the pt being very difficult to arouse.     PE:  General: baseline mental status, responds to painful stimuli  Vascular: exam limited due to edema  Extremities: grossly edematous    Radiology:  5/12 CT Knee No Cont, Right: Status post removal of hardware with placement of antibiotic impregnated acrylic cement as outlined above. Postoperative changes of the distal femur, proximal tibia, and patella as above. Broad cutaneous wound at the   level of the patella with a gap of isoattenuation soft tissue between the   wound and the patella of approximately 1.5 cm likely related to   granulation tissue. There is confluent low attenuation adjacent to the   anterolateral aspect of the patella which may be related to confluent   subcutaneous edema, however a fluid collection cannot be entirely   excluded as the examination is limited by the lack of intravenous   contrast.    A/P: 72F c PMH HTN, HLD, LIN, CKD, COPD, Afib, septic arthritis, presenting with anasarca and acute on chronic kidney disease.  - If R knee wound does not heal we recommend amputation.  - cont current dialysis regimen.

## 2017-05-15 DIAGNOSIS — E11.59 TYPE 2 DIABETES MELLITUS WITH OTHER CIRCULATORY COMPLICATIONS: ICD-10-CM

## 2017-05-15 DIAGNOSIS — I95.9 HYPOTENSION, UNSPECIFIED: ICD-10-CM

## 2017-05-15 DIAGNOSIS — N17.9 ACUTE KIDNEY FAILURE, UNSPECIFIED: ICD-10-CM

## 2017-05-15 DIAGNOSIS — D72.829 ELEVATED WHITE BLOOD CELL COUNT, UNSPECIFIED: ICD-10-CM

## 2017-05-15 DIAGNOSIS — A41.52 SEPSIS DUE TO PSEUDOMONAS: ICD-10-CM

## 2017-05-15 LAB
ANION GAP SERPL CALC-SCNC: 19 MMOL/L — HIGH (ref 5–17)
BUN SERPL-MCNC: 65 MG/DL — HIGH (ref 8–20)
CALCIUM SERPL-MCNC: 8.3 MG/DL — LOW (ref 8.6–10.2)
CHLORIDE SERPL-SCNC: 105 MMOL/L — SIGNIFICANT CHANGE UP (ref 98–107)
CK SERPL-CCNC: 58 U/L — SIGNIFICANT CHANGE UP (ref 25–170)
CO2 SERPL-SCNC: 21 MMOL/L — LOW (ref 22–29)
CREAT SERPL-MCNC: 7.96 MG/DL — HIGH (ref 0.5–1.3)
GLUCOSE SERPL-MCNC: 102 MG/DL — SIGNIFICANT CHANGE UP (ref 70–115)
HCT VFR BLD CALC: 28.9 % — LOW (ref 37–47)
HGB BLD-MCNC: 9.8 G/DL — LOW (ref 12–16)
INR BLD: 2.07 RATIO — HIGH (ref 0.88–1.16)
MAGNESIUM SERPL-MCNC: 1.7 MG/DL — SIGNIFICANT CHANGE UP (ref 1.6–2.6)
MCHC RBC-ENTMCNC: 29.5 PG — SIGNIFICANT CHANGE UP (ref 27–31)
MCHC RBC-ENTMCNC: 33.9 G/DL — SIGNIFICANT CHANGE UP (ref 32–36)
MCV RBC AUTO: 87 FL — SIGNIFICANT CHANGE UP (ref 81–99)
PHOSPHATE SERPL-MCNC: 4.2 MG/DL — SIGNIFICANT CHANGE UP (ref 2.4–4.7)
PLATELET # BLD AUTO: 241 K/UL — SIGNIFICANT CHANGE UP (ref 150–400)
POTASSIUM SERPL-MCNC: 4 MMOL/L — SIGNIFICANT CHANGE UP (ref 3.5–5.3)
POTASSIUM SERPL-SCNC: 4 MMOL/L — SIGNIFICANT CHANGE UP (ref 3.5–5.3)
PROCALCITONIN SERPL-MCNC: 16.86 NG/ML — HIGH (ref 0–0.05)
PROTHROM AB SERPL-ACNC: 23.1 SEC — HIGH (ref 9.8–12.7)
RBC # BLD: 3.32 M/UL — LOW (ref 4.4–5.2)
RBC # FLD: 17 % — HIGH (ref 11–15.6)
SODIUM SERPL-SCNC: 145 MMOL/L — SIGNIFICANT CHANGE UP (ref 135–145)
WBC # BLD: 23.2 K/UL — HIGH (ref 4.8–10.8)
WBC # FLD AUTO: 23.2 K/UL — HIGH (ref 4.8–10.8)

## 2017-05-15 PROCEDURE — 93971 EXTREMITY STUDY: CPT | Mod: 26,RT

## 2017-05-15 PROCEDURE — 71010: CPT | Mod: 26

## 2017-05-15 PROCEDURE — 73560 X-RAY EXAM OF KNEE 1 OR 2: CPT | Mod: 26,RT

## 2017-05-15 PROCEDURE — 73060 X-RAY EXAM OF HUMERUS: CPT | Mod: 26,RT

## 2017-05-15 PROCEDURE — 99223 1ST HOSP IP/OBS HIGH 75: CPT

## 2017-05-15 RX ORDER — MIDODRINE HYDROCHLORIDE 2.5 MG/1
10 TABLET ORAL EVERY 8 HOURS
Qty: 0 | Refills: 0 | Status: DISCONTINUED | OUTPATIENT
Start: 2017-05-15 | End: 2017-05-21

## 2017-05-15 RX ORDER — ACETAMINOPHEN 500 MG
1000 TABLET ORAL ONCE
Qty: 0 | Refills: 0 | Status: COMPLETED | OUTPATIENT
Start: 2017-05-15 | End: 2017-05-15

## 2017-05-15 RX ORDER — MEROPENEM 1 G/30ML
500 INJECTION INTRAVENOUS EVERY 24 HOURS
Qty: 0 | Refills: 0 | Status: DISCONTINUED | OUTPATIENT
Start: 2017-05-16 | End: 2017-05-17

## 2017-05-15 RX ORDER — DIGOXIN 250 MCG
0.12 TABLET ORAL DAILY
Qty: 0 | Refills: 0 | Status: DISCONTINUED | OUTPATIENT
Start: 2017-05-15 | End: 2017-05-15

## 2017-05-15 RX ORDER — SODIUM BICARBONATE 1 MEQ/ML
0.05 SYRINGE (ML) INTRAVENOUS
Qty: 75 | Refills: 0 | Status: DISCONTINUED | OUTPATIENT
Start: 2017-05-15 | End: 2017-05-17

## 2017-05-15 RX ORDER — MIDODRINE HYDROCHLORIDE 2.5 MG/1
5 TABLET ORAL EVERY 8 HOURS
Qty: 0 | Refills: 0 | Status: DISCONTINUED | OUTPATIENT
Start: 2017-05-15 | End: 2017-05-15

## 2017-05-15 RX ORDER — MEROPENEM 1 G/30ML
INJECTION INTRAVENOUS
Qty: 0 | Refills: 0 | Status: DISCONTINUED | OUTPATIENT
Start: 2017-05-15 | End: 2017-05-17

## 2017-05-15 RX ORDER — MEROPENEM 1 G/30ML
500 INJECTION INTRAVENOUS ONCE
Qty: 0 | Refills: 0 | Status: COMPLETED | OUTPATIENT
Start: 2017-05-15 | End: 2017-05-15

## 2017-05-15 RX ORDER — COLLAGENASE CLOSTRIDIUM HIST. 250 UNIT/G
1 OINTMENT (GRAM) TOPICAL
Qty: 0 | Refills: 0 | Status: DISCONTINUED | OUTPATIENT
Start: 2017-05-15 | End: 2017-05-24

## 2017-05-15 RX ORDER — SODIUM CHLORIDE 9 MG/ML
250 INJECTION INTRAMUSCULAR; INTRAVENOUS; SUBCUTANEOUS ONCE
Qty: 0 | Refills: 0 | Status: COMPLETED | OUTPATIENT
Start: 2017-05-15 | End: 2017-05-15

## 2017-05-15 RX ADMIN — Medication 50 MILLILITER(S): at 14:16

## 2017-05-15 RX ADMIN — Medication 400 MILLIGRAM(S): at 06:14

## 2017-05-15 RX ADMIN — SODIUM CHLORIDE 100 MILLILITER(S): 9 INJECTION, SOLUTION INTRAVENOUS at 01:13

## 2017-05-15 RX ADMIN — Medication 1 APPLICATION(S): at 19:13

## 2017-05-15 RX ADMIN — MIDODRINE HYDROCHLORIDE 5 MILLIGRAM(S): 2.5 TABLET ORAL at 13:49

## 2017-05-15 RX ADMIN — MEROPENEM 200 MILLIGRAM(S): 1 INJECTION INTRAVENOUS at 16:45

## 2017-05-15 RX ADMIN — Medication 75 MEQ/KG/HR: at 16:15

## 2017-05-15 RX ADMIN — Medication 0.12 MILLIGRAM(S): at 10:59

## 2017-05-15 RX ADMIN — Medication 1000 MILLIGRAM(S): at 06:44

## 2017-05-15 RX ADMIN — Medication 50 MILLILITER(S): at 05:13

## 2017-05-15 RX ADMIN — SODIUM CHLORIDE 1500 MILLILITER(S): 9 INJECTION INTRAMUSCULAR; INTRAVENOUS; SUBCUTANEOUS at 08:11

## 2017-05-15 RX ADMIN — MIDODRINE HYDROCHLORIDE 10 MILLIGRAM(S): 2.5 TABLET ORAL at 22:20

## 2017-05-15 NOTE — CONSULT NOTE ADULT - PROBLEM SELECTOR RECOMMENDATION 5
Continue to check FS and cover with ISS, consistent carb diet Will obtain repeat dopplers of RUE to r/o DVT. Low suspicion for compartment syndrome given physical exam findings, but will sent CK to r/o CS and consult surgery if suspicion for CS increases. Will also check R humeral xray to further evaluate lytic lesions found incidentally on CXR and consider ortho eval pending xray results.

## 2017-05-15 NOTE — PROGRESS NOTE ADULT - SUBJECTIVE AND OBJECTIVE BOX
Patient is well known to me. She is s/p Bilateral TKA with the Right knee currently treated for a knee infection. SHe initially had the prosthesis removed with a mobile spacer and iv antibiotics .THe infection returned and the mobile spacer was removed and an antibiotic spacer was placed.  Post op, the patient has had wound healing difficulties and has been followed by Dr Rey. The anterior wound is not new and has been followed by Dr Rey and allowed to heal by secondary intention.    Patient admitted for renal failure with possible dialysis and Orthopedics consulted for her wound/.    Exam show the anterior eschar with good granulation tissue, A thick exudate was noted but no purulence would be expressed from the wound .Proximal and distal wound intact without erythema.Foot warm with distal ulcers noted.

## 2017-05-15 NOTE — CONSULT NOTE ADULT - ASSESSMENT
71 yo female, PMHx COPD, AFib, DM, CKD, h/o breast CA s/p L mastectomy, morbid obesity, LIN on CPAP at home, multiple pressure ulcers, presented to ED on 05/12/17 from [facility] with worsening altered mental status, concern for metabolic encephalopathy possibly secondary to acute on chronic renal failure vs occult sepsis of unclear etiology. 73 yo female, PMHx COPD, AFib, DM, CKD, h/o breast CA s/p L mastectomy, morbid obesity, LIN on CPAP at home, multiple pressure ulcers, presented to ED on 05/12/17 from [facility] with worsening altered mental status, concern for metabolic encephalopathy possibly secondary to acute on chronic renal failure vs occult sepsis of unclear etiology. Given complex medical history with multiple comorbidities and risk factors, and unstable hemodynamic status, will admit to MICU for critical care monitoring and care. 73 yo female, PMHx COPD, AFib, DM, CKD, h/o breast CA s/p L mastectomy, morbid obesity, LIN on CPAP at home, multiple pressure ulcers, presented to ED on 05/12/17 from [facility] with worsening altered mental status, concern for metabolic encephalopathy possibly secondary to acute on chronic renal failure vs occult sepsis of unclear etiology. Also with RUE edema concerning for DVT, low suspicion for compartment syndrome at this time, and R humeral head lytic lesions of uncertain etiology. Given complex medical history with multiple comorbidities and risk factors, and unstable hemodynamic status, will admit to MICU for critical care monitoring and care.

## 2017-05-15 NOTE — PROGRESS NOTE ADULT - ASSESSMENT
72 yr old female with atrial fibrillation, hypertension, hyperlipidemia, COPD, breast cancer, diabetes mellitus, right knee surgeries, pressure ulcer, CKD, h/o right UE DVT admitted with altered mental status. She was discharged to rehab in April 2017 and completed course of antibiotics for infected right knee. Patient has no known history of dementia. Per sister, she was becoming increasingly lethargic for a week prior to admission with increasingly poor oral intake. On admission she was noted to have acute on chronic renal failure. CT brain was done, negative for acute stroke. Renal consulted, she was started on IV fluids. Plastics consulted. Given elevated WBC, ID, orthopedics and plastics consulted. No signs of infection in the knee as per orthopedics and plastics. She was started on Meropenem as per ID, cultures sent. Digoxin added for rate control, cardiology consulted, ECHO ordered. Given persistent hypotension and renal failure, ICU consulted for possible pressor support and eventual HD.

## 2017-05-15 NOTE — PROGRESS NOTE ADULT - ASSESSMENT
1) Anterior Wound- Have consulted  Dr Rey who will evaluate the wound and decide next steps if any  2) Vascuar- Will discuss next steps.AKA was a possible next step but certainly want to review plan with vascular prior to moving forward  3) Infection- Dr Chamberlain recently evaluated the patient and will ask for their thoughts on infection/abx  4) Knee- THe plan was to attempt a replant once skin healed and infection was cleared .Patient has been a non ambulator for a long period of time. Certainly would like to rule out return of the infection and suggest an aspiration of fluid  collection noted on CT.If indeed infection has returned then t AKA may be a reasonable next step.Given that 2 irrigation and debridements at the joint level have not been successful then not optimistic that infection will clear.  5) Complex case- Will attempt to discuss with the family and consultants  and determine next steps

## 2017-05-15 NOTE — CONSULT NOTE ADULT - PROBLEM SELECTOR RECOMMENDATION 2
Plan for vascular team to gain access tomorrow, with plan for dialysis tomorrow  Continue to trend electrolytes, replace electrolytes as needed  Renally dose medications and avoid nephrotoxic agents when possible Plan for vascular team to gain access tomorrow, with plan for dialysis tomorrow  Continue bicarb infusion  Continue to trend electrolytes, replace electrolytes as needed  Renally dose medications and avoid nephrotoxic agents when possible

## 2017-05-15 NOTE — CONSULT NOTE ADULT - SUBJECTIVE AND OBJECTIVE BOX
Patient is a 72y old  Female who presents with a chief complaint of mental status changes & anasarca. (12 May 2017 18:13)      HPI:  71 yr old female with Severe dementia non verbal bed bound, hypertension, hyperlipidemia, LIN, CKD, COPD, paroxysmal A fib, septic arthritis, CKD baseline Cr 2.8 in April '17 sent in from [facility] due to mental status changes & anasarca. No information could be obtained from patient due to mental status. Patient continues with altered mental status and is therefore unable to provide history.     Addendum :  pt's ct chest showed Unchanged 6 mm nodule along the right minor fissure compared with prior CT  of the chest from September 2016. Upon discharge discharging team will make recommendation for pt. to follow up on 6 mm nodule. Dr. Durand 5/13/17. 5:20 am. (12 May 2017 18:13)      PAST MEDICAL & SURGICAL HISTORY:  Hypercholesterolemia  Deficiency of vitamin K  Chronic pain  COPD (chronic obstructive pulmonary disease)  Urine retention  Pressure ulcer  Atrial fibrillation  Constipation  Breast cancer  HLD (hyperlipidemia)  HTN (hypertension)  GERD (gastroesophageal reflux disease)  DM (diabetes mellitus)  Obesity  LIN on CPAP  History of incision and drainage: rt knee  S/p total knee replacement, bilateral  S/P knee replacement, right  S/P hysterectomy  S/P mastectomy: Left  Breast cancer      Allergies    No Known Allergies    Intolerances        MEDICATIONS  (STANDING):  insulin lispro (HumaLOG) corrective regimen sliding scale  SubCutaneous three times a day before meals  dextrose 5%. 1000milliLiter(s) IV Continuous <Continuous>  dextrose 50% Injectable 12.5Gram(s) IV Push once  dextrose 50% Injectable 25Gram(s) IV Push once  dextrose 50% Injectable 25Gram(s) IV Push once  mineral oil enema 133milliLiter(s) Rectal once  collagenase Ointment 1Application(s) Topical daily  digoxin     Tablet 0.125milliGRAM(s) Oral daily  midodrine 5milliGRAM(s) Oral every 8 hours  sodium bicarbonate  Infusion 0.049mEq/kG/Hr IV Continuous <Continuous>  meropenem IVPB  IV Intermittent   collagenase Ointment 1Application(s) Topical two times a day    MEDICATIONS  (PRN):  dextrose Gel 1Dose(s) Oral once PRN Blood Glucose LESS THAN 70 milliGRAM(s)/deciliter  glucagon  Injectable 1milliGRAM(s) IntraMuscular once PRN Glucose LESS THAN 70 milligrams/deciliter      FAMILY HISTORY:  Family history of diabetes mellitus      SOCIAL HISTORY: unable to obtain      REVIEW OF SYSTEMS:  ROS unobtainable    Vital Signs Last 24 Hrs  T(C): 36.6, Max: 36.9 (05-15 @ 07:27)  T(F): 97.8, Max: 98.5 (05-15 @ 07:27)  HR: 100 (98 - 130)  BP: 86/61 (79/50 - 117/47)  BP(mean): --  RR: 15 (12 - 20)  SpO2: 99% (95% - 100%)      I & Os for current day (as of 15 May 2017 17:46)  =============================================  IN:    dextrose 5% + sodium chloride 0.45%.: 1900 ml    Sodium Chloride 0.9% IV Bolus: 500 ml    Solution: 100 ml    Total IN: 2500 ml  ---------------------------------------------  OUT:    Indwelling Catheter - Urethral: 45 ml    Total OUT: 45 ml  ---------------------------------------------  Total NET: 2455 ml      PHYSICAL EXAM:  Appearance: No distress	  HEENT:   Normal oral mucosa, PERRL, EOMI, sclera non-icteric	  Lymphatic: No cervical lymphadenopathy  Cardiovascular: irregular, tachy, no audible murmurs  Respiratory: Lungs clear to auscultation	  Gastrointestinal:  Soft, Non-tender, + BS	  Neurologic: decreased mental status  Vascular: Peripheral pulses non-palpable, skin warm to touch      INTERPRETATION OF TELEMETRY: atrial fibrillation    ECG: Atrial fibrillation with RVR    LABS:                        9.8    23.2  )-----------( 241      ( 15 May 2017 06:27 )             28.9     05-15    145  |  105  |  65.0<H>  ----------------------------<  102  4.0   |  21.0<L>  |  7.96<H>    Ca    8.3<L>      15 May 2017 06:27  Phos  4.2     05-15  Mg     1.7     05-15          PT/INR - ( 15 May 2017 06:27 )   PT: 23.1 sec;   INR: 2.07 ratio             I&O's Summary    I & Os for current day (as of 15 May 2017 17:46)  =============================================  IN: 2500 ml / OUT: 45 ml / NET: 2455 ml    BNP  RADIOLOGY & ADDITIONAL STUDIES:

## 2017-05-15 NOTE — CONSULT NOTE ADULT - PROBLEM SELECTOR RECOMMENDATION 3
Continue with gentle IV fluid hydration  Will start on vasoactive agents if needed for hypotension refractory to fluids Continue with gentle IV fluid hydration  Will increase Midodrine to 10 mg q8h  Will start on vasoactive agents if needed for hypotension refractory to fluids

## 2017-05-15 NOTE — PROGRESS NOTE ADULT - ASSESSMENT
wbc rising; BP low and has drainage from R KJ- all point to infection  ortho noted- comlpex situation  Has severe KARMEN on top of CKD but stable and Lytes acceptable- had planned to dialyze today as d/w PMD earlier but now BP too low and will need to stabilize before HD   d/w Dr. Nicholson, will need ID eval tough has been off abx lately  overall prognosis not good as per ortho may need AKA    continue fluid support and SPA  may need pressors- shall defer to cardiology  HD arranged- do when stable  needs BP and HR control- cardiology pending

## 2017-05-15 NOTE — PROGRESS NOTE ADULT - SUBJECTIVE AND OBJECTIVE BOX
Addendum    1) Spoke with Medicine/Renal- Renal failure appears to be the main issue.Need to balance dialysis need versus the hypotension.Ortho paln reviewed with medicine.  2) Wound- Seen with Dr Rey.Wound appears to be granulating in and is improved from Dr Rey's last evaluation.No purulence at wound and no surrounding edema.Dr Rey agrees no evidence of return of knee infection but he will prescribe the local care and continue to follwo wound  3) Infectious Disease- Dr charlton evaluated the patient.Dr Charlton in agreement that no apparent knee infection but did start iv antibiotics.Patient was seen in his office last week and antibiotics discontinued.  4) Vascular- Discussed the case with Dr Sargent and patients history reviewed.Pulses not palpable but foot is warm and inability may be related tot he edema. Further vascular studies per vascular before considering the AKA  5) I spent some time discussin the aboive with Anisa(sister) and Juan Carlos(son).  Questions were answered

## 2017-05-15 NOTE — PROGRESS NOTE ADULT - SUBJECTIVE AND OBJECTIVE BOX
Patient seen and examined  lethargic but opens eyes when called  has developed AFib with HR around 112/m  has become more hypotensive despite IVF/Albumin    I&O's Summary    I & Os for current day (as of 15 May 2017 12:25)  =============================================  IN: 2500 ml / OUT: 45 ml / NET: 2455 ml      REVIEW OF SYSTEMS:    CONSTITUTIONAL: No F/C  RESPIRATORY: No cough or SOB noted  CARDIOVASCULAR: No CP/palpitations  NEUROLOGICAL: Not able to reply/Lethargic  MUSCULOSKELETAL:  + joint pains all over when moved /swelling + both KJ; No LBP  EXTREMITIES : ++ swelling both LE    Vital Signs Last 24 Hrs  T(C): 36.9, Max: 36.9 (05-15 @ 07:27)  T(F): 98.5, Max: 98.5 (05-15 @ 07:27)  HR: 108 (98 - 130)  BP: 82/60 (79/50 - 128/70)  BP(mean): 88 (68 - 88)  RR: 13 (12 - 20)  SpO2: 100% (95% - 100%)    PHYSICAL EXAM:    GENERAL: NAD but lethargic; moans when arms moved  EYES:  conjunctiva and sclera clear; Keeps eyes closed mostly  NECK: Supple, No JVD/Bruit  NERVOUS SYSTEM: opens eyes when called,   CHEST:  CTA ,No rales or rhonchi  HEART:  Irreg Irregular, No murmurs  ABDOMEN: Soft, NT/ND BS+  EXTREMITIES:  2 + Edema both LEs  SKIN: No rashes    LABS:                        9.8    23.2  )-----------( 241      ( 15 May 2017 06:27 )             28.9     05-15    145  |  105  |  65.0<H>  ----------------------------<  102  4.0   |  21.0<L>  |  7.96<H>    Ca    8.3<L>      15 May 2017 06:27  Phos  4.2     05-15  Mg     1.7     05-15    SaO2  100%on NC    MEDICATIONS  (STANDING):  insulin lispro (HumaLOG) corrective regimen sliding scale  dextrose 5%.  dextrose Gel PRN  dextrose 50% Injectable  dextrose 50% Injectable  dextrose 50% Injectable  glucagon  Injectable PRN  mineral oil enema  dextrose 5% + sodium chloride 0.45%.  collagenase Ointment  albumin human 25% IVPB  furosemide   Injectable  digoxin     Tablet  midodrine

## 2017-05-15 NOTE — PROGRESS NOTE ADULT - SUBJECTIVE AND OBJECTIVE BOX
Pt well known to me from prior admissions.  She was re-admitted from rehab with acute renal failure.  Etiology unknown.    Right knee wound is stable.  Last seen 5 days ago as an outpatient.    She has been receiving serial debridements and santyl for wound care.    Wound si showing signs of steady improvement with decr necrotic tissue and granulating wound base.    No surrounding erythema, no drainage, no palpable deep collections.      A/P:  No concern for active wound infection  Continue local wound care  Pt seen and evaluated with Dr. Ritchie  Appreciate vascula input  Continue supportive care for now

## 2017-05-15 NOTE — CONSULT NOTE ADULT - SUBJECTIVE AND OBJECTIVE BOX
[facility] [place] CARMENZA HARRIS MD FACP   DESIRE METZGER MD  Diplomates American Board of Internal Medicine and Infecctious Diseases      MRN-[id number]  SHYAM LAL is a 72y  Female     CC:  WELL KNOWN TO ME  SEEN LAST MAY 5TH ION OFFICE  ALL LABS AT [facility]  COMPLETED TX 4 WEEKS FOR SEPTIC R TKNEE - REMOVAL HARDWARE WITH C/S WITH VRE/PSUEDOMONAS  WAS ON DAPTO QOD AND MERREM    BASELINE CREAT APPROX 3  ADMITTED [facility] ? ETIOLOGY AND RISING WBC  POORLY RESPONSIVE  NO POS C/S  URINE CONTAM    AFEBRILE      Past Medical & Surgical Hx:  PAST MEDICAL & SURGICAL HISTORY:  Hypercholesterolemia  Deficiency of vitamin K  Chronic pain  COPD (chronic obstructive pulmonary disease)  Urine retention  Pressure ulcer  Atrial fibrillation  Constipation  Breast cancer  HLD (hyperlipidemia)  HTN (hypertension)  GERD (gastroesophageal reflux disease)  DM (diabetes mellitus)  Obesity  LIN on CPAP  History of incision and drainage: rt knee  S/p total knee replacement, bilateral  S/P knee replacement, right  S/P hysterectomy  S/P mastectomy: Left  Breast cancer      Problem List:  HEALTH ISSUES - PROBLEM Dx:  HTN (hypertension): HTN (hypertension)  Infection of right knee: Infection of right knee  Breast mass: Breast mass  Coagulopathy: Coagulopathy  Atrial fibrillation, unspecified type: Atrial fibrillation, unspecified type  Anasarca: Anasarca  DM (diabetes mellitus): DM (diabetes mellitus)  Dementia without behavioral disturbance, unspecified dementia type: Dementia without behavioral disturbance, unspecified dementia type  Encephalopathy: Encephalopathy  Skin ulcer of sacrum, with unspecified severity: Skin ulcer of sacrum, with unspecified severity  CKD (chronic kidney disease) stage 3, GFR 30-59 ml/min: CKD (chronic kidney disease) stage 3, GFR 30-59 ml/min  Pressure ulcer: Pressure ulcer  Chronic atrial fibrillation: Chronic atrial fibrillation  Acute renal failure, unspecified acute renal failure type: Acute renal failure, unspecified acute renal failure type            Allergies    No Known Allergies    Intolerances          ANTIBIOTICS:   meropenem IVPB  IV Intermittent        Review of Systems: - Negative except as mentioned below  POORLY RESPONSIVE - NEW      Physical Exam:    Vital Signs Last 24 Hrs  T(C): 36.4, Max: 36.9 (05-15 @ 07:27)  T(F): 97.6, Max: 98.5 (05-15 @ 07:27)  HR: 100 (98 - 130)  BP: 86/61 (79/50 - 128/70)  BP(mean): 88 (88 - 88)  RR: 15 (12 - 20)  SpO2: 99% (95% - 100%)        GEN:AROUSABLE, NECK SUPPLE  HEENT: normocephalic and atraumatic. EOMI. CHIDI. Moist mucosa. Clear Posterior pharynx.  NECK: Supple. No carotid bruits.  No lymphadenopathy or thyromegaly.  LUNGS: Clear to auscultation.  HEART: Regular rate and rhythm without murmur.  ABDOMEN: Soft, nontender, and nondistended.  Positive bowel sounds.  No hepatosplenomegaly was noted.  EXTREMITIES: Without any cyanosis, clubbing, rash, lesions or edema.R KNEE WND - DEEP NECROTIC BUT NIOT CELLULITIS. BASELINE AND SIMILAR TO LAST 2 OFFICE AND ER EVALUATION  NEUROLOGIC: Cranial nerves II through XII are grossly intact.  MUSCULOSKELETAL: R KNEE OPEN ULCER  SKIN: No ulceration or induration present.      Labs:                        9.8    23.2  )-----------( 241      ( 15 May 2017 06:27 )             28.9     05-15    145  |  105  |  65.0<H>  ----------------------------<  102  4.0   |  21.0<L>  |  7.96<H>    Ca    8.3<L>      15 May 2017 06:27  Phos  4.2     05-15  Mg     1.7     05-15      PT/INR - ( 15 May 2017 06:27 )   PT: 23.1 sec;   INR: 2.07 ratio               Platelet Count - Automated: 241 K/uL (05-15 @ 06:27)  WBC Count: 23.2 K/uL (05-15 @ 06:27)  Hematocrit: 28.9 % (05-15 @ 06:27)  Hemoglobin: 9.8 g/dL (05-15 @ 06:27)  Hematocrit: 31.7 % (05-14 @ 04:20)  WBC Count: 17.6 K/uL (05-14 @ 04:20)  Platelet Count - Automated: 285 K/uL (05-14 @ 04:20)  Hemoglobin: 10.6 g/dL (05-14 @ 04:20)    Blood Urea Nitrogen, Serum: 65.0 mg/dL <H> (05-15 @ 06:27)  Potassium, Serum: 4.0 mmol/L (05-15 @ 06:27)  Sodium, Serum: 145 mmol/L (05-15 @ 06:27)  Sodium, Serum: 143 mmol/L (05-14 @ 04:20)  Blood Urea Nitrogen, Serum: 63.0 mg/dL <H> (05-14 @ 04:20)  Potassium, Serum: 4.1 mmol/L (05-14 @ 04:20)          MICROBIOLOGY    Culture Results:   Numerous Gram Negative Rods Identification and susceptibility to follow.  Culture in progress (05-14 @ 01:11)  Culture Results:   Culture grew 3 or more types of organisms which indicate  collection contamination; consider recollection only if clinically  indicated.  .  TYPE: (C=Critical, N=Notification, A=Abnormal) N  TESTS:  _ Contaminated Urine Culture  DATE/TIME CALLED: _ (05-12 @ 19:10)  Culture Results:   No growth at 48 hours (05-12 @ 15:07)  Culture Results:   No growth at 48 hours (05-12 @ 15:06)      RADIOLOGY  MPRESSION: Trace bilateral pleural effusions. Left-sided central venous   access catheter noted with the distal tip overlying the superior vena   cava. No appreciable pneumothorax is seen. Mottled appearance to the   right humeral head with the suggestion of a lytic lesion. Clinical   correlation is suggested.                     RAÚL HARRIS MD FACP

## 2017-05-15 NOTE — CONSULT NOTE ADULT - ASSESSMENT
LEUKOCYTOSIS   H/O INFECTED RIGHT KNEE  ALL HARDWARE PREV OUT R KNEE - DOESNT APPEAR TO BE SOURCE    R/O UTI  R/O VAH    NOT SEPTIC ON ADMIT BUT MUST R/O HOSP ACQ  INFECTION

## 2017-05-15 NOTE — CONSULT NOTE ADULT - SUBJECTIVE AND OBJECTIVE BOX
This is a 73 yo female who is a pt of Dr. Ritchie.  She is s/p Bilateral TKA with the Right knee currently treated for a right knee infection. SHe initially had the prosthesis removed with a mobile spacer and iv antibiotics .  The infection returned and the mobile spacer was removed and an antibiotic spacer was placed.  Post op, the patient has had wound healing difficulties and has been followed by Dr Rey.  The anterior wound is not new and has been followed by Dr Rey and allowed to heal by secondary intention.    Patient admitted for renal failure with possible dialysis and Orthopedics consulted for her wound.    Vital Signs Last 24 Hrs  T(C): 36.9, Max: 36.9 (05-15 @ 07:27)  T(F): 98.5, Max: 98.5 (05-15 @ 07:27)  HR: 108 (98 - 130)  BP: 82/60 (79/50 - 128/70)  BP(mean): 88 (68 - 88)  RR: 13 (12 - 20)  SpO2: 100% (95% - 100%)    Pt responds to pain only  Exam show the anterior eschar with good granulation tissue, A thick exudate was noted but no purulence would be expressed from the wound  Proximal and distal wound intact without erythema.    Foot warm with distal ulcers noted.  cap refill brisk                          9.8    23.2  )-----------( 241      ( 15 May 2017 06:27 )             28.9   05-15    145  |  105  |  65.0<H>  ----------------------------<  102  4.0   |  21.0<L>  |  7.96<H>    Ca    8.3<L>      15 May 2017 06:27  Phos  4.2     05-15  Mg     1.7     05-15      A/P:  · Assessment	  1)Dr. Rey contacted by Dr. Ritchie.  He will evaluate the anterior knee wound  2.  Dr. Ritchie to discuss with Dr. Bowens as well as the pt family

## 2017-05-15 NOTE — CONSULT NOTE ADULT - ASSESSMENT
Atrial fibrillation with RVR: concurrent hypotension complicates management. I agree with continued hydration for now. INR 2.07. Has been on digoxin daily. Digoxin levels ordered. TTE pending. Prognosis guarded. Findings likely due to underlying sepsis.

## 2017-05-15 NOTE — PROGRESS NOTE ADULT - SUBJECTIVE AND OBJECTIVE BOX
INTERVAL HPI/OVERNIGHT EVENTS:    CC: acute on chronic kidney failure, sepsis, encephalopathy, diabetes mellitus    Events noted, she remained hypotensive throughout the day. Mental status slightly improved, more arousable.     Vital Signs Last 24 Hrs  T(C): 36.6, Max: 36.9 (05-15 @ 07:27)  T(F): 97.8, Max: 98.5 (05-15 @ 07:27)  HR: 100 (100 - 125)  BP: 86/61 (79/50 - 117/47)  BP(mean): --  RR: 15 (12 - 19)  SpO2: 99% (98% - 100%)    PHYSICAL EXAM:    GENERAL: drowsy but arousable, responds to painful stimuli, anasarca  CHEST/LUNG: b/l air entry  HEART: Regular   ABDOMEN: Soft, BS+  EXTREMITIES: 3+ edema    MEDICATIONS  (STANDING):  insulin lispro (HumaLOG) corrective regimen sliding scale  SubCutaneous three times a day before meals  dextrose 5%. 1000milliLiter(s) IV Continuous <Continuous>  dextrose 50% Injectable 12.5Gram(s) IV Push once  dextrose 50% Injectable 25Gram(s) IV Push once  dextrose 50% Injectable 25Gram(s) IV Push once  mineral oil enema 133milliLiter(s) Rectal once  collagenase Ointment 1Application(s) Topical daily  midodrine 5milliGRAM(s) Oral every 8 hours  sodium bicarbonate  Infusion 0.049mEq/kG/Hr IV Continuous <Continuous>  meropenem IVPB  IV Intermittent   collagenase Ointment 1Application(s) Topical two times a day    MEDICATIONS  (PRN):  dextrose Gel 1Dose(s) Oral once PRN Blood Glucose LESS THAN 70 milliGRAM(s)/deciliter  glucagon  Injectable 1milliGRAM(s) IntraMuscular once PRN Glucose LESS THAN 70 milligrams/deciliter      Allergies    No Known Allergies    Intolerances          LABS:                          9.8    23.2  )-----------( 241      ( 15 May 2017 06:27 )             28.9     05-15    145  |  105  |  65.0<H>  ----------------------------<  102  4.0   |  21.0<L>  |  7.96<H>    Ca    8.3<L>      15 May 2017 06:27  Phos  4.2     05-15  Mg     1.7     05-15      PT/INR - ( 15 May 2017 06:27 )   PT: 23.1 sec;   INR: 2.07 ratio               RADIOLOGY & ADDITIONAL TESTS:

## 2017-05-15 NOTE — CONSULT NOTE ADULT - PROBLEM SELECTOR RECOMMENDATION 9
Possibly secondary to acute on chronic RF. Initial CT head neg. Plan for dialysis tomorrow, will observe closely with serial neurologic exams. Continue to trend procalcitonin, treat with gentle IV fluid hydration and empiric antibiotics

## 2017-05-15 NOTE — CONSULT NOTE ADULT - SUBJECTIVE AND OBJECTIVE BOX
Patient is a 72y old  Female who presents with a chief complaint of mental status changes & anasarca. (12 May 2017 18:13)      BRIEF HOSPITAL COURSE: 71 yo female, PMHx COPD, AFib, DM, h/o breast CA s/p L mastectomy, morbid obesity, LIN on CPAP at home, multiple pressure ulcers, presented to ED on 05/12/17 from [facility] with worsening altered mental status x 2 weeks. Patient was in [facility] s/p R knee replacement     Events last 24 hours: ***    PAST MEDICAL & SURGICAL HISTORY:  Hypercholesterolemia  Deficiency of vitamin K  Chronic pain  COPD (chronic obstructive pulmonary disease)  Urine retention  Pressure ulcer  Atrial fibrillation  Constipation  Breast cancer  HLD (hyperlipidemia)  HTN (hypertension)  GERD (gastroesophageal reflux disease)  DM (diabetes mellitus)  Obesity  LIN on CPAP  History of incision and drainage: rt knee  S/p total knee replacement, bilateral  S/P knee replacement, right  S/P hysterectomy  S/P mastectomy: Left    Allergies    No Known Allergies    Intolerances      FAMILY HISTORY:  Family history of diabetes mellitus      Review of Systems:  CONSTITUTIONAL: No fever, chills, or fatigue  EYES: No eye pain, visual disturbances, or discharge  ENMT:  No difficulty hearing, tinnitus, vertigo; No sinus or throat pain  NECK: No pain or stiffness  RESPIRATORY: No cough, wheezing, chills or hemoptysis; No shortness of breath  CARDIOVASCULAR: No chest pain, palpitations, dizziness, or leg swelling  GASTROINTESTINAL: No abdominal or epigastric pain. No nausea, vomiting, or hematemesis; No diarrhea or constipation. No melena or hematochezia.  GENITOURINARY: No dysuria, frequency, hematuria, or incontinence  NEUROLOGICAL: No headaches, memory loss, loss of strength, numbness, or tremors  SKIN: No itching, burning, rashes, or lesions   MUSCULOSKELETAL: No joint pain or swelling; No muscle, back, or extremity pain  PSYCHIATRIC: No depression, anxiety, mood swings, or difficulty sleeping      Medications:  meropenem IVPB  IV Intermittent     midodrine 5milliGRAM(s) Oral every 8 hours            mineral oil enema 133milliLiter(s) Rectal once      insulin lispro (HumaLOG) corrective regimen sliding scale  SubCutaneous three times a day before meals  dextrose Gel 1Dose(s) Oral once PRN  dextrose 50% Injectable 12.5Gram(s) IV Push once  dextrose 50% Injectable 25Gram(s) IV Push once  dextrose 50% Injectable 25Gram(s) IV Push once  glucagon  Injectable 1milliGRAM(s) IntraMuscular once PRN    dextrose 5%. 1000milliLiter(s) IV Continuous <Continuous>  sodium bicarbonate  Infusion 0.049mEq/kG/Hr IV Continuous <Continuous>      collagenase Ointment 1Application(s) Topical daily  collagenase Ointment 1Application(s) Topical two times a day            ICU Vital Signs Last 24 Hrs  T(C): 36.6, Max: 36.9 (05-15 @ 07:27)  T(F): 97.8, Max: 98.5 (05-15 @ 07:27)  HR: 100 (100 - 125)  BP: 86/61 (79/50 - 117/47)  BP(mean): --  ABP: --  ABP(mean): --  RR: 15 (12 - 19)  SpO2: 99% (98% - 100%)    Vital Signs Last 24 Hrs  T(C): 36.6, Max: 36.9 (05-15 @ 07:27)  T(F): 97.8, Max: 98.5 (05-15 @ 07:27)  HR: 100 (100 - 125)  BP: 86/61 (79/50 - 117/47)  BP(mean): --  RR: 15 (12 - 19)  SpO2: 99% (98% - 100%)        I&O's Detail  I & Os for 24h ending 15 May 2017 07:00  =============================================  IN:    dextrose 5% + sodium chloride 0.45%.: 1900 ml    Sodium Chloride 0.9% IV Bolus: 500 ml    Solution: 100 ml    Total IN: 2500 ml  ---------------------------------------------  OUT:    Indwelling Catheter - Urethral: 45 ml    Total OUT: 45 ml  ---------------------------------------------  Total NET: 2455 ml    I & Os for current day (as of 15 May 2017 18:38)  =============================================  IN:    sodium bicarbonate  Infusion: 700 ml    dextrose 5% + sodium chloride 0.45%.: 375 ml    Albumin 5%  - 250 mL: 250 ml    Total IN: 1325 ml  ---------------------------------------------  OUT:    Indwelling Catheter - Urethral: 50 ml    Total OUT: 50 ml  ---------------------------------------------  Total NET: 1275 ml        LABS:                        9.8    23.2  )-----------( 241      ( 15 May 2017 06:27 )             28.9     05-15    145  |  105  |  65.0<H>  ----------------------------<  102  4.0   |  21.0<L>  |  7.96<H>    Ca    8.3<L>      15 May 2017 06:27  Phos  4.2     05-15  Mg     1.7     05-15            CAPILLARY BLOOD GLUCOSE  102 (15 May 2017 07:11)    PT/INR - ( 15 May 2017 06:27 )   PT: 23.1 sec;   INR: 2.07 ratio             CULTURES:  Culture Results:   Numerous Gram Negative Rods Identification and susceptibility to follow.  Culture in progress (05-14 @ 01:11)  Culture Results:   Culture grew 3 or more types of organisms which indicate  collection contamination; consider recollection only if clinically  indicated.  .  TYPE: (C=Critical, N=Notification, A=Abnormal) N  TESTS:  _ Contaminated Urine Culture  DATE/TIME CALLED: _ (05-12 @ 19:10)  Culture Results:   No growth at 48 hours (05-12 @ 15:07)  Culture Results:   No growth at 48 hours (05-12 @ 15:06)      Physical Examination:    General: No acute distress.      HEENT: Pupils equal, reactive to light.  Symmetric.    PULM: Clear to auscultation bilaterally, no significant sputum production    CVS: Regular rate and rhythm, no murmurs, rubs, or gallops    ABD: Soft, nondistended, nontender, normoactive bowel sounds, no masses    EXT: No edema, nontender    SKIN: Warm and well perfused, no rashes noted.    NEURO: Alert, oriented, interactive, nonfocal    RADIOLOGY: ***    CRITICAL CARE TIME SPENT: *** Patient is a 72y old  Female who presents with a chief complaint of mental status changes & anasarca. (12 May 2017 18:13)      BRIEF HOSPITAL COURSE: 71 yo female, PMHx COPD, AFib, DM, CKD, h/o breast CA s/p L mastectomy, morbid obesity, LIN on CPAP at home, multiple pressure ulcers, presented to ED on 05/12/17 from [facility] with worsening altered mental status x 2 weeks. Has CKD with baseline Cr of ~2.8, found to have Cr of 8.0 on arrival. Patient has had multiple hospital admissions since R TKR in 2016, requiring multiple revisions and hardware removal for infection.     Events last 24 hours: ***    PAST MEDICAL & SURGICAL HISTORY:  Hypercholesterolemia  Deficiency of vitamin K  Chronic pain  COPD (chronic obstructive pulmonary disease)  Urine retention  Pressure ulcer  Atrial fibrillation  Constipation  Breast cancer  HLD (hyperlipidemia)  HTN (hypertension)  GERD (gastroesophageal reflux disease)  DM (diabetes mellitus)  Obesity  LIN on CPAP  History of incision and drainage: rt knee  S/p total knee replacement, bilateral  S/P knee replacement, right  S/P hysterectomy  S/P mastectomy: Left    Allergies    No Known Allergies    Intolerances      FAMILY HISTORY:  Family history of diabetes mellitus      Review of Systems:  CONSTITUTIONAL: No fever, chills, or fatigue  EYES: No eye pain, visual disturbances, or discharge  ENMT:  No difficulty hearing, tinnitus, vertigo; No sinus or throat pain  NECK: No pain or stiffness  RESPIRATORY: No cough, wheezing, chills or hemoptysis; No shortness of breath  CARDIOVASCULAR: No chest pain, palpitations, dizziness, or leg swelling  GASTROINTESTINAL: No abdominal or epigastric pain. No nausea, vomiting, or hematemesis; No diarrhea or constipation. No melena or hematochezia.  GENITOURINARY: No dysuria, frequency, hematuria, or incontinence  NEUROLOGICAL: No headaches, memory loss, loss of strength, numbness, or tremors  SKIN: No itching, burning, rashes, or lesions   MUSCULOSKELETAL: No joint pain or swelling; No muscle, back, or extremity pain  PSYCHIATRIC: No depression, anxiety, mood swings, or difficulty sleeping      Medications:  meropenem IVPB  IV Intermittent     midodrine 5milliGRAM(s) Oral every 8 hours            mineral oil enema 133milliLiter(s) Rectal once      insulin lispro (HumaLOG) corrective regimen sliding scale  SubCutaneous three times a day before meals  dextrose Gel 1Dose(s) Oral once PRN  dextrose 50% Injectable 12.5Gram(s) IV Push once  dextrose 50% Injectable 25Gram(s) IV Push once  dextrose 50% Injectable 25Gram(s) IV Push once  glucagon  Injectable 1milliGRAM(s) IntraMuscular once PRN    dextrose 5%. 1000milliLiter(s) IV Continuous <Continuous>  sodium bicarbonate  Infusion 0.049mEq/kG/Hr IV Continuous <Continuous>      collagenase Ointment 1Application(s) Topical daily  collagenase Ointment 1Application(s) Topical two times a day            ICU Vital Signs Last 24 Hrs  T(C): 36.6, Max: 36.9 (05-15 @ 07:27)  T(F): 97.8, Max: 98.5 (05-15 @ 07:27)  HR: 100 (100 - 125)  BP: 86/61 (79/50 - 117/47)  BP(mean): --  ABP: --  ABP(mean): --  RR: 15 (12 - 19)  SpO2: 99% (98% - 100%)    Vital Signs Last 24 Hrs  T(C): 36.6, Max: 36.9 (05-15 @ 07:27)  T(F): 97.8, Max: 98.5 (05-15 @ 07:27)  HR: 100 (100 - 125)  BP: 86/61 (79/50 - 117/47)  BP(mean): --  RR: 15 (12 - 19)  SpO2: 99% (98% - 100%)        I&O's Detail  I & Os for 24h ending 15 May 2017 07:00  =============================================  IN:    dextrose 5% + sodium chloride 0.45%.: 1900 ml    Sodium Chloride 0.9% IV Bolus: 500 ml    Solution: 100 ml    Total IN: 2500 ml  ---------------------------------------------  OUT:    Indwelling Catheter - Urethral: 45 ml    Total OUT: 45 ml  ---------------------------------------------  Total NET: 2455 ml    I & Os for current day (as of 15 May 2017 18:38)  =============================================  IN:    sodium bicarbonate  Infusion: 700 ml    dextrose 5% + sodium chloride 0.45%.: 375 ml    Albumin 5%  - 250 mL: 250 ml    Total IN: 1325 ml  ---------------------------------------------  OUT:    Indwelling Catheter - Urethral: 50 ml    Total OUT: 50 ml  ---------------------------------------------  Total NET: 1275 ml        LABS:                        9.8    23.2  )-----------( 241      ( 15 May 2017 06:27 )             28.9     05-15    145  |  105  |  65.0<H>  ----------------------------<  102  4.0   |  21.0<L>  |  7.96<H>    Ca    8.3<L>      15 May 2017 06:27  Phos  4.2     05-15  Mg     1.7     05-15            CAPILLARY BLOOD GLUCOSE  102 (15 May 2017 07:11)    PT/INR - ( 15 May 2017 06:27 )   PT: 23.1 sec;   INR: 2.07 ratio             CULTURES:  Culture Results:   Numerous Gram Negative Rods Identification and susceptibility to follow.  Culture in progress (05-14 @ 01:11)  Culture Results:   Culture grew 3 or more types of organisms which indicate  collection contamination; consider recollection only if clinically  indicated.  .  TYPE: (C=Critical, N=Notification, A=Abnormal) N  TESTS:  _ Contaminated Urine Culture  DATE/TIME CALLED: _ (05-12 @ 19:10)  Culture Results:   No growth at 48 hours (05-12 @ 15:07)  Culture Results:   No growth at 48 hours (05-12 @ 15:06)      Physical Examination:    General: No acute distress.      HEENT: Pupils equal, reactive to light.  Symmetric.    PULM: Clear to auscultation bilaterally, no significant sputum production    CVS: Regular rate and rhythm, no murmurs, rubs, or gallops    ABD: Soft, nondistended, nontender, normoactive bowel sounds, no masses    EXT: No edema, nontender    SKIN: Warm and well perfused, no rashes noted.    NEURO: Alert, oriented, interactive, nonfocal    RADIOLOGY: ***    CRITICAL CARE TIME SPENT: *** Patient is a 72y old  Female who presents with a chief complaint of mental status changes & anasarca. (12 May 2017 18:13)      BRIEF HOSPITAL COURSE: 73 yo female, PMHx COPD, AFib, DM, CKD, h/o breast CA s/p L mastectomy, morbid obesity, LIN on CPAP at home, multiple pressure ulcers, presented to ED on 05/12/17 from [facility] with worsening altered mental status x 2 weeks. Has CKD with baseline Cr of ~2.8, found to have Cr of 8.0 on arrival. On admission, CXR neg with ?humeral head lytic lesions. CT head negative for acute infarct or mass. Patient has had multiple hospital admissions since R TKR in 2016, requiring multiple revisions and hardware removal for infection.    Events last 24 hours: MICU consulted for hypotension. Plan for Vascular team to place dialysis access with plan for dialysis tomorrow, concern for worsening hypotension requiring vasopressors especially while receiving dialysis. On evaluation, patient encephalopathic with generalized edema and anasarca, RUE > RLE tenderness. Patient evaluated by Plastics, Ortho, Renal, and ID, started on abx for possible infectious etiology, ortho / plastics doubt R knee as source; has procalcitonin 16.8 and increasing leukocytosis but has remained afebrile. Pt's family also states she has a RUE DVT from PICC line however past DVT studies of RUE are negative. Patient unable to contribute to HPI or ROS due to mental status; history obtained from patient's family and charts.    PAST MEDICAL & SURGICAL HISTORY:  Hypercholesterolemia  Deficiency of vitamin K  Chronic pain  COPD (chronic obstructive pulmonary disease)  Urine retention  Pressure ulcer  Atrial fibrillation  Constipation  Breast cancer  HLD (hyperlipidemia)  HTN (hypertension)  GERD (gastroesophageal reflux disease)  DM (diabetes mellitus)  Obesity  LIN on CPAP  History of incision and drainage: rt knee  S/p total knee replacement, bilateral  S/P knee replacement, right  S/P hysterectomy  S/P mastectomy: Left    Allergies    No Known Allergies    Intolerances      FAMILY HISTORY:  Family history of diabetes mellitus      Review of Systems: per HPI.      Medications:  meropenem IVPB  IV Intermittent   midodrine 5milliGRAM(s) Oral every 8 hours  mineral oil enema 133milliLiter(s) Rectal once  insulin lispro (HumaLOG) corrective regimen sliding scale  SubCutaneous three times a day before meals  dextrose Gel 1Dose(s) Oral once PRN  dextrose 50% Injectable 12.5Gram(s) IV Push once  dextrose 50% Injectable 25Gram(s) IV Push once  dextrose 50% Injectable 25Gram(s) IV Push once  glucagon  Injectable 1milliGRAM(s) IntraMuscular once PRN  dextrose 5%. 1000milliLiter(s) IV Continuous <Continuous>  sodium bicarbonate  Infusion 0.049mEq/kG/Hr IV Continuous <Continuous>  collagenase Ointment 1Application(s) Topical daily  collagenase Ointment 1Application(s) Topical two times a day        ICU Vital Signs Last 24 Hrs  T(C): 36.6, Max: 36.9 (05-15 @ 07:27)  T(F): 97.8, Max: 98.5 (05-15 @ 07:27)  HR: 100 (100 - 125)  BP: 86/61 (79/50 - 117/47)  BP(mean): --  ABP: --  ABP(mean): --  RR: 15 (12 - 19)  SpO2: 99% (98% - 100%)    Vital Signs Last 24 Hrs  T(C): 36.6, Max: 36.9 (05-15 @ 07:27)  T(F): 97.8, Max: 98.5 (05-15 @ 07:27)  HR: 100 (100 - 125)  BP: 86/61 (79/50 - 117/47)  BP(mean): --  RR: 15 (12 - 19)  SpO2: 99% (98% - 100%)        I&O's Detail  I & Os for 24h ending 15 May 2017 07:00  =============================================  IN:    dextrose 5% + sodium chloride 0.45%.: 1900 ml    Sodium Chloride 0.9% IV Bolus: 500 ml    Solution: 100 ml    Total IN: 2500 ml  ---------------------------------------------  OUT:    Indwelling Catheter - Urethral: 45 ml    Total OUT: 45 ml  ---------------------------------------------  Total NET: 2455 ml    I & Os for current day (as of 15 May 2017 18:38)  =============================================  IN:    sodium bicarbonate  Infusion: 700 ml    dextrose 5% + sodium chloride 0.45%.: 375 ml    Albumin 5%  - 250 mL: 250 ml    Total IN: 1325 ml  ---------------------------------------------  OUT:    Indwelling Catheter - Urethral: 50 ml    Total OUT: 50 ml  ---------------------------------------------  Total NET: 1275 ml        LABS:                        9.8    23.2  )-----------( 241      ( 15 May 2017 06:27 )             28.9     05-15    145  |  105  |  65.0<H>  ----------------------------<  102  4.0   |  21.0<L>  |  7.96<H>    Ca    8.3<L>      15 May 2017 06:27  Phos  4.2     05-15  Mg     1.7     05-15            CAPILLARY BLOOD GLUCOSE  102 (15 May 2017 07:11)    PT/INR - ( 15 May 2017 06:27 )   PT: 23.1 sec;   INR: 2.07 ratio             CULTURES:  Culture Results:   Numerous Gram Negative Rods Identification and susceptibility to follow.  Culture in progress (05-14 @ 01:11)  Culture Results:   Culture grew 3 or more types of organisms which indicate  collection contamination; consider recollection only if clinically  indicated.  .  TYPE: (C=Critical, N=Notification, A=Abnormal) N  TESTS:  _ Contaminated Urine Culture  DATE/TIME CALLED: _ (05-12 @ 19:10)  Culture Results:   No growth at 48 hours (05-12 @ 15:07)  Culture Results:   No growth at 48 hours (05-12 @ 15:06)      Physical Examination:    General: Morbidly obese female lying in bed in no acute distress.      HEENT: Pupils equal, 4 mm sluggish, reactive to light. Symmetric. L IJ CVC.    PULM: Clear to auscultation bilaterally, no significant sputum production    CVS: Sinus tachycardia, +S1, S2, no murmurs, rubs, or gallops    ABD: Soft, nondistended, nontender, normoactive bowel sounds, no masses    EXT: Generalized edema; RUE edema > LUE. RUE exquisitely TTP, 2+ radial pulses bilaterally, cap refill < 2 seconds. Bilateral lower extremities edematous R > L with 1+ distal RLE pitting edema, RLE mildly tender to palpation. R knee wound with granulated tissue, no discharge, no surrounding erythema, well circumscribed edges. L heel- skin dark discoloration.    SKIN: Warm and well perfused. Per family has multiple skin ulcerations on sacrum but unable to examine at this time due to body habitus and pain.    NEURO: Awake, tracks with eyes, nonverbal, encephalopathic.      CRITICAL CARE TIME SPENT: I have spent 42 minutes of critical care time evaluating and treating this patient, including reviewing charts, labs, imaging studies, and collaborating with interdisciplinary team.

## 2017-05-15 NOTE — PROGRESS NOTE ADULT - SUBJECTIVE AND OBJECTIVE BOX
a detailed d/w pt's son and sister held, along with Dr. Shankar lasting > 45 minutes  Pt. has been hypotensive, has rising WBC and has CKD with severe KARMEN and decreased UO  Likely some infectious etiology but source unclear  creat has not improved with IVFs and SPA/diuretics  will need HD as patient retaining waste products but d/t hypotension risks are high; also has Afib with RVR  HD can be done with Pressor support to minimise risk  will need PC or IJ catheter  fortunately no fluid overload and severe acidosis or high K requiring urgent HD  offered HD today via IJ or after PC am and hopefully more stable, risk of delay not much but not fully predictable  P/I/R/B expld in detail incl risk of death    agree with PC am and HD as needed  called Vasc surg for PC am  called PMD re need for cardiac eval  called Dr. Trent for transfer to MICU, situation expld  D/w Dr. Shankar again  May need Indium scan to find the source    total time 85 minutes spent

## 2017-05-16 DIAGNOSIS — R78.81 BACTEREMIA: ICD-10-CM

## 2017-05-16 DIAGNOSIS — N17.9 ACUTE KIDNEY FAILURE, UNSPECIFIED: ICD-10-CM

## 2017-05-16 DIAGNOSIS — A41.50 GRAM-NEGATIVE SEPSIS, UNSPECIFIED: ICD-10-CM

## 2017-05-16 DIAGNOSIS — G93.40 ENCEPHALOPATHY, UNSPECIFIED: ICD-10-CM

## 2017-05-16 DIAGNOSIS — R53.81 OTHER MALAISE: ICD-10-CM

## 2017-05-16 LAB
-  AMIKACIN: SIGNIFICANT CHANGE UP
-  AMPICILLIN/SULBACTAM: SIGNIFICANT CHANGE UP
-  AMPICILLIN: SIGNIFICANT CHANGE UP
-  AZTREONAM: SIGNIFICANT CHANGE UP
-  CEFAZOLIN: SIGNIFICANT CHANGE UP
-  CEFEPIME: SIGNIFICANT CHANGE UP
-  CEFOXITIN: SIGNIFICANT CHANGE UP
-  CEFTAZIDIME: SIGNIFICANT CHANGE UP
-  CEFTRIAXONE: SIGNIFICANT CHANGE UP
-  CIPROFLOXACIN: SIGNIFICANT CHANGE UP
-  ERTAPENEM: SIGNIFICANT CHANGE UP
-  GENTAMICIN: SIGNIFICANT CHANGE UP
-  LEVOFLOXACIN: SIGNIFICANT CHANGE UP
-  MEROPENEM: SIGNIFICANT CHANGE UP
-  PIPERACILLIN/TAZOBACTAM: SIGNIFICANT CHANGE UP
-  TOBRAMYCIN: SIGNIFICANT CHANGE UP
-  TRIMETHOPRIM/SULFAMETHOXAZOLE: SIGNIFICANT CHANGE UP
ALBUMIN SERPL ELPH-MCNC: 3.1 G/DL — LOW (ref 3.3–5.2)
ALP SERPL-CCNC: 85 U/L — SIGNIFICANT CHANGE UP (ref 40–120)
ALT FLD-CCNC: <4 U/L — SIGNIFICANT CHANGE UP
ANION GAP SERPL CALC-SCNC: 18 MMOL/L — HIGH (ref 5–17)
APPEARANCE UR: ABNORMAL
AST SERPL-CCNC: 13 U/L — SIGNIFICANT CHANGE UP
BACTERIA # UR AUTO: ABNORMAL
BILIRUB SERPL-MCNC: 2 MG/DL — SIGNIFICANT CHANGE UP (ref 0.4–2)
BILIRUB UR-MCNC: NEGATIVE — SIGNIFICANT CHANGE UP
BUN SERPL-MCNC: 67 MG/DL — HIGH (ref 8–20)
C3 SERPL-MCNC: 78 MG/DL — LOW (ref 80–180)
C4 SERPL-MCNC: 67 MG/DL — HIGH (ref 10–45)
CALCIUM SERPL-MCNC: 8.9 MG/DL — SIGNIFICANT CHANGE UP (ref 8.6–10.2)
CHLORIDE SERPL-SCNC: 105 MMOL/L — SIGNIFICANT CHANGE UP (ref 98–107)
CO2 SERPL-SCNC: 21 MMOL/L — LOW (ref 22–29)
COLOR SPEC: YELLOW — SIGNIFICANT CHANGE UP
CREAT SERPL-MCNC: 7.85 MG/DL — HIGH (ref 0.5–1.3)
DIFF PNL FLD: ABNORMAL
GLUCOSE SERPL-MCNC: 58 MG/DL — LOW (ref 70–115)
GLUCOSE UR QL: NEGATIVE MG/DL — SIGNIFICANT CHANGE UP
HCT VFR BLD CALC: 24.3 % — LOW (ref 37–47)
HGB BLD-MCNC: 8.5 G/DL — LOW (ref 12–16)
KETONES UR-MCNC: ABNORMAL
LACTATE BLDV-MCNC: 1.8 MMOL/L — SIGNIFICANT CHANGE UP (ref 0.7–2)
LEUKOCYTE ESTERASE UR-ACNC: ABNORMAL
MAGNESIUM SERPL-MCNC: 1.7 MG/DL — SIGNIFICANT CHANGE UP (ref 1.6–2.6)
MCHC RBC-ENTMCNC: 29.9 PG — SIGNIFICANT CHANGE UP (ref 27–31)
MCHC RBC-ENTMCNC: 35 G/DL — SIGNIFICANT CHANGE UP (ref 32–36)
MCV RBC AUTO: 85.6 FL — SIGNIFICANT CHANGE UP (ref 81–99)
METHOD TYPE: SIGNIFICANT CHANGE UP
NITRITE UR-MCNC: NEGATIVE — SIGNIFICANT CHANGE UP
PH UR: 8 — SIGNIFICANT CHANGE UP (ref 5–8)
PHOSPHATE SERPL-MCNC: 4 MG/DL — SIGNIFICANT CHANGE UP (ref 2.4–4.7)
PLATELET # BLD AUTO: 188 K/UL — SIGNIFICANT CHANGE UP (ref 150–400)
POTASSIUM SERPL-MCNC: 3.8 MMOL/L — SIGNIFICANT CHANGE UP (ref 3.5–5.3)
POTASSIUM SERPL-SCNC: 3.8 MMOL/L — SIGNIFICANT CHANGE UP (ref 3.5–5.3)
PROCALCITONIN SERPL-MCNC: 16.86 NG/ML — HIGH (ref 0–0.05)
PROT SERPL-MCNC: 5.1 G/DL — LOW (ref 6.6–8.7)
PROT UR-MCNC: 500 MG/DL
RBC # BLD: 2.84 M/UL — LOW (ref 4.4–5.2)
RBC # FLD: 16.9 % — HIGH (ref 11–15.6)
RBC CASTS # UR COMP ASSIST: ABNORMAL /HPF (ref 0–4)
SODIUM SERPL-SCNC: 144 MMOL/L — SIGNIFICANT CHANGE UP (ref 135–145)
SP GR SPEC: 1.01 — SIGNIFICANT CHANGE UP (ref 1.01–1.02)
UROBILINOGEN FLD QL: 1 MG/DL
WBC # BLD: 20.4 K/UL — HIGH (ref 4.8–10.8)
WBC # FLD AUTO: 20.4 K/UL — HIGH (ref 4.8–10.8)
WBC UR QL: >50

## 2017-05-16 PROCEDURE — 99233 SBSQ HOSP IP/OBS HIGH 50: CPT

## 2017-05-16 PROCEDURE — 99223 1ST HOSP IP/OBS HIGH 75: CPT

## 2017-05-16 PROCEDURE — 71010: CPT | Mod: 26

## 2017-05-16 PROCEDURE — 20611 DRAIN/INJ JOINT/BURSA W/US: CPT | Mod: 53,RT

## 2017-05-16 PROCEDURE — 99291 CRITICAL CARE FIRST HOUR: CPT

## 2017-05-16 RX ORDER — VANCOMYCIN HCL 1 G
1000 VIAL (EA) INTRAVENOUS ONCE
Qty: 0 | Refills: 0 | Status: COMPLETED | OUTPATIENT
Start: 2017-05-16 | End: 2017-05-16

## 2017-05-16 RX ORDER — INSULIN LISPRO 100/ML
VIAL (ML) SUBCUTANEOUS EVERY 6 HOURS
Qty: 0 | Refills: 0 | Status: DISCONTINUED | OUTPATIENT
Start: 2017-05-16 | End: 2017-05-24

## 2017-05-16 RX ORDER — DEXTROSE 50 % IN WATER 50 %
50 SYRINGE (ML) INTRAVENOUS ONCE
Qty: 0 | Refills: 0 | Status: COMPLETED | OUTPATIENT
Start: 2017-05-16 | End: 2017-05-16

## 2017-05-16 RX ORDER — SODIUM CHLORIDE 9 MG/ML
1000 INJECTION, SOLUTION INTRAVENOUS
Qty: 0 | Refills: 0 | Status: DISCONTINUED | OUTPATIENT
Start: 2017-05-16 | End: 2017-05-16

## 2017-05-16 RX ORDER — ALBUMIN HUMAN 25 %
50 VIAL (ML) INTRAVENOUS ONCE
Qty: 0 | Refills: 0 | Status: COMPLETED | OUTPATIENT
Start: 2017-05-16 | End: 2017-05-16

## 2017-05-16 RX ORDER — HYDROMORPHONE HYDROCHLORIDE 2 MG/ML
0.5 INJECTION INTRAMUSCULAR; INTRAVENOUS; SUBCUTANEOUS ONCE
Qty: 0 | Refills: 0 | Status: DISCONTINUED | OUTPATIENT
Start: 2017-05-16 | End: 2017-05-16

## 2017-05-16 RX ORDER — ALBUMIN HUMAN 25 %
50 VIAL (ML) INTRAVENOUS
Qty: 0 | Refills: 0 | Status: COMPLETED | OUTPATIENT
Start: 2017-05-16 | End: 2017-05-16

## 2017-05-16 RX ORDER — NOREPINEPHRINE BITARTRATE/D5W 8 MG/250ML
0.05 PLASTIC BAG, INJECTION (ML) INTRAVENOUS
Qty: 8 | Refills: 0 | Status: DISCONTINUED | OUTPATIENT
Start: 2017-05-16 | End: 2017-05-18

## 2017-05-16 RX ORDER — PANTOPRAZOLE SODIUM 20 MG/1
40 TABLET, DELAYED RELEASE ORAL DAILY
Qty: 0 | Refills: 0 | Status: DISCONTINUED | OUTPATIENT
Start: 2017-05-16 | End: 2017-05-21

## 2017-05-16 RX ORDER — ALBUMIN HUMAN 25 %
50 VIAL (ML) INTRAVENOUS EVERY 6 HOURS
Qty: 0 | Refills: 0 | Status: COMPLETED | OUTPATIENT
Start: 2017-05-16 | End: 2017-05-16

## 2017-05-16 RX ADMIN — Medication 50 MILLILITER(S): at 07:43

## 2017-05-16 RX ADMIN — Medication 100 MILLILITER(S): at 17:35

## 2017-05-16 RX ADMIN — Medication 10.84 MICROGRAM(S)/KG/MIN: at 06:41

## 2017-05-16 RX ADMIN — Medication 50 MILLILITER(S): at 01:33

## 2017-05-16 RX ADMIN — Medication 75 MEQ/KG/HR: at 07:00

## 2017-05-16 RX ADMIN — Medication 50 MILLILITER(S): at 13:41

## 2017-05-16 RX ADMIN — Medication 50 MILLILITER(S): at 03:44

## 2017-05-16 RX ADMIN — Medication 1 APPLICATION(S): at 13:42

## 2017-05-16 RX ADMIN — PANTOPRAZOLE SODIUM 40 MILLIGRAM(S): 20 TABLET, DELAYED RELEASE ORAL at 12:56

## 2017-05-16 RX ADMIN — Medication 50 MILLILITER(S): at 06:41

## 2017-05-16 RX ADMIN — Medication 100 MILLILITER(S): at 16:46

## 2017-05-16 RX ADMIN — HYDROMORPHONE HYDROCHLORIDE 0.5 MILLIGRAM(S): 2 INJECTION INTRAMUSCULAR; INTRAVENOUS; SUBCUTANEOUS at 12:31

## 2017-05-16 RX ADMIN — MIDODRINE HYDROCHLORIDE 10 MILLIGRAM(S): 2.5 TABLET ORAL at 05:25

## 2017-05-16 RX ADMIN — HYDROMORPHONE HYDROCHLORIDE 0.5 MILLIGRAM(S): 2 INJECTION INTRAMUSCULAR; INTRAVENOUS; SUBCUTANEOUS at 12:42

## 2017-05-16 RX ADMIN — Medication 250 MILLIGRAM(S): at 08:11

## 2017-05-16 RX ADMIN — MEROPENEM 200 MILLIGRAM(S): 1 INJECTION INTRAVENOUS at 13:41

## 2017-05-16 RX ADMIN — Medication 75 MEQ/KG/HR: at 07:44

## 2017-05-16 RX ADMIN — Medication 10.84 MICROGRAM(S)/KG/MIN: at 07:44

## 2017-05-16 NOTE — PROGRESS NOTE ADULT - SUBJECTIVE AND OBJECTIVE BOX
Critical Care Invasive Line Procedure Note  SHYAM LAL 72y Female    Procedure being performed: Dialysis catheter insertion  Indication for procedure: Need for urgent dialysis  Person performing time out: Alejandra RUTLEDGE  Person performing procedure: Dolores Meyers PA-C  Consent was obtained prior to the procedure.    Under sterile conditions,  chlorhexidine scrub was used and we placed the patient in the trendelenberg position.  Utilizing the Seldinger technique a dialysis catheter was successfully placed into the right Internal Jugular Vein. Ultrasound was utilized for identification of the vessel.    The catheter was sutured in place. There were no complications and a dry sterile dressing was placed.     A CXR was ordered to confirm placement.    The patient tolerated the procedure well.

## 2017-05-16 NOTE — PROGRESS NOTE ADULT - SUBJECTIVE AND OBJECTIVE BOX
Asked to eval for placement of permcath.  Transferred to ICU overnight with hypotension requiring pressors and sepsis  Worsening renal status  Now with + blood cultures  In light of above would place [facility] for urgent HD and once stable and treated for bacteremia can reconsider permcath placement  Discussed with Dr Larson and Dr Santiago

## 2017-05-16 NOTE — CONSULT NOTE ADULT - SUBJECTIVE AND OBJECTIVE BOX
Pt Name: SHYAM LAL    MRN: [id number]      Patient is a being followed for a right knee wound following an explant of a total knee arthroplasty with a cement spacer by Dr Ritchie. Wound is being followed by Dr. Rey. Patient is not verbal. She currently has positive blood cultures and has new right shoulder XR findings concerning for osteomyelitis.           PAST MEDICAL & SURGICAL HISTORY:  PAST MEDICAL & SURGICAL HISTORY:  Hypercholesterolemia  Deficiency of vitamin K  Chronic pain  COPD (chronic obstructive pulmonary disease)  Urine retention  Pressure ulcer  Atrial fibrillation  Constipation  Breast cancer  HLD (hyperlipidemia)  HTN (hypertension)  GERD (gastroesophageal reflux disease)  DM (diabetes mellitus)  Obesity  LIN on CPAP  History of incision and drainage: rt knee  S/p total knee replacement, bilateral  S/P knee replacement, right  S/P hysterectomy  S/P mastectomy: Left  Breast cancer      Allergies: No Known Allergies      Medications: dextrose 5%. 1000milliLiter(s) IV Continuous <Continuous>  dextrose Gel 1Dose(s) Oral once PRN  dextrose 50% Injectable 12.5Gram(s) IV Push once  dextrose 50% Injectable 25Gram(s) IV Push once  dextrose 50% Injectable 25Gram(s) IV Push once  glucagon  Injectable 1milliGRAM(s) IntraMuscular once PRN  collagenase Ointment 1Application(s) Topical daily  sodium bicarbonate  Infusion 0.049mEq/kG/Hr IV Continuous <Continuous>  meropenem IVPB  IV Intermittent   meropenem IVPB 500milliGRAM(s) IV Intermittent every 24 hours  collagenase Ointment 1Application(s) Topical two times a day  midodrine 10milliGRAM(s) Oral every 8 hours  oxyCODONE  5 mG/acetaminophen 325 mG 2Tablet(s) Oral every 6 hours PRN  albumin human 25% IVPB 50milliLiter(s) IV Intermittent every 6 hours  norepinephrine Infusion 0.05MICROgram(s)/kG/Min IV Continuous <Continuous>  insulin lispro (HumaLOG) corrective regimen sliding scale  SubCutaneous every 6 hours  pantoprazole  Injectable 40milliGRAM(s) IV Push daily        Ambulation: Walking independently [ ] With Cane [ ] With Walker [ ]  Bedbound [x]                           8.5    20.4  )-----------( 188      ( 16 May 2017 06:51 )             24.3     05-16    144  |  105  |  67.0<H>  ----------------------------<  58<L>  3.8   |  21.0<L>  |  7.85<H>    Ca    8.9      16 May 2017 06:51  Phos  4.0     05-16  Mg     1.7     05-16    TPro  5.1<L>  /  Alb  3.1<L>  /  TBili  2.0  /  DBili  x   /  AST  13  /  ALT  <4  /  AlkPhos  85  05-16      PHYSICAL EXAM:    Vital Signs Last 24 Hrs  T(C): 36.6, Max: 36.7 (05-16 @ 00:00)  T(F): 97.9, Max: 98.1 (05-16 @ 00:00)  HR: 69 (67 - 134)  BP: 109/59 (70/51 - 137/74)  BP(mean): 75 (55 - 97)  RR: 12 (12 - 21)  SpO2: 99% (95% - 99%)  Daily     Daily     Appearance: Alert to painful stimuli only.     Neurological: unable to assess due to patient's condition.    Skin: large deep anterior knee wound with fibronuous tissue. No erythema associated.     Vascular: 1+ distal pulses. Cap refill < 2 sec.     Musculoskeletal:         Left Upper Extremity: No tenderness. Limited ROM due to rigidity.       Right Upper Extremity: + guarding with limited PROM of the shoulder. + flucuance noted with palpation.        XR IMAGES:       EXAM:  HUMERUS-RIGHT                          PROCEDURE DATE:  05/15/2017        INTERPRETATION:  AP and lateral views of the right humerus are submitted.     Clinical data; trauma with pain.     Findings:  There is severe osteoarthritis with inferior displacement of the humeral   head relation to glenoid with the sclerosis of the humeral head articular   surface and medullary space. Differential diagnosis includes septic joint   versus osteoarthritis. No fracture seen. Distal humerus intact. AC joint   acromion and coracoid processes intact.     IMPRESSION: Severe osteoarthritis versus a septic arthritis of the right   shoulder joint with inferior displacement of the humeral head relation to   the glenoid.        ALEK ROMERO, ATTENDING RADIOLOGIST  This document has been electronically signed. May 16 2017  9:43AM              A/P:  Pt is a  72y non-verbal Female with chronic anterior right knee wound following an explant / cement spacer of a right total knee and possible right septic shoulder and postive blood cultures    PLAN:   * Recommend IR guided aspiration of the right shoulder for cell count and culture / sensitivity  * dressing change performed - wet-to-dry dressing applied. Pt Name: SHYAM LAL    MRN: [id number]      Patient is a being followed for a right knee wound following an explant of a total knee arthroplasty with a cement spacer by Dr Ritchie. Wound is being followed by Dr. Rey. Patient is not verbal. She currently has positive blood cultures and has new right shoulder XR findings concerning for osteomyelitis.       PAST MEDICAL & SURGICAL HISTORY:  PAST MEDICAL & SURGICAL HISTORY:  Hypercholesterolemia  Deficiency of vitamin K  Chronic pain  COPD (chronic obstructive pulmonary disease)  Urine retention  Pressure ulcer  Atrial fibrillation  Constipation  Breast cancer  HLD (hyperlipidemia)  HTN (hypertension)  GERD (gastroesophageal reflux disease)  DM (diabetes mellitus)  Obesity  LIN on CPAP  History of incision and drainage: rt knee  S/p total knee replacement, bilateral  S/P knee replacement, right  S/P hysterectomy  S/P mastectomy: Left  Breast cancer      Allergies: No Known Allergies      Medications: dextrose 5%. 1000milliLiter(s) IV Continuous <Continuous>  dextrose Gel 1Dose(s) Oral once PRN  dextrose 50% Injectable 12.5Gram(s) IV Push once  dextrose 50% Injectable 25Gram(s) IV Push once  dextrose 50% Injectable 25Gram(s) IV Push once  glucagon  Injectable 1milliGRAM(s) IntraMuscular once PRN  collagenase Ointment 1Application(s) Topical daily  sodium bicarbonate  Infusion 0.049mEq/kG/Hr IV Continuous <Continuous>  meropenem IVPB  IV Intermittent   meropenem IVPB 500milliGRAM(s) IV Intermittent every 24 hours  collagenase Ointment 1Application(s) Topical two times a day  midodrine 10milliGRAM(s) Oral every 8 hours  oxyCODONE  5 mG/acetaminophen 325 mG 2Tablet(s) Oral every 6 hours PRN  albumin human 25% IVPB 50milliLiter(s) IV Intermittent every 6 hours  norepinephrine Infusion 0.05MICROgram(s)/kG/Min IV Continuous <Continuous>  insulin lispro (HumaLOG) corrective regimen sliding scale  SubCutaneous every 6 hours  pantoprazole  Injectable 40milliGRAM(s) IV Push daily        Ambulation: Walking independently [ ] With Cane [ ] With Walker [ ]  Bedbound [x]                           8.5    20.4  )-----------( 188      ( 16 May 2017 06:51 )             24.3     05-16    144  |  105  |  67.0<H>  ----------------------------<  58<L>  3.8   |  21.0<L>  |  7.85<H>    Ca    8.9      16 May 2017 06:51  Phos  4.0     05-16  Mg     1.7     05-16    TPro  5.1<L>  /  Alb  3.1<L>  /  TBili  2.0  /  DBili  x   /  AST  13  /  ALT  <4  /  AlkPhos  85  05-16      PHYSICAL EXAM:    Vital Signs Last 24 Hrs  T(C): 36.6, Max: 36.7 (05-16 @ 00:00)  T(F): 97.9, Max: 98.1 (05-16 @ 00:00)  HR: 69 (67 - 134)  BP: 109/59 (70/51 - 137/74)  BP(mean): 75 (55 - 97)  RR: 12 (12 - 21)  SpO2: 99% (95% - 99%)  Daily     Daily     Appearance: Alert to painful stimuli only.     Neurological: unable to assess due to patient's condition.    Skin: large deep anterior knee wound with fibronuous tissue. No erythema associated.     Vascular: 1+ distal pulses. Cap refill < 2 sec.     Musculoskeletal:         Left Upper Extremity: No tenderness. Limited ROM due to rigidity.       Right Upper Extremity: + guarding with limited PROM of the shoulder. + flucuance noted with palpation.        XR IMAGES:    EXAM:  HUMERUS-RIGHT                          PROCEDURE DATE:  05/15/2017        INTERPRETATION:  AP and lateral views of the right humerus are submitted.     Clinical data; trauma with pain.     Findings:  There is severe osteoarthritis with inferior displacement of the humeral   head relation to glenoid with the sclerosis of the humeral head articular   surface and medullary space. Differential diagnosis includes septic joint   versus osteoarthritis. No fracture seen. Distal humerus intact. AC joint   acromion and coracoid processes intact.     IMPRESSION: Severe osteoarthritis versus a septic arthritis of the right   shoulder joint with inferior displacement of the humeral head relation to   the glenoid.    ALEK ROMERO, ATTENDING RADIOLOGIST  This document has been electronically signed. May 16 2017  9:43AM        A/P:  Pt is a  72y non-verbal Female with chronic anterior right knee wound following an explant / cement spacer of a right total knee and possible right septic shoulder and postive blood cultures    PLAN:   * Recommend IR guided aspiration of the right shoulder for cell count and culture / sensitivity  * dressing change performed - wet-to-dry dressing applied.

## 2017-05-16 NOTE — CONSULT NOTE ADULT - SUBJECTIVE AND OBJECTIVE BOX
HPI: 72F with PMH as listed admitted  with     PERTINENT PMH REVIEWED: Yes     PAST MEDICAL & SURGICAL HISTORY:  Hypercholesterolemia  Deficiency of vitamin K  Chronic pain  COPD (chronic obstructive pulmonary disease)  Urine retention  Pressure ulcer  Atrial fibrillation  Constipation  Breast cancer  HLD (hyperlipidemia)  HTN (hypertension)  GERD (gastroesophageal reflux disease)  DM (diabetes mellitus)  Obesity  LIN on CPAP  History of incision and drainage: rt knee  S/p total knee replacement, bilateral  S/P knee replacement, right  S/P hysterectomy  S/P mastectomy: Left  Breast cancer    SOCIAL HISTORY:                                     Admitted from:  home  SNF  [facility]     Surrogate/HCP/Guardian: Phone#:    FAMILY HISTORY:  Family history of diabetes mellitus    Baseline ADLs (prior to admission):  Independent/ Dependent      Allergies    No Known Allergies    Present Symptoms:     Dyspnea: 0 1 2 3   Nausea/Vomiting: Yes No  Anxiety:  Yes No  Depression: Yes No  Fatigue: Yes No  Loss of appetite: Yes No    Pain:             Character-            Duration-            Effect-            Factors-            Frequency-            Location-            Severity-    Review of Systems: Reviewed                     Negative:                     Positive:  Unable to obtain due to poor mentation   All others negative    MEDICATIONS  (STANDING):  dextrose 5%. 1000milliLiter(s) IV Continuous <Continuous>  dextrose 50% Injectable 12.5Gram(s) IV Push once  dextrose 50% Injectable 25Gram(s) IV Push once  dextrose 50% Injectable 25Gram(s) IV Push once  collagenase Ointment 1Application(s) Topical daily  sodium bicarbonate  Infusion 0.049mEq/kG/Hr IV Continuous <Continuous>  meropenem IVPB  IV Intermittent   meropenem IVPB 500milliGRAM(s) IV Intermittent every 24 hours  collagenase Ointment 1Application(s) Topical two times a day  midodrine 10milliGRAM(s) Oral every 8 hours  albumin human 25% IVPB 50milliLiter(s) IV Intermittent every 6 hours  norepinephrine Infusion 0.05MICROgram(s)/kG/Min IV Continuous <Continuous>  insulin lispro (HumaLOG) corrective regimen sliding scale  SubCutaneous every 6 hours  pantoprazole  Injectable 40milliGRAM(s) IV Push daily    MEDICATIONS  (PRN):  dextrose Gel 1Dose(s) Oral once PRN Blood Glucose LESS THAN 70 milliGRAM(s)/deciliter  glucagon  Injectable 1milliGRAM(s) IntraMuscular once PRN Glucose LESS THAN 70 milligrams/deciliter  oxyCODONE  5 mG/acetaminophen 325 mG 2Tablet(s) Oral every 6 hours PRN Moderate Pain (4 - 6)    PHYSICAL EXAM:    Vital Signs Last 24 Hrs  T(C): 36.6, Max: 36.7 ( @ 00:00)  T(F): 97.9, Max: 98.1 ( @ 00:00)  HR: 72 (67 - 134)  BP: 88/49 (70/51 - 137/74)  BP(mean): 63 (55 - 97)  RR: 13 (11 - 21)  SpO2: 96% (95% - 99%)    General: alert  oriented x ____ lethargic agitated                  cachexia  nonverbal  coma    Karnofsky:  %    HEENT: normal  dry mouth  ET tube/trach    Lungs: comfortable tachypnea/labored breathing  excessive secretions    CV: normal  tachycardia    GI: normal  distended  tender  no BS               PEG/NG/OG tube  constipation  last BM:     : normal  incontinent  oliguria/anuria  anna    MSK: normal  weakness  edema             ambulatory  bedbound/wheelchair bound    Skin: normal  pressure ulcers- Stage_____  no rash    LABS:                        8.5    20.4  )-----------( 188      ( 16 May 2017 06:51 )             24.3     -    144  |  105  |  67.0<H>  ----------------------------<  58<L>  3.8   |  21.0<L>  |  7.85<H>    Ca    8.9      16 May 2017 06:51  Phos  4.0       Mg     1.7         TPro  5.1<L>  /  Alb  3.1<L>  /  TBili  2.0  /  DBili  x   /  AST  13  /  ALT  <4  /  AlkPhos  85  -    PT/INR - ( 15 May 2017 06:27 )   PT: 23.1 sec;   INR: 2.07 ratio      Urinalysis Basic - ( 16 May 2017 04:07 )    Color: Yellow / Appearance: Cloudy / S.010 / pH: x  Gluc: x / Ketone: Small  / Bili: Negative / Urobili: 1 mg/dL   Blood: x / Protein: 500 mg/dL / Nitrite: Negative   Leuk Esterase: Moderate / RBC: 3-5 /HPF / WBC >50   Sq Epi: x / Non Sq Epi: x / Bacteria: TNTC      I&O's Summary  I & Os for 24h ending 16 May 2017 07:00  =============================================  IN: 2360.8 ml / OUT: 60 ml / NET: 2300.8 ml    I & Os for current day (as of 16 May 2017 13:14)  =============================================  IN: 649.8 ml / OUT: 0 ml / NET: 649.8 ml      RADIOLOGY & ADDITIONAL STUDIES:    ADVANCE DIRECTIVES:   DNR YES NO  Completed on:                     MOLST  YES NO   Completed on:  Living Will  YES NO   Completed on: HPI: 72F with PMH as listed admitted  with     PERTINENT PMH REVIEWED: Yes     PAST MEDICAL & SURGICAL HISTORY:  Hypercholesterolemia  Deficiency of vitamin K  Chronic pain  COPD (chronic obstructive pulmonary disease)  Urine retention  Pressure ulcer  Atrial fibrillation  Constipation  Breast cancer  HLD (hyperlipidemia)  HTN (hypertension)  GERD (gastroesophageal reflux disease)  DM (diabetes mellitus)  Obesity  LIN on CPAP  History of incision and drainage: rt knee  S/p total knee replacement, bilateral  S/P knee replacement, right  S/P hysterectomy  S/P mastectomy: Left  Breast cancer    SOCIAL HISTORY:                                     Admitted from:  home  SNF  [facility]     Surrogate Tanna Mars     FAMILY HISTORY:  Family history of diabetes mellitus    Baseline ADLs (prior to admission):  Independent/ Dependent      Allergies    No Known Allergies    Present Symptoms:     Dyspnea: 0 1 2 3   Nausea/Vomiting: Yes No  Anxiety:  Yes No  Depression: Yes No  Fatigue: Yes No  Loss of appetite: Yes No    Pain:             Character-            Duration-            Effect-            Factors-            Frequency-            Location-            Severity-    Review of Systems: Reviewed                     Negative:                     Positive:  Unable to obtain due to poor mentation   All others negative    MEDICATIONS  (STANDING):  dextrose 5%. 1000milliLiter(s) IV Continuous <Continuous>  dextrose 50% Injectable 12.5Gram(s) IV Push once  dextrose 50% Injectable 25Gram(s) IV Push once  dextrose 50% Injectable 25Gram(s) IV Push once  collagenase Ointment 1Application(s) Topical daily  sodium bicarbonate  Infusion 0.049mEq/kG/Hr IV Continuous <Continuous>  meropenem IVPB  IV Intermittent   meropenem IVPB 500milliGRAM(s) IV Intermittent every 24 hours  collagenase Ointment 1Application(s) Topical two times a day  midodrine 10milliGRAM(s) Oral every 8 hours  albumin human 25% IVPB 50milliLiter(s) IV Intermittent every 6 hours  norepinephrine Infusion 0.05MICROgram(s)/kG/Min IV Continuous <Continuous>  insulin lispro (HumaLOG) corrective regimen sliding scale  SubCutaneous every 6 hours  pantoprazole  Injectable 40milliGRAM(s) IV Push daily    MEDICATIONS  (PRN):  dextrose Gel 1Dose(s) Oral once PRN Blood Glucose LESS THAN 70 milliGRAM(s)/deciliter  glucagon  Injectable 1milliGRAM(s) IntraMuscular once PRN Glucose LESS THAN 70 milligrams/deciliter  oxyCODONE  5 mG/acetaminophen 325 mG 2Tablet(s) Oral every 6 hours PRN Moderate Pain (4 - 6)    PHYSICAL EXAM:    Vital Signs Last 24 Hrs  T(C): 36.6, Max: 36.7 ( @ 00:00)  T(F): 97.9, Max: 98.1 ( @ 00:00)  HR: 72 (67 - 134)  BP: 88/49 (70/51 - 137/74)  BP(mean): 63 (55 - 97)  RR: 13 (11 - 21)  SpO2: 96% (95% - 99%)    General: alert  oriented x ____ lethargic agitated                  cachexia  nonverbal  coma    Karnofsky:  %    HEENT: normal  dry mouth  ET tube/trach    Lungs: comfortable tachypnea/labored breathing  excessive secretions    CV: normal  tachycardia    GI: normal  distended  tender  no BS               PEG/NG/OG tube  constipation  last BM:     : normal  incontinent  oliguria/anuria  anna    MSK: normal  weakness  edema             ambulatory  bedbound/wheelchair bound    Skin: normal  pressure ulcers- Stage_____  no rash    LABS:                        8.5    20.4  )-----------( 188      ( 16 May 2017 06:51 )             24.3     -16    144  |  105  |  67.0<H>  ----------------------------<  58<L>  3.8   |  21.0<L>  |  7.85<H>    Ca    8.9      16 May 2017 06:51  Phos  4.0     -  Mg     1.7         TPro  5.1<L>  /  Alb  3.1<L>  /  TBili  2.0  /  DBili  x   /  AST  13  /  ALT  <4  /  AlkPhos  85  -16    PT/INR - ( 15 May 2017 06:27 )   PT: 23.1 sec;   INR: 2.07 ratio      Urinalysis Basic - ( 16 May 2017 04:07 )    Color: Yellow / Appearance: Cloudy / S.010 / pH: x  Gluc: x / Ketone: Small  / Bili: Negative / Urobili: 1 mg/dL   Blood: x / Protein: 500 mg/dL / Nitrite: Negative   Leuk Esterase: Moderate / RBC: 3-5 /HPF / WBC >50   Sq Epi: x / Non Sq Epi: x / Bacteria: TNTC      I&O's Summary  I & Os for 24h ending 16 May 2017 07:00  =============================================  IN: 2360.8 ml / OUT: 60 ml / NET: 2300.8 ml    I & Os for current day (as of 16 May 2017 13:14)  =============================================  IN: 649.8 ml / OUT: 0 ml / NET: 649.8 ml      RADIOLOGY & ADDITIONAL STUDIES:    ADVANCE DIRECTIVES:   DNR YES NO  Completed on:                     MOLST  YES NO   Completed on:  Living Will  YES NO   Completed on: HPI: 72F with PMH as listed admitted  transferred from [place] with encephelopathy and acute kidney injury. Now in MICU for hypotension and need for acute hemodialysis.     PERTINENT PMH REVIEWED: Yes     PAST MEDICAL & SURGICAL HISTORY:  Hypercholesterolemia  Deficiency of vitamin K  Chronic pain  COPD (chronic obstructive pulmonary disease)  Urine retention  Pressure ulcer  Atrial fibrillation  Constipation  Breast cancer  HLD (hyperlipidemia)  HTN (hypertension)  GERD (gastroesophageal reflux disease)  DM (diabetes mellitus)  Obesity  LIN on CPAP  History of incision and drainage: rt knee  S/p total knee replacement, bilateral  S/P knee replacement, right  S/P hysterectomy  S/P mastectomy: Left  Breast cancer    SOCIAL HISTORY:  nonsmoker                                   Admitted from:  [facility]    Surrogate Tanna Mars     FAMILY HISTORY:  Family history of diabetes mellitus    Baseline ADLs (prior to admission):  Independent/ Dependent      Allergies    No Known Allergies    Present Symptoms:     Dyspnea: 0 1 2 3   Nausea/Vomiting: Yes No  Anxiety:  Yes No  Depression: Yes No  Fatigue: Yes No  Loss of appetite: Yes No    Pain:             Character-            Duration-            Effect-            Factors-            Frequency-            Location-            Severity-    Review of Systems: Reviewed                     Negative:                     Positive:  Unable to obtain due to poor mentation   All others negative    MEDICATIONS  (STANDING):  dextrose 5%. 1000milliLiter(s) IV Continuous <Continuous>  dextrose 50% Injectable 12.5Gram(s) IV Push once  dextrose 50% Injectable 25Gram(s) IV Push once  dextrose 50% Injectable 25Gram(s) IV Push once  collagenase Ointment 1Application(s) Topical daily  sodium bicarbonate  Infusion 0.049mEq/kG/Hr IV Continuous <Continuous>  meropenem IVPB  IV Intermittent   meropenem IVPB 500milliGRAM(s) IV Intermittent every 24 hours  collagenase Ointment 1Application(s) Topical two times a day  midodrine 10milliGRAM(s) Oral every 8 hours  albumin human 25% IVPB 50milliLiter(s) IV Intermittent every 6 hours  norepinephrine Infusion 0.05MICROgram(s)/kG/Min IV Continuous <Continuous>  insulin lispro (HumaLOG) corrective regimen sliding scale  SubCutaneous every 6 hours  pantoprazole  Injectable 40milliGRAM(s) IV Push daily    MEDICATIONS  (PRN):  dextrose Gel 1Dose(s) Oral once PRN Blood Glucose LESS THAN 70 milliGRAM(s)/deciliter  glucagon  Injectable 1milliGRAM(s) IntraMuscular once PRN Glucose LESS THAN 70 milligrams/deciliter  oxyCODONE  5 mG/acetaminophen 325 mG 2Tablet(s) Oral every 6 hours PRN Moderate Pain (4 - 6)    PHYSICAL EXAM:    Vital Signs Last 24 Hrs  T(C): 36.6, Max: 36.7 ( @ 00:00)  T(F): 97.9, Max: 98.1 ( @ 00:00)  HR: 72 (67 - 134)  BP: 88/49 (70/51 - 137/74)  BP(mean): 63 (55 - 97)  RR: 13 (11 - 21)  SpO2: 96% (95% - 99%)    General: alert  oriented x ____ lethargic agitated                  cachexia  nonverbal  coma    Karnofsky:  %    HEENT: normal  dry mouth  ET tube/trach    Lungs: comfortable tachypnea/labored breathing  excessive secretions    CV: normal  tachycardia    GI: normal  distended  tender  no BS               PEG/NG/OG tube  constipation  last BM:     : normal  incontinent  oliguria/anuria  anna    MSK: normal  weakness  edema             ambulatory  bedbound/wheelchair bound    Skin: normal  pressure ulcers- Stage_____  no rash    LABS:                        8.5    20.4  )-----------( 188      ( 16 May 2017 06:51 )             24.3         144  |  105  |  67.0<H>  ----------------------------<  58<L>  3.8   |  21.0<L>  |  7.85<H>    Ca    8.9      16 May 2017 06:51  Phos  4.0       Mg     1.7         TPro  5.1<L>  /  Alb  3.1<L>  /  TBili  2.0  /  DBili  x   /  AST  13  /  ALT  <4  /  AlkPhos  85      PT/INR - ( 15 May 2017 06:27 )   PT: 23.1 sec;   INR: 2.07 ratio      Urinalysis Basic - ( 16 May 2017 04:07 )    Color: Yellow / Appearance: Cloudy / S.010 / pH: x  Gluc: x / Ketone: Small  / Bili: Negative / Urobili: 1 mg/dL   Blood: x / Protein: 500 mg/dL / Nitrite: Negative   Leuk Esterase: Moderate / RBC: 3-5 /HPF / WBC >50   Sq Epi: x / Non Sq Epi: x / Bacteria: TNTC    Blood cultures: GNR x 2; right knee wound - proteus and GNR    I&O's Summary  I & Os for 24h ending 16 May 2017 07:00  =============================================  IN: 2360.8 ml / OUT: 60 ml / NET: 2300.8 ml    I & Os for current day (as of 16 May 2017 13:14)  =============================================  IN: 649.8 ml / OUT: 0 ml / NET: 649.8 ml    RADIOLOGY & ADDITIONAL STUDIES:    Right shoulder x ray: Severe osteoarthritis versus a septic arthritis of the right shoulder joint with inferior displacement of the humeral head relation to the glenoid.    Right knee X ray: Status post knee replacement with removal of the metallic tibial femoral compartments with replacement of large large amount of cement between the femur and tibia. There is evidence of loosening between the bone cement interface as described. Soft tissue ulceration. Severe soft tissue swelling and osteopenia. No radiographic evidence of fracture or osteomyelitis.     CXR: Subsegmental atelectasis right lower lobe. No change in cardiomegaly.    ADVANCE DIRECTIVES: DNR HPI: 72F with PMH as listed admitted  transferred from [place] with encephelopathy and acute kidney injury. Now in MICU for hypotension and need for acute hemodialysis.     PERTINENT PMH REVIEWED: Yes     PAST MEDICAL & SURGICAL HISTORY:  Hypercholesterolemia  Deficiency of vitamin K  Chronic pain  COPD (chronic obstructive pulmonary disease)  Urine retention  Pressure ulcer  Atrial fibrillation  Constipation  Breast cancer  HLD (hyperlipidemia)  HTN (hypertension)  GERD (gastroesophageal reflux disease)  DM (diabetes mellitus)  Obesity  LIN on CPAP  History of incision and drainage: rt knee  S/p total knee replacement, bilateral  S/P knee replacement, right  S/P hysterectomy  S/P mastectomy: Left  Breast cancer    SOCIAL HISTORY:  nonsmoker                                   Admitted from:  [facility]    Surrogate Tanna Mars     FAMILY HISTORY:  Family history of diabetes mellitus    Baseline ADLs (prior to admission):  moderately dependent      Allergies    No Known Allergies    Present Symptoms:     Dyspnea: 0 1 2 3   Nausea/Vomiting: Yes No  Anxiety:  Yes No  Depression: Yes No  Fatigue: Yes No  Loss of appetite: Yes No    Pain:             Character-            Duration-            Effect-            Factors-            Frequency-            Location-            Severity-    Review of Systems: Reviewed                     Negative:                     Positive:  Unable to obtain due to poor mentation   All others negative    MEDICATIONS  (STANDING):  dextrose 5%. 1000milliLiter(s) IV Continuous <Continuous>  dextrose 50% Injectable 12.5Gram(s) IV Push once  dextrose 50% Injectable 25Gram(s) IV Push once  dextrose 50% Injectable 25Gram(s) IV Push once  collagenase Ointment 1Application(s) Topical daily  sodium bicarbonate  Infusion 0.049mEq/kG/Hr IV Continuous <Continuous>  meropenem IVPB  IV Intermittent   meropenem IVPB 500milliGRAM(s) IV Intermittent every 24 hours  collagenase Ointment 1Application(s) Topical two times a day  midodrine 10milliGRAM(s) Oral every 8 hours  albumin human 25% IVPB 50milliLiter(s) IV Intermittent every 6 hours  norepinephrine Infusion 0.05MICROgram(s)/kG/Min IV Continuous <Continuous>  insulin lispro (HumaLOG) corrective regimen sliding scale  SubCutaneous every 6 hours  pantoprazole  Injectable 40milliGRAM(s) IV Push daily    MEDICATIONS  (PRN):  dextrose Gel 1Dose(s) Oral once PRN Blood Glucose LESS THAN 70 milliGRAM(s)/deciliter  glucagon  Injectable 1milliGRAM(s) IntraMuscular once PRN Glucose LESS THAN 70 milligrams/deciliter  oxyCODONE  5 mG/acetaminophen 325 mG 2Tablet(s) Oral every 6 hours PRN Moderate Pain (4 - 6)    PHYSICAL EXAM:    Vital Signs Last 24 Hrs  T(C): 36.6, Max: 36.7 ( @ 00:00)  T(F): 97.9, Max: 98.1 ( @ 00:00)  HR: 72 (67 - 134)  BP: 88/49 (70/51 - 137/74)  BP(mean): 63 (55 - 97)  RR: 13 (11 - 21)  SpO2: 96% (95% - 99%)    General: alert  oriented x ____ lethargic agitated                  cachexia  nonverbal  coma    Karnofsky:  %    HEENT: normal  dry mouth  ET tube/trach    Lungs: comfortable tachypnea/labored breathing  excessive secretions    CV: normal  tachycardia    GI: normal  distended  tender  no BS               PEG/NG/OG tube  constipation  last BM:     : normal  incontinent  oliguria/anuria  anna    MSK: normal  weakness  edema             ambulatory  bedbound/wheelchair bound    Skin: normal  pressure ulcers- Stage_____  no rash    LABS:                        8.5    20.4  )-----------( 188      ( 16 May 2017 06:51 )             24.3         144  |  105  |  67.0<H>  ----------------------------<  58<L>  3.8   |  21.0<L>  |  7.85<H>    Ca    8.9      16 May 2017 06:51  Phos  4.0       Mg     1.7         TPro  5.1<L>  /  Alb  3.1<L>  /  TBili  2.0  /  DBili  x   /  AST  13  /  ALT  <4  /  AlkPhos  85      PT/INR - ( 15 May 2017 06:27 )   PT: 23.1 sec;   INR: 2.07 ratio      Urinalysis Basic - ( 16 May 2017 04:07 )    Color: Yellow / Appearance: Cloudy / S.010 / pH: x  Gluc: x / Ketone: Small  / Bili: Negative / Urobili: 1 mg/dL   Blood: x / Protein: 500 mg/dL / Nitrite: Negative   Leuk Esterase: Moderate / RBC: 3-5 /HPF / WBC >50   Sq Epi: x / Non Sq Epi: x / Bacteria: TNTC    Blood cultures: GNR x 2; right knee wound - proteus and GNR    I&O's Summary  I & Os for 24h ending 16 May 2017 07:00  =============================================  IN: 2360.8 ml / OUT: 60 ml / NET: 2300.8 ml    I & Os for current day (as of 16 May 2017 13:14)  =============================================  IN: 649.8 ml / OUT: 0 ml / NET: 649.8 ml    RADIOLOGY & ADDITIONAL STUDIES:    Right shoulder x ray: Severe osteoarthritis versus a septic arthritis of the right shoulder joint with inferior displacement of the humeral head relation to the glenoid.    Right knee X ray: Status post knee replacement with removal of the metallic tibial femoral compartments with replacement of large large amount of cement between the femur and tibia. There is evidence of loosening between the bone cement interface as described. Soft tissue ulceration. Severe soft tissue swelling and osteopenia. No radiographic evidence of fracture or osteomyelitis.     CXR: Subsegmental atelectasis right lower lobe. No change in cardiomegaly. CT Chest: Mild interstitial edema. Small right and trace left pleural effusion. Diffuse generalized soft tissue edema. Unchanged 6 mm nodule along the right minor fissure compared with prior CT of the chest from 2016.    ADVANCE DIRECTIVES: DNR HPI: 72F with PMH as listed admitted  transferred from [place] with encephelopathy and acute kidney injury. Now in MICU for hypotension on pressor support and need for acute hemodialysis.     ****no history of dementia as per my assessment and history taking from her son Will. ****    PERTINENT PMH REVIEWED: Yes     PAST MEDICAL & SURGICAL HISTORY:  Hypercholesterolemia  Deficiency of vitamin K  Chronic pain  COPD (chronic obstructive pulmonary disease)  Urine retention  Pressure ulcer  Atrial fibrillation  Constipation  Breast cancer  HLD (hyperlipidemia)  HTN (hypertension)  GERD (gastroesophageal reflux disease)  DM (diabetes mellitus)  Obesity  LIN on CPAP  History of incision and drainage: rt knee  S/p total knee replacement, bilateral  S/P knee replacement, right  S/P hysterectomy  S/P mastectomy: Left  Breast cancer    SOCIAL HISTORY:  nonsmoker                                   Admitted from:  [facility]    Surrogate Tanna Mars     FAMILY HISTORY:  Family history of diabetes mellitus    Baseline ADLs (prior to admission):  moderately dependent      Allergies    No Known Allergies    Present Symptoms:     Dyspnea: 0  Nausea/Vomiting: No  Anxiety:  No  Depression: unable  Fatigue: unable  Loss of appetite: unable    Pain: none            Character-            Duration-            Effect-            Factors-            Frequency-            Location-            Severity-    Review of Systems: Reviewed                  Unable to obtain due to poor mentation     MEDICATIONS  (STANDING):  dextrose 5%. 1000milliLiter(s) IV Continuous <Continuous>  dextrose 50% Injectable 12.5Gram(s) IV Push once  dextrose 50% Injectable 25Gram(s) IV Push once  dextrose 50% Injectable 25Gram(s) IV Push once  collagenase Ointment 1Application(s) Topical daily  sodium bicarbonate  Infusion 0.049mEq/kG/Hr IV Continuous <Continuous>  meropenem IVPB  IV Intermittent   meropenem IVPB 500milliGRAM(s) IV Intermittent every 24 hours  collagenase Ointment 1Application(s) Topical two times a day  midodrine 10milliGRAM(s) Oral every 8 hours  albumin human 25% IVPB 50milliLiter(s) IV Intermittent every 6 hours  norepinephrine Infusion 0.05MICROgram(s)/kG/Min IV Continuous <Continuous>  insulin lispro (HumaLOG) corrective regimen sliding scale  SubCutaneous every 6 hours  pantoprazole  Injectable 40milliGRAM(s) IV Push daily    MEDICATIONS  (PRN):  dextrose Gel 1Dose(s) Oral once PRN Blood Glucose LESS THAN 70 milliGRAM(s)/deciliter  glucagon  Injectable 1milliGRAM(s) IntraMuscular once PRN Glucose LESS THAN 70 milligrams/deciliter  oxyCODONE  5 mG/acetaminophen 325 mG 2Tablet(s) Oral every 6 hours PRN Moderate Pain (4 - 6)    PHYSICAL EXAM:    Vital Signs Last 24 Hrs  T(C): 36.6, Max: 36.7 ( @ 00:00)  T(F): 97.9, Max: 98.1 ( @ 00:00)  HR: 72 (67 - 134)  BP: 88/49 (70/51 - 137/74)  BP(mean): 63 (55 - 97)  RR: 13 (11 - 21)  SpO2: 96% (95% - 99%)    General: lethargic             Karnofsky:  20 %    HEENT: ET tube/trach    Lungs: comfortable     CV: normal      GI: nondistended    :  anna    MSK: weakness  edema            Skin: no rash    LABS:                        8.5    20.4  )-----------( 188      ( 16 May 2017 06:51 )             24.3     05-16    144  |  105  |  67.0<H>  ----------------------------<  58<L>  3.8   |  21.0<L>  |  7.85<H>    Ca    8.9      16 May 2017 06:51  Phos  4.0     -  Mg     1.7     -    TPro  5.1<L>  /  Alb  3.1<L>  /  TBili  2.0  /  DBili  x   /  AST  13  /  ALT  <4  /  AlkPhos  85  05-16    PT/INR - ( 15 May 2017 06:27 )   PT: 23.1 sec;   INR: 2.07 ratio      Urinalysis Basic - ( 16 May 2017 04:07 )    Color: Yellow / Appearance: Cloudy / S.010 / pH: x  Gluc: x / Ketone: Small  / Bili: Negative / Urobili: 1 mg/dL   Blood: x / Protein: 500 mg/dL / Nitrite: Negative   Leuk Esterase: Moderate / RBC: 3-5 /HPF / WBC >50   Sq Epi: x / Non Sq Epi: x / Bacteria: TNTC    Blood cultures: GNR x 2; right knee wound - proteus and GNR    I&O's Summary  I & Os for 24h ending 16 May 2017 07:00  =============================================  IN: 2360.8 ml / OUT: 60 ml / NET: 2300.8 ml    I & Os for current day (as of 16 May 2017 13:14)  =============================================  IN: 649.8 ml / OUT: 0 ml / NET: 649.8 ml    RADIOLOGY & ADDITIONAL STUDIES:    Right shoulder x ray: Severe osteoarthritis versus a septic arthritis of the right shoulder joint with inferior displacement of the humeral head relation to the glenoid.    Right knee X ray: Status post knee replacement with removal of the metallic tibial femoral compartments with replacement of large large amount of cement between the femur and tibia. There is evidence of loosening between the bone cement interface as described. Soft tissue ulceration. Severe soft tissue swelling and osteopenia. No radiographic evidence of fracture or osteomyelitis.     CXR: Subsegmental atelectasis right lower lobe. No change in cardiomegaly. CT Chest: Mild interstitial edema. Small right and trace left pleural effusion. Diffuse generalized soft tissue edema. Unchanged 6 mm nodule along the right minor fissure compared with prior CT of the chest from 2016.    ADVANCE DIRECTIVES: DNR

## 2017-05-16 NOTE — PROGRESS NOTE ADULT - ASSESSMENT
72 yr old female with atrial fibrillation, hypertension, hyperlipidemia, COPD, breast cancer, diabetes mellitus, right knee surgeries, pressure ulcer, CKD, h/o right UE DVT admitted with altered mental status. She was discharged to rehab in April 2017 and completed course of antibiotics for infected right knee. Patient has no known history of dementia. Per sister, she was becoming increasingly lethargic for a week prior to admission with increasingly poor oral intake. On admission she was noted to have acute on chronic renal failure. CT brain was done, negative for acute stroke. Renal consulted, she was started on IV fluids. Plastics consulted. Given elevated WBC, ID, orthopedics and plastics consulted. No signs of infection in the knee as per orthopedics and plastics. She was started on Meropenem as per ID, cultures sent. Digoxin added for rate control, cardiology consulted, ECHO ordered. Given persistent hypotension and renal failure, ICU consulted for possible pressor support and eventual HD. She was transferred and started on pressors, HD scheduled for 5/161/7. Her blood cultures positive for gram negative bacteremia. HD access placed by IR.

## 2017-05-16 NOTE — PROGRESS NOTE ADULT - SUBJECTIVE AND OBJECTIVE BOX
INTERVAL HPI/OVERNIGHT EVENTS:    CC: encephalopathy, sepsis and gram negative bacteremia, atrial fibrillation, KARMEN    Transferred to ICU, on pressors. HD scheduled for today. Son at bedside. Mental status unchanged.    Vital Signs Last 24 Hrs  T(C): 36.6, Max: 36.7 (-16 @ 00:00)  T(F): 97.9, Max: 98.1 (05-16 @ 00:00)  HR: 79 (67 - 134)  BP: 118/50 (70/51 - 137/74)  BP(mean): 72 (55 - 97)  RR: 18 (11 - 21)  SpO2: 98% (95% - 99%)    PHYSICAL EXAM:    GENERAL: lethargic, anasarca  CHEST/LUNG: b/l air entry  HEART: Regular   ABDOMEN: Soft, BS+  EXTREMITIES:  3+ edema    MEDICATIONS  (STANDING):  dextrose 5%. 1000milliLiter(s) IV Continuous <Continuous>  dextrose 50% Injectable 12.5Gram(s) IV Push once  dextrose 50% Injectable 25Gram(s) IV Push once  dextrose 50% Injectable 25Gram(s) IV Push once  collagenase Ointment 1Application(s) Topical daily  sodium bicarbonate  Infusion 0.049mEq/kG/Hr IV Continuous <Continuous>  meropenem IVPB  IV Intermittent   meropenem IVPB 500milliGRAM(s) IV Intermittent every 24 hours  collagenase Ointment 1Application(s) Topical two times a day  midodrine 10milliGRAM(s) Oral every 8 hours  norepinephrine Infusion 0.05MICROgram(s)/kG/Min IV Continuous <Continuous>  insulin lispro (HumaLOG) corrective regimen sliding scale  SubCutaneous every 6 hours  pantoprazole  Injectable 40milliGRAM(s) IV Push daily    MEDICATIONS  (PRN):  dextrose Gel 1Dose(s) Oral once PRN Blood Glucose LESS THAN 70 milliGRAM(s)/deciliter  glucagon  Injectable 1milliGRAM(s) IntraMuscular once PRN Glucose LESS THAN 70 milligrams/deciliter  oxyCODONE  5 mG/acetaminophen 325 mG 2Tablet(s) Oral every 6 hours PRN Moderate Pain (4 - 6)      Allergies    No Known Allergies    Intolerances          LABS:                          8.5    20.4  )-----------( 188      ( 16 May 2017 06:51 )             24.3     05-16    144  |  105  |  67.0<H>  ----------------------------<  58<L>  3.8   |  21.0<L>  |  7.85<H>    Ca    8.9      16 May 2017 06:51  Phos  4.0     05-  Mg     1.7     -16    TPro  5.1<L>  /  Alb  3.1<L>  /  TBili  2.0  /  DBili  x   /  AST  13  /  ALT  <4  /  AlkPhos  85  05-16    PT/INR - ( 15 May 2017 06:27 )   PT: 23.1 sec;   INR: 2.07 ratio           Urinalysis Basic - ( 16 May 2017 04:07 )    Color: Yellow / Appearance: Cloudy / S.010 / pH: x  Gluc: x / Ketone: Small  / Bili: Negative / Urobili: 1 mg/dL   Blood: x / Protein: 500 mg/dL / Nitrite: Negative   Leuk Esterase: Moderate / RBC: 3-5 /HPF / WBC >50   Sq Epi: x / Non Sq Epi: x / Bacteria: TNTC        RADIOLOGY & ADDITIONAL TESTS:

## 2017-05-16 NOTE — PROGRESS NOTE ADULT - ASSESSMENT
has GNR sepsis; source to be confirmed  UA shows high WBC/RBC - on Merrem per ID  creat unchanged; HD now  Awaiting Josr; shall avoid tunneled cath in view of + BC  see orders

## 2017-05-16 NOTE — BRIEF OPERATIVE NOTE - OPERATION/FINDINGS
US guided right shoulder aspiration.  multiple attempts with 21G and 18 G needle, dry tap.  No joint effusion.  No aspirate sent

## 2017-05-16 NOTE — PROGRESS NOTE ADULT - SUBJECTIVE AND OBJECTIVE BOX
Pt Name: SHYAM LAL    MRN: [id number]      Patient is a being followed for a right knee wound following an explant of a total knee arthroplasty with a cement spacer by Dr Ritchie. Wound is being followed by Dr. Rey. Patient is not verbal. She currently has positive blood cultures and has new right shoulder XR findings concerning for osteomyelitis.           PAST MEDICAL & SURGICAL HISTORY:  PAST MEDICAL & SURGICAL HISTORY:  Hypercholesterolemia  Deficiency of vitamin K  Chronic pain  COPD (chronic obstructive pulmonary disease)  Urine retention  Pressure ulcer  Atrial fibrillation  Constipation  Breast cancer  HLD (hyperlipidemia)  HTN (hypertension)  GERD (gastroesophageal reflux disease)  DM (diabetes mellitus)  Obesity  LIN on CPAP  History of incision and drainage: rt knee  S/p total knee replacement, bilateral  S/P knee replacement, right  S/P hysterectomy  S/P mastectomy: Left  Breast cancer      Allergies: No Known Allergies      Medications: dextrose 5%. 1000milliLiter(s) IV Continuous <Continuous>  dextrose Gel 1Dose(s) Oral once PRN  dextrose 50% Injectable 12.5Gram(s) IV Push once  dextrose 50% Injectable 25Gram(s) IV Push once  dextrose 50% Injectable 25Gram(s) IV Push once  glucagon  Injectable 1milliGRAM(s) IntraMuscular once PRN  collagenase Ointment 1Application(s) Topical daily  sodium bicarbonate  Infusion 0.049mEq/kG/Hr IV Continuous <Continuous>  meropenem IVPB  IV Intermittent   meropenem IVPB 500milliGRAM(s) IV Intermittent every 24 hours  collagenase Ointment 1Application(s) Topical two times a day  midodrine 10milliGRAM(s) Oral every 8 hours  oxyCODONE  5 mG/acetaminophen 325 mG 2Tablet(s) Oral every 6 hours PRN  albumin human 25% IVPB 50milliLiter(s) IV Intermittent every 6 hours  norepinephrine Infusion 0.05MICROgram(s)/kG/Min IV Continuous <Continuous>  insulin lispro (HumaLOG) corrective regimen sliding scale  SubCutaneous every 6 hours  pantoprazole  Injectable 40milliGRAM(s) IV Push daily        Ambulation: Walking independently [ ] With Cane [ ] With Walker [ ]  Bedbound [x]                           8.5    20.4  )-----------( 188      ( 16 May 2017 06:51 )             24.3     05-16    144  |  105  |  67.0<H>  ----------------------------<  58<L>  3.8   |  21.0<L>  |  7.85<H>    Ca    8.9      16 May 2017 06:51  Phos  4.0     05-16  Mg     1.7     05-16    TPro  5.1<L>  /  Alb  3.1<L>  /  TBili  2.0  /  DBili  x   /  AST  13  /  ALT  <4  /  AlkPhos  85  05-16      PHYSICAL EXAM:    Vital Signs Last 24 Hrs  T(C): 36.6, Max: 36.7 (05-16 @ 00:00)  T(F): 97.9, Max: 98.1 (05-16 @ 00:00)  HR: 69 (67 - 134)  BP: 109/59 (70/51 - 137/74)  BP(mean): 75 (55 - 97)  RR: 12 (12 - 21)  SpO2: 99% (95% - 99%)  Daily     Daily     Appearance: Alert to painful stimuli only.     Neurological: unable to assess due to patient's condition.    Skin: large deep anterior knee wound with fibronuous tissue. No erythema associated.     Vascular: 1+ distal pulses. Cap refill < 2 sec.     Musculoskeletal:         Left Upper Extremity: No tenderness. Limited ROM due to rigidity.       Right Upper Extremity: + guarding with limited PROM of the shoulder. + flucuance noted with palpation.        XR IMAGES:       EXAM:  HUMERUS-RIGHT                          PROCEDURE DATE:  05/15/2017        INTERPRETATION:  AP and lateral views of the right humerus are submitted.     Clinical data; trauma with pain.     Findings:  There is severe osteoarthritis with inferior displacement of the humeral   head relation to glenoid with the sclerosis of the humeral head articular   surface and medullary space. Differential diagnosis includes septic joint   versus osteoarthritis. No fracture seen. Distal humerus intact. AC joint   acromion and coracoid processes intact.     IMPRESSION: Severe osteoarthritis versus a septic arthritis of the right   shoulder joint with inferior displacement of the humeral head relation to   the glenoid.        ALEK ROMERO, ATTENDING RADIOLOGIST  This document has been electronically signed. May 16 2017  9:43AM              A/P:  Pt is a  72y non-verbal Female with chronic anterior right knee wound following an explant / cement spacer of a right total knee and possible right septic shoulder and postive blood cultures    PLAN:   * Recommend IR guided aspiration of the right shoulder for cell count and culture / sensitivity  * dressing change performed - wet-to-dry dressing applied.

## 2017-05-16 NOTE — CONSULT NOTE ADULT - PROBLEM SELECTOR RECOMMENDATION 4
OF CONCERN - R/O PNEUMONIA
Continue with wound care and plan per ortho / plastics
-to be started on hemodialysis today to see if that helps with her mental state.

## 2017-05-16 NOTE — PROGRESS NOTE ADULT - ASSESSMENT
72 year old BF with severe dementia from nursing home,  non verbal bed bound, hypertension, hyperlipidemia, LIN, CKD, COPD, paroxysmal A fib, septic arthritis, CKD   1. Altered mental status  2. Intermittent atrial fibrillation, currently in normal sinus rhythm.   3. Anasarca/Diastolic CHF   4. Equivocal troponin I likely secondary to renal disease, last echo (12/2016) with normal LV function.   5. Hypotension likely secondary to sepsis on levophed.     CV:   Echo pending.   Continue low dose levophed for pressor support.   Dialysis catheter planned for today to start dialysis.   Case discussed with ICU RN Alejandra.   Patient is DNR.

## 2017-05-16 NOTE — CONSULT NOTE ADULT - PROBLEM SELECTOR RECOMMENDATION 3
-baseline, full assist with ADLs, will nee PT/OT, has been at [facility] for 90 days with no real progress in terms of rehabilitation per son Will.

## 2017-05-16 NOTE — PROGRESS NOTE ADULT - SUBJECTIVE AND OBJECTIVE BOX
Patient is a 72y old  Female who presents with a chief complaint of mental status changes & anasarca. (12 May 2017 18:13)        Events last 24 hours: now requiring vasopressors, + bcx GNR    PAST MEDICAL & SURGICAL HISTORY:  Hypercholesterolemia  Deficiency of vitamin K  Chronic pain  COPD (chronic obstructive pulmonary disease)  Urine retention  Pressure ulcer  Atrial fibrillation  Constipation  Breast cancer  HLD (hyperlipidemia)  HTN (hypertension)  GERD (gastroesophageal reflux disease)  DM (diabetes mellitus)  Obesity  LIN on CPAP  History of incision and drainage: rt knee  S/p total knee replacement, bilateral  S/P knee replacement, right  S/P hysterectomy  S/P mastectomy: Left  Breast cancer      Review of Systems:  unable to obtain      Medications:  meropenem IVPB  IV Intermittent   meropenem IVPB 500milliGRAM(s) IV Intermittent every 24 hours    midodrine 10milliGRAM(s) Oral every 8 hours  norepinephrine Infusion 0.05MICROgram(s)/kG/Min IV Continuous <Continuous>      oxyCODONE  5 mG/acetaminophen 325 mG 2Tablet(s) Oral every 6 hours PRN        pantoprazole  Injectable 40milliGRAM(s) IV Push daily      dextrose Gel 1Dose(s) Oral once PRN  dextrose 50% Injectable 12.5Gram(s) IV Push once  dextrose 50% Injectable 25Gram(s) IV Push once  dextrose 50% Injectable 25Gram(s) IV Push once  glucagon  Injectable 1milliGRAM(s) IntraMuscular once PRN  insulin lispro (HumaLOG) corrective regimen sliding scale  SubCutaneous every 6 hours    dextrose 5%. 1000milliLiter(s) IV Continuous <Continuous>  sodium bicarbonate  Infusion 0.049mEq/kG/Hr IV Continuous <Continuous>      collagenase Ointment 1Application(s) Topical daily  collagenase Ointment 1Application(s) Topical two times a day            ICU Vital Signs Last 24 Hrs  T(C): 36.5, Max: 36.8 (05-16 @ 16:00)  T(F): 97.7, Max: 98.3 (05-16 @ 16:00)  HR: 72 (65 - 134)  BP: 108/51 (70/51 - 137/74)  BP(mean): 73 (55 - 97)  ABP: --  ABP(mean): --  RR: 12 (11 - 21)  SpO2: 99% (95% - 100%)          I&O's Detail  I & Os for 24h ending 16 May 2017 07:00  =============================================  IN:    sodium bicarbonate  Infusion: 1625 ml    dextrose 5% + sodium chloride 0.45%.: 375 ml    Albumin 5%  - 250 mL: 250 ml    Albumin 25%: 100 ml    norepinephrine Infusion: 10.8 ml    Total IN: 2360.8 ml  ---------------------------------------------  OUT:    Indwelling Catheter - Urethral: 60 ml    Total OUT: 60 ml  ---------------------------------------------  Total NET: 2300.8 ml    I & Os for current day (as of 16 May 2017 20:09)  =============================================  IN:    sodium bicarbonate  Infusion: 825 ml    Solution: 250 ml    norepinephrine Infusion: 144.8 ml    Albumin 25%: 100 ml    Solution: 100 ml    Total IN: 1419.8 ml  ---------------------------------------------  OUT:    Total OUT: 0 ml  ---------------------------------------------  Total NET: 1419.8 ml        LABS:                        8.5    20.4  )-----------( 188      ( 16 May 2017 06:51 )             24.3     05-16    144  |  105  |  67.0<H>  ----------------------------<  58<L>  3.8   |  21.0<L>  |  7.85<H>    Ca    8.9      16 May 2017 06:51  Phos  4.0       Mg     1.7         TPro  5.1<L>  /  Alb  3.1<L>  /  TBili  2.0  /  DBili  x   /  AST  13  /  ALT  <4  /  AlkPhos  85        CARDIAC MARKERS ( 15 May 2017 19:28 )  x     / x     / 58 U/L / x     / x          CAPILLARY BLOOD GLUCOSE  72 (16 May 2017 18:00)    PT/INR - ( 15 May 2017 06:27 )   PT: 23.1 sec;   INR: 2.07 ratio           Urinalysis Basic - ( 16 May 2017 04:07 )    Color: Yellow / Appearance: Cloudy / S.010 / pH: x  Gluc: x / Ketone: Small  / Bili: Negative / Urobili: 1 mg/dL   Blood: x / Protein: 500 mg/dL / Nitrite: Negative   Leuk Esterase: Moderate / RBC: 3-5 /HPF / WBC >50   Sq Epi: x / Non Sq Epi: x / Bacteria: TNTC      CULTURES:  Culture Results:   Growth in aerobic and anaerobic bottles: Gram Negative Rods  Anaerobic Bottle: 16:13 Hours to positivity  Aerobic Bottle: 1 day 01:14 Hours to positivity  .  TYPE: (C=Critical, N=Notification, A=Abnormal) C  TESTS:  _ GS: d culture  DATE/TIME CALL (05-15 @ 16:02)  Culture Results:   Growth in aerobic bottle: Gram Negative Rods  Aerobic Bottle: 1 day 01:44 Hours to positivity  Anaerobic Bottle: No growth to date  .  TYPE: (C=Critical, N=Notification, A=Abnormal) C  TESTS:  _ GS: d Culture  DATE/TIME CALLED: _ 2017 18:30: (05-15 @ 16:02)  Culture Results:   Growth in anaerobic bottle: Gram Negative Rods  Anaerobic Bottle: 1 day; 20:29 Hours to positivity  Aerobic Bottle: No growth to date  ,  TYPE: (C=Critical, N=Notification, A=Abnormal) C  TESTS:  _ BLD GS  DATE/TIME CALLED: _ 2017 09:29:27  CA ( @ 06:35)  Culture Results:   Numerous Proteus mirabilis  Numerous Gram Negative Rods  Culture in progress ( @ 01:11)  Culture Results:   Culture grew 3 or more types of organisms which indicate  collection contamination; consider recollection only if clinically  indicated.  .  TYPE: (C=Critical, N=Notification, A=Abnormal) N  TESTS:  _ Contaminated Urine Culture  DATE/TIME CALLED: _ ( @ 19:10)  Culture Results:   No growth at 48 hours ( @ 15:07)  Culture Results:   No growth at 48 hours ( @ 15:06)      Physical Examination:    General: No acute distress.  rousable    HEENT: Pupils equal, reactive to light.  Symmetric.    PULM: Clear to auscultation bilaterally, no significant sputum production    CVS: Regular rate and rhythm, no murmurs, rubs, or gallops    ABD: Soft, nondistended, nontender, normoactive bowel sounds, no masses    EXT: 3+ edema, nontender    SKIN: Warm and well perfused, no rashes noted.        CRITICAL CARE TIME SPENT: 40

## 2017-05-16 NOTE — PROGRESS NOTE ADULT - SUBJECTIVE AND OBJECTIVE BOX
S: Patient appears drosy.     TELEMETRY: sr    MEDICATIONS  (STANDING):  dextrose 5%. 1000milliLiter(s) IV Continuous <Continuous>  dextrose 50% Injectable 12.5Gram(s) IV Push once  dextrose 50% Injectable 25Gram(s) IV Push once  dextrose 50% Injectable 25Gram(s) IV Push once  collagenase Ointment 1Application(s) Topical daily  sodium bicarbonate  Infusion 0.049mEq/kG/Hr IV Continuous <Continuous>  meropenem IVPB  IV Intermittent   meropenem IVPB 500milliGRAM(s) IV Intermittent every 24 hours  collagenase Ointment 1Application(s) Topical two times a day  midodrine 10milliGRAM(s) Oral every 8 hours  albumin human 25% IVPB 50milliLiter(s) IV Intermittent every 6 hours  norepinephrine Infusion 0.05MICROgram(s)/kG/Min IV Continuous <Continuous>  insulin lispro (HumaLOG) corrective regimen sliding scale  SubCutaneous every 6 hours    MEDICATIONS  (PRN):  dextrose Gel 1Dose(s) Oral once PRN Blood Glucose LESS THAN 70 milliGRAM(s)/deciliter  glucagon  Injectable 1milliGRAM(s) IntraMuscular once PRN Glucose LESS THAN 70 milligrams/deciliter  oxyCODONE  5 mG/acetaminophen 325 mG 2Tablet(s) Oral every 6 hours PRN Moderate Pain (4 - 6)        Vital Signs Last 24 Hrs  T(C): 2.6, Max: 36.7 (05-16 @ 00:00)  T(F): 36.6, Max: 98.1 (05-16 @ 00:00)  HR: 74 (69 - 134)  BP: 98/55 (70/51 - 137/74)  BP(mean): 72 (55 - 97)  RR: 14 (14 - 21)  SpO2: 99% (95% - 99%)    Daily     Daily     I&O's Detail  I & Os for 24h ending 16 May 2017 07:00  =============================================  IN:    sodium bicarbonate  Infusion: 1625 ml    dextrose 5% + sodium chloride 0.45%.: 375 ml    Albumin 5%  - 250 mL: 250 ml    Albumin 25%: 100 ml    norepinephrine Infusion: 10.8 ml    Total IN: 2360.8 ml  ---------------------------------------------  OUT:    Indwelling Catheter - Urethral: 60 ml    Total OUT: 60 ml  ---------------------------------------------  Total NET: 2300.8 ml    I & Os for current day (as of 16 May 2017 09:01)  =============================================  IN:    sodium bicarbonate  Infusion: 75 ml    Albumin 25%: 50 ml    norepinephrine Infusion: 10.8 ml    Total IN: 135.8 ml  ---------------------------------------------  OUT:    Total OUT: 0 ml  ---------------------------------------------  Total NET: 135.8 ml      PHYSICAL EXAM:  Appearance: Obese BF, lethargic  Neck: Thick neck,   Cardiovascular: Normal S1 S2, distant  Respiratory: Scattered coarse breath sounds.   Gastrointestinal:  Soft, Non-tender, + BS, no bruits	  Neurologic: Grossly non-focal.  Extremities: + edema to above knees, with soft boots on.     LABS:                        8.5    20.4  )-----------( 188      ( 16 May 2017 06:51 )             24.3     05-16    144  |  105  |  67.0<H>  ----------------------------<  58<L>  3.8   |  21.0<L>  |  7.85<H>    Ca    8.9      16 May 2017 06:51  Phos  4.0     05-16  Mg     1.7     -    TPro  5.1<L>  /  Alb  3.1<L>  /  TBili  2.0  /  DBili  x   /  AST  13  /  ALT  <4  /  AlkPhos  85  05-    CARDIAC MARKERS ( 15 May 2017 19:28 )  x     / x     / 58 U/L / x     / x          PT/INR - ( 15 May 2017 06:27 )   PT: 23.1 sec;   INR: 2.07 ratio           Urinalysis Basic - ( 16 May 2017 04:07 )    Color: Yellow / Appearance: Cloudy / S.010 / pH: x  Gluc: x / Ketone: Small  / Bili: Negative / Urobili: 1 mg/dL   Blood: x / Protein: 500 mg/dL / Nitrite: Negative   Leuk Esterase: Moderate / RBC: 3-5 /HPF / WBC >50   Sq Epi: x / Non Sq Epi: x / Bacteria: TNTC      I&O's Summary  I & Os for 24h ending 16 May 2017 07:00  =============================================  IN: 2360.8 ml / OUT: 60 ml / NET: 2300.8 ml    I & Os for current day (as of 16 May 2017 09:01)  =============================================  IN: 135.8 ml / OUT: 0 ml / NET: 135.8 ml

## 2017-05-16 NOTE — PROGRESS NOTE ADULT - ASSESSMENT
C/S PENDING  NO POS C/S  ABNL U/A - R/O VRE UTI  WOULD ADD ZYVOX    CONT MERREM PENDING ALL C/S    HOPEFULLY MS WILL IMPROVE AFTER HD

## 2017-05-16 NOTE — PROGRESS NOTE ADULT - SUBJECTIVE AND OBJECTIVE BOX
[facility] CARMEN[facility] HARRIS MD FACP   DESIRE METZGER MD  Diplomates American Board of Internal Medicine and Infectious Diseases      SHYAM LAL  [id number]  72y    INTERVAL HPI/OVERNIGHT EVENTS:  ICU  FOR HD  NO POS C/S  ALL PENDING    Vital Signs Last 24 Hrs  T(C): 36.6, Max: 36.7 (05-16 @ 00:00)  T(F): 97.9, Max: 98.1 (05-16 @ 00:00)  HR: 67 (67 - 134)  BP: 87/51 (70/51 - 137/74)  BP(mean): 64 (55 - 97)  RR: 12 (12 - 21)  SpO2: 99% (95% - 99%)    ANTIBIOTICS  meropenem IVPB  IV Intermittent   meropenem IVPB 500milliGRAM(s) IV Intermittent every 24 hours  VANCO X 1    Allergies    No Known Allergies    Intolerances        REVIEW OF SYSTEMS:POORLY REPSONSIVE    PHYSICAL EXAM:  Vital Signs Last 24 Hrs  T(C): 36.6, Max: 36.7 (05-16 @ 00:00)  T(F): 97.9, Max: 98.1 (05-16 @ 00:00)  HR: 67 (67 - 134)  BP: 87/51 (70/51 - 137/74)  BP(mean): 64 (55 - 97)  RR: 12 (12 - 21)  SpO2: 99% (95% - 99%)      GEN: NAD, LETHARGIC  HEENT: normocephalic and atraumatic. EOMI. CHIDI. Moist mucosa. Clear Posterior pharynx.  NECK: Supple. No carotid bruits.  No lymphadenopathy or thyromegaly.  LUNGS: Clear to auscultation.  HEART: Regular rate and rhythm without murmur.  ABDOMEN: Soft, nontender, and nondistended.  Positive bowel sounds.  No hepatosplenomegaly was noted.  EXTREMITIES: Without any cyanosis, clubbing, rash, lesions or edema.  NEUROLOGIC: Cranial nerves II through XII are grossly intact.  MUSCULOSKELETAL:KNEE NO CHANGE  SKIN: No ulceration or induration present    LABS:                        8.5    20.4  )-----------( 188      ( 16 May 2017 06:51 )             24.3       05-16    144  |  105  |  67.0<H>  ----------------------------<  58<L>  3.8   |  21.0<L>  |  7.85<H>    Ca    8.9      16 May 2017 06:51  Phos  4.0     05-16  Mg     1.7     05-16    TPro  5.1<L>  /  Alb  3.1<L>  /  TBili  2.0  /  DBili  x   /  AST  13  /  ALT  <4  /  AlkPhos  85  05-16 05-16 @ 06:51  hct 24.3 % hgb 8.5 g/dL    05-16 @ 06:51  plat 188 K/uL wbc 20.4 K/uL    05-15 @ 06:27  hct 28.9 % hgb 9.8 g/dL    05-15 @ 06:27  plat 241 K/uL wbc 23.2 K/uL    05-14 @ 04:20  hct 31.7 % hgb 10.6 g/dL    05-14 @ 04:20  plat 285 K/uL wbc 17.6 K/uL      05-16-17  creat 7.85 mg/dL gfr 5 mL/min/1.73M2 bun 67.0 mg/dL k 3.8 mmol/L  05-15-17  creat 7.96 mg/dL gfr 5 mL/min/1.73M2 bun 65.0 mg/dL k 4.0 mmol/L  05-14-17  creat 7.77 mg/dL gfr 5 mL/min/1.73M2 bun 63.0 mg/dL k 4.1 mmol/L      MICROBIOLOGY:  Culture Results:   Numerous Gram Negative Rods Identification and susceptibility to follow.  Culture in progress (05-14 @ 01:11)  Culture Results:   Culture grew 3 or more types of organisms which indicate  collection contamination; consider recollection only if clinically  indicated.  .  TYPE: (C=Critical, N=Notification, A=Abnormal) N  TESTS:  _ Contaminated Urine Culture  DATE/TIME CALLED: _ (05-12 @ 19:10)  Culture Results:   No growth at 48 hours (05-12 @ 15:07)  Culture Results:   No growth at 48 hours (05-12 @ 15:06)        RADIOLOGY & ADDITIONAL STUDIES:          RAÚL HARRIS MD FACP

## 2017-05-16 NOTE — PROGRESS NOTE ADULT - SUBJECTIVE AND OBJECTIVE BOX
Patient seen and examined  opens eyes to VC; followed few VC      I&O's Summary  I & Os for 24h ending 16 May 2017 07:00  =============================================  IN: 2360.8 ml / OUT: 60 ml / NET: 2300.8 ml    I & Os for current day (as of 16 May 2017 11:01)  =============================================  IN: 472.8 ml / OUT: 0 ml / NET: 472.8 ml      REVIEW OF SYSTEMS:    CONSTITUTIONAL: No F/C  denies pain anywhere; detaile ROS NA  EXTREMITIES : no swelling,    Vital Signs Last 24 Hrs  T(C): 36.6, Max: 36.7 (05-16 @ 00:00)  T(F): 97.9, Max: 98.1 (05-16 @ 00:00)  HR: 68 (67 - 134)  BP: 102/49 (70/51 - 137/74)  BP(mean): 70 (55 - 97)  RR: 13 (12 - 21)  SpO2: 99% (95% - 99%)    PHYSICAL EXAM:    GENERAL: NAD,   EYES:  conjunctiva and sclera clear; opened eyes/mouth when asked  NECK: Supple, No JVD/Bruit  NERVOUS SYSTEM:  A/O x3,   CHEST:  CTA ,No rales or rhonchi  HEART:  RRR, No murmurs  ABDOMEN: Soft, NT/ND BS+  EXTREMITIES:  + Edema; R KJ in dressing  SKIN: No rashes    LABS:                        8.5    20.4  )-----------( 188      ( 16 May 2017 06:51 )             24.3     05-16    144  |  105  |  67.0<H>  ----------------------------<  58<L>  3.8   |  21.0<L>  |  7.85<H>    Ca    8.9      16 May 2017 06:51  Phos  4.0     05-16  Mg     1.7     05-16    TPro  5.1<L>  /  Alb  3.1<L>  /  TBili  2.0  /  DBili  x   /  AST  13  /  ALT  <4  /  AlkPhos  85  05-16    UA- high wbc/rbc    Bc x 2 - GNR both bottles      MEDICATIONS  (STANDING):  dextrose 5%.  dextrose Gel PRN  dextrose 50% Injectable  dextrose 50% Injectable  dextrose 50% Injectable  glucagon  Injectable PRN  collagenase Ointment  sodium bicarbonate  Infusion  meropenem IVPB  meropenem IVPB  collagenase Ointment  midodrine  oxyCODONE  5 mG/acetaminophen 325 mG PRN  albumin human 25% IVPB  norepinephrine Infusion  insulin lispro (HumaLOG) corrective regimen sliding scale  pantoprazole  Injectable    on Levo now

## 2017-05-17 DIAGNOSIS — L89.154 PRESSURE ULCER OF SACRAL REGION, STAGE 4: ICD-10-CM

## 2017-05-17 DIAGNOSIS — R78.81 BACTEREMIA: ICD-10-CM

## 2017-05-17 DIAGNOSIS — A41.59 OTHER GRAM-NEGATIVE SEPSIS: ICD-10-CM

## 2017-05-17 LAB
-  AMIKACIN: SIGNIFICANT CHANGE UP
-  AMIKACIN: SIGNIFICANT CHANGE UP
-  AMPICILLIN/SULBACTAM: SIGNIFICANT CHANGE UP
-  AMPICILLIN/SULBACTAM: SIGNIFICANT CHANGE UP
-  AMPICILLIN: SIGNIFICANT CHANGE UP
-  AMPICILLIN: SIGNIFICANT CHANGE UP
-  AZTREONAM: SIGNIFICANT CHANGE UP
-  AZTREONAM: SIGNIFICANT CHANGE UP
-  CEFAZOLIN: SIGNIFICANT CHANGE UP
-  CEFAZOLIN: SIGNIFICANT CHANGE UP
-  CEFEPIME: SIGNIFICANT CHANGE UP
-  CEFEPIME: SIGNIFICANT CHANGE UP
-  CEFOXITIN: SIGNIFICANT CHANGE UP
-  CEFOXITIN: SIGNIFICANT CHANGE UP
-  CEFTAZIDIME: SIGNIFICANT CHANGE UP
-  CEFTAZIDIME: SIGNIFICANT CHANGE UP
-  CEFTRIAXONE: SIGNIFICANT CHANGE UP
-  CEFTRIAXONE: SIGNIFICANT CHANGE UP
-  CIPROFLOXACIN: SIGNIFICANT CHANGE UP
-  CIPROFLOXACIN: SIGNIFICANT CHANGE UP
-  ERTAPENEM: SIGNIFICANT CHANGE UP
-  ERTAPENEM: SIGNIFICANT CHANGE UP
-  GENTAMICIN: SIGNIFICANT CHANGE UP
-  IMIPENEM: SIGNIFICANT CHANGE UP
-  LEVOFLOXACIN: SIGNIFICANT CHANGE UP
-  LEVOFLOXACIN: SIGNIFICANT CHANGE UP
-  MEROPENEM: SIGNIFICANT CHANGE UP
-  MEROPENEM: SIGNIFICANT CHANGE UP
-  PIPERACILLIN/TAZOBACTAM: SIGNIFICANT CHANGE UP
-  PIPERACILLIN/TAZOBACTAM: SIGNIFICANT CHANGE UP
-  TOBRAMYCIN: SIGNIFICANT CHANGE UP
-  TRIMETHOPRIM/SULFAMETHOXAZOLE: SIGNIFICANT CHANGE UP
-  TRIMETHOPRIM/SULFAMETHOXAZOLE: SIGNIFICANT CHANGE UP
ANION GAP SERPL CALC-SCNC: 17 MMOL/L — SIGNIFICANT CHANGE UP (ref 5–17)
APTT BLD: 68.7 SEC — HIGH (ref 27.5–37.4)
BUN SERPL-MCNC: 42 MG/DL — HIGH (ref 8–20)
CALCIUM SERPL-MCNC: 8.4 MG/DL — LOW (ref 8.6–10.2)
CHLORIDE SERPL-SCNC: 100 MMOL/L — SIGNIFICANT CHANGE UP (ref 98–107)
CO2 SERPL-SCNC: 25 MMOL/L — SIGNIFICANT CHANGE UP (ref 22–29)
CREAT SERPL-MCNC: 5.16 MG/DL — HIGH (ref 0.5–1.3)
CULTURE RESULTS: SIGNIFICANT CHANGE UP
GLUCOSE SERPL-MCNC: 46 MG/DL — CRITICAL LOW (ref 70–115)
HCT VFR BLD CALC: 26.1 % — LOW (ref 37–47)
HGB BLD-MCNC: 9.1 G/DL — LOW (ref 12–16)
INR BLD: 2.41 RATIO — HIGH (ref 0.88–1.16)
MAGNESIUM SERPL-MCNC: 1.7 MG/DL — SIGNIFICANT CHANGE UP (ref 1.6–2.6)
MCHC RBC-ENTMCNC: 29.6 PG — SIGNIFICANT CHANGE UP (ref 27–31)
MCHC RBC-ENTMCNC: 34.9 G/DL — SIGNIFICANT CHANGE UP (ref 32–36)
MCV RBC AUTO: 85 FL — SIGNIFICANT CHANGE UP (ref 81–99)
METHOD TYPE: SIGNIFICANT CHANGE UP
ORGANISM # SPEC MICROSCOPIC CNT: SIGNIFICANT CHANGE UP
PHOSPHATE SERPL-MCNC: 2.7 MG/DL — SIGNIFICANT CHANGE UP (ref 2.4–4.7)
PLATELET # BLD AUTO: 204 K/UL — SIGNIFICANT CHANGE UP (ref 150–400)
POTASSIUM SERPL-MCNC: 3.9 MMOL/L — SIGNIFICANT CHANGE UP (ref 3.5–5.3)
POTASSIUM SERPL-SCNC: 3.9 MMOL/L — SIGNIFICANT CHANGE UP (ref 3.5–5.3)
PROCALCITONIN SERPL-MCNC: 17.02 NG/ML — HIGH (ref 0–0.05)
PROTHROM AB SERPL-ACNC: 27 SEC — HIGH (ref 9.8–12.7)
RBC # BLD: 3.07 M/UL — LOW (ref 4.4–5.2)
RBC # FLD: 17.1 % — HIGH (ref 11–15.6)
SODIUM SERPL-SCNC: 142 MMOL/L — SIGNIFICANT CHANGE UP (ref 135–145)
SPECIMEN SOURCE: SIGNIFICANT CHANGE UP
WBC # BLD: 20.5 K/UL — HIGH (ref 4.8–10.8)
WBC # FLD AUTO: 20.5 K/UL — HIGH (ref 4.8–10.8)

## 2017-05-17 PROCEDURE — 76705 ECHO EXAM OF ABDOMEN: CPT | Mod: 26

## 2017-05-17 PROCEDURE — 99233 SBSQ HOSP IP/OBS HIGH 50: CPT

## 2017-05-17 PROCEDURE — 99221 1ST HOSP IP/OBS SF/LOW 40: CPT

## 2017-05-17 PROCEDURE — 99291 CRITICAL CARE FIRST HOUR: CPT

## 2017-05-17 PROCEDURE — 74177 CT ABD & PELVIS W/CONTRAST: CPT | Mod: 26

## 2017-05-17 PROCEDURE — 73030 X-RAY EXAM OF SHOULDER: CPT | Mod: 26,LT

## 2017-05-17 RX ORDER — FENTANYL CITRATE 50 UG/ML
25 INJECTION INTRAVENOUS EVERY 6 HOURS
Qty: 0 | Refills: 0 | Status: DISCONTINUED | OUTPATIENT
Start: 2017-05-17 | End: 2017-05-22

## 2017-05-17 RX ORDER — MEROPENEM 1 G/30ML
1000 INJECTION INTRAVENOUS EVERY 24 HOURS
Qty: 0 | Refills: 0 | Status: DISCONTINUED | OUTPATIENT
Start: 2017-05-17 | End: 2017-05-24

## 2017-05-17 RX ORDER — ALBUMIN HUMAN 25 %
100 VIAL (ML) INTRAVENOUS ONCE
Qty: 0 | Refills: 0 | Status: COMPLETED | OUTPATIENT
Start: 2017-05-17 | End: 2017-05-17

## 2017-05-17 RX ORDER — DEXTROSE 50 % IN WATER 50 %
12.5 SYRINGE (ML) INTRAVENOUS ONCE
Qty: 0 | Refills: 0 | Status: COMPLETED | OUTPATIENT
Start: 2017-05-17 | End: 2017-05-17

## 2017-05-17 RX ORDER — METOPROLOL TARTRATE 50 MG
5 TABLET ORAL ONCE
Qty: 0 | Refills: 0 | Status: COMPLETED | OUTPATIENT
Start: 2017-05-17 | End: 2017-05-17

## 2017-05-17 RX ORDER — DEXTROSE 50 % IN WATER 50 %
25 SYRINGE (ML) INTRAVENOUS ONCE
Qty: 0 | Refills: 0 | Status: COMPLETED | OUTPATIENT
Start: 2017-05-17 | End: 2017-05-17

## 2017-05-17 RX ORDER — PHYTONADIONE (VIT K1) 5 MG
5 TABLET ORAL ONCE
Qty: 0 | Refills: 0 | Status: COMPLETED | OUTPATIENT
Start: 2017-05-17 | End: 2017-05-17

## 2017-05-17 RX ORDER — DIGOXIN 250 MCG
0.25 TABLET ORAL ONCE
Qty: 0 | Refills: 0 | Status: DISCONTINUED | OUTPATIENT
Start: 2017-05-17 | End: 2017-05-17

## 2017-05-17 RX ADMIN — FENTANYL CITRATE 25 MICROGRAM(S): 50 INJECTION INTRAVENOUS at 15:45

## 2017-05-17 RX ADMIN — Medication 5 MILLIGRAM(S): at 11:34

## 2017-05-17 RX ADMIN — Medication 25 GRAM(S): at 11:34

## 2017-05-17 RX ADMIN — FENTANYL CITRATE 25 MICROGRAM(S): 50 INJECTION INTRAVENOUS at 15:30

## 2017-05-17 RX ADMIN — Medication 1 APPLICATION(S): at 11:34

## 2017-05-17 RX ADMIN — Medication 12.5 GRAM(S): at 01:24

## 2017-05-17 RX ADMIN — Medication 25 GRAM(S): at 06:09

## 2017-05-17 RX ADMIN — Medication 1 APPLICATION(S): at 05:52

## 2017-05-17 RX ADMIN — Medication 10.84 MICROGRAM(S)/KG/MIN: at 01:01

## 2017-05-17 RX ADMIN — MEROPENEM 200 MILLIGRAM(S): 1 INJECTION INTRAVENOUS at 23:18

## 2017-05-17 RX ADMIN — FENTANYL CITRATE 25 MICROGRAM(S): 50 INJECTION INTRAVENOUS at 23:18

## 2017-05-17 RX ADMIN — Medication 50 MILLILITER(S): at 17:00

## 2017-05-17 RX ADMIN — Medication 75 MEQ/KG/HR: at 01:00

## 2017-05-17 RX ADMIN — Medication 1 APPLICATION(S): at 17:51

## 2017-05-17 RX ADMIN — PANTOPRAZOLE SODIUM 40 MILLIGRAM(S): 20 TABLET, DELAYED RELEASE ORAL at 11:34

## 2017-05-17 RX ADMIN — Medication 12.5 GRAM(S): at 18:08

## 2017-05-17 NOTE — PROGRESS NOTE ADULT - ASSESSMENT
72F with encephalopathy, acute on chronic kidney failure, GNR bacteremia possibly due to sacral ulcer.

## 2017-05-17 NOTE — CONSULT NOTE ADULT - PROBLEM SELECTOR PROBLEM 1
Decubitus ulcer of sacral region, stage 4
Infection of right knee
Pressure ulcer
Encephalopathy
Bacteremia due to Gram-negative bacteria

## 2017-05-17 NOTE — ADVANCED PRACTICE NURSE CONSULT - ASSESSMENT
Pt is 73 y/o female, non verbal. Pt has history of diabetic. Assess pt's left heel, dry firmly attached black eschar noted to pt's left heel, periwound area boggy to touch, no exudate -- unstageable pressure injury noted at pt's left heel. No signs and symptoms of wound infection noted at left heel.  RN staff was using Z-float boots to offload bilateral heels. Unable to assess pt's sacrum/coccyx pressure injury since pt is on hemodialysis. Vacular team consulted for the sacrum/coccyx wound and recommended santyl ointment with wet to dry dressing (please see the consult note from vascular at 5/13). Discussed pt's wound status with Dr. Larson, pt possible have osteomyelitis due to sacrum/coccyx wound, pt is receiving IV antibiotic, waiting for surgery consult for wound debridement.

## 2017-05-17 NOTE — PROGRESS NOTE ADULT - ASSESSMENT
72 yr old female with atrial fibrillation, hypertension, hyperlipidemia, COPD, breast cancer, diabetes mellitus, right knee surgeries, pressure ulcer, CKD, h/o right UE DVT admitted with altered mental status. She was discharged to rehab in April 2017 and completed course of antibiotics for infected right knee. Patient has no known history of dementia. Per sister, she was becoming increasingly lethargic for a week prior to admission with increasingly poor oral intake. On admission she was noted to have acute on chronic renal failure. CT brain was done, negative for acute stroke. Renal consulted, she was started on IV fluids. Plastics consulted. Given elevated WBC, ID, orthopedics and plastics consulted. No signs of infection in the knee as per orthopedics and plastics. She was started on Meropenem as per ID, cultures sent. Digoxin added for rate control, cardiology consulted, ECHO ordered. Given persistent hypotension and renal failure, ICU consulted for possible pressor support and eventual HD. She was transferred and started on pressors, HD scheduled for 5/161/7. Her blood cultures positive for gram negative bacteremia. HD access placed by IR. She received her first HD session on 5/16/17. Blood cultures grew proteus. CT abdomen ordered to rule out intra abdominal source of bacteremia. HD scheduled, to be done after CT abdomen with contrast.

## 2017-05-17 NOTE — CONSULT NOTE ADULT - PROBLEM SELECTOR PROBLEM 2
Gram negative sepsis
Type 2 diabetes mellitus with other circulatory complication, unspecified long term insulin use status
Acute on chronic renal failure
Encephalopathy, unspecified

## 2017-05-17 NOTE — ADVANCED PRACTICE NURSE CONSULT - RECOMMEDATIONS
Wound care recommendation for left heel unstageable pressure injury:  1. clean the wound with normal saline and pad dry  2. apply betadine wipes to left heel,   3. apply foam dressing to left heel,   4. dressing change recommended daily,   5. recommend wound care follow up with podiatry since pt is diabetic with high risk of wound infection.   6. continue offload the heels with z-float boots.   7. recommend continue pressure injury prevention protocol,  8. continue nutrition support for wound healing.   Wound care recommendation discussed with MATY Isbell and Dr. Larson. Please contact wound care if further follow up is needed.

## 2017-05-17 NOTE — PROGRESS NOTE ADULT - SUBJECTIVE AND OBJECTIVE BOX
SHYAM WONGMORE    [id number]    History: 72y Female is status post right total knee explant several months ago.   Under the care of Dr Ritchie and Candido for right wound dehiscence .   Patient is  non responsive at this time and admitted for renal failure.  Seen and examined with Dr Ritchie today.                           8.5    20.4  )-----------( 188      ( 16 May 2017 06:51 )             24.3     05-17    142  |  100  |  42.0<H>  ----------------------------<  46<LL>  3.9   |  25.0  |  5.16<H>    Ca    8.4<L>      17 May 2017 06:38  Phos  2.7     05-17  Mg     1.7     05-17    TPro  5.1<L>  /  Alb  3.1<L>  /  TBili  2.0  /  DBili  x   /  AST  13  /  ALT  <4  /  AlkPhos  85  05-16      MEDICATIONS  (STANDING):  dextrose 5%. 1000milliLiter(s) IV Continuous <Continuous>  dextrose 50% Injectable 12.5Gram(s) IV Push once  dextrose 50% Injectable 25Gram(s) IV Push once  dextrose 50% Injectable 25Gram(s) IV Push once  collagenase Ointment 1Application(s) Topical daily  collagenase Ointment 1Application(s) Topical two times a day  midodrine 10milliGRAM(s) Oral every 8 hours  norepinephrine Infusion 0.05MICROgram(s)/kG/Min IV Continuous <Continuous>  insulin lispro (HumaLOG) corrective regimen sliding scale  SubCutaneous every 6 hours  pantoprazole  Injectable 40milliGRAM(s) IV Push daily  albumin human 25% IVPB 100milliLiter(s) IV Intermittent once  meropenem IVPB 1000milliGRAM(s) IV Intermittent every 24 hours    MEDICATIONS  (PRN):  dextrose Gel 1Dose(s) Oral once PRN Blood Glucose LESS THAN 70 milliGRAM(s)/deciliter  glucagon  Injectable 1milliGRAM(s) IntraMuscular once PRN Glucose LESS THAN 70 milligrams/deciliter  oxyCODONE  5 mG/acetaminophen 325 mG 2Tablet(s) Oral every 6 hours PRN Moderate Pain (4 - 6)      Physical exam: The right knee dressing is clean, dry and intact. New dressings placed.  Right knee midline incision wound dehiscnce 3'x3 'No active drainage or discharge noted.  . No erythema is noted. No . The calf is supple nontender. Right shoulder aspiration is negative for fluid 1 day ago.     Primary Orthopedic Assessment:  • s/p RIGHT total knee explant    Secondary  Orthopedic Assessment(s):   •     Secondary  Medical Assessment(s):   •     Plan:   • DVT prophylaxis as prescribed, including use of compression devices a• • Incentive spirometry encouraged  Wet to dry dressing changes daily

## 2017-05-17 NOTE — CONSULT NOTE ADULT - ASSESSMENT
71yo F with stage 4 sacral pressure sore, urosepsis 71yo F with stage 4 sacral pressure sore, presumed urosepsis

## 2017-05-17 NOTE — PROGRESS NOTE ADULT - SUBJECTIVE AND OBJECTIVE BOX
[facility] CARMENZA HARRIS MD FACP   DESIRE METZGER MD  Diplomates American Board of Internal Medicine and Infectious Diseases      SHYAM LAL  [id number]  72y    INTERVAL HPI/OVERNIGHT EVENTS:  ICU  FOR HD  STILL LETHARGIC  POLYMICROBIAL INFECTIONS    NLF 100K IN URINE - NOT PSEUDO  PROTEUS AND BACTEROIDES BLOOD    ICU Vital Signs Last 24 Hrs  T(C): 36.8, Max: 37.1 (05-16 @ 23:40)  T(F): 98.3, Max: 98.8 (05-16 @ 23:40)  HR: 79 (65 - 156)  BP: 106/57 (88/49 - 129/64)  BP(mean): 77 (63 - 90)  ABP: --  ABP(mean): --  RR: 13 (11 - 20)  SpO2: 98% (95% - 100%)  Vital Signs Last 24 Hrs  T(C): 36.6, Max: 36.7 (05-16 @ 00:00)  T(F): 97.9, Max: 98.1 (05-16 @ 00:00)  HR: 67 (67 - 134)  BP: 87/51 (70/51 - 137/74)  BP(mean): 64 (55 - 97)  RR: 12 (12 - 21)  SpO2: 99% (95% - 99%)    ANTIBIOTICS  meropenem IVPB  IV Intermittent   meropenem IVPB 500milliGRAM(s) IV Intermittent every 24 hours  VANCO X 1    Allergies    No Known Allergies    Intolerances        REVIEW OF SYSTEMS:POORLY REPSONSIVE    PHYSICAL EXAM:  Vital Signs Last 24 Hrs  T(C): 36.6, Max: 36.7 (05-16 @ 00:00)  T(F): 97.9, Max: 98.1 (05-16 @ 00:00)  HR: 67 (67 - 134)  BP: 87/51 (70/51 - 137/74)  BP(mean): 64 (55 - 97)  RR: 12 (12 - 21)  SpO2: 99% (95% - 99%)      GEN: NAD, LETHARGIC  HEENT: normocephalic and atraumatic. EOMI. CHIDI. Moist mucosa. Clear Posterior pharynx.  NECK: Supple. No carotid bruits.  No lymphadenopathy or thyromegaly.  LUNGS: Clear to auscultation.  HEART: Regular rate and rhythm without murmur.  ABDOMEN: Soft, nontender, and nondistended.  Positive bowel sounds.  No hepatosplenomegaly was noted.NEG  EXTREMITIES: Without any cyanosis, clubbing, rash, lesions or edema.  NEUROLOGIC: Cranial nerves II through XII are grossly intact.  MUSCULOSKELETAL:KNEE NO CHANGE  SKIN: No ulceration or induration present    LABS:                        8.5    20.4  )-----------( 188      ( 16 May 2017 06:51 )             24.3       05-16    144  |  105  |  67.0<H>  ----------------------------<  58<L>  3.8   |  21.0<L>  |  7.85<H>    Ca    8.9      16 May 2017 06:51  Phos  4.0     05-16  Mg     1.7     05-16    TPro  5.1<L>  /  Alb  3.1<L>  /  TBili  2.0  /  DBili  x   /  AST  13  /  ALT  <4  /  AlkPhos  85  05-16 05-16 @ 06:51  hct 24.3 % hgb 8.5 g/dL    05-16 @ 06:51  plat 188 K/uL wbc 20.4 K/uL    05-15 @ 06:27  hct 28.9 % hgb 9.8 g/dL    05-15 @ 06:27  plat 241 K/uL wbc 23.2 K/uL    05-14 @ 04:20  hct 31.7 % hgb 10.6 g/dL    05-14 @ 04:20  plat 285 K/uL wbc 17.6 K/uL      05-16-17  creat 7.85 mg/dL gfr 5 mL/min/1.73M2 bun 67.0 mg/dL k 3.8 mmol/L  05-15-17  creat 7.96 mg/dL gfr 5 mL/min/1.73M2 bun 65.0 mg/dL k 4.0 mmol/L  05-14-17  creat 7.77 mg/dL gfr 5 mL/min/1.73M2 bun 63.0 mg/dL k 4.1 mmol/L      MICROBIOLOGY:  Culture Results:   Numerous Gram Negative Rods Identification and susceptibility to follow.  Culture in progress (05-14 @ 01:11)  Culture Results:   Culture grew 3 or more types of organisms which indicate  collection contamination; consider recollection only if clinically  indicated.  .  TYPE: (C=Critical, N=Notification, A=Abnormal) N  TESTS:  _ Contaminated Urine Culture  DATE/TIME CALLED: _ (05-12 @ 19:10)  Culture Results:   No growth at 48 hours (05-12 @ 15:07)  Culture Results:   No growth at 48 hours (05-12 @ 15:06)        RADIOLOGY & ADDITIONAL STUDIES:    GNR BLOOD GNR URINE - DIFF SPECIES      RAÚL HARRIS MD FACP

## 2017-05-17 NOTE — PROGRESS NOTE ADULT - ASSESSMENT
Sepsis: improved hemodynamic stability with pressors, IV Abx Therapy. Consider d/c midodrine while on levophed. Echo shows normal LV function, dilated RV with elevated RVSP. Consider r/o thromboembolic lung disease.   Paroxysmal AF: Currently in sinus rhythm

## 2017-05-17 NOTE — PROGRESS NOTE ADULT - SUBJECTIVE AND OBJECTIVE BOX
Wound examined this am.No evidence of return of right knee infection.Wound care per Dr Rey and improved appearance of granulation tissue.Pulses not palpable but foot warm to palpation.No wound compromise above  or below the anterior defect.No purulence noted ,    1) Wound- Continue treatment per Dr Rey  2) Knee- No evidence of return of infection.Will continue to observe  3) Right Shoulder- Seen by Dr LEE.No fluid on aspiration and he does not believe it is infected.  4) Infection- I spoke with Dr Chamberlain.Blood cultures positive and appear to have come form a urine infection.He agrees that at this time, knee does not appear to be the source  5) Vascular- Will provide access for dialysis.Will get further vascular studies once able but no plans for AKA at this time,The extremity remains viable to appearance

## 2017-05-17 NOTE — PROGRESS NOTE ADULT - SUBJECTIVE AND OBJECTIVE BOX
Patient is a 72y old  Female who presents with a chief complaint of mental status changes & anasarca. (12 May 2017 18:13)      BRIEF HOSPITAL COURSE: 72 yof with AoCRF, now on dialysis encephalopathy acute, gram negative bacteremia, sacral decubitus ulcer, heel ulcer    Events last 24 hours: off vasopressors, periods of tachycardia     PAST MEDICAL & SURGICAL HISTORY:  Hypercholesterolemia  Deficiency of vitamin K  Chronic pain  COPD (chronic obstructive pulmonary disease)  Urine retention  Pressure ulcer  Atrial fibrillation  Constipation  Breast cancer  HLD (hyperlipidemia)  HTN (hypertension)  GERD (gastroesophageal reflux disease)  DM (diabetes mellitus)  Obesity  LIN on CPAP  History of incision and drainage: rt knee  S/p total knee replacement, bilateral  S/P knee replacement, right  S/P hysterectomy  S/P mastectomy: Left  Breast cancer      Review of Systems:  unable to obtain      Medications:  meropenem IVPB 1000milliGRAM(s) IV Intermittent every 24 hours    midodrine 10milliGRAM(s) Oral every 8 hours  norepinephrine Infusion 0.05MICROgram(s)/kG/Min IV Continuous <Continuous>      oxyCODONE  5 mG/acetaminophen 325 mG 2Tablet(s) Oral every 6 hours PRN  fentaNYL    Injectable 25MICROGram(s) IV Push every 6 hours PRN        pantoprazole  Injectable 40milliGRAM(s) IV Push daily      dextrose Gel 1Dose(s) Oral once PRN  dextrose 50% Injectable 12.5Gram(s) IV Push once  dextrose 50% Injectable 25Gram(s) IV Push once  dextrose 50% Injectable 25Gram(s) IV Push once  glucagon  Injectable 1milliGRAM(s) IntraMuscular once PRN  insulin lispro (HumaLOG) corrective regimen sliding scale  SubCutaneous every 6 hours    dextrose 5%. 1000milliLiter(s) IV Continuous <Continuous>  albumin human 25% IVPB 100milliLiter(s) IV Intermittent once      collagenase Ointment 1Application(s) Topical daily  collagenase Ointment 1Application(s) Topical two times a day            ICU Vital Signs Last 24 Hrs  T(C): 36.8, Max: 37.1 (05-16 @ 23:40)  T(F): 98.3, Max: 98.8 (05-16 @ 23:40)  HR: 144 (68 - 156)  BP: 87/61 (87/61 - 129/64)  BP(mean): 70 (67 - 90)  ABP: --  ABP(mean): --  RR: 13 (12 - 20)  SpO2: 99% (98% - 100%)          I&O's Detail  I & Os for 24h ending 17 May 2017 07:00  =============================================  IN:    sodium bicarbonate  Infusion: 1800 ml    norepinephrine Infusion: 363.1 ml    Solution: 250 ml    Albumin 25%: 100 ml    Solution: 100 ml    Total IN: 2613.1 ml  ---------------------------------------------  OUT:    Total OUT: 0 ml  ---------------------------------------------  Total NET: 2613.1 ml    I & Os for current day (as of 17 May 2017 16:24)  =============================================  IN:    sodium bicarbonate  Infusion: 75 ml    norepinephrine Infusion: 12.9 ml    Total IN: 87.9 ml  ---------------------------------------------  OUT:    Total OUT: 0 ml  ---------------------------------------------  Total NET: 87.9 ml        LABS:                        9.1    20.5  )-----------( 204      ( 17 May 2017 06:38 )             26.1     -    142  |  100  |  42.0<H>  ----------------------------<  46<LL>  3.9   |  25.0  |  5.16<H>    Ca    8.4<L>      17 May 2017 06:38  Phos  2.7       Mg     1.7         TPro  5.1<L>  /  Alb  3.1<L>  /  TBili  2.0  /  DBili  x   /  AST  13  /  ALT  <4  /  AlkPhos  85  16      CARDIAC MARKERS ( 15 May 2017 19:28 )  x     / x     / 58 U/L / x     / x          CAPILLARY BLOOD GLUCOSE  85 (17 May 2017 11:15)    PT/INR - ( 17 May 2017 06:38 )   PT: 27.0 sec;   INR: 2.41 ratio         PTT - ( 17 May 2017 06:38 )  PTT:68.7 sec  Urinalysis Basic - ( 16 May 2017 04:07 )    Color: Yellow / Appearance: Cloudy / S.010 / pH: x  Gluc: x / Ketone: Small  / Bili: Negative / Urobili: 1 mg/dL   Blood: x / Protein: 500 mg/dL / Nitrite: Negative   Leuk Esterase: Moderate / RBC: 3-5 /HPF / WBC >50   Sq Epi: x / Non Sq Epi: x / Bacteria: TNTC      CULTURES:  Culture Results:   >100,000 CFU/ml Gram Negative Rods Identification and susceptibility to  follow.  Culture in progress ( @ 04:07)  Culture Results:   Growth in aerobic and anaerobic bottles: Proteus mirabilis  Anaerobic Bottle: 16:13 Hours to positivity  Aerobic Bottle: 1 day 01:14 Hours to positivity  .  TYPE: (C=Critical, N=Notification, A=Abnormal) C  TESTS:  _ GS: d culture  DATE/TIME SALCEDO (05-15 @ 16:02)  Culture Results:   Growth in aerobic bottle: Gram Negative Rods  Aerobic Bottle: 1 day 01:44 Hours to positivity  Anaerobic Bottle: No growth to date  .  TYPE: (C=Critical, N=Notification, A=Abnormal) C  TESTS:  _ GS: d Culture  DATE/TIME CALLED: _ 2017 18:30: (05-15 @ 16:02)  Culture Results:   Growth in anaerobic bottle: Bacteroides thetaiotaomicron (anaerobic  organism)  Anaerobic Bottle: 1 day; 20:29 Hours to positivity  Aerobic Bottle: No growth to date  ,  TYPE: (C=Critical, N=Notification, A=Abnormal) C  TESTS:  _ BLD GS  DATE/TIME C ( @ 06:35)  Culture Results:   No growth at 48 hours ( @ 06:35)  Culture Results:   Numerous Proteus mirabilis  Numerous Providencia stuartii ( @ 01:11)  Culture Results:   Culture grew 3 or more types of organisms which indicate  collection contamination; consider recollection only if clinically  indicated.  .  TYPE: (C=Critical, N=Notification, A=Abnormal) N  TESTS:  _ Contaminated Urine Culture  DATE/TIME CALLED: _ ( @ 19:10)  Culture Results:   No growth at 48 hours ( @ 15:07)  Culture Results:   No growth at 48 hours ( @ 15:06)      Physical Examination:    General:arousable with moan with pain    HEENT: Pupils equal, reactive to light.  Symmetric.    PULM: Clear to auscultation bilaterally, no significant sputum production    CVS: Regular rate and rhythm, no murmurs, rubs, or gallops    ABD: Soft, nondistended, nontender, normoactive bowel sounds, no masses    EXT: diffuse edema, nontender    SKIN: Warm and well perfused, no rashes noted.    RADIOLOGY: CT ab/pelvis pending    CRITICAL CARE TIME SPENT: 40

## 2017-05-17 NOTE — PROGRESS NOTE ADULT - SUBJECTIVE AND OBJECTIVE BOX
NEPHROLOGY INTERVAL HPI/OVER NIGHT EVENTS: No new events.    MEDICATIONS  (STANDING):  dextrose 5%. 1000milliLiter(s) IV Continuous <Continuous>  dextrose 50% Injectable 12.5Gram(s) IV Push once  dextrose 50% Injectable 25Gram(s) IV Push once  dextrose 50% Injectable 25Gram(s) IV Push once  collagenase Ointment 1Application(s) Topical daily  meropenem IVPB  IV Intermittent   meropenem IVPB 500milliGRAM(s) IV Intermittent every 24 hours  collagenase Ointment 1Application(s) Topical two times a day  midodrine 10milliGRAM(s) Oral every 8 hours  norepinephrine Infusion 0.05MICROgram(s)/kG/Min IV Continuous <Continuous>  insulin lispro (HumaLOG) corrective regimen sliding scale  SubCutaneous every 6 hours  pantoprazole  Injectable 40milliGRAM(s) IV Push daily  albumin human 25% IVPB 100milliLiter(s) IV Intermittent once    MEDICATIONS  (PRN):  dextrose Gel 1Dose(s) Oral once PRN Blood Glucose LESS THAN 70 milliGRAM(s)/deciliter  glucagon  Injectable 1milliGRAM(s) IntraMuscular once PRN Glucose LESS THAN 70 milligrams/deciliter  oxyCODONE  5 mG/acetaminophen 325 mG 2Tablet(s) Oral every 6 hours PRN Moderate Pain (4 - 6)      Allergies    No Known Allergies    Intolerances        Vital Signs Last 24 Hrs  T(C): 36.8, Max: 37.1 (05-16 @ 23:40)  T(F): 98.3, Max: 98.8 (05-16 @ 23:40)  HR: 79 (65 - 156)  BP: 106/57 (88/49 - 129/64)  BP(mean): 77 (63 - 90)  RR: 13 (11 - 20)  SpO2: 98% (95% - 100%)  Daily     Daily     PHYSICAL EXAM:    GENERAL: appears acutely ill  HEAD:  no 02  EYES: wnl  ENMT:   NECK: bilateral central lines  NERVOUS SYSTEM:  awake but falls  to sleep easily   CHEST/LUNG: decreased bs bases  HEART: tachy, no rub  ABDOMEN: soft  EXTREMITIES:  right knee with open wound and clean dressing, bilateral thigh edema - pitting and lower back  LYMPH:   SKIN: no rash    LABS:                        8.5    20.4  )-----------( 188      ( 16 May 2017 06:51 )             24.3         142  |  100  |  42.0<H>  ----------------------------<  46<LL>  3.9   |  25.0  |  5.16<H>    Ca    8.4<L>      17 May 2017 06:38  Phos  2.7       Mg     1.7         TPro  5.1<L>  /  Alb  3.1<L>  /  TBili  2.0  /  DBili  x   /  AST  13  /  ALT  <4  /  AlkPhos  85      PT/INR - ( 17 May 2017 06:38 )   PT: 27.0 sec;   INR: 2.41 ratio         PTT - ( 17 May 2017 06:38 )  PTT:68.7 sec  Urinalysis Basic - ( 16 May 2017 04:07 )    Color: Yellow / Appearance: Cloudy / S.010 / pH: x  Gluc: x / Ketone: Small  / Bili: Negative / Urobili: 1 mg/dL   Blood: x / Protein: 500 mg/dL / Nitrite: Negative   Leuk Esterase: Moderate / RBC: 3-5 /HPF / WBC >50   Sq Epi: x / Non Sq Epi: x / Bacteria: TNTC      Magnesium, Serum: 1.7 mg/dL ( @ 06:38)  Phosphorus Level, Serum: 2.7 mg/dL ( @ 06:38)          RADIOLOGY & ADDITIONAL TESTS:

## 2017-05-17 NOTE — PROGRESS NOTE ADULT - SUBJECTIVE AND OBJECTIVE BOX
Patient is currently somnolent, without active complaints.     MEDICATIONS  (STANDING):  dextrose 5%. 1000milliLiter(s) IV Continuous <Continuous>  dextrose 50% Injectable 12.5Gram(s) IV Push once  dextrose 50% Injectable 25Gram(s) IV Push once  dextrose 50% Injectable 25Gram(s) IV Push once  collagenase Ointment 1Application(s) Topical daily  collagenase Ointment 1Application(s) Topical two times a day  midodrine 10milliGRAM(s) Oral every 8 hours  norepinephrine Infusion 0.05MICROgram(s)/kG/Min IV Continuous <Continuous>  insulin lispro (HumaLOG) corrective regimen sliding scale  SubCutaneous every 6 hours  pantoprazole  Injectable 40milliGRAM(s) IV Push daily  albumin human 25% IVPB 100milliLiter(s) IV Intermittent once  meropenem IVPB 1000milliGRAM(s) IV Intermittent every 24 hours    Vital Signs Last 24 Hrs  T(C): 36.8, Max: 37.1 (05-16 @ 23:40)  T(F): 98.3, Max: 98.8 (05-16 @ 23:40)  HR: 114 (65 - 156)  BP: 98/56 (88/49 - 129/64)  BP(mean): 71 (63 - 90)  RR: 14 (12 - 20)  SpO2: 98% (95% - 100%)      Gen: no distresss  CV: regular, S1S2, no audible murmurs  Rsp: Lungs CTA  GI: abd soft, NT      Echo   1. Left ventricular ejection fraction, by visual estimation, is 65 to   70%.   2. Moderately enlarged right ventricle.   3. Dilated right atrium.   4. There is no evidence of pericardial effusion.   5. Mild mitral annular calcification.   6. Trace mitral valve regurgitation.   7. Mild-moderate tricuspid regurgitation.   8. Mild to moderate pulmonic valve regurgitation.   9. Estimated pulmonary artery systolic pressure is 49.1 mmHg assuming a   right atrial pressure of 15 mmHg, which is consistent with mild pulmonary   hypertension.  10. There is mild aortic root calcification.

## 2017-05-17 NOTE — PROGRESS NOTE ADULT - SUBJECTIVE AND OBJECTIVE BOX
OVERNIGHT EVENTS:     Present Symptoms:     Dyspnea: 0 1 2 3   Nausea/Vomiting: Yes No  Anxiety:  Yes No  Depression: Yes No  Fatigue: Yes No  Loss of appetite: Yes No    Pain:             Character-            Duration-            Effect-            Factors-            Frequency-            Location-            Severity-    Review of Systems: Reviewed                     Negative:                     Positive:  Unable to obtain due to poor mentation   All others negative    MEDICATIONS  (STANDING):  dextrose 5%. 1000milliLiter(s) IV Continuous <Continuous>  dextrose 50% Injectable 12.5Gram(s) IV Push once  dextrose 50% Injectable 25Gram(s) IV Push once  dextrose 50% Injectable 25Gram(s) IV Push once  collagenase Ointment 1Application(s) Topical daily  collagenase Ointment 1Application(s) Topical two times a day  midodrine 10milliGRAM(s) Oral every 8 hours  norepinephrine Infusion 0.05MICROgram(s)/kG/Min IV Continuous <Continuous>  insulin lispro (HumaLOG) corrective regimen sliding scale  SubCutaneous every 6 hours  pantoprazole  Injectable 40milliGRAM(s) IV Push daily  albumin human 25% IVPB 100milliLiter(s) IV Intermittent once  meropenem IVPB 1000milliGRAM(s) IV Intermittent every 24 hours    MEDICATIONS  (PRN):  dextrose Gel 1Dose(s) Oral once PRN Blood Glucose LESS THAN 70 milliGRAM(s)/deciliter  glucagon  Injectable 1milliGRAM(s) IntraMuscular once PRN Glucose LESS THAN 70 milligrams/deciliter  oxyCODONE  5 mG/acetaminophen 325 mG 2Tablet(s) Oral every 6 hours PRN Moderate Pain (4 - 6)  fentaNYL    Injectable 25MICROGram(s) IV Push every 6 hours PRN Moderate Pain (4 - 6)      PHYSICAL EXAM:    Vital Signs Last 24 Hrs  T(C): 36.8, Max: 37.1 (05-16 @ 23:40)  T(F): 98.3, Max: 98.8 (05-16 @ 23:40)  HR: 144 (68 - 156)  BP: 87/61 (87/61 - 129/64)  BP(mean): 70 (67 - 90)  RR: 13 (12 - 20)  SpO2: 99% (98% - 100%)    General: alert  oriented x ____ lethargic agitated                  cachexia  nonverbal  coma    Karnofsky:  %    HEENT: normal  dry mouth  ET tube/trach    Lungs: comfortable tachypnea/labored breathing  excessive secretions    CV: normal  tachycardia    GI: normal  distended  tender  no BS               PEG/NG/OG tube  constipation  last BM:     : normal  incontinent  oliguria/anuria  anna    MSK: normal  weakness  edema             ambulatory  bedbound/wheelchair bound    Skin: normal  pressure ulcers- Stage_____  no rash    LABS:                          9.1    20.5  )-----------( 204      ( 17 May 2017 06:38 )             26.1     -    142  |  100  |  42.0<H>  ----------------------------<  46<LL>  3.9   |  25.0  |  5.16<H>    Ca    8.4<L>      17 May 2017 06:38  Phos  2.7       Mg     1.7         TPro  5.1<L>  /  Alb  3.1<L>  /  TBili  2.0  /  DBili  x   /  AST  13  /  ALT  <4  /  AlkPhos  85  -16    PT/INR - ( 17 May 2017 06:38 )   PT: 27.0 sec;   INR: 2.41 ratio       PTT - ( 17 May 2017 06:38 )  PTT:68.7 sec  Urinalysis Basic - ( 16 May 2017 04:07 )    Color: Yellow / Appearance: Cloudy / S.010 / pH: x  Gluc: x / Ketone: Small  / Bili: Negative / Urobili: 1 mg/dL   Blood: x / Protein: 500 mg/dL / Nitrite: Negative   Leuk Esterase: Moderate / RBC: 3-5 /HPF / WBC >50   Sq Epi: x / Non Sq Epi: x / Bacteria: TNTC      I&O's Summary  I & Os for 24h ending 17 May 2017 07:00  =============================================  IN: 2613.1 ml / OUT: 0 ml / NET: 2613.1 ml    I & Os for current day (as of 17 May 2017 16:08)  =============================================  IN: 87.9 ml / OUT: 0 ml / NET: 87.9 ml      RADIOLOGY & ADDITIONAL STUDIES:    ADVANCE DIRECTIVES:   DNR YES NO  Completed on:                     MOLST  YES NO   Completed on:  Living Will  YES NO   Completed on: OVERNIGHT EVENTS: likely source sacral ulcer    Present Symptoms:     Dyspnea: 0  Nausea/Vomiting: No  Anxiety:  unable  Depression: unable  Fatigue: unable  Loss of appetite: unable    Pain: none            Character-            Duration-            Effect-            Factors-            Frequency-            Location-            Severity-    Review of Systems: Reviewed  Unable to obtain due to poor mentation   All others negative    MEDICATIONS  (STANDING):  dextrose 5%. 1000milliLiter(s) IV Continuous <Continuous>  dextrose 50% Injectable 12.5Gram(s) IV Push once  dextrose 50% Injectable 25Gram(s) IV Push once  dextrose 50% Injectable 25Gram(s) IV Push once  collagenase Ointment 1Application(s) Topical daily  collagenase Ointment 1Application(s) Topical two times a day  midodrine 10milliGRAM(s) Oral every 8 hours  norepinephrine Infusion 0.05MICROgram(s)/kG/Min IV Continuous <Continuous>  insulin lispro (HumaLOG) corrective regimen sliding scale  SubCutaneous every 6 hours  pantoprazole  Injectable 40milliGRAM(s) IV Push daily  albumin human 25% IVPB 100milliLiter(s) IV Intermittent once  meropenem IVPB 1000milliGRAM(s) IV Intermittent every 24 hours    MEDICATIONS  (PRN):  dextrose Gel 1Dose(s) Oral once PRN Blood Glucose LESS THAN 70 milliGRAM(s)/deciliter  glucagon  Injectable 1milliGRAM(s) IntraMuscular once PRN Glucose LESS THAN 70 milligrams/deciliter  oxyCODONE  5 mG/acetaminophen 325 mG 2Tablet(s) Oral every 6 hours PRN Moderate Pain (4 - 6)  fentaNYL    Injectable 25MICROGram(s) IV Push every 6 hours PRN Moderate Pain (4 - 6)    PHYSICAL EXAM:    Vital Signs Last 24 Hrs  T(C): 36.8, Max: 37.1 (05-16 @ 23:40)  T(F): 98.3, Max: 98.8 (05-16 @ 23:40)  HR: 144 (68 - 156)  BP: 87/61 (87/61 - 129/64)  BP(mean): 70 (67 - 90)  RR: 13 (12 - 20)  SpO2: 99% (98% - 100%)    General: opens eyes    Karnofsky:  20%    HEENT: normal      Lungs: comfortable     CV: normal      GI: normal       :  anna    MSK: weakness  edema     Skin: pressure ulcers- unstageable sacrum, left heel ulcer    LABS:              9.1    20.5  )-----------( 204      ( 17 May 2017 06:38 )             26.1         142  |  100  |  42.0<H>  ----------------------------<  46<LL>  3.9   |  25.0  |  5.16<H>    Ca    8.4<L>      17 May 2017 06:38  Phos  2.7       Mg     1.7         TPro  5.1<L>  /  Alb  3.1<L>  /  TBili  2.0  /  DBili  x   /  AST  13  /  ALT  <4  /  AlkPhos  85      PT/INR - ( 17 May 2017 06:38 )   PT: 27.0 sec;   INR: 2.41 ratio       PTT - ( 17 May 2017 06:38 )  PTT:68.7 sec  Urinalysis Basic - ( 16 May 2017 04:07 )    Color: Yellow / Appearance: Cloudy / S.010 / pH: x  Gluc: x / Ketone: Small  / Bili: Negative / Urobili: 1 mg/dL   Blood: x / Protein: 500 mg/dL / Nitrite: Negative   Leuk Esterase: Moderate / RBC: 3-5 /HPF / WBC >50   Sq Epi: x / Non Sq Epi: x / Bacteria: TNTC      I&O's Summary  I & Os for 24h ending 17 May 2017 07:00  =============================================  IN: 2613.1 ml / OUT: 0 ml / NET: 2613.1 ml    I & Os for current day (as of 17 May 2017 16:08)  =============================================  IN: 87.9 ml / OUT: 0 ml / NET: 87.9 ml    RADIOLOGY & ADDITIONAL STUDIES:    ADVANCE DIRECTIVES: DNR

## 2017-05-17 NOTE — CONSULT NOTE ADULT - PROBLEM SELECTOR RECOMMENDATION 9
santyl dressing daily  bedside vs operative sacral debridement once medically stable  broad spectrum abx  will discuss with attending santyl dressing daily  bedside vs operative sacral debridement once medically stable if family agreeable  broad spectrum abx  will discuss with attending

## 2017-05-17 NOTE — CONSULT NOTE ADULT - SUBJECTIVE AND OBJECTIVE BOX
INTERVAL HPI/OVERNIGHT EVENTS:    73yo F admitted with acute on chronic renal failure, urosepsis, with sacral pressure sore found on CT as part of septic workup.  Pt nonverbal and cannot give history.    SUBJECTIVE:      MEDICATIONS  (STANDING):  dextrose 5%. 1000milliLiter(s) IV Continuous <Continuous>  dextrose 50% Injectable 12.5Gram(s) IV Push once  dextrose 50% Injectable 25Gram(s) IV Push once  dextrose 50% Injectable 25Gram(s) IV Push once  collagenase Ointment 1Application(s) Topical daily  collagenase Ointment 1Application(s) Topical two times a day  midodrine 10milliGRAM(s) Oral every 8 hours  norepinephrine Infusion 0.05MICROgram(s)/kG/Min IV Continuous <Continuous>  insulin lispro (HumaLOG) corrective regimen sliding scale  SubCutaneous every 6 hours  pantoprazole  Injectable 40milliGRAM(s) IV Push daily  meropenem IVPB 1000milliGRAM(s) IV Intermittent every 24 hours    MEDICATIONS  (PRN):  dextrose Gel 1Dose(s) Oral once PRN Blood Glucose LESS THAN 70 milliGRAM(s)/deciliter  glucagon  Injectable 1milliGRAM(s) IntraMuscular once PRN Glucose LESS THAN 70 milligrams/deciliter  oxyCODONE  5 mG/acetaminophen 325 mG 2Tablet(s) Oral every 6 hours PRN Moderate Pain (4 - 6)  fentaNYL    Injectable 25MICROGram(s) IV Push every 6 hours PRN Moderate Pain (4 - 6)      Vital Signs Last 24 Hrs  T(C): 36.5, Max: 37.1 (05-16 @ 23:40)  T(F): 97.7, Max: 98.8 (05-16 @ 23:40)  HR: 84 (69 - 156)  BP: 96/52 (87/61 - 129/64)  BP(mean): 69 (66 - 90)  RR: 16 (13 - 20)  SpO2: 99% (98% - 100%)    PE  Gen: Responds to painful stimuli, opens eyes spontaneously  Pulm: Coarse BS BL  CV: RRR  Abd: s, nt, nd  MS: 12 x 8 sacral pressure sore, full thickness with stool staining      I&O's Detail  I & Os for 24h ending 17 May 2017 07:00  =============================================  IN:    sodium bicarbonate  Infusion: 1800 ml    norepinephrine Infusion: 363.1 ml    Solution: 250 ml    Albumin 25%: 100 ml    Solution: 100 ml    Total IN: 2613.1 ml  ---------------------------------------------  OUT:    Total OUT: 0 ml  ---------------------------------------------  Total NET: 2613.1 ml    I & Os for current day (as of 17 May 2017 20:49)  =============================================  IN:    Albumin 25%: 100 ml    sodium bicarbonate  Infusion: 75 ml    norepinephrine Infusion: 12.9 ml    Total IN: 187.9 ml  ---------------------------------------------  OUT:    Other: 1400 ml    Total OUT: 1400 ml  ---------------------------------------------  Total NET: -1212.1 ml      LABS:                        9.1    20.5  )-----------( 204      ( 17 May 2017 06:38 )             26.1         142  |  100  |  42.0<H>  ----------------------------<  46<LL>  3.9   |  25.0  |  5.16<H>    Ca    8.4<L>      17 May 2017 06:38  Phos  2.7       Mg     1.7         TPro  5.1<L>  /  Alb  3.1<L>  /  TBili  2.0  /  DBili  x   /  AST  13  /  ALT  <4  /  AlkPhos  85  05-16    PT/INR - ( 17 May 2017 06:38 )   PT: 27.0 sec;   INR: 2.41 ratio         PTT - ( 17 May 2017 06:38 )  PTT:68.7 sec  Urinalysis Basic - ( 16 May 2017 04:07 )    Color: Yellow / Appearance: Cloudy / S.010 / pH: x  Gluc: x / Ketone: Small  / Bili: Negative / Urobili: 1 mg/dL   Blood: x / Protein: 500 mg/dL / Nitrite: Negative   Leuk Esterase: Moderate / RBC: 3-5 /HPF / WBC >50   Sq Epi: x / Non Sq Epi: x / Bacteria: TNTC        RADIOLOGY & ADDITIONAL STUDIES: INTERVAL HPI/OVERNIGHT EVENTS:    71yo F admitted with acute on chronic renal failure, presumed urosepsis, with sacral pressure sore found on CT as part of septic workup.  Pt nonverbal and cannot give history.    SUBJECTIVE:      MEDICATIONS  (STANDING):  dextrose 5%. 1000milliLiter(s) IV Continuous <Continuous>  dextrose 50% Injectable 12.5Gram(s) IV Push once  dextrose 50% Injectable 25Gram(s) IV Push once  dextrose 50% Injectable 25Gram(s) IV Push once  collagenase Ointment 1Application(s) Topical daily  collagenase Ointment 1Application(s) Topical two times a day  midodrine 10milliGRAM(s) Oral every 8 hours  norepinephrine Infusion 0.05MICROgram(s)/kG/Min IV Continuous <Continuous>  insulin lispro (HumaLOG) corrective regimen sliding scale  SubCutaneous every 6 hours  pantoprazole  Injectable 40milliGRAM(s) IV Push daily  meropenem IVPB 1000milliGRAM(s) IV Intermittent every 24 hours    MEDICATIONS  (PRN):  dextrose Gel 1Dose(s) Oral once PRN Blood Glucose LESS THAN 70 milliGRAM(s)/deciliter  glucagon  Injectable 1milliGRAM(s) IntraMuscular once PRN Glucose LESS THAN 70 milligrams/deciliter  oxyCODONE  5 mG/acetaminophen 325 mG 2Tablet(s) Oral every 6 hours PRN Moderate Pain (4 - 6)  fentaNYL    Injectable 25MICROGram(s) IV Push every 6 hours PRN Moderate Pain (4 - 6)      Vital Signs Last 24 Hrs  T(C): 36.5, Max: 37.1 (05-16 @ 23:40)  T(F): 97.7, Max: 98.8 (05-16 @ 23:40)  HR: 84 (69 - 156)  BP: 96/52 (87/61 - 129/64)  BP(mean): 69 (66 - 90)  RR: 16 (13 - 20)  SpO2: 99% (98% - 100%)    PE  Gen: Responds to painful stimuli, opens eyes spontaneously  Pulm: Coarse BS BL  CV: RRR  Abd: s, nt, nd  MS: 12 x 8 sacral pressure sore, full thickness with stool staining      I&O's Detail  I & Os for 24h ending 17 May 2017 07:00  =============================================  IN:    sodium bicarbonate  Infusion: 1800 ml    norepinephrine Infusion: 363.1 ml    Solution: 250 ml    Albumin 25%: 100 ml    Solution: 100 ml    Total IN: 2613.1 ml  ---------------------------------------------  OUT:    Total OUT: 0 ml  ---------------------------------------------  Total NET: 2613.1 ml    I & Os for current day (as of 17 May 2017 20:49)  =============================================  IN:    Albumin 25%: 100 ml    sodium bicarbonate  Infusion: 75 ml    norepinephrine Infusion: 12.9 ml    Total IN: 187.9 ml  ---------------------------------------------  OUT:    Other: 1400 ml    Total OUT: 1400 ml  ---------------------------------------------  Total NET: -1212.1 ml      LABS:                        9.1    20.5  )-----------( 204      ( 17 May 2017 06:38 )             26.1     -    142  |  100  |  42.0<H>  ----------------------------<  46<LL>  3.9   |  25.0  |  5.16<H>    Ca    8.4<L>      17 May 2017 06:38  Phos  2.7     -  Mg     1.7         TPro  5.1<L>  /  Alb  3.1<L>  /  TBili  2.0  /  DBili  x   /  AST  13  /  ALT  <4  /  AlkPhos  85  05-16    PT/INR - ( 17 May 2017 06:38 )   PT: 27.0 sec;   INR: 2.41 ratio         PTT - ( 17 May 2017 06:38 )  PTT:68.7 sec  Urinalysis Basic - ( 16 May 2017 04:07 )    Color: Yellow / Appearance: Cloudy / S.010 / pH: x  Gluc: x / Ketone: Small  / Bili: Negative / Urobili: 1 mg/dL   Blood: x / Protein: 500 mg/dL / Nitrite: Negative   Leuk Esterase: Moderate / RBC: 3-5 /HPF / WBC >50   Sq Epi: x / Non Sq Epi: x / Bacteria: TNTC        RADIOLOGY & ADDITIONAL STUDIES:

## 2017-05-17 NOTE — CONSULT NOTE ADULT - ATTENDING COMMENTS
Saw pt at 4:30 pm on 5/16  - Pt in on dialysis at the time. Responds to painful stimuli. Passive motion of the bilateral UE causing pain (grimaces), right side is worse. both side limited ROM. No clear local swelling on both side. Right shoulder area is soft and nontender. No fluctuation. No skin color changes.  - Xray report: possible osteoarthritis or osteomyelitis  - A/P: Right shoulder osteoarthritis, need to r/o infection  - Suggest: 1) IR right shoulder aspiration; 2) obtain xrays of the left shoulder
71 yo demented, bed bound female with multiple medical problems presents to the ED w/ septic shock.  She was seen and evaluated by the MICU service, admitted and started on pressor support.  The source of sepsis was unclear.  Patient also had a acute on chronic renal failure and the renal service was consulted.  A CT A/P showed a distended gallbladder and a sacral ulcer with air that may extend into the coccyx.  ACS service consulted for possible debridement of sacral pressure ulcer which could possibly be the source of sepsis.  Leukocytosis to 20.5K.  On exam, patient is non-verbal.  Abdomen is soft, NT/ND.  Sacrum has a large, irregular, unstageable ulcer.  Blood cultures have shown GNR and patient on broad spectrum abx.  As imaging shows a distended GB this could possibly be the source of the sepsis and a perc young could be done to see if this is, indeed, the source.  It is also certainly possible the sacral decub could be the source of sepsis and surgical debridement would be warranted.  There is no family at the bedside and in discussion w/ Dr. Aguilar of MICU, the family is unclear they wish to pursue operative or any invasive procedures.  Will follow and should the family agree then may proceed w/ perc young and debridement of sacral ulcer.
Met with son Will at bedside (he flew in from [place] this weekend). support offered, wait and see how she does over the next 24-48 hours to determine longer term prognosis.

## 2017-05-17 NOTE — PROGRESS NOTE ADULT - ASSESSMENT
POLYMICROBIAL SEPSIS - C/W ANAEROBE - ON MERREM APPROP AS PROTEUS SENIS  UTI - R/O MDR - AWAITING S    R/O INTRABD SOURCE

## 2017-05-17 NOTE — PROGRESS NOTE ADULT - SUBJECTIVE AND OBJECTIVE BOX
INTERVAL HPI/OVERNIGHT EVENTS:    CC: gram negative bacteremia, encephalopathy, KARMEN on CKD, atrial fibrillation, septic shock    Mental status unchanged. Received HD yesterday, pressors temporarily held given tachycardia.     Vital Signs Last 24 Hrs  T(C): 36.8, Max: 37.1 (05-16 @ 23:40)  T(F): 98.3, Max: 98.8 (05-16 @ 23:40)  HR: 144 (65 - 156)  BP: 87/61 (87/61 - 129/64)  BP(mean): 70 (67 - 90)  RR: 13 (12 - 20)  SpO2: 99% (98% - 100%)    PHYSICAL EXAM:    GENERAL: lethargic, arousable to painful stimuli, anasarca  CHEST/LUNG: b/l air entry  HEART: irregular  ABDOMEN: Soft, BS+  EXTREMITIES:  3+ edema    MEDICATIONS  (STANDING):  dextrose 5%. 1000milliLiter(s) IV Continuous <Continuous>  dextrose 50% Injectable 12.5Gram(s) IV Push once  dextrose 50% Injectable 25Gram(s) IV Push once  dextrose 50% Injectable 25Gram(s) IV Push once  collagenase Ointment 1Application(s) Topical daily  collagenase Ointment 1Application(s) Topical two times a day  midodrine 10milliGRAM(s) Oral every 8 hours  norepinephrine Infusion 0.05MICROgram(s)/kG/Min IV Continuous <Continuous>  insulin lispro (HumaLOG) corrective regimen sliding scale  SubCutaneous every 6 hours  pantoprazole  Injectable 40milliGRAM(s) IV Push daily  albumin human 25% IVPB 100milliLiter(s) IV Intermittent once  meropenem IVPB 1000milliGRAM(s) IV Intermittent every 24 hours  digoxin  Injectable 0.25milliGRAM(s) IV Push once    MEDICATIONS  (PRN):  dextrose Gel 1Dose(s) Oral once PRN Blood Glucose LESS THAN 70 milliGRAM(s)/deciliter  glucagon  Injectable 1milliGRAM(s) IntraMuscular once PRN Glucose LESS THAN 70 milligrams/deciliter  oxyCODONE  5 mG/acetaminophen 325 mG 2Tablet(s) Oral every 6 hours PRN Moderate Pain (4 - 6)      Allergies    No Known Allergies    Intolerances          LABS:                          9.1    20.5  )-----------( 204      ( 17 May 2017 06:38 )             26.1         142  |  100  |  42.0<H>  ----------------------------<  46<LL>  3.9   |  25.0  |  5.16<H>    Ca    8.4<L>      17 May 2017 06:38  Phos  2.7       Mg     1.7         TPro  5.1<L>  /  Alb  3.1<L>  /  TBili  2.0  /  DBili  x   /  AST  13  /  ALT  <4  /  AlkPhos  85  05-16    PT/INR - ( 17 May 2017 06:38 )   PT: 27.0 sec;   INR: 2.41 ratio         PTT - ( 17 May 2017 06:38 )  PTT:68.7 sec  Urinalysis Basic - ( 16 May 2017 04:07 )    Color: Yellow / Appearance: Cloudy / S.010 / pH: x  Gluc: x / Ketone: Small  / Bili: Negative / Urobili: 1 mg/dL   Blood: x / Protein: 500 mg/dL / Nitrite: Negative   Leuk Esterase: Moderate / RBC: 3-5 /HPF / WBC >50   Sq Epi: x / Non Sq Epi: x / Bacteria: TNTC        RADIOLOGY & ADDITIONAL TESTS:

## 2017-05-17 NOTE — PROGRESS NOTE ADULT - SUBJECTIVE AND OBJECTIVE BOX
Patient is a 72y old  Female who presents with a chief complaint of mental status changes & anasarca. (12 May 2017 18:13)    PAST MEDICAL & SURGICAL HISTORY:  Hypercholesterolemia  Deficiency of vitamin K  Chronic pain  COPD (chronic obstructive pulmonary disease)  Urine retention  Pressure ulcer  Atrial fibrillation  Constipation  Breast cancer  HLD (hyperlipidemia)  HTN (hypertension)  GERD (gastroesophageal reflux disease)  DM (diabetes mellitus)  Obesity  LIN on CPAP  History of incision and drainage: rt knee  S/p total knee replacement, bilateral  S/P knee replacement, right  S/P hysterectomy  S/P mastectomy: Left  Breast cancer    SHYAM LAL   72y    Female    BRIEF HOSPITAL COURSE:    73 yo female, PMHx COPD, AFib, DM, CKD, h/o breast CA s/p L mastectomy, morbid obesity, LIN on CPAP at home, multiple pressure ulcers, presented to ED on 17  with KARMEN AMS.  ICU 5/15 with hypotension.  GNR bacteremia with shock was on pressor now off  Tonight runs of SVT terminated by pushes of BB without drop in BP.      Review of Systems:  UATO                         ICU Vital Signs Last 24 Hrs  T(C): 36.6, Max: 36.9 ( @ 04:00)  T(F): 97.9, Max: 98.4 ( @ 04:00)  HR: 141 (74 - 156)  BP: 121/56 (87/61 - 129/64)  BP(mean): 80 (66 - 99)  ABP: --  ABP(mean): --  RR: 17 (13 - 20)  SpO2: 97% (95% - 100%)    Physical Examination:    General: drowsy      HEENT: RIJ HD cath [facility] TLC    PULM: bilateral BS    CVS: s1 s2 SVT;s    ABD: sofr    EXT:  edematous    SKIN:  huge sacral ulcer in need of debridement    Neuro: lethargic          LABS:                        9.1    20.5  )-----------( 204      ( 17 May 2017 06:38 )             26.1         142  |  100  |  42.0<H>  ----------------------------<  46<LL>  3.9   |  25.0  |  5.16<H>    Ca    8.4<L>      17 May 2017 06:38  Phos  2.7       Mg     1.7         TPro  5.1<L>  /  Alb  3.1<L>  /  TBili  2.0  /  DBili  x   /  AST  13  /  ALT  <4  /  AlkPhos  85        PT/INR - ( 17 May 2017 06:38 )   PT: 27.0 sec;   INR: 2.41 ratio         PTT - ( 17 May 2017 06:38 )  PTT:68.7 sec  Urinalysis Basic - ( 16 May 2017 04:07 )    Color: Yellow / Appearance: Cloudy / S.010 / pH: x  Gluc: x / Ketone: Small  / Bili: Negative / Urobili: 1 mg/dL   Blood: x / Protein: 500 mg/dL / Nitrite: Negative   Leuk Esterase: Moderate / RBC: 3-5 /HPF / WBC >50   Sq Epi: x / Non Sq Epi: x / Bacteria: TNTC      CULTURES:  Culture Results:   >100,000 CFU/ml Gram Negative Rods Identification and susceptibility to  follow.  Culture in progress ( @ 04:07)  Culture Results:   Growth in aerobic and anaerobic bottles: Proteus mirabilis  Anaerobic Bottle: 16:13 Hours to positivity  Aerobic Bottle: 1 day 01:14 Hours to positivity  .  TYPE: (C=Critical, N=Notification, A=Abnormal) C  TESTS:  _ : [facility] culture  DATE/TIME SALCEDO (05-15 @ 16:02)  Culture Results:   Growth in aerobic bottle: Gram Negative Rods  Aerobic Bottle: 1 day 01:44 Hours to positivity  Anaerobic Bottle: No growth to date  .  TYPE: (C=Critical, N=Notification, A=Abnormal) C  TESTS:  _ GS: [facility] Culture  DATE/TIME CALLED: _ 2017 18:30: (05-15 @ 16:02)  Culture Results:   Growth in anaerobic bottle: Bacteroides thetaiotaomicron (anaerobic  organism)  Anaerobic Bottle: 1 day; 20:29 Hours to positivity  Aerobic Bottle: No growth to date  ,  TYPE: (C=Critical, N=Notification, A=Abnormal) C  TESTS:  _ BLD GS  DATE/TIME C ( @ 06:35)  Culture Results:   No growth at 48 hours ( @ 06:35)  Culture Results:   Numerous Proteus mirabilis  Numerous Providencia stuartii ( @ 01:11)  Culture Results:   Culture grew 3 or more types of organisms which indicate  collection contamination; consider recollection only if clinically  indicated.  .  TYPE: (C=Critical, N=Notification, A=Abnormal) N  TESTS:  _ Contaminated Urine Culture  DATE/TIME CALLED: _ ( @ 19:10)  Culture Results:   No growth at 5 days. ( @ 15:07)  Culture Results:   No growth at 5 days. ( @ 15:06)      Medications:  meropenem IVPB 1000milliGRAM(s) IV Intermittent every 24 hours  midodrine 10milliGRAM(s) Oral every 8 hours  metoprolol Injectable 5milliGRAM(s) IV Push every 6 hours  oxyCODONE  5 mG/acetaminophen 325 mG 2Tablet(s) Oral every 6 hours PRN  fentaNYL    Injectable 25MICROGram(s) IV Push every 6 hours PRN  pantoprazole  Injectable 40milliGRAM(s) IV Push daily          I & Os for 24h ending  @ 07:00  =============================================  IN: 2613.1 ml / OUT: 0 ml / NET: 2613.1 ml    I & Os for current day (as of  @ 03:39)  =============================================  IN: 437.9 ml / OUT: 1400 ml / NET: -962.1 ml      RADIOLOGY/IMAGING/ECHO    US  Distended gallbladder containing gallstones and sludge.  Distended common bile duct measuring 8.9 mm .    CT    Large sacral decubitus ulcer with medial buttock soft tissue gas and  probable sacral osteomyelitis, new finding since .    Distended gallbladder with cholelithiasis, correlate clinically for acute  cholecystitis. Small new ascites.    Assessment/Plan:    73 yo female, PMHx COPD, AFib, DM, CKD, h/o breast CA s/p L mastectomy, morbid obesity, LIN on CPAP at home, multiple pressure ulcers, presented to ED on 17 AMS karmen    Proteus bacteremia with GNR urosepsis (probably  same organism) as the source of her shock which is now resolved.  She is also growing bacteroides in a blood culture.  She has a deep sacral decub in need of debridement .  Her GB could be a source too D/W Dr Hubbard, VIT K given to reverse INR was on coumadin for a-fib.   Planning for sacral debridemtn for now.  ? perc young tube,  KARMEN now on HD  SVT  BB RTC as BP tolerates  BP holding on midodrine.  .      Minutes of Critical Care time: 42  (Reviewing data, imaging, discussing with multidisciplinary team, non inclusive of procedures, discussing goals of care with patient/family)

## 2017-05-17 NOTE — PROGRESS NOTE ADULT - PROBLEM SELECTOR PLAN 1
-patient a bit more alert today; likely related to kidney failure, and sepsis, as those things clear, hope mental state clears.

## 2017-05-18 DIAGNOSIS — A41.9 SEPSIS, UNSPECIFIED ORGANISM: ICD-10-CM

## 2017-05-18 DIAGNOSIS — I48.0 PAROXYSMAL ATRIAL FIBRILLATION: ICD-10-CM

## 2017-05-18 DIAGNOSIS — Z01.810 ENCOUNTER FOR PREPROCEDURAL CARDIOVASCULAR EXAMINATION: ICD-10-CM

## 2017-05-18 DIAGNOSIS — Z51.5 ENCOUNTER FOR PALLIATIVE CARE: ICD-10-CM

## 2017-05-18 LAB
-  AMIKACIN: SIGNIFICANT CHANGE UP
-  AMPICILLIN/SULBACTAM: SIGNIFICANT CHANGE UP
-  AMPICILLIN: SIGNIFICANT CHANGE UP
-  AZTREONAM: SIGNIFICANT CHANGE UP
-  CEFAZOLIN: SIGNIFICANT CHANGE UP
-  CEFEPIME: SIGNIFICANT CHANGE UP
-  CEFOXITIN: SIGNIFICANT CHANGE UP
-  CEFTAZIDIME: SIGNIFICANT CHANGE UP
-  CEFTRIAXONE: SIGNIFICANT CHANGE UP
-  CIPROFLOXACIN: SIGNIFICANT CHANGE UP
-  ERTAPENEM: SIGNIFICANT CHANGE UP
-  GENTAMICIN: SIGNIFICANT CHANGE UP
-  LEVOFLOXACIN: SIGNIFICANT CHANGE UP
-  MEROPENEM: SIGNIFICANT CHANGE UP
-  PIPERACILLIN/TAZOBACTAM: SIGNIFICANT CHANGE UP
-  TOBRAMYCIN: SIGNIFICANT CHANGE UP
-  TRIMETHOPRIM/SULFAMETHOXAZOLE: SIGNIFICANT CHANGE UP
ANION GAP SERPL CALC-SCNC: 15 MMOL/L — SIGNIFICANT CHANGE UP (ref 5–17)
BUN SERPL-MCNC: 28 MG/DL — HIGH (ref 8–20)
CALCIUM SERPL-MCNC: 8.6 MG/DL — SIGNIFICANT CHANGE UP (ref 8.6–10.2)
CHLORIDE SERPL-SCNC: 101 MMOL/L — SIGNIFICANT CHANGE UP (ref 98–107)
CO2 SERPL-SCNC: 26 MMOL/L — SIGNIFICANT CHANGE UP (ref 22–29)
CREAT SERPL-MCNC: 4.1 MG/DL — HIGH (ref 0.5–1.3)
GLUCOSE SERPL-MCNC: 72 MG/DL — SIGNIFICANT CHANGE UP (ref 70–115)
GRAM STN FLD: SIGNIFICANT CHANGE UP
HBV CORE AB SER-ACNC: REACTIVE
HBV CORE IGM SER-ACNC: SIGNIFICANT CHANGE UP
HBV SURFACE AB SER-ACNC: 38 MIU/ML — SIGNIFICANT CHANGE UP
HBV SURFACE AG SER-ACNC: SIGNIFICANT CHANGE UP
HCT VFR BLD CALC: 26.4 % — LOW (ref 37–47)
HCV AB S/CO SERPL IA: 0.16 S/CO — SIGNIFICANT CHANGE UP
HCV AB SERPL-IMP: SIGNIFICANT CHANGE UP
HGB BLD-MCNC: 9.2 G/DL — LOW (ref 12–16)
INR BLD: 2.36 RATIO — HIGH (ref 0.88–1.16)
MAGNESIUM SERPL-MCNC: 1.9 MG/DL — SIGNIFICANT CHANGE UP (ref 1.6–2.6)
MCHC RBC-ENTMCNC: 29.7 PG — SIGNIFICANT CHANGE UP (ref 27–31)
MCHC RBC-ENTMCNC: 34.8 G/DL — SIGNIFICANT CHANGE UP (ref 32–36)
MCV RBC AUTO: 85.2 FL — SIGNIFICANT CHANGE UP (ref 81–99)
METHOD TYPE: SIGNIFICANT CHANGE UP
PHOSPHATE SERPL-MCNC: 2.3 MG/DL — LOW (ref 2.4–4.7)
PLATELET # BLD AUTO: 143 K/UL — LOW (ref 150–400)
POTASSIUM SERPL-MCNC: 4 MMOL/L — SIGNIFICANT CHANGE UP (ref 3.5–5.3)
POTASSIUM SERPL-SCNC: 4 MMOL/L — SIGNIFICANT CHANGE UP (ref 3.5–5.3)
PROTHROM AB SERPL-ACNC: 26.4 SEC — HIGH (ref 9.8–12.7)
RBC # BLD: 3.1 M/UL — LOW (ref 4.4–5.2)
RBC # FLD: 17.3 % — HIGH (ref 11–15.6)
SODIUM SERPL-SCNC: 142 MMOL/L — SIGNIFICANT CHANGE UP (ref 135–145)
SPECIMEN SOURCE: SIGNIFICANT CHANGE UP
TYPE + AB SCN PNL BLD: SIGNIFICANT CHANGE UP
WBC # BLD: 19.2 K/UL — HIGH (ref 4.8–10.8)
WBC # FLD AUTO: 19.2 K/UL — HIGH (ref 4.8–10.8)

## 2017-05-18 PROCEDURE — 99233 SBSQ HOSP IP/OBS HIGH 50: CPT

## 2017-05-18 PROCEDURE — 71010: CPT | Mod: 26

## 2017-05-18 PROCEDURE — 47490 INCISION OF GALLBLADDER: CPT

## 2017-05-18 RX ORDER — NOREPINEPHRINE BITARTRATE/D5W 8 MG/250ML
0.3 PLASTIC BAG, INJECTION (ML) INTRAVENOUS
Qty: 8 | Refills: 0 | Status: DISCONTINUED | OUTPATIENT
Start: 2017-05-18 | End: 2017-05-20

## 2017-05-18 RX ORDER — HYDROMORPHONE HYDROCHLORIDE 2 MG/ML
0.5 INJECTION INTRAMUSCULAR; INTRAVENOUS; SUBCUTANEOUS ONCE
Qty: 0 | Refills: 0 | Status: DISCONTINUED | OUTPATIENT
Start: 2017-05-18 | End: 2017-05-18

## 2017-05-18 RX ORDER — METOPROLOL TARTRATE 50 MG
5 TABLET ORAL EVERY 6 HOURS
Qty: 0 | Refills: 0 | Status: DISCONTINUED | OUTPATIENT
Start: 2017-05-18 | End: 2017-05-20

## 2017-05-18 RX ORDER — ALBUMIN HUMAN 25 %
100 VIAL (ML) INTRAVENOUS ONCE
Qty: 0 | Refills: 0 | Status: COMPLETED | OUTPATIENT
Start: 2017-05-19 | End: 2017-05-19

## 2017-05-18 RX ORDER — FENTANYL CITRATE 50 UG/ML
50 INJECTION INTRAVENOUS ONCE
Qty: 0 | Refills: 0 | Status: DISCONTINUED | OUTPATIENT
Start: 2017-05-18 | End: 2017-05-19

## 2017-05-18 RX ORDER — METOPROLOL TARTRATE 50 MG
5 TABLET ORAL ONCE
Qty: 0 | Refills: 0 | Status: COMPLETED | OUTPATIENT
Start: 2017-05-18 | End: 2017-05-18

## 2017-05-18 RX ORDER — MAGNESIUM SULFATE 500 MG/ML
2 VIAL (ML) INJECTION ONCE
Qty: 0 | Refills: 0 | Status: COMPLETED | OUTPATIENT
Start: 2017-05-18 | End: 2017-05-18

## 2017-05-18 RX ORDER — DEXTROSE 50 % IN WATER 50 %
12.5 SYRINGE (ML) INTRAVENOUS ONCE
Qty: 0 | Refills: 0 | Status: COMPLETED | OUTPATIENT
Start: 2017-05-18 | End: 2017-05-18

## 2017-05-18 RX ORDER — DEXTROSE 50 % IN WATER 50 %
25 SYRINGE (ML) INTRAVENOUS ONCE
Qty: 0 | Refills: 0 | Status: COMPLETED | OUTPATIENT
Start: 2017-05-18 | End: 2017-05-18

## 2017-05-18 RX ADMIN — Medication 101 MILLIGRAM(S): at 01:26

## 2017-05-18 RX ADMIN — MIDODRINE HYDROCHLORIDE 10 MILLIGRAM(S): 2.5 TABLET ORAL at 05:30

## 2017-05-18 RX ADMIN — Medication 12.5 GRAM(S): at 00:59

## 2017-05-18 RX ADMIN — MIDODRINE HYDROCHLORIDE 10 MILLIGRAM(S): 2.5 TABLET ORAL at 21:09

## 2017-05-18 RX ADMIN — Medication 5 MILLIGRAM(S): at 17:41

## 2017-05-18 RX ADMIN — HYDROMORPHONE HYDROCHLORIDE 0.5 MILLIGRAM(S): 2 INJECTION INTRAMUSCULAR; INTRAVENOUS; SUBCUTANEOUS at 17:57

## 2017-05-18 RX ADMIN — Medication 5 MILLIGRAM(S): at 11:34

## 2017-05-18 RX ADMIN — Medication 5 MILLIGRAM(S): at 00:49

## 2017-05-18 RX ADMIN — FENTANYL CITRATE 25 MICROGRAM(S): 50 INJECTION INTRAVENOUS at 10:07

## 2017-05-18 RX ADMIN — FENTANYL CITRATE 25 MICROGRAM(S): 50 INJECTION INTRAVENOUS at 09:52

## 2017-05-18 RX ADMIN — Medication 65.03 MICROGRAM(S)/KG/MIN: at 21:51

## 2017-05-18 RX ADMIN — FENTANYL CITRATE 25 MICROGRAM(S): 50 INJECTION INTRAVENOUS at 00:50

## 2017-05-18 RX ADMIN — Medication 1 APPLICATION(S): at 17:41

## 2017-05-18 RX ADMIN — HYDROMORPHONE HYDROCHLORIDE 0.5 MILLIGRAM(S): 2 INJECTION INTRAMUSCULAR; INTRAVENOUS; SUBCUTANEOUS at 17:42

## 2017-05-18 RX ADMIN — Medication 25 GRAM(S): at 12:31

## 2017-05-18 RX ADMIN — PANTOPRAZOLE SODIUM 40 MILLIGRAM(S): 20 TABLET, DELAYED RELEASE ORAL at 11:34

## 2017-05-18 RX ADMIN — FENTANYL CITRATE 25 MICROGRAM(S): 50 INJECTION INTRAVENOUS at 22:48

## 2017-05-18 RX ADMIN — Medication 63.75 MILLIMOLE(S): at 10:54

## 2017-05-18 RX ADMIN — FENTANYL CITRATE 25 MICROGRAM(S): 50 INJECTION INTRAVENOUS at 22:33

## 2017-05-18 RX ADMIN — Medication 1 APPLICATION(S): at 05:30

## 2017-05-18 RX ADMIN — Medication 1 APPLICATION(S): at 11:34

## 2017-05-18 RX ADMIN — Medication 5 MILLIGRAM(S): at 04:11

## 2017-05-18 RX ADMIN — Medication 50 GRAM(S): at 00:49

## 2017-05-18 NOTE — PROGRESS NOTE ADULT - ASSESSMENT
POLYMICROBIAL SEPSIS  DISTENDED GB  KARMEN    PT PREV ALERT PRIOR TO ADMIT - SEEN BY ME IN OFFICE 05/05 APPROX  SOURCE UNCLEAR - BACTEROIDES IS NOT FROM GB - MORE DECUBITIS AND ? IF LARGE ABSCESS THAT I CANT FEEL  AS FAR AS OSTEO PT JUST CAME OFF 4 WEEKS DAPTO/MERREM FOR KNEE

## 2017-05-18 NOTE — PROGRESS NOTE ADULT - PROBLEM SELECTOR PLAN 1
Appreciate Dr Chamberlain's and Dr Cobos's input.  UTI + Sacral decubitus ulcer/osteomyelitis.  Will ask podiatric surgeon to evaluate the L foot.  No joint infections noted.  I have some concern with the appearance of the gall bladder.  In the setting of relatively unresolved sepsis, long courses of antibiotics (prior to hospitalization she was on weeks of antibiotics for her knee -- this should have helped with the above issues); source control of the gall bladder should be obtained as well.  Dr Leblanc consulted for percutaneous cholecystostomy.

## 2017-05-18 NOTE — PROGRESS NOTE ADULT - PROBLEM SELECTOR PLAN 2
-on antibiotics, likely source sacral ulcer, CT scan shows probably sacral OM, also possible cholecystis. IR for drain, Surgery for potential debridement -on antibiotics, likely source sacral ulcer, CT scan shows probably sacral OM, also possible cholecystis. IR for drain, Surgery for potential debridement...

## 2017-05-18 NOTE — PROGRESS NOTE ADULT - ASSESSMENT
71 yo F w/ h/o COPD, AF, CKD, DVT who is displaying septic shock. Note, patient in sinus rhythm. 73 yo F w/ h/o normal EF, COPD, AF, CKD, DVT who is displaying septic shock.

## 2017-05-18 NOTE — PROGRESS NOTE ADULT - PROBLEM SELECTOR PLAN 1
RICH C/S  SURGIACAL EVAL RE ? DEBRIDEMENT    UNCLEAR IF CHOLECYSTOTOMY TUBE NEEDED    MAINTAIN ALL ABS

## 2017-05-18 NOTE — PROGRESS NOTE ADULT - SUBJECTIVE AND OBJECTIVE BOX
Patient is a 72y old  Female who presents with a chief complaint of mental status changes & anasarca. (12 May 2017 18:13)        PAST MEDICAL & SURGICAL HISTORY:  Hypercholesterolemia  Deficiency of vitamin K  Chronic pain  COPD (chronic obstructive pulmonary disease)  Urine retention  Pressure ulcer  Atrial fibrillation  Constipation  Breast cancer  HLD (hyperlipidemia)  HTN (hypertension)  GERD (gastroesophageal reflux disease)  DM (diabetes mellitus)  Obesity  LIN on CPAP  History of incision and drainage: rt knee  S/p total knee replacement, bilateral  S/P knee replacement, right  S/P hysterectomy  S/P mastectomy: Left  Breast cancer      Summary of admission HPI:                PREVIOUS DIAGNOSTIC TESTING:      ECHO  FINDINGS:    STRESS  FINDINGS:    CATHETERIZATION  FINDINGS:    ELECTROPHYSIOLOGY STUDY  FINDINGS:    CAROTID ULTRASOUND:  FINDINGS    VENOUS DUPLEX SCAN:  FINDINGS:    CHEST CT PULMONARY ANGIO with IV Contrast:  FINDINGS:  MEDICATIONS  (STANDING):  dextrose 5%. 1000milliLiter(s) IV Continuous <Continuous>  dextrose 50% Injectable 12.5Gram(s) IV Push once  dextrose 50% Injectable 25Gram(s) IV Push once  dextrose 50% Injectable 25Gram(s) IV Push once  collagenase Ointment 1Application(s) Topical daily  collagenase Ointment 1Application(s) Topical two times a day  midodrine 10milliGRAM(s) Oral every 8 hours  insulin lispro (HumaLOG) corrective regimen sliding scale  SubCutaneous every 6 hours  pantoprazole  Injectable 40milliGRAM(s) IV Push daily  meropenem IVPB 1000milliGRAM(s) IV Intermittent every 24 hours  metoprolol Injectable 5milliGRAM(s) IV Push every 6 hours  sodium phosphate IVPB 15milliMole(s) IV Intermittent once    MEDICATIONS  (PRN):  dextrose Gel 1Dose(s) Oral once PRN Blood Glucose LESS THAN 70 milliGRAM(s)/deciliter  glucagon  Injectable 1milliGRAM(s) IntraMuscular once PRN Glucose LESS THAN 70 milligrams/deciliter  oxyCODONE  5 mG/acetaminophen 325 mG 2Tablet(s) Oral every 6 hours PRN Moderate Pain (4 - 6)  fentaNYL    Injectable 25MICROGram(s) IV Push every 6 hours PRN Moderate Pain (4 - 6)      FAMILY HISTORY:  Family history of diabetes mellitus      SOCIAL HISTORY:    CIGARETTES:    ALCOHOL:    REVIEW OF SYSTEMS:    CONSTITUTIONAL: No fever, weight loss, chills, shakes, or fatigue  EYES: No eye pain, visual disturbances, or discharge  ENMT:  No difficulty hearing, tinnitus, vertigo; No sinus or throat pain  NECK: No pain or stiffness  BREASTS: No pain, masses, or nipple discharge  RESPIRATORY: No cough, wheezing, hemoptysis, or shortness of breath  CARDIOVASCULAR: No chest pain, dyspnea, palpitations, dizziness, syncope, paroxysmal nocturnal dyspnea, orthopnea, or arm or leg swelling  GASTROINTESTINAL: No abdominal  or epigastric pain, nausea, vomiting, hematemesis, diarrhea, constipation, melena or bright red blood.  GENITOURINARY: No dysuria, nocturia, hematuria, or urinary incontinence  NEUROLOGICAL: No headaches, memory loss, slurred speech, limb weakness, loss of strength, numbness, or tremors  SKIN: No itching, burning, rashes, or lesions   LYMPH NODES: No enlarged glands  ENDOCRINE: No heat or cold intolerance, or hair loss  MUSCULOSKELETAL: No joint pain or swelling, muscle, back, or extremity pain  PSYCHIATRIC: No depression, anxiety, or difficulty sleeping  HEME/LYMPH: No easy bruising or bleeding gums  ALLERY AND IMMUNOLOGIC: No hives or rash.      Vital Signs Last 24 Hrs  T(C): 36.9, Max: 36.9 (05-18 @ 08:00)  T(F): 98.4, Max: 98.4 (05-18 @ 08:00)  HR: 102 (71 - 144)  BP: 86/51 (86/51 - 124/67)  BP(mean): 63 (63 - 99)  RR: 16 (13 - 22)  SpO2: 97% (95% - 99%)    PHYSICAL EXAM:      GENERAL: In no apparent distress, well nourished, and hydrated.  HEART: regular rate and rhythm; No murmurs, rubs, or gallops.  PULMONARY:decreased BS BL  ABDOMEN: Soft, Nontender, Nondistended; Bowel sounds present  EXTREMITIES:  2+ Peripheral Pulses, No clubbing, cyanosis, or edema          INTERPRETATION OF TELEMETRY:    ECG:    I&O's Detail  I & Os for 24h ending 18 May 2017 07:00  =============================================  IN:    Albumin 25%: 100 ml    Solution: 100 ml    Solution: 100 ml    sodium bicarbonate  Infusion: 75 ml    Solution: 50 ml    Nepro: 40 ml    norepinephrine Infusion: 12.9 ml    Total IN: 477.9 ml  ---------------------------------------------  OUT:    Other: 1400 ml    Total OUT: 1400 ml  ---------------------------------------------  Total NET: -922.1 ml    I & Os for current day (as of 18 May 2017 09:58)  =============================================  IN:    Nepro: 20 ml    Total IN: 20 ml  ---------------------------------------------  OUT:    Total OUT: 0 ml  ---------------------------------------------  Total NET: 20 ml      LABS:                        9.2    19.2  )-----------( 143      ( 18 May 2017 06:41 )             26.4     05-18    142  |  101  |  28.0<H>  ----------------------------<  72  4.0   |  26.0  |  4.10<H>    Ca    8.6      18 May 2017 06:41  Phos  2.3     05-18  Mg     1.9     -18          PT/INR - ( 18 May 2017 06:41 )   PT: 26.4 sec;   INR: 2.36 ratio         PTT - ( 17 May 2017 06:38 )  PTT:68.7 sec    BNP  I&O's Detail  I & Os for 24h ending 18 May 2017 07:00  =============================================  IN:    Albumin 25%: 100 ml    Solution: 100 ml    Solution: 100 ml    sodium bicarbonate  Infusion: 75 ml    Solution: 50 ml    Nepro: 40 ml    norepinephrine Infusion: 12.9 ml    Total IN: 477.9 ml  ---------------------------------------------  OUT:    Other: 1400 ml    Total OUT: 1400 ml  ---------------------------------------------  Total NET: -922.1 ml    I & Os for current day (as of 18 May 2017 09:58)  =============================================  IN:    Nepro: 20 ml    Total IN: 20 ml  ---------------------------------------------  OUT:    Total OUT: 0 ml  ---------------------------------------------  Total NET: 20 ml    Daily     Daily Weight in k.9 (17 May 2017 20:00)    RADIOLOGY & ADDITIONAL STUDIES: Patient is a 72y old  Female who presents with a chief complaint of mental status changes & anasarca. (12 May 2017 18:13)        PAST MEDICAL & SURGICAL HISTORY:  Hypercholesterolemia  Deficiency of vitamin K  Chronic pain  COPD (chronic obstructive pulmonary disease)  Urine retention  Pressure ulcer  Atrial fibrillation  Constipation  Breast cancer  HLD (hyperlipidemia)  HTN (hypertension)  GERD (gastroesophageal reflux disease)  DM (diabetes mellitus)  Obesity  LIN on CPAP  History of incision and drainage: rt knee  S/p total knee replacement, bilateral  S/P knee replacement, right  S/P hysterectomy  S/P mastectomy: Left  Breast cancer      Summary of admission HPI:                PREVIOUS DIAGNOSTIC TESTING:      ECHO  FINDINGS:    STRESS  FINDINGS:    CATHETERIZATION  FINDINGS:    ELECTROPHYSIOLOGY STUDY  FINDINGS:    CAROTID ULTRASOUND:  FINDINGS    VENOUS DUPLEX SCAN:  FINDINGS:    CHEST CT PULMONARY ANGIO with IV Contrast:  FINDINGS:  MEDICATIONS  (STANDING):  dextrose 5%. 1000milliLiter(s) IV Continuous <Continuous>  dextrose 50% Injectable 12.5Gram(s) IV Push once  dextrose 50% Injectable 25Gram(s) IV Push once  dextrose 50% Injectable 25Gram(s) IV Push once  collagenase Ointment 1Application(s) Topical daily  collagenase Ointment 1Application(s) Topical two times a day  midodrine 10milliGRAM(s) Oral every 8 hours  insulin lispro (HumaLOG) corrective regimen sliding scale  SubCutaneous every 6 hours  pantoprazole  Injectable 40milliGRAM(s) IV Push daily  meropenem IVPB 1000milliGRAM(s) IV Intermittent every 24 hours  metoprolol Injectable 5milliGRAM(s) IV Push every 6 hours  sodium phosphate IVPB 15milliMole(s) IV Intermittent once    MEDICATIONS  (PRN):  dextrose Gel 1Dose(s) Oral once PRN Blood Glucose LESS THAN 70 milliGRAM(s)/deciliter  glucagon  Injectable 1milliGRAM(s) IntraMuscular once PRN Glucose LESS THAN 70 milligrams/deciliter  oxyCODONE  5 mG/acetaminophen 325 mG 2Tablet(s) Oral every 6 hours PRN Moderate Pain (4 - 6)  fentaNYL    Injectable 25MICROGram(s) IV Push every 6 hours PRN Moderate Pain (4 - 6)      FAMILY HISTORY:  Family history of diabetes mellitus      SOCIAL HISTORY:    CIGARETTES:    ALCOHOL:    REVIEW OF SYSTEMS:    CONSTITUTIONAL: No fever, weight loss, chills, shakes, or fatigue  EYES: No eye pain, visual disturbances, or discharge  ENMT:  No difficulty hearing, tinnitus, vertigo; No sinus or throat pain  NECK: No pain or stiffness  BREASTS: No pain, masses, or nipple discharge  RESPIRATORY: No cough, wheezing, hemoptysis, or shortness of breath  CARDIOVASCULAR: No chest pain, dyspnea, palpitations, dizziness, syncope, paroxysmal nocturnal dyspnea, orthopnea, or arm or leg swelling  GASTROINTESTINAL: No abdominal  or epigastric pain, nausea, vomiting, hematemesis, diarrhea, constipation, melena or bright red blood.  GENITOURINARY: No dysuria, nocturia, hematuria, or urinary incontinence  NEUROLOGICAL: No headaches, memory loss, slurred speech, limb weakness, loss of strength, numbness, or tremors  SKIN: No itching, burning, rashes, or lesions   LYMPH NODES: No enlarged glands  ENDOCRINE: No heat or cold intolerance, or hair loss  MUSCULOSKELETAL: No joint pain or swelling, muscle, back, or extremity pain  PSYCHIATRIC: No depression, anxiety, or difficulty sleeping  HEME/LYMPH: No easy bruising or bleeding gums  ALLERY AND IMMUNOLOGIC: No hives or rash.      Vital Signs Last 24 Hrs  T(C): 36.9, Max: 36.9 (05-18 @ 08:00)  T(F): 98.4, Max: 98.4 (05-18 @ 08:00)  HR: 102 (71 - 144)  BP: 86/51 (86/51 - 124/67)  BP(mean): 63 (63 - 99)  RR: 16 (13 - 22)  SpO2: 97% (95% - 99%)    PHYSICAL EXAM:      GENERAL: In no apparent distress, well nourished, and hydrated.  HEART: irreg irreg; No murmurs, rubs, or gallops.  PULMONARY:decreased BS BL  ABDOMEN: Soft, Nontender, Nondistended; Bowel sounds present  EXTREMITIES:  2+ Peripheral Pulses, No clubbing, cyanosis, or edema          INTERPRETATION OF TELEMETRY:    ECG:    I&O's Detail  I & Os for 24h ending 18 May 2017 07:00  =============================================  IN:    Albumin 25%: 100 ml    Solution: 100 ml    Solution: 100 ml    sodium bicarbonate  Infusion: 75 ml    Solution: 50 ml    Nepro: 40 ml    norepinephrine Infusion: 12.9 ml    Total IN: 477.9 ml  ---------------------------------------------  OUT:    Other: 1400 ml    Total OUT: 1400 ml  ---------------------------------------------  Total NET: -922.1 ml    I & Os for current day (as of 18 May 2017 09:58)  =============================================  IN:    Nepro: 20 ml    Total IN: 20 ml  ---------------------------------------------  OUT:    Total OUT: 0 ml  ---------------------------------------------  Total NET: 20 ml      LABS:                        9.2    19.2  )-----------( 143      ( 18 May 2017 06:41 )             26.4     05-18    142  |  101  |  28.0<H>  ----------------------------<  72  4.0   |  26.0  |  4.10<H>    Ca    8.6      18 May 2017 06:41  Phos  2.3     05-18  Mg     1.9     -18          PT/INR - ( 18 May 2017 06:41 )   PT: 26.4 sec;   INR: 2.36 ratio         PTT - ( 17 May 2017 06:38 )  PTT:68.7 sec    BNP  I&O's Detail  I & Os for 24h ending 18 May 2017 07:00  =============================================  IN:    Albumin 25%: 100 ml    Solution: 100 ml    Solution: 100 ml    sodium bicarbonate  Infusion: 75 ml    Solution: 50 ml    Nepro: 40 ml    norepinephrine Infusion: 12.9 ml    Total IN: 477.9 ml  ---------------------------------------------  OUT:    Other: 1400 ml    Total OUT: 1400 ml  ---------------------------------------------  Total NET: -922.1 ml    I & Os for current day (as of 18 May 2017 09:58)  =============================================  IN:    Nepro: 20 ml    Total IN: 20 ml  ---------------------------------------------  OUT:    Total OUT: 0 ml  ---------------------------------------------  Total NET: 20 ml    Daily     Daily Weight in k.9 (17 May 2017 20:00)    RADIOLOGY & ADDITIONAL STUDIES:

## 2017-05-18 NOTE — PROGRESS NOTE ADULT - SUBJECTIVE AND OBJECTIVE BOX
Patient is a 72y old  Female who presents with a chief complaint of mental status changes & anasarca. (12 May 2017 18:13)      BRIEF HOSPITAL COURSE: Admitted with septic shock and acute on chronic renal failure in the setting of severe encephalopathy.    Events last 24 hours: No vasoactive agents but remains very encephalopathic.    PAST MEDICAL & SURGICAL HISTORY:  Hypercholesterolemia  Deficiency of vitamin K  Chronic pain  COPD (chronic obstructive pulmonary disease)  Urine retention  Pressure ulcer  Atrial fibrillation  Constipation  Breast cancer  HLD (hyperlipidemia)  HTN (hypertension)  GERD (gastroesophageal reflux disease)  DM (diabetes mellitus)  Obesity  LIN on CPAP  History of incision and drainage: rt knee  S/p total knee replacement, bilateral  S/P knee replacement, right  S/P hysterectomy  S/P mastectomy: Left  Breast cancer      Review of Systems:  unable, obtunded    Medications:  meropenem IVPB 1000milliGRAM(s) IV Intermittent every 24 hours    midodrine 10milliGRAM(s) Oral every 8 hours  metoprolol Injectable 5milliGRAM(s) IV Push every 6 hours      oxyCODONE  5 mG/acetaminophen 325 mG 2Tablet(s) Oral every 6 hours PRN  fentaNYL    Injectable 25MICROGram(s) IV Push every 6 hours PRN        pantoprazole  Injectable 40milliGRAM(s) IV Push daily      dextrose Gel 1Dose(s) Oral once PRN  dextrose 50% Injectable 12.5Gram(s) IV Push once  dextrose 50% Injectable 25Gram(s) IV Push once  dextrose 50% Injectable 25Gram(s) IV Push once  glucagon  Injectable 1milliGRAM(s) IntraMuscular once PRN  insulin lispro (HumaLOG) corrective regimen sliding scale  SubCutaneous every 6 hours    dextrose 5%. 1000milliLiter(s) IV Continuous <Continuous>      collagenase Ointment 1Application(s) Topical daily  collagenase Ointment 1Application(s) Topical two times a day            ICU Vital Signs Last 24 Hrs  T(C): 36.9, Max: 36.9 (05-18 @ 08:00)  T(F): 98.4, Max: 98.4 (05-18 @ 08:00)  HR: 71 (71 - 141)  BP: 133/59 (80/42 - 133/59)  BP(mean): 85 (57 - 99)  ABP: --  ABP(mean): --  RR: 13 (13 - 22)  SpO2: 99% (91% - 99%)          I&O's Detail  I & Os for 24h ending 18 May 2017 07:00  =============================================  IN:    Albumin 25%: 100 ml    Solution: 100 ml    Solution: 100 ml    sodium bicarbonate  Infusion: 75 ml    Solution: 50 ml    Nepro: 40 ml    norepinephrine Infusion: 12.9 ml    Total IN: 477.9 ml  ---------------------------------------------  OUT:    Other: 1400 ml    Total OUT: 1400 ml  ---------------------------------------------  Total NET: -922.1 ml    I & Os for current day (as of 18 May 2017 16:08)  =============================================  IN:    Solution: 250 ml    Nepro: 140 ml    Total IN: 390 ml  ---------------------------------------------  OUT:    Total OUT: 0 ml  ---------------------------------------------  Total NET: 390 ml        LABS:                        9.2    19.2  )-----------( 143      ( 18 May 2017 06:41 )             26.4     05-18    142  |  101  |  28.0<H>  ----------------------------<  72  4.0   |  26.0  |  4.10<H>    Ca    8.6      18 May 2017 06:41  Phos  2.3     05-18  Mg     1.9     05-18            CAPILLARY BLOOD GLUCOSE  125 (18 May 2017 12:00)    PT/INR - ( 18 May 2017 06:41 )   PT: 26.4 sec;   INR: 2.36 ratio         PTT - ( 17 May 2017 06:38 )  PTT:68.7 sec    CULTURES:  Culture Results:   >100,000 CFU/ml Gram Negative Rods Identification and susceptibility to  follow.  Culture in progress (05-16 @ 04:07)  Culture Results:   Growth in aerobic and anaerobic bottles: Proteus mirabilis  Anaerobic Bottle: 16:13 Hours to positivity  Aerobic Bottle: 1 day 01:14 Hours to positivity  .  TYPE: (C=Critical, N=Notification, A=Abnormal) C  TESTS:  _ GS: Bld culture  DATE/TIME SALCEDO (05-15 @ 16:02)  Culture Results:   Growth in aerobic bottle: Proteus mirabilis  Aerobic Bottle: 1 day 01:44 Hours to positivity  Anaerobic Bottle: No growth to date  .  TYPE: (C=Critical, N=Notification, A=Abnormal) C  TESTS:  _ GS: Bld Culture  DATE/TIME CALLED: _ 05/16/2017 18:30:0 (05-15 @ 16:02)  Culture Results:   Growth in anaerobic bottle: Bacteroides thetaiotaomicron (anaerobic  organism)  Anaerobic Bottle: 1 day; 20:29 Hours to positivity  Aerobic Bottle: No growth to date  ,  TYPE: (C=Critical, N=Notification, A=Abnormal) C  TESTS:  _ BLD GS  DATE/TIME C (05-14 @ 06:35)  Culture Results:   No growth at 48 hours (05-14 @ 06:35)  Culture Results:   Numerous Proteus mirabilis  Numerous Providencia stuartii (05-14 @ 01:11)  Culture Results:   Culture grew 3 or more types of organisms which indicate  collection contamination; consider recollection only if clinically  indicated.  .  TYPE: (C=Critical, N=Notification, A=Abnormal) N  TESTS:  _ Contaminated Urine Culture  DATE/TIME CALLED: _ (05-12 @ 19:10)  Culture Results:   No growth at 5 days. (05-12 @ 15:07)  Culture Results:   No growth at 5 days. (05-12 @ 15:06)      Physical Examination:    General: No respiratory distress, opens eyes to noxious stimuli    HEENT: Pupils equal, reactive to light.  Symmetric.    PULM: Clear to auscultation bilaterally, no significant sputum production    CVS: Regular rate and rhythm, no murmurs, rubs, or gallops    ABD: Tender throughout without rebound.    EXT: anasarca    SKIN: sacral ulcer to bone -- L foot wrapped    NEURO: Somnulent, arousable to pain.

## 2017-05-18 NOTE — PROGRESS NOTE ADULT - ASSESSMENT
72F with encephalopathy, acute on chronic kidney failure, GNR bacteremia due to sacral ulcer, probably osteomyeltis, potential cholecystitis.

## 2017-05-18 NOTE — PROGRESS NOTE ADULT - PROBLEM SELECTOR PLAN 4
-assist with ADLs, has been debilitated due to numerous knee infections, post tkr? -assist with ADLs, has been debilitated due to numerous knee infections...

## 2017-05-18 NOTE — CHART NOTE - NSCHARTNOTEFT_GEN_A_CORE
Patient seen and examined on ACS rounds.  Abdomen is tender at RUQ, no rebound, no distention.  Proteus bacteremia: Agree with Percutaneous cholecystostomy placement.  Awaiting decision from family meeting referring the goals of care.  We shall debride the ulcer if family agrees.

## 2017-05-18 NOTE — PROGRESS NOTE ADULT - SUBJECTIVE AND OBJECTIVE BOX
NEPHROLOGY INTERVAL HPI/OVER NIGHT EVENTS: No new events.    MEDICATIONS  (STANDING):  dextrose 5%. 1000milliLiter(s) IV Continuous <Continuous>  dextrose 50% Injectable 12.5Gram(s) IV Push once  dextrose 50% Injectable 25Gram(s) IV Push once  dextrose 50% Injectable 25Gram(s) IV Push once  collagenase Ointment 1Application(s) Topical daily  meropenem IVPB  IV Intermittent   meropenem IVPB 500milliGRAM(s) IV Intermittent every 24 hours  collagenase Ointment 1Application(s) Topical two times a day  midodrine 10milliGRAM(s) Oral every 8 hours  norepinephrine Infusion 0.05MICROgram(s)/kG/Min IV Continuous <Continuous>  insulin lispro (HumaLOG) corrective regimen sliding scale  SubCutaneous every 6 hours  pantoprazole  Injectable 40milliGRAM(s) IV Push daily  albumin human 25% IVPB 100milliLiter(s) IV Intermittent once    MEDICATIONS  (PRN):  dextrose Gel 1Dose(s) Oral once PRN Blood Glucose LESS THAN 70 milliGRAM(s)/deciliter  glucagon  Injectable 1milliGRAM(s) IntraMuscular once PRN Glucose LESS THAN 70 milligrams/deciliter  oxyCODONE  5 mG/acetaminophen 325 mG 2Tablet(s) Oral every 6 hours PRN Moderate Pain (4 - 6)      Allergies    No Known Allergies    Intolerances        Vital Signs Last 24 Hrs  T(C): 36.8, Max: 37.1 (16 @ 23:40)  T(F): 98.3, Max: 98.8 (05-16 @ 23:40)  HR: 79 (65 - 156)  BP: 106/57 (88/49 - 129/64)  BP(mean): 77 (63 - 90)  RR: 13 (11 - 20)  SpO2: 98% (95% - 100%)  Daily     Daily     PHYSICAL EXAM:    GENERAL: appears acutely ill  HEAD:  no 02  EYES: wnl  ENMT:   NECK: bilateral central lines  NERVOUS SYSTEM:  awake but lethargic  CHEST/LUNG: decreased bs bases, no wheezes  HEART: tachy, no rub  ABDOMEN: soft  EXTREMITIES:  right knee with open wound and clean dressing, bilateral thigh edema - pitting and lower back same  LYMPH:   SKIN: no rash   neg      LABS:    142  |  101  |  28.0<H>  ----------------------------<  72  4.0   |  26.0  |  4.10<H>    Ca    8.6      18 May 2017 06:41  Phos  2.3     0518  Mg     1.9                                 8.5    20.4  )-----------( 188      ( 16 May 2017 06:51 )             24.3     05    142  |  100  |  42.0<H>  ----------------------------<  46<LL>  3.9   |  25.0  |  5.16<H>    Ca    8.4<L>      17 May 2017 06:38  Phos  2.7       Mg     1.7         TPro  5.1<L>  /  Alb  3.1<L>  /  TBili  2.0  /  DBili  x   /  AST  13  /  ALT  <4  /  AlkPhos  85  16    PT/INR - ( 17 May 2017 06:38 )   PT: 27.0 sec;   INR: 2.41 ratio         PTT - ( 17 May 2017 06:38 )  PTT:68.7 sec  Urinalysis Basic - ( 16 May 2017 04:07 )    Color: Yellow / Appearance: Cloudy / S.010 / pH: x  Gluc: x / Ketone: Small  / Bili: Negative / Urobili: 1 mg/dL   Blood: x / Protein: 500 mg/dL / Nitrite: Negative   Leuk Esterase: Moderate / RBC: 3-5 /HPF / WBC >50   Sq Epi: x / Non Sq Epi: x / Bacteria: TNTC      Magnesium, Serum: 1.7 mg/dL ( @ 06:38)  Phosphorus Level, Serum: 2.7 mg/dL ( @ 06:38)          RADIOLOGY & ADDITIONAL TESTS:

## 2017-05-18 NOTE — PROGRESS NOTE ADULT - PROBLEM SELECTOR PLAN 3
1.Note, if patient is to have surgical debridement, note that she would be an intermediate-risk patient for an intermediate-risk procedure and there is no further cardiac testing/intervention to do at this time.   2. If patient goes for surgery, please call back for post-op check.

## 2017-05-18 NOTE — PROGRESS NOTE ADULT - ASSESSMENT
72 yr old female with atrial fibrillation, hypertension, hyperlipidemia, COPD, breast cancer, diabetes mellitus, right knee surgeries, pressure ulcer, CKD, h/o right UE DVT admitted with altered mental status. She was discharged to rehab in April 2017 and completed course of antibiotics for infected right knee. Patient has no known history of dementia. Per sister, she was becoming increasingly lethargic for a week prior to admission with increasingly poor oral intake. On admission she was noted to have acute on chronic renal failure. CT brain was done, negative for acute stroke. Renal consulted, she was started on IV fluids. Plastics consulted. Given elevated WBC, ID, orthopedics and plastics consulted. No signs of infection in the knee as per orthopedics and plastics. She was started on Meropenem as per ID, cultures sent. Digoxin added for rate control, cardiology consulted, ECHO ordered. Given persistent hypotension and renal failure, ICU consulted for possible pressor support and eventual HD. She was transferred and started on pressors, HD scheduled for 5/161/7. Her blood cultures positive for gram negative bacteremia. HD access placed by IR. She received her first HD session on 5/16/17. Blood cultures grew proteus. CT abdomen ordered to rule out intra abdominal source of bacteremia. HD scheduled, to be done after CT abdomen with contrast. CT showed sacral decubitus, + gas and cholecystitis. US done suggestive of acute cholecystitis. Surgery and IR consulted. Plan of percutaneous cholecystostomy.

## 2017-05-18 NOTE — PROGRESS NOTE ADULT - SUBJECTIVE AND OBJECTIVE BOX
[facility] CARMEN[facility] HARRIS MD FACP   DESIRE METZGER MD  Diplomates American Board of Internal Medicine and Infectious Diseases      SHYAM LAL  [id number]  72y    INTERVAL HPI/OVERNIGHT EVENTS:  afebrile  POORLY RESPOSICE  POLYMICROBIAL BACTEREMIA WITH PROTEUS AND BACTEROIDES    CT WITH ? SACRAL OSTEO AND ? GAS SOFT TISSUE  DISTENDED GB ON SONO    Vital Signs Last 24 Hrs  T(C): 36.9, Max: 36.9 (05-18 @ 08:00)  T(F): 98.4, Max: 98.4 (05-18 @ 08:00)  HR: 102 (71 - 144)  BP: 86/51 (86/51 - 124/67)  BP(mean): 63 (63 - 99)  RR: 16 (13 - 22)  SpO2: 97% (95% - 99%)    ANTIBIOTICS  meropenem IVPB 1000milliGRAM(s) IV Intermittent every 24 hours      Allergies    No Known Allergies    Intolerances        REVIEW OF SYSTEMS:N/A    PHYSICAL EXAM:  Vital Signs Last 24 Hrs  T(C): 36.9, Max: 36.9 (05-18 @ 08:00)  T(F): 98.4, Max: 98.4 (05-18 @ 08:00)  HR: 102 (71 - 144)  BP: 86/51 (86/51 - 124/67)  BP(mean): 63 (63 - 99)  RR: 16 (13 - 22)  SpO2: 97% (95% - 99%)      GEN: NAD,POORLY RESPONSIVE - NO HX OF DEMENTIA - WAS ALERT MAY 5  HEENT: normocephalic and atraumatic. EOMI. CHIDI. Moist mucosa. Clear Posterior pharynx.  NECK: Supple. No carotid bruits.  No lymphadenopathy or thyromegaly.  LUNGS: Clear to auscultation.  HEART: Regular rate and rhythm without murmur.  ABDOMEN: Soft, nontender, and nondistended.  Positive bowel sounds.  No hepatosplenomegaly was noted.  EXTREMITIES: Without any cyanosis, clubbing, rash, lesions or edema.  NEUROLOGIC: Cranial nerves II through XII are grossly intact.  MUSCULOSKELETAL:  SKIN: PATIENT ROLLED OVER ON SIDE WITH 4 NURSE ASSISTANCE  LARGE DECUBITUS AND FOUL SMELLING - NO ABSCESS FELT - NO FLUCTUANCE OR CREPITANCE IN ENTIRE AREA    LABS:                        9.2    19.2  )-----------( 143      ( 18 May 2017 06:41 )             26.4       05-18    142  |  101  |  28.0<H>  ----------------------------<  72  4.0   |  26.0  |  4.10<H>    Ca    8.6      18 May 2017 06:41  Phos  2.3     05-18  Mg     1.9     05-18 05-18 @ 06:41  hct 26.4 % hgb 9.2 g/dL    05-18 @ 06:41  plat 143 K/uL wbc 19.2 K/uL    05-17 @ 06:38  hct 26.1 % hgb 9.1 g/dL    05-17 @ 06:38  plat 204 K/uL wbc 20.5 K/uL    05-16 @ 06:51  hct 24.3 % hgb 8.5 g/dL    05-16 @ 06:51  plat 188 K/uL wbc 20.4 K/uL      05-18-17  creat 4.10 mg/dL gfr 12 mL/min/1.73M2 bun 28.0 mg/dL k 4.0 mmol/L  05-17-17  creat 5.16 mg/dL gfr 9 mL/min/1.73M2 bun 42.0 mg/dL k 3.9 mmol/L  05-16-17  creat 7.85 mg/dL gfr 5 mL/min/1.73M2 bun 67.0 mg/dL k 3.8 mmol/L      MICROBIOLOGY:  Culture Results:   >100,000 CFU/ml Gram Negative Rods Identification and susceptibility to  follow.  Culture in progress (05-16 @ 04:07)  Culture Results:   Growth in aerobic and anaerobic bottles: Proteus mirabilis  Anaerobic Bottle: 16:13 Hours to positivity  Aerobic Bottle: 1 day 01:14 Hours to positivity  .  TYPE: (C=Critical, N=Notification, A=Abnormal) C  TESTS:  _ GS: Bld culture  DATE/TIME SALCEDO (05-15 @ 16:02)  Culture Results:   Growth in aerobic bottle: Proteus mirabilis  Aerobic Bottle: 1 day 01:44 Hours to positivity  Anaerobic Bottle: No growth to date  .  TYPE: (C=Critical, N=Notification, A=Abnormal) C  TESTS:  _ GS: Bld Culture  DATE/TIME CALLED: _ 05/16/2017 18:30:0 (05-15 @ 16:02)  Culture Results:   Growth in anaerobic bottle: Bacteroides thetaiotaomicron (anaerobic  organism)  Anaerobic Bottle: 1 day; 20:29 Hours to positivity  Aerobic Bottle: No growth to date  ,  TYPE: (C=Critical, N=Notification, A=Abnormal) C  TESTS:  _ BLD GS  DATE/TIME C (05-14 @ 06:35)  Culture Results:   No growth at 48 hours (05-14 @ 06:35)  Culture Results:   Numerous Proteus mirabilis  Numerous Providencia stuartii (05-14 @ 01:11)  Culture Results:   Culture grew 3 or more types of organisms which indicate  collection contamination; consider recollection only if clinically  indicated.  .  TYPE: (C=Critical, N=Notification, A=Abnormal) N  TESTS:  _ Contaminated Urine Culture  DATE/TIME CALLED: _ (05-12 @ 19:10)  Culture Results:   No growth at 5 days. (05-12 @ 15:07)  Culture Results:   No growth at 5 days. (05-12 @ 15:06)        RADIOLOGY & ADDITIONAL STUDIES:    IMPRESSION:     Large sacral decubitus ulcer with medial buttock soft tissue gas and   probable sacral osteomyelitis, new finding since 5/12.    Distended gallbladder with cholelithiasis, correlate clinically for acute   cholecystitis. Small new ascites.    Other incidental comments as above.    Findings were discussed withPhysician: JLUIS MOISE [id number] with a   read back at 4:06 PM.          RAÚL HARRIS MD FACP

## 2017-05-18 NOTE — PROGRESS NOTE ADULT - SUBJECTIVE AND OBJECTIVE BOX
INTERVAL HPI/OVERNIGHT EVENTS:    CC: gram negative bacteremia, cholecystitis, sacral decubitus, severe sepsis, KARMEN on CKD    Events noted, remains lethargic, had second HD session yesterday.    Vital Signs Last 24 Hrs  T(C): 36.9, Max: 36.9 (05-18 @ 08:00)  T(F): 98.4, Max: 98.4 (05-18 @ 08:00)  HR: 71 (71 - 141)  BP: 133/59 (80/42 - 133/59)  BP(mean): 85 (57 - 99)  RR: 13 (13 - 22)  SpO2: 99% (91% - 99%)    PHYSICAL EXAM:    GENERAL: anasarca, lethargic, arousable to painful stimuli  CHEST/LUNG: b/l air entry  HEART: irregular  ABDOMEN: Soft, BS+  EXTREMITIES:  3+ edema    MEDICATIONS  (STANDING):  dextrose 5%. 1000milliLiter(s) IV Continuous <Continuous>  dextrose 50% Injectable 12.5Gram(s) IV Push once  dextrose 50% Injectable 25Gram(s) IV Push once  dextrose 50% Injectable 25Gram(s) IV Push once  collagenase Ointment 1Application(s) Topical daily  collagenase Ointment 1Application(s) Topical two times a day  midodrine 10milliGRAM(s) Oral every 8 hours  insulin lispro (HumaLOG) corrective regimen sliding scale  SubCutaneous every 6 hours  pantoprazole  Injectable 40milliGRAM(s) IV Push daily  meropenem IVPB 1000milliGRAM(s) IV Intermittent every 24 hours  metoprolol Injectable 5milliGRAM(s) IV Push every 6 hours  fentaNYL    Injectable 50MICROGram(s) IV Push once  HYDROmorphone  Injectable 0.5milliGRAM(s) IV Push once    MEDICATIONS  (PRN):  dextrose Gel 1Dose(s) Oral once PRN Blood Glucose LESS THAN 70 milliGRAM(s)/deciliter  glucagon  Injectable 1milliGRAM(s) IntraMuscular once PRN Glucose LESS THAN 70 milligrams/deciliter  oxyCODONE  5 mG/acetaminophen 325 mG 2Tablet(s) Oral every 6 hours PRN Moderate Pain (4 - 6)  fentaNYL    Injectable 25MICROGram(s) IV Push every 6 hours PRN Moderate Pain (4 - 6)      Allergies    No Known Allergies    Intolerances          LABS:                          9.2    19.2  )-----------( 143      ( 18 May 2017 06:41 )             26.4     05-18    142  |  101  |  28.0<H>  ----------------------------<  72  4.0   |  26.0  |  4.10<H>    Ca    8.6      18 May 2017 06:41  Phos  2.3     05-18  Mg     1.9     05-18      PT/INR - ( 18 May 2017 06:41 )   PT: 26.4 sec;   INR: 2.36 ratio         PTT - ( 17 May 2017 06:38 )  PTT:68.7 sec      RADIOLOGY & ADDITIONAL TESTS:

## 2017-05-18 NOTE — PROGRESS NOTE ADULT - SUBJECTIVE AND OBJECTIVE BOX
OVERNIGHT EVENTS:    Present Symptoms:     Dyspnea: 0 1 2 3   Nausea/Vomiting: Yes No  Anxiety:  Yes No  Depression: Yes No  Fatigue: Yes No  Loss of appetite: Yes No    Pain: moaning            Character-            Duration-            Effect-            Factors-            Frequency-            Location-            Severity-    Review of Systems: Reviewed                     Negative:                     Positive:  Unable to obtain due to poor mentation   All others negative    MEDICATIONS  (STANDING):  dextrose 5%. 1000milliLiter(s) IV Continuous <Continuous>  dextrose 50% Injectable 12.5Gram(s) IV Push once  dextrose 50% Injectable 25Gram(s) IV Push once  dextrose 50% Injectable 25Gram(s) IV Push once  collagenase Ointment 1Application(s) Topical daily  collagenase Ointment 1Application(s) Topical two times a day  midodrine 10milliGRAM(s) Oral every 8 hours  insulin lispro (HumaLOG) corrective regimen sliding scale  SubCutaneous every 6 hours  pantoprazole  Injectable 40milliGRAM(s) IV Push daily  meropenem IVPB 1000milliGRAM(s) IV Intermittent every 24 hours  metoprolol Injectable 5milliGRAM(s) IV Push every 6 hours    MEDICATIONS  (PRN):  dextrose Gel 1Dose(s) Oral once PRN Blood Glucose LESS THAN 70 milliGRAM(s)/deciliter  glucagon  Injectable 1milliGRAM(s) IntraMuscular once PRN Glucose LESS THAN 70 milligrams/deciliter  oxyCODONE  5 mG/acetaminophen 325 mG 2Tablet(s) Oral every 6 hours PRN Moderate Pain (4 - 6)  fentaNYL    Injectable 25MICROGram(s) IV Push every 6 hours PRN Moderate Pain (4 - 6)      PHYSICAL EXAM:    Vital Signs Last 24 Hrs  T(C): 36.9, Max: 36.9 (05-18 @ 08:00)  T(F): 98.4, Max: 98.4 (05-18 @ 08:00)  HR: 71 (71 - 141)  BP: 133/59 (80/42 - 133/59)  BP(mean): 85 (57 - 99)  RR: 13 (13 - 22)  SpO2: 99% (91% - 99%)    General: alert  oriented x ____ lethargic agitated                  cachexia  nonverbal  coma    Karnofsky:  %    HEENT: normal  dry mouth  ET tube/trach    Lungs: comfortable tachypnea/labored breathing  excessive secretions    CV: normal  tachycardia    GI: normal  distended  tender  no BS               PEG/NG/OG tube  constipation  last BM:     : normal  incontinent  oliguria/anuria  anna    MSK: normal  weakness  edema             ambulatory  bedbound/wheelchair bound    Skin: normal  pressure ulcers- Stage_____  no rash    LABS:                        9.2    19.2  )-----------( 143      ( 18 May 2017 06:41 )             26.4     05-18    142  |  101  |  28.0<H>  ----------------------------<  72  4.0   |  26.0  |  4.10<H>    Ca    8.6      18 May 2017 06:41  Phos  2.3     05-18  Mg     1.9     05-18    PT/INR - ( 18 May 2017 06:41 )   PT: 26.4 sec;   INR: 2.36 ratio       PTT - ( 17 May 2017 06:38 )  PTT:68.7 sec    I&O's Summary  I & Os for 24h ending 18 May 2017 07:00  =============================================  IN: 477.9 ml / OUT: 1400 ml / NET: -922.1 ml    I & Os for current day (as of 18 May 2017 15:41)  =============================================  IN: 390 ml / OUT: 0 ml / NET: 390 ml      RADIOLOGY & ADDITIONAL STUDIES:    ADVANCE DIRECTIVES:   DNR YES NO  Completed on:                     MOLST  YES NO   Completed on:  Living Will  YES NO   Completed on: OVERNIGHT EVENTS: still with poor mental state, potential gallbladder infection.     Present Symptoms:     Dyspnea: 0   Nausea/Vomiting: No  Anxiety:  No  Depression: No  Fatigue: Yes   Loss of appetite: No    Pain: moaning            Character-            Duration-            Effect-            Factors-            Frequency-            Location-            Severity-    Review of Systems: Reviewed                    Unable to obtain due to poor mentation     MEDICATIONS  (STANDING):  dextrose 5%. 1000milliLiter(s) IV Continuous <Continuous>  dextrose 50% Injectable 12.5Gram(s) IV Push once  dextrose 50% Injectable 25Gram(s) IV Push once  dextrose 50% Injectable 25Gram(s) IV Push once  collagenase Ointment 1Application(s) Topical daily  collagenase Ointment 1Application(s) Topical two times a day  midodrine 10milliGRAM(s) Oral every 8 hours  insulin lispro (HumaLOG) corrective regimen sliding scale  SubCutaneous every 6 hours  pantoprazole  Injectable 40milliGRAM(s) IV Push daily  meropenem IVPB 1000milliGRAM(s) IV Intermittent every 24 hours  metoprolol Injectable 5milliGRAM(s) IV Push every 6 hours    MEDICATIONS  (PRN):  dextrose Gel 1Dose(s) Oral once PRN Blood Glucose LESS THAN 70 milliGRAM(s)/deciliter  glucagon  Injectable 1milliGRAM(s) IntraMuscular once PRN Glucose LESS THAN 70 milligrams/deciliter  oxyCODONE  5 mG/acetaminophen 325 mG 2Tablet(s) Oral every 6 hours PRN Moderate Pain (4 - 6)  fentaNYL    Injectable 25MICROGram(s) IV Push every 6 hours PRN Moderate Pain (4 - 6)    PHYSICAL EXAM:    Vital Signs Last 24 Hrs  T(C): 36.9, Max: 36.9 (05-18 @ 08:00)  T(F): 98.4, Max: 98.4 (05-18 @ 08:00)  HR: 71 (71 - 141)  BP: 133/59 (80/42 - 133/59)  BP(mean): 85 (57 - 99)  RR: 13 (13 - 22)  SpO2: 99% (91% - 99%)    General: overall lethargic    Karnofsky:  20-30%    HEENT: normal     Lungs: comfortable    CV: normal      GI: normal     : normal     MSK: weakness  edema     Skin: pressure ulcers- sacrum and left heel    LABS:                        9.2    19.2  )-----------( 143      ( 18 May 2017 06:41 )             26.4     05-18    142  |  101  |  28.0<H>  ----------------------------<  72  4.0   |  26.0  |  4.10<H>    Ca    8.6      18 May 2017 06:41  Phos  2.3     05-18  Mg     1.9     05-18    PT/INR - ( 18 May 2017 06:41 )   PT: 26.4 sec;   INR: 2.36 ratio       PTT - ( 17 May 2017 06:38 )  PTT:68.7 sec    I&O's Summary  I & Os for 24h ending 18 May 2017 07:00  =============================================  IN: 477.9 ml / OUT: 1400 ml / NET: -922.1 ml    I & Os for current day (as of 18 May 2017 15:41)  =============================================  IN: 390 ml / OUT: 0 ml / NET: 390 ml    RADIOLOGY & ADDITIONAL STUDIES:    ADVANCE DIRECTIVES: DNR OVERNIGHT EVENTS: still with poor mental state, potential gallbladder infection.     Present Symptoms:     Dyspnea: 0   Nausea/Vomiting: No  Anxiety:  No  Depression: No  Fatigue: Yes   Loss of appetite: No    Pain: moaning            Character-            Duration-            Effect-            Factors-            Frequency-            Location-            Severity-    Review of Systems: Reviewed                    Unable to obtain due to poor mentation     MEDICATIONS  (STANDING):  dextrose 5%. 1000milliLiter(s) IV Continuous <Continuous>  dextrose 50% Injectable 12.5Gram(s) IV Push once  dextrose 50% Injectable 25Gram(s) IV Push once  dextrose 50% Injectable 25Gram(s) IV Push once  collagenase Ointment 1Application(s) Topical daily  collagenase Ointment 1Application(s) Topical two times a day  midodrine 10milliGRAM(s) Oral every 8 hours  insulin lispro (HumaLOG) corrective regimen sliding scale  SubCutaneous every 6 hours  pantoprazole  Injectable 40milliGRAM(s) IV Push daily  meropenem IVPB 1000milliGRAM(s) IV Intermittent every 24 hours  metoprolol Injectable 5milliGRAM(s) IV Push every 6 hours    MEDICATIONS  (PRN):  dextrose Gel 1Dose(s) Oral once PRN Blood Glucose LESS THAN 70 milliGRAM(s)/deciliter  glucagon  Injectable 1milliGRAM(s) IntraMuscular once PRN Glucose LESS THAN 70 milligrams/deciliter  oxyCODONE  5 mG/acetaminophen 325 mG 2Tablet(s) Oral every 6 hours PRN Moderate Pain (4 - 6)  fentaNYL    Injectable 25MICROGram(s) IV Push every 6 hours PRN Moderate Pain (4 - 6)    PHYSICAL EXAM:    Vital Signs Last 24 Hrs  T(C): 36.9, Max: 36.9 (05-18 @ 08:00)  T(F): 98.4, Max: 98.4 (05-18 @ 08:00)  HR: 71 (71 - 141)  BP: 133/59 (80/42 - 133/59)  BP(mean): 85 (57 - 99)  RR: 13 (13 - 22)  SpO2: 99% (91% - 99%)    General: overall lethargic    Karnofsky:  20-30%    HEENT: normal     Lungs: comfortable    CV: normal      GI: normal     : normal     MSK: weakness  edema     Skin: pressure ulcers- sacrum and left heel    LABS:                        9.2    19.2  )-----------( 143      ( 18 May 2017 06:41 )             26.4     05-18    142  |  101  |  28.0<H>  ----------------------------<  72  4.0   |  26.0  |  4.10<H>    Ca    8.6      18 May 2017 06:41  Phos  2.3     05-18  Mg     1.9     05-18    PT/INR - ( 18 May 2017 06:41 )   PT: 26.4 sec;   INR: 2.36 ratio       PTT - ( 17 May 2017 06:38 )  PTT:68.7 sec    I&O's Summary  I & Os for 24h ending 18 May 2017 07:00  =============================================  IN: 477.9 ml / OUT: 1400 ml / NET: -922.1 ml    I & Os for current day (as of 18 May 2017 15:41)  =============================================  IN: 390 ml / OUT: 0 ml / NET: 390 ml    RADIOLOGY & ADDITIONAL STUDIES:    CT A/P: Large sacral decubitus ulcer with medial buttock soft tissue gas and probable sacral osteomyelitis, new finding since 5/12. Distended gallbladder with cholelithiasis, correlate clinically for acute cholecystitis. Small new ascites.    ADVANCE DIRECTIVES: DNR

## 2017-05-18 NOTE — PROGRESS NOTE ADULT - ASSESSMENT
Septic shock with multiple organisms and multiple sources in the setting of acute on chronic renal failure and severe metabolic encephalopathy.

## 2017-05-19 LAB
-  AMIKACIN: SIGNIFICANT CHANGE UP
-  AMIKACIN: SIGNIFICANT CHANGE UP
-  AMPICILLIN/SULBACTAM: SIGNIFICANT CHANGE UP
-  AMPICILLIN/SULBACTAM: SIGNIFICANT CHANGE UP
-  AMPICILLIN: SIGNIFICANT CHANGE UP
-  AMPICILLIN: SIGNIFICANT CHANGE UP
-  AZTREONAM: SIGNIFICANT CHANGE UP
-  AZTREONAM: SIGNIFICANT CHANGE UP
-  CEFAZOLIN: SIGNIFICANT CHANGE UP
-  CEFAZOLIN: SIGNIFICANT CHANGE UP
-  CEFEPIME: SIGNIFICANT CHANGE UP
-  CEFEPIME: SIGNIFICANT CHANGE UP
-  CEFOXITIN: SIGNIFICANT CHANGE UP
-  CEFOXITIN: SIGNIFICANT CHANGE UP
-  CEFTAZIDIME: SIGNIFICANT CHANGE UP
-  CEFTAZIDIME: SIGNIFICANT CHANGE UP
-  CEFTRIAXONE: SIGNIFICANT CHANGE UP
-  CEFTRIAXONE: SIGNIFICANT CHANGE UP
-  CIPROFLOXACIN: SIGNIFICANT CHANGE UP
-  CIPROFLOXACIN: SIGNIFICANT CHANGE UP
-  ERTAPENEM: SIGNIFICANT CHANGE UP
-  ERTAPENEM: SIGNIFICANT CHANGE UP
-  GENTAMICIN: SIGNIFICANT CHANGE UP
-  GENTAMICIN: SIGNIFICANT CHANGE UP
-  IMIPENEM: SIGNIFICANT CHANGE UP
-  IMIPENEM: SIGNIFICANT CHANGE UP
-  LEVOFLOXACIN: SIGNIFICANT CHANGE UP
-  LEVOFLOXACIN: SIGNIFICANT CHANGE UP
-  MEROPENEM: SIGNIFICANT CHANGE UP
-  MEROPENEM: SIGNIFICANT CHANGE UP
-  NITROFURANTOIN: SIGNIFICANT CHANGE UP
-  NITROFURANTOIN: SIGNIFICANT CHANGE UP
-  PIPERACILLIN/TAZOBACTAM: SIGNIFICANT CHANGE UP
-  PIPERACILLIN/TAZOBACTAM: SIGNIFICANT CHANGE UP
-  TOBRAMYCIN: SIGNIFICANT CHANGE UP
-  TOBRAMYCIN: SIGNIFICANT CHANGE UP
-  TRIMETHOPRIM/SULFAMETHOXAZOLE: SIGNIFICANT CHANGE UP
-  TRIMETHOPRIM/SULFAMETHOXAZOLE: SIGNIFICANT CHANGE UP
ANION GAP SERPL CALC-SCNC: 16 MMOL/L — SIGNIFICANT CHANGE UP (ref 5–17)
BUN SERPL-MCNC: 30 MG/DL — HIGH (ref 8–20)
CALCIUM SERPL-MCNC: 8.9 MG/DL — SIGNIFICANT CHANGE UP (ref 8.6–10.2)
CHLORIDE SERPL-SCNC: 100 MMOL/L — SIGNIFICANT CHANGE UP (ref 98–107)
CO2 SERPL-SCNC: 25 MMOL/L — SIGNIFICANT CHANGE UP (ref 22–29)
CREAT SERPL-MCNC: 4.41 MG/DL — HIGH (ref 0.5–1.3)
CULTURE RESULTS: SIGNIFICANT CHANGE UP
GLUCOSE SERPL-MCNC: 144 MG/DL — HIGH (ref 70–115)
HCT VFR BLD CALC: 27.7 % — LOW (ref 37–47)
HGB BLD-MCNC: 9.5 G/DL — LOW (ref 12–16)
INR BLD: 1.68 RATIO — HIGH (ref 0.88–1.16)
LACTATE BLDV-MCNC: 2.4 MMOL/L — HIGH (ref 0.5–2)
MAGNESIUM SERPL-MCNC: 2 MG/DL — SIGNIFICANT CHANGE UP (ref 1.8–2.6)
MCHC RBC-ENTMCNC: 29.4 PG — SIGNIFICANT CHANGE UP (ref 27–31)
MCHC RBC-ENTMCNC: 34.3 G/DL — SIGNIFICANT CHANGE UP (ref 32–36)
MCV RBC AUTO: 85.8 FL — SIGNIFICANT CHANGE UP (ref 81–99)
METHOD TYPE: SIGNIFICANT CHANGE UP
METHOD TYPE: SIGNIFICANT CHANGE UP
ORGANISM # SPEC MICROSCOPIC CNT: SIGNIFICANT CHANGE UP
PHOSPHATE SERPL-MCNC: 3.1 MG/DL — SIGNIFICANT CHANGE UP (ref 2.4–4.7)
PLATELET # BLD AUTO: 152 K/UL — SIGNIFICANT CHANGE UP (ref 150–400)
POTASSIUM SERPL-MCNC: 4.2 MMOL/L — SIGNIFICANT CHANGE UP (ref 3.5–5.3)
POTASSIUM SERPL-SCNC: 4.2 MMOL/L — SIGNIFICANT CHANGE UP (ref 3.5–5.3)
PROTHROM AB SERPL-ACNC: 18.7 SEC — HIGH (ref 9.8–12.7)
RBC # BLD: 3.23 M/UL — LOW (ref 4.4–5.2)
RBC # FLD: 17.4 % — HIGH (ref 11–15.6)
SODIUM SERPL-SCNC: 141 MMOL/L — SIGNIFICANT CHANGE UP (ref 135–145)
SPECIMEN SOURCE: SIGNIFICANT CHANGE UP
WBC # BLD: 18.1 K/UL — HIGH (ref 4.8–10.8)
WBC # FLD AUTO: 18.1 K/UL — HIGH (ref 4.8–10.8)

## 2017-05-19 PROCEDURE — 99231 SBSQ HOSP IP/OBS SF/LOW 25: CPT

## 2017-05-19 PROCEDURE — 99233 SBSQ HOSP IP/OBS HIGH 50: CPT

## 2017-05-19 RX ORDER — CADEXOMER IODINE 0.9 %
1 PADS, MEDICATED (EA) TOPICAL DAILY
Qty: 0 | Refills: 0 | Status: DISCONTINUED | OUTPATIENT
Start: 2017-05-19 | End: 2017-05-24

## 2017-05-19 RX ORDER — DIGOXIN 250 MCG
0.25 TABLET ORAL ONCE
Qty: 0 | Refills: 0 | Status: COMPLETED | OUTPATIENT
Start: 2017-05-19 | End: 2017-05-19

## 2017-05-19 RX ORDER — ERYTHROPOIETIN 10000 [IU]/ML
10000 INJECTION, SOLUTION INTRAVENOUS; SUBCUTANEOUS
Qty: 0 | Refills: 0 | Status: DISCONTINUED | OUTPATIENT
Start: 2017-05-19 | End: 2017-05-21

## 2017-05-19 RX ORDER — DIGOXIN 250 MCG
0.12 TABLET ORAL DAILY
Qty: 0 | Refills: 0 | Status: DISCONTINUED | OUTPATIENT
Start: 2017-05-19 | End: 2017-05-24

## 2017-05-19 RX ADMIN — Medication 5 MILLIGRAM(S): at 00:41

## 2017-05-19 RX ADMIN — Medication 1 APPLICATION(S): at 17:39

## 2017-05-19 RX ADMIN — Medication 5 MILLIGRAM(S): at 05:05

## 2017-05-19 RX ADMIN — PANTOPRAZOLE SODIUM 40 MILLIGRAM(S): 20 TABLET, DELAYED RELEASE ORAL at 14:30

## 2017-05-19 RX ADMIN — MIDODRINE HYDROCHLORIDE 10 MILLIGRAM(S): 2.5 TABLET ORAL at 05:05

## 2017-05-19 RX ADMIN — Medication 0.12 MILLIGRAM(S): at 15:59

## 2017-05-19 RX ADMIN — Medication 50 MILLILITER(S): at 11:37

## 2017-05-19 RX ADMIN — Medication 0.25 MILLIGRAM(S): at 11:22

## 2017-05-19 RX ADMIN — MIDODRINE HYDROCHLORIDE 10 MILLIGRAM(S): 2.5 TABLET ORAL at 21:22

## 2017-05-19 RX ADMIN — MIDODRINE HYDROCHLORIDE 10 MILLIGRAM(S): 2.5 TABLET ORAL at 14:31

## 2017-05-19 RX ADMIN — Medication 1 APPLICATION(S): at 14:29

## 2017-05-19 RX ADMIN — Medication 5 MILLIGRAM(S): at 18:31

## 2017-05-19 RX ADMIN — Medication 1 APPLICATION(S): at 05:06

## 2017-05-19 RX ADMIN — Medication 5 MILLIGRAM(S): at 14:31

## 2017-05-19 RX ADMIN — ERYTHROPOIETIN 10000 UNIT(S): 10000 INJECTION, SOLUTION INTRAVENOUS; SUBCUTANEOUS at 15:59

## 2017-05-19 RX ADMIN — MEROPENEM 200 MILLIGRAM(S): 1 INJECTION INTRAVENOUS at 14:29

## 2017-05-19 NOTE — PROGRESS NOTE ADULT - SUBJECTIVE AND OBJECTIVE BOX
POD # 1 s/p cholecystostomy tube placement    ALLERGIES:  No Known Allergies      SUBJECTIVE:  Patient is a 72y Female s/p cholecystostomy tube placement  Seen and examined at bedside.    VITALS:  T(C): 36.9, Max: 37 (05-19 @ 00:00)  HR: 70 (65 - 138)  BP: 100/53 (75/44 - 133/59)  RR: 14 (10 - 17)  SpO2: 97% (91% - 99%)  Wt(kg): --    PE:  Chest: good air exchange  Abdomin: soft nontender, cholecystostomy tube intact and draining bilious liquid    OUTPUT:  cholecystostomy tube ->  200cc/24h      IMPRESSION:    s/p cholecystostomy tube placement    PLAN:  Continue current care management  DVT prophylaxis  Plan per MICU  Irrigate cholecystostomy tube with 10cc NS every 24 hours  Cont to monitor output of cholecystostomy tube

## 2017-05-19 NOTE — PROGRESS NOTE ADULT - ASSESSMENT
72F with encephalopathy, acute on chronic kidney failure, GNR bacteremia due to sacral ulcer, probably osteomyeltis, persistent encephalopathy.

## 2017-05-19 NOTE — PROGRESS NOTE ADULT - PROBLEM SELECTOR PLAN 1
Appreciate Dr Chamberlain's and Dr Cobos's input.  UTI + Sacral decubitus ulcer/osteomyelitis.  Appreciate podiatry -- no debridement/acuity noted.  No joint infections noted.  The gall bladder was drained yesterday and gram stain negative.    This leaves the decubitus ulcer the most likely source.  Clearly the urine is infected, but in the setting of septic shock and renal failure, unlikely to be the unifying diagnosis.  Debridement is wrought with morbidity and re-operations and may contribute to suffering -- would continue to discuss goals of care and decision about this with the family.

## 2017-05-19 NOTE — CONSULT NOTE ADULT - ASSESSMENT
A  Left heel ulceration  Left 1st MPJ superficial hematoma    P  Patient evaluated and chart reviewed  Left foot x-rays pending  betadine DSD applied  application of Z-flex boots  Iodosorb ordered  Nsg wound instructions placed  patient to c/w offloading z-flex boots    Nursing dressing instructions left heel QD  Remove dressing  Cleanse wound with normal saline  apply iodosorb  apply DSD  Apply allyvn to 1st mpj   apply Z-flex boots

## 2017-05-19 NOTE — PROGRESS NOTE ADULT - ASSESSMENT
72 yr old female with atrial fibrillation, hypertension, hyperlipidemia, COPD, breast cancer, diabetes mellitus, right knee surgeries, pressure ulcer, CKD, h/o right UE DVT admitted with altered mental status. She was discharged to rehab in April 2017 and completed course of antibiotics for infected right knee. Patient has no known history of dementia. Per sister, she was becoming increasingly lethargic for a week prior to admission with increasingly poor oral intake. On admission she was noted to have acute on chronic renal failure. CT brain was done, negative for acute stroke. Renal consulted, she was started on IV fluids. Plastics consulted. Given elevated WBC, ID, orthopedics and plastics consulted. No signs of infection in the knee as per orthopedics and plastics. She was started on Meropenem as per ID, cultures sent. Digoxin added for rate control, cardiology consulted, ECHO ordered. Given persistent hypotension and renal failure, ICU consulted for possible pressor support and eventual HD. She was transferred and started on pressors, HD scheduled for 5/161/7. Her blood cultures positive for gram negative bacteremia. HD access placed by IR. She received her first HD session on 5/16/17. Blood cultures grew proteus. CT abdomen ordered to rule out intra abdominal source of bacteremia. HD scheduled, to be done after CT abdomen with contrast. CT showed sacral decubitus, + gas and cholecystitis. US done suggestive of acute cholecystitis. Surgery and IR consulted. Plan of percutaneous cholecystostomy, performed on 5/18/17.

## 2017-05-19 NOTE — CONSULT NOTE ADULT - SUBJECTIVE AND OBJECTIVE BOX
Patient is a 72y old  Female who presents with a chief complaint of mental status changes & anasarca. (12 May 2017 18:13)      HPI:  71 yr old female with Severe dementia non verbal bed bound, hypertension, hyperlipidemia, LIN, CKD, COPD, paroxysmal A fib, septic arthritis, CKD was seen at bedside for left heel ulceration No information could be obtained from patient due to mental status. Tried calling [facility] & left VM. Pt's Cr increased from baseline 2.8 to 8 today. Not known if pt making urine. Will insert anna for more information. Pt is currently lethargic barely arousable.           PMH:Hypercholesterolemia  Deficiency of vitamin K  Chronic pain  COPD (chronic obstructive pulmonary disease)  Urine retention  Pressure ulcer  Atrial fibrillation  Constipation  Breast cancer  HLD (hyperlipidemia)  HTN (hypertension)  GERD (gastroesophageal reflux disease)  DM (diabetes mellitus)  Obesity  LIN on CPAP    Allergies: No Known Allergies    Medications: dextrose 5%. 1000milliLiter(s) IV Continuous <Continuous>  dextrose Gel 1Dose(s) Oral once PRN  dextrose 50% Injectable 12.5Gram(s) IV Push once  dextrose 50% Injectable 25Gram(s) IV Push once  dextrose 50% Injectable 25Gram(s) IV Push once  glucagon  Injectable 1milliGRAM(s) IntraMuscular once PRN  collagenase Ointment 1Application(s) Topical daily  collagenase Ointment 1Application(s) Topical two times a day  midodrine 10milliGRAM(s) Oral every 8 hours  oxyCODONE  5 mG/acetaminophen 325 mG 2Tablet(s) Oral every 6 hours PRN  insulin lispro (HumaLOG) corrective regimen sliding scale  SubCutaneous every 6 hours  pantoprazole  Injectable 40milliGRAM(s) IV Push daily  meropenem IVPB 1000milliGRAM(s) IV Intermittent every 24 hours  fentaNYL    Injectable 25MICROGram(s) IV Push every 6 hours PRN  metoprolol Injectable 5milliGRAM(s) IV Push every 6 hours  albumin human 25% IVPB 100milliLiter(s) IV Intermittent once  norepinephrine Infusion 0.3MICROgram(s)/kG/Min IV Continuous <Continuous>    FH:Family history of diabetes mellitus    PSX: History of incision and drainage  S/p total knee replacement, bilateral  S/P knee replacement, right  S/P hysterectomy  S/P mastectomy  Breast cancer    SH: dextrose 5%. 1000milliLiter(s) IV Continuous <Continuous>  dextrose Gel 1Dose(s) Oral once PRN  dextrose 50% Injectable 12.5Gram(s) IV Push once  dextrose 50% Injectable 25Gram(s) IV Push once  dextrose 50% Injectable 25Gram(s) IV Push once  glucagon  Injectable 1milliGRAM(s) IntraMuscular once PRN  collagenase Ointment 1Application(s) Topical daily  collagenase Ointment 1Application(s) Topical two times a day  midodrine 10milliGRAM(s) Oral every 8 hours  oxyCODONE  5 mG/acetaminophen 325 mG 2Tablet(s) Oral every 6 hours PRN  insulin lispro (HumaLOG) corrective regimen sliding scale  SubCutaneous every 6 hours  pantoprazole  Injectable 40milliGRAM(s) IV Push daily  meropenem IVPB 1000milliGRAM(s) IV Intermittent every 24 hours  fentaNYL    Injectable 25MICROGram(s) IV Push every 6 hours PRN  metoprolol Injectable 5milliGRAM(s) IV Push every 6 hours  albumin human 25% IVPB 100milliLiter(s) IV Intermittent once  norepinephrine Infusion 0.3MICROgram(s)/kG/Min IV Continuous <Continuous>      Vital Signs Last 24 Hrs  T(C): 36.9, Max: 37 (05-19 @ 00:00)  T(F): 98.4, Max: 98.6 (05-19 @ 00:00)  HR: 133 (65 - 138)  BP: 106/44 (75/44 - 133/59)  BP(mean): 63 (54 - 87)  RR: 15 (10 - 15)  SpO2: 98% (91% - 99%)    LABS                        9.5    18.1  )-----------( 152      ( 19 May 2017 05:50 )             27.7               05-19    141  |  100  |  30.0<H>  ----------------------------<  144<H>  4.2   |  25.0  |  4.41<H>    Ca    8.9      19 May 2017 05:50  Phos  3.1     05-19  Mg     2.0     05-19        ROS  REVIEW OF SYSTEMS:    CONSTITUTIONAL: No fever, weight loss, or fatigue  EYES: No eye pain, visual disturbances, or discharge  ENMT:  No difficulty hearing, tinnitus, vertigo; No sinus or throat pain  NECK: No pain or stiffness  BREASTS: No pain, masses, or nipple discharge  RESPIRATORY: No cough, wheezing, chills or hemoptysis; No shortness of breath  CARDIOVASCULAR: No chest pain, palpitations, dizziness, or leg swelling  GASTROINTESTINAL: No abdominal or epigastric pain. No nausea, vomiting, or hematemesis; No diarrhea or constipation. No melena or hematochezia.  GENITOURINARY: No dysuria, frequency, hematuria, or incontinence  NEUROLOGICAL: No headaches, memory loss, loss of strength, numbness, or tremors  SKIN: No itching, burning, rashes, or lesions   LYMPH NODES: No enlarged glands  ENDOCRINE: No heat or cold intolerance; No hair loss  MUSCULOSKELETAL: No joint pain or swelling; No muscle, back, or extremity pain  PSYCHIATRIC: No depression, anxiety, mood swings, or difficulty sleeping  HEME/LYMPH: No easy bruising, or bleeding gums  ALLERY AND IMMUNOLOGIC: No hives or eczema      PHYSICAL EXAM  GEN: SHYAM LAL is a pleasant well-nourished, well developed 72y Female in no acute distress, alert awake, and oriented to person, place and time.   LE Focused:  Left foot  Vasc: pedal pulses non palpable, CFT 2 sec x 3, TG WNL   Derm: Posterior heel ulcerations with necrotic eschar and normal borders.  Eschar is dry w/ no underlying fluctuance.  No purulence, no mal odor, no drainage.  LE is severely edematous.  Medial 1st MPJ hematoma formation with no open lesions.  No erythema.  Neuro: could not assess   MSK: could not assess  Imaging: pending    .

## 2017-05-19 NOTE — PROGRESS NOTE ADULT - SUBJECTIVE AND OBJECTIVE BOX
Patient is a 72y old  Female who presents with a chief complaint of mental status changes & anasarca. (12 May 2017 18:13)      BRIEF HOSPITAL COURSE: Admitted with septic shock and acute on chronic renal failure in the setting of severe encephalopathy.    Events last 24 hours: No vasoactive agents but remains very encephalopathic.  HD today.    PAST MEDICAL & SURGICAL HISTORY:  Hypercholesterolemia  Deficiency of vitamin K  Chronic pain  COPD (chronic obstructive pulmonary disease)  Urine retention  Pressure ulcer  Atrial fibrillation  Constipation  Breast cancer  HLD (hyperlipidemia)  HTN (hypertension)  GERD (gastroesophageal reflux disease)  DM (diabetes mellitus)  Obesity  LIN on CPAP  History of incision and drainage: rt knee  S/p total knee replacement, bilateral  S/P knee replacement, right  S/P hysterectomy  S/P mastectomy: Left  Breast cancer      Review of Systems:  unable, obtunded    MEDICATIONS  (STANDING):  dextrose 5%. 1000milliLiter(s) IV Continuous <Continuous>  dextrose 50% Injectable 12.5Gram(s) IV Push once  dextrose 50% Injectable 25Gram(s) IV Push once  dextrose 50% Injectable 25Gram(s) IV Push once  collagenase Ointment 1Application(s) Topical daily  collagenase Ointment 1Application(s) Topical two times a day  midodrine 10milliGRAM(s) Oral every 8 hours  insulin lispro (HumaLOG) corrective regimen sliding scale  SubCutaneous every 6 hours  pantoprazole  Injectable 40milliGRAM(s) IV Push daily  meropenem IVPB 1000milliGRAM(s) IV Intermittent every 24 hours  metoprolol Injectable 5milliGRAM(s) IV Push every 6 hours  norepinephrine Infusion 0.3MICROgram(s)/kG/Min IV Continuous <Continuous>  cadexomer iodine Gel 1Application(s) Topical daily  digoxin     Tablet 0.125milliGRAM(s) Oral daily  epoetin melany Injectable 71532Iujp(s) IV Push <User Schedule>    MEDICATIONS  (PRN):  dextrose Gel 1Dose(s) Oral once PRN Blood Glucose LESS THAN 70 milliGRAM(s)/deciliter  glucagon  Injectable 1milliGRAM(s) IntraMuscular once PRN Glucose LESS THAN 70 milligrams/deciliter  oxyCODONE  5 mG/acetaminophen 325 mG 2Tablet(s) Oral every 6 hours PRN Moderate Pain (4 - 6)  fentaNYL    Injectable 25MICROGram(s) IV Push every 6 hours PRN Moderate Pain (4 - 6)        ICU Vital Signs Last 24 Hrs  T(C): 36.4, Max: 37 (05-19 @ 00:00)  T(F): 97.6, Max: 98.6 (05-19 @ 00:00)  HR: 77 (65 - 133)  BP: 112/53 (75/44 - 128/59)  BP(mean): 77 (54 - 87)  ABP: --  ABP(mean): --  RR: 18 (10 - 18)  SpO2: 96% (96% - 99%)        I&O's Detail  I & Os for 24h ending 19 May 2017 07:00  =============================================  IN:    Nepro: 890 ml    Solution: 250 ml    FFP (Fresh Frozen Plasma): 199 ml    norepinephrine Infusion: 170 ml    Total IN: 1509 ml  ---------------------------------------------  OUT:    Bulb: 200 ml    Total OUT: 200 ml  ---------------------------------------------  Total NET: 1309 ml    I & Os for current day (as of 19 May 2017 17:45)  =============================================  IN:    Nepro: 450 ml    Solution: 100 ml    Total IN: 550 ml  ---------------------------------------------  OUT:    Other: 1500 ml    Total OUT: 1500 ml  ---------------------------------------------  Total NET: -950 ml        LABS:             05-19    141  |  100  |  30.0<H>  ----------------------------<  144<H>  4.2   |  25.0  |  4.41<H>    Ca    8.9      19 May 2017 05:50  Phos  3.1     05-19  Mg     2.0     05-19                          9.5    18.1  )-----------( 152      ( 19 May 2017 05:50 )             27.7         CAPILLARY BLOOD GLUCOSE  125 (18 May 2017 12:00)    PT/INR - ( 18 May 2017 06:41 )   PT: 26.4 sec;   INR: 2.36 ratio         PTT - ( 17 May 2017 06:38 )  PTT:68.7 sec    RECENT CULTURES:  05-18 .Body Fluid Abdominal Fluid XXXX   No White blood cells  No organisms seen   No growth at 1 day.  Culture in progress    05-17 .Blood Blood-Peripheral XXXX XXXX   No growth at 48 hours    05-16 .Urine Catheterized Klebsiella pneumoniae ESBL  Citrobacter werkmanii XXXX   >100,000 CFU/ml Klebsiella pneumoniae ESBL  >100,000 CFU/ml Citrobacter werkmanii  .  TYPE: (C=Critical, N=Notification, A=Abnormal) c  TESTS:  _ K pneumo ESBL  DATE/TIME CALLED: _ 05/19/2017 12:22:01  CALLED TO: Roberta brian  rn  READ BACK (2    05-15 .Blood Blood Proteus mirabilis XXXX   Growth in aerobic and anaerobic bottles: Proteus mirabilis  Anaerobic Bottle: 16:13 Hours to positivity  Aerobic Bottle: 1 day 01:14 Hours to positivity  .  TYPE: (C=Critical, N=Notification, A=Abnormal) C  TESTS:  _ GS: Bld culture  DATE/TIME SALCEDO    05-14 .Blood Blood Bacteroides thetaiotaomicron XXXX   Growth in anaerobic bottle: Bacteroides thetaiotaomicron (anaerobic  organism)  Anaerobic Bottle: 1 day; 20:29 Hours to positivity  Aerobic Bottle: No growth at 5 days.  ,  TYPE: (C=Critical, N=Notification, A=Abnormal) C  TESTS:  _ BLD GS  DATE/JESSE    05-14 Skin right knee wound culture Proteus mirabilis  Providencia stuartii XXXX   Numerous Proteus mirabilis  Numerous Providencia stuartii    05-12 .Urine Clean Catch (Midstream) XXXX XXXX   Culture grew 3 or more types of organisms which indicate  collection contamination; consider recollection only if clinically  indicated.  .  TYPE: (C=Critical, N=Notification, A=Abnormal) N  TESTS:  _ Contaminated Urine Culture  DATE/TIME CALLED: _            Physical Examination:    General: No respiratory distress, opens eyes to noxious stimuli    HEENT: Pupils equal, reactive to light.  Symmetric.    PULM: Clear to auscultation bilaterally, no significant sputum production    CVS: Regular rate and rhythm, no murmurs, rubs, or gallops    ABD: Tender throughout without rebound.    EXT: anasarca    SKIN: sacral ulcer to bone -- L foot wrapped    NEURO: Somnulent, arousable to pain.

## 2017-05-19 NOTE — PROGRESS NOTE ADULT - ASSESSMENT
KARMEN on CKD   No hydro on CT 5-17  Anasarca   HD today   Fluid removal as tolerated     Sepsis - H/O infected right knee hardware , s/p young tube, decub  Abx as per ID     Anemia - Add Epogen

## 2017-05-19 NOTE — PROGRESS NOTE ADULT - SUBJECTIVE AND OBJECTIVE BOX
INTERVAL HPI/OVERNIGHT EVENTS:    CC: septic shock secondary acute cholecystitis s/p percutaneous cholecystostomy, KARMEN on CKD, encephalopathy, atrial fibrillation    Underwent percutaneous cholecystostomy, HD done today. Mental status unchanged.    Vital Signs Last 24 Hrs  T(C): 36.3, Max: 37 (05-19 @ 00:00)  T(F): 97.4, Max: 98.6 (05-19 @ 00:00)  HR: 73 (65 - 138)  BP: 96/55 (75/44 - 128/59)  BP(mean): 72 (54 - 87)  RR: 13 (10 - 17)  SpO2: 97% (96% - 99%)    PHYSICAL EXAM:    GENERAL: anasarca, lethargic, responds to painful stimuli  CHEST/LUNG: b/l air entry  HEART: irregular  ABDOMEN: + BS  EXTREMITIES:  3+ edema    MEDICATIONS  (STANDING):  dextrose 5%. 1000milliLiter(s) IV Continuous <Continuous>  dextrose 50% Injectable 12.5Gram(s) IV Push once  dextrose 50% Injectable 25Gram(s) IV Push once  dextrose 50% Injectable 25Gram(s) IV Push once  collagenase Ointment 1Application(s) Topical daily  collagenase Ointment 1Application(s) Topical two times a day  midodrine 10milliGRAM(s) Oral every 8 hours  insulin lispro (HumaLOG) corrective regimen sliding scale  SubCutaneous every 6 hours  pantoprazole  Injectable 40milliGRAM(s) IV Push daily  meropenem IVPB 1000milliGRAM(s) IV Intermittent every 24 hours  metoprolol Injectable 5milliGRAM(s) IV Push every 6 hours  norepinephrine Infusion 0.3MICROgram(s)/kG/Min IV Continuous <Continuous>  cadexomer iodine Gel 1Application(s) Topical daily  digoxin     Tablet 0.125milliGRAM(s) Oral daily  epoetin melany Injectable [id number](s) IV Push <User Schedule>    MEDICATIONS  (PRN):  dextrose Gel 1Dose(s) Oral once PRN Blood Glucose LESS THAN 70 milliGRAM(s)/deciliter  glucagon  Injectable 1milliGRAM(s) IntraMuscular once PRN Glucose LESS THAN 70 milligrams/deciliter  oxyCODONE  5 mG/acetaminophen 325 mG 2Tablet(s) Oral every 6 hours PRN Moderate Pain (4 - 6)  fentaNYL    Injectable 25MICROGram(s) IV Push every 6 hours PRN Moderate Pain (4 - 6)      Allergies    No Known Allergies    Intolerances          LABS:                          9.5    18.1  )-----------( 152      ( 19 May 2017 05:50 )             27.7     05-19    141  |  100  |  30.0<H>  ----------------------------<  144<H>  4.2   |  25.0  |  4.41<H>    Ca    8.9      19 May 2017 05:50  Phos  3.1     05-19  Mg     2.0     05-19      PT/INR - ( 19 May 2017 05:50 )   PT: 18.7 sec;   INR: 1.68 ratio               RADIOLOGY & ADDITIONAL TESTS:

## 2017-05-19 NOTE — PROGRESS NOTE ADULT - SUBJECTIVE AND OBJECTIVE BOX
OVERNIGHT EVENTS: still with poor mental state,s/p perc yonug yesterday    Present Symptoms:     Dyspnea: 0   Nausea/Vomiting: No  Anxiety:  No  Depression: No  Fatigue: Yes   Loss of appetite: No    Pain: moaning            Character-            Duration-            Effect-            Factors-            Frequency-            Location-            Severity-    Review of Systems: Reviewed                    Unable to obtain due to poor mentation     MEDICATIONS  (STANDING):  dextrose 5%. 1000milliLiter(s) IV Continuous <Continuous>  dextrose 50% Injectable 12.5Gram(s) IV Push once  dextrose 50% Injectable 25Gram(s) IV Push once  dextrose 50% Injectable 25Gram(s) IV Push once  collagenase Ointment 1Application(s) Topical daily  collagenase Ointment 1Application(s) Topical two times a day  midodrine 10milliGRAM(s) Oral every 8 hours  insulin lispro (HumaLOG) corrective regimen sliding scale  SubCutaneous every 6 hours  pantoprazole  Injectable 40milliGRAM(s) IV Push daily  meropenem IVPB 1000milliGRAM(s) IV Intermittent every 24 hours  metoprolol Injectable 5milliGRAM(s) IV Push every 6 hours  norepinephrine Infusion 0.3MICROgram(s)/kG/Min IV Continuous <Continuous>  cadexomer iodine Gel 1Application(s) Topical daily  digoxin     Tablet 0.125milliGRAM(s) Oral daily  epoetin melany Injectable 45015Btqc(s) IV Push <User Schedule>    MEDICATIONS  (PRN):  dextrose Gel 1Dose(s) Oral once PRN Blood Glucose LESS THAN 70 milliGRAM(s)/deciliter  glucagon  Injectable 1milliGRAM(s) IntraMuscular once PRN Glucose LESS THAN 70 milligrams/deciliter  oxyCODONE  5 mG/acetaminophen 325 mG 2Tablet(s) Oral every 6 hours PRN Moderate Pain (4 - 6)  fentaNYL    Injectable 25MICROGram(s) IV Push every 6 hours PRN Moderate Pain (4 - 6)    PHYSICAL EXAM:    Vital Signs Last 24 Hrs  T(C): 36.9, Max: 36.9 (05-18 @ 08:00)  T(F): 98.4, Max: 98.4 (05-18 @ 08:00)  HR: 71 (71 - 141)  BP: 133/59 (80/42 - 133/59)  BP(mean): 85 (57 - 99)  RR: 13 (13 - 22)  SpO2: 99% (91% - 99%)    General: overall lethargic    Karnofsky:  20-30%    HEENT: normal     Lungs: comfortable    CV: normal      GI: normal     : normal     MSK: weakness  edema     Skin: pressure ulcers- sacrum and left heel    LABS:                        9.2    19.2  )-----------( 143      ( 18 May 2017 06:41 )             26.4     05-18    142  |  101  |  28.0<H>  ----------------------------<  72  4.0   |  26.0  |  4.10<H>    Ca    8.6      18 May 2017 06:41  Phos  2.3     05-18  Mg     1.9     05-18    PT/INR - ( 18 May 2017 06:41 )   PT: 26.4 sec;   INR: 2.36 ratio       PTT - ( 17 May 2017 06:38 )  PTT:68.7 sec    I&O's Summary  I & Os for 24h ending 18 May 2017 07:00  =============================================  IN: 477.9 ml / OUT: 1400 ml / NET: -922.1 ml    I & Os for current day (as of 18 May 2017 15:41)  =============================================  IN: 390 ml / OUT: 0 ml / NET: 390 ml    RADIOLOGY & ADDITIONAL STUDIES:    ADVANCE DIRECTIVES: DNR

## 2017-05-19 NOTE — CONSULT NOTE ADULT - CONSULT REQUESTED DATE/TIME
12-May-2017 17:21
12-May-2017 19:20
13-May-2017 02:35
15-May-2017 11:30
15-May-2017 14:43
15-May-2017 17:37
16-May-2017 12:18
16-May-2017 13:14
17-May-2017 20:00
19-May-2017 10:18
15-May-2017

## 2017-05-19 NOTE — PROGRESS NOTE ADULT - SUBJECTIVE AND OBJECTIVE BOX
SHYAM LUKASZ    [id number]    History: 72y Female is status post right total knee arthroplasty explant admitted with acute renal failure. Patient is unresponsive to verbal commands and is on TPN but is in no apparent distress.                        9.5    18.1  )-----------( 152      ( 19 May 2017 05:50 )             27.7     05-19    141  |  100  |  30.0<H>  ----------------------------<  144<H>  4.2   |  25.0  |  4.41<H>    Ca    8.9      19 May 2017 05:50  Phos  3.1     05-19  Mg     2.0     05-19        MEDICATIONS  (STANDING):  dextrose 5%. 1000milliLiter(s) IV Continuous <Continuous>  dextrose 50% Injectable 12.5Gram(s) IV Push once  dextrose 50% Injectable 25Gram(s) IV Push once  dextrose 50% Injectable 25Gram(s) IV Push once  collagenase Ointment 1Application(s) Topical daily  collagenase Ointment 1Application(s) Topical two times a day  midodrine 10milliGRAM(s) Oral every 8 hours  insulin lispro (HumaLOG) corrective regimen sliding scale  SubCutaneous every 6 hours  pantoprazole  Injectable 40milliGRAM(s) IV Push daily  meropenem IVPB 1000milliGRAM(s) IV Intermittent every 24 hours  metoprolol Injectable 5milliGRAM(s) IV Push every 6 hours  albumin human 25% IVPB 100milliLiter(s) IV Intermittent once  norepinephrine Infusion 0.3MICROgram(s)/kG/Min IV Continuous <Continuous>  cadexomer iodine Gel 1Application(s) Topical daily  digoxin  Injectable 0.25milliGRAM(s) IV Push once  digoxin     Tablet 0.125milliGRAM(s) Oral daily    MEDICATIONS  (PRN):  dextrose Gel 1Dose(s) Oral once PRN Blood Glucose LESS THAN 70 milliGRAM(s)/deciliter  glucagon  Injectable 1milliGRAM(s) IntraMuscular once PRN Glucose LESS THAN 70 milligrams/deciliter  oxyCODONE  5 mG/acetaminophen 325 mG 2Tablet(s) Oral every 6 hours PRN Moderate Pain (4 - 6)  fentaNYL    Injectable 25MICROGram(s) IV Push every 6 hours PRN Moderate Pain (4 - 6)      Physical exam: Right knee wound wet to dry dressing changed. No drainage or discharge. No erythema is noted. No blistering. No ecchymosis. +b/l LE edema. The calf is supple nontender. 2+ dorsalis pedis pulse. Capillary refill is less than 2 seconds. No cyanosis.    Primary Orthopedic Assessment:   72y Female is status post right total knee arthroplasty explant admitted with acute renal failure    Secondary  Orthopedic Assessment(s):   •     Secondary  Medical Assessment(s):   •     Plan:   •  daily wet to dry dressing changes R knee  •  f/u vascular plan  •  will continue to follow

## 2017-05-19 NOTE — PROGRESS NOTE ADULT - SUBJECTIVE AND OBJECTIVE BOX
NEPHROLOGY INTERVAL HPI/OVERNIGHT EVENTS:    S/P Jenna tube   Meds reviewed     MEDICATIONS  (STANDING):  dextrose 5%. 1000milliLiter(s) IV Continuous <Continuous>  dextrose 50% Injectable 12.5Gram(s) IV Push once  dextrose 50% Injectable 25Gram(s) IV Push once  dextrose 50% Injectable 25Gram(s) IV Push once  collagenase Ointment 1Application(s) Topical daily  collagenase Ointment 1Application(s) Topical two times a day  midodrine 10milliGRAM(s) Oral every 8 hours  insulin lispro (HumaLOG) corrective regimen sliding scale  SubCutaneous every 6 hours  pantoprazole  Injectable 40milliGRAM(s) IV Push daily  meropenem IVPB 1000milliGRAM(s) IV Intermittent every 24 hours  metoprolol Injectable 5milliGRAM(s) IV Push every 6 hours  norepinephrine Infusion 0.3MICROgram(s)/kG/Min IV Continuous <Continuous>  cadexomer iodine Gel 1Application(s) Topical daily  digoxin     Tablet 0.125milliGRAM(s) Oral daily    MEDICATIONS  (PRN):  dextrose Gel 1Dose(s) Oral once PRN Blood Glucose LESS THAN 70 milliGRAM(s)/deciliter  glucagon  Injectable 1milliGRAM(s) IntraMuscular once PRN Glucose LESS THAN 70 milligrams/deciliter  oxyCODONE  5 mG/acetaminophen 325 mG 2Tablet(s) Oral every 6 hours PRN Moderate Pain (4 - 6)  fentaNYL    Injectable 25MICROGram(s) IV Push every 6 hours PRN Moderate Pain (4 - 6)      Allergies    No Known Allergies    Intolerances        Vital Signs Last 24 Hrs  T(C): 36.3, Max: 37 (05-19 @ 00:00)  T(F): 97.4, Max: 98.6 (05-19 @ 00:00)  HR: 75 (65 - 138)  BP: 100/57 (75/44 - 133/59)  BP(mean): 70 (54 - 87)  RR: 14 (10 - 15)  SpO2: 97% (97% - 99%)  Daily     Daily   I&O's Detail    I & Os for current day (as of 19 May 2017 12:07)  =============================================  IN:    Nepro: 890 ml    Solution: 250 ml    FFP (Fresh Frozen Plasma): 199 ml    norepinephrine Infusion: 170 ml    Total IN: 1509 ml  ---------------------------------------------  OUT:    Bulb: 200 ml    Total OUT: 200 ml  ---------------------------------------------  Total NET: 1309 ml    I&O's Summary    I & Os for current day (as of 19 May 2017 12:07)  =============================================  IN: 1509 ml / OUT: 200 ml / NET: 1309 ml      PHYSICAL EXAM:  EYES: wnl  NECK: bilateral central lines  NERVOUS SYSTEM:  awake but lethargic  CHEST/LUNG: decreased bs bases, no wheezes  HEART: tachy, no rub  ABDOMEN: soft  EXTREMITIES:  right knee with open wound and clean dressing, bilateral thigh edema - pitting and lower back same      LABS:                        9.5    18.1  )-----------( 152      ( 19 May 2017 05:50 )             27.7     05-19    141  |  100  |  30.0<H>  ----------------------------<  144<H>  4.2   |  25.0  |  4.41<H>    Ca    8.9      19 May 2017 05:50  Phos  3.1     05-19  Mg     2.0     05-19      PT/INR - ( 19 May 2017 05:50 )   PT: 18.7 sec;   INR: 1.68 ratio             Magnesium, Serum: 2.0 mg/dL (05-19 @ 05:50)  Phosphorus Level, Serum: 3.1 mg/dL (05-19 @ 05:50)          RADIOLOGY & ADDITIONAL TESTS:

## 2017-05-20 DIAGNOSIS — R57.9 SHOCK, UNSPECIFIED: ICD-10-CM

## 2017-05-20 LAB
ANION GAP SERPL CALC-SCNC: 14 MMOL/L — SIGNIFICANT CHANGE UP (ref 5–17)
BUN SERPL-MCNC: 21 MG/DL — HIGH (ref 8–20)
CALCIUM SERPL-MCNC: 8.9 MG/DL — SIGNIFICANT CHANGE UP (ref 8.6–10.2)
CHLORIDE SERPL-SCNC: 102 MMOL/L — SIGNIFICANT CHANGE UP (ref 98–107)
CO2 SERPL-SCNC: 26 MMOL/L — SIGNIFICANT CHANGE UP (ref 22–29)
CREAT SERPL-MCNC: 3.3 MG/DL — HIGH (ref 0.5–1.3)
GLUCOSE SERPL-MCNC: 213 MG/DL — HIGH (ref 70–115)
HCT VFR BLD CALC: 27 % — LOW (ref 37–47)
HGB BLD-MCNC: 9.3 G/DL — LOW (ref 12–16)
MCHC RBC-ENTMCNC: 29.9 PG — SIGNIFICANT CHANGE UP (ref 27–31)
MCHC RBC-ENTMCNC: 34.4 G/DL — SIGNIFICANT CHANGE UP (ref 32–36)
MCV RBC AUTO: 86.8 FL — SIGNIFICANT CHANGE UP (ref 81–99)
MYOGLOBIN UR-MCNC: <15 MCG/L — SIGNIFICANT CHANGE UP
PLATELET # BLD AUTO: 95 K/UL — LOW (ref 150–400)
POTASSIUM SERPL-MCNC: 4.3 MMOL/L — SIGNIFICANT CHANGE UP (ref 3.5–5.3)
POTASSIUM SERPL-SCNC: 4.3 MMOL/L — SIGNIFICANT CHANGE UP (ref 3.5–5.3)
RBC # BLD: 3.11 M/UL — LOW (ref 4.4–5.2)
RBC # FLD: 17.7 % — HIGH (ref 11–15.6)
SODIUM SERPL-SCNC: 142 MMOL/L — SIGNIFICANT CHANGE UP (ref 135–145)
WBC # BLD: 14.9 K/UL — HIGH (ref 4.8–10.8)
WBC # FLD AUTO: 14.9 K/UL — HIGH (ref 4.8–10.8)

## 2017-05-20 PROCEDURE — 99291 CRITICAL CARE FIRST HOUR: CPT

## 2017-05-20 PROCEDURE — 99233 SBSQ HOSP IP/OBS HIGH 50: CPT

## 2017-05-20 RX ORDER — HEPARIN SODIUM 5000 [USP'U]/ML
7500 INJECTION INTRAVENOUS; SUBCUTANEOUS EVERY 8 HOURS
Qty: 0 | Refills: 0 | Status: DISCONTINUED | OUTPATIENT
Start: 2017-05-20 | End: 2017-05-24

## 2017-05-20 RX ADMIN — Medication 1 APPLICATION(S): at 13:09

## 2017-05-20 RX ADMIN — Medication: at 06:45

## 2017-05-20 RX ADMIN — MIDODRINE HYDROCHLORIDE 10 MILLIGRAM(S): 2.5 TABLET ORAL at 23:13

## 2017-05-20 RX ADMIN — MIDODRINE HYDROCHLORIDE 10 MILLIGRAM(S): 2.5 TABLET ORAL at 13:11

## 2017-05-20 RX ADMIN — Medication 2: at 00:32

## 2017-05-20 RX ADMIN — Medication 2: at 17:05

## 2017-05-20 RX ADMIN — Medication 1 APPLICATION(S): at 06:45

## 2017-05-20 RX ADMIN — Medication 1 APPLICATION(S): at 13:10

## 2017-05-20 RX ADMIN — Medication 0.12 MILLIGRAM(S): at 06:45

## 2017-05-20 RX ADMIN — MIDODRINE HYDROCHLORIDE 10 MILLIGRAM(S): 2.5 TABLET ORAL at 06:44

## 2017-05-20 RX ADMIN — PANTOPRAZOLE SODIUM 40 MILLIGRAM(S): 20 TABLET, DELAYED RELEASE ORAL at 13:09

## 2017-05-20 RX ADMIN — Medication 1 APPLICATION(S): at 17:01

## 2017-05-20 RX ADMIN — Medication 5 MILLIGRAM(S): at 00:33

## 2017-05-20 RX ADMIN — HEPARIN SODIUM 7500 UNIT(S): 5000 INJECTION INTRAVENOUS; SUBCUTANEOUS at 23:13

## 2017-05-20 RX ADMIN — Medication 5 MILLIGRAM(S): at 06:44

## 2017-05-20 RX ADMIN — Medication 2: at 13:10

## 2017-05-20 NOTE — PROGRESS NOTE ADULT - PROBLEM SELECTOR PLAN 2
- metabolic/septic encephalopathy   - per family this morning, progressive in nature, however this is "worse than it has been"

## 2017-05-20 NOTE — PROGRESS NOTE ADULT - SUBJECTIVE AND OBJECTIVE BOX
Pt being followed for right knee wound being treated with wet to dry daily dressing changes.                             Physical exam: Pt lying in bed, pt not responding   Right knee wound wet to dry dressing changed. No drainage or discharge. No erythema is noted. No blistering. No ecchymosis. +b/l LE edema. The calf is supple nontender. 2+ dorsalis pedis pulse. Capillary refill is less than 2 seconds. No cyanosis.      Plan:   •  daily wet to dry dressing changes R knee  •  f/u vascular plan  •  will continue to follow

## 2017-05-20 NOTE — PROGRESS NOTE ADULT - ASSESSMENT
PT WITH POLYMICROBIAL BACTEREMIA WITH PROTEUS AND BACTEROIDES  REPEAT BLOOD CX NEGATIVE  URINE WITH ESBL ON MERREM  S/P CHOLECYSTOTOMY BY IR  SACRAL DECUBITUS SURGERY FOLLOWING  CONTINUE PRESENT REGIMEN  LONG D/W FAMILY AT BEDSIDE  ANSWERED ALL QUESTIONS

## 2017-05-20 NOTE — PROGRESS NOTE ADULT - SUBJECTIVE AND OBJECTIVE BOX
NEPHROLOGY INTERVAL HPI/OVERNIGHT EVENTS:    Interim noted   Uneventful HD yesterday (-) 1.5 kg   Meds reviewed   Palliative Care follow up , and discussions with son noted     MEDICATIONS  (STANDING):  dextrose 50% Injectable 12.5Gram(s) IV Push once  dextrose 50% Injectable 25Gram(s) IV Push once  dextrose 50% Injectable 25Gram(s) IV Push once  collagenase Ointment 1Application(s) Topical daily  collagenase Ointment 1Application(s) Topical two times a day  midodrine 10milliGRAM(s) Oral every 8 hours  insulin lispro (HumaLOG) corrective regimen sliding scale  SubCutaneous every 6 hours  pantoprazole  Injectable 40milliGRAM(s) IV Push daily  meropenem IVPB 1000milliGRAM(s) IV Intermittent every 24 hours  metoprolol Injectable 5milliGRAM(s) IV Push every 6 hours  cadexomer iodine Gel 1Application(s) Topical daily  digoxin     Tablet 0.125milliGRAM(s) Oral daily  epoetin melany Injectable [id number](s) IV Push <User Schedule>    MEDICATIONS  (PRN):  dextrose Gel 1Dose(s) Oral once PRN Blood Glucose LESS THAN 70 milliGRAM(s)/deciliter  glucagon  Injectable 1milliGRAM(s) IntraMuscular once PRN Glucose LESS THAN 70 milligrams/deciliter  oxyCODONE  5 mG/acetaminophen 325 mG 2Tablet(s) Oral every 6 hours PRN Moderate Pain (4 - 6)  fentaNYL    Injectable 25MICROGram(s) IV Push every 6 hours PRN Moderate Pain (4 - 6)      Allergies    No Known Allergies    Intolerances        Vital Signs Last 24 Hrs  T(C): 36.1, Max: 36.6 (05-19 @ 20:00)  T(F): 97, Max: 97.9 (05-19 @ 20:00)  HR: 68 (66 - 103)  BP: 90/51 (79/53 - 112/55)  BP(mean): 66 (60 - 83)  RR: 11 (11 - 20)  SpO2: 98% (95% - 99%)  Daily     Daily   I&O's Detail    I & Os for current day (as of 20 May 2017 11:10)  =============================================  IN:    Nepro: 1100 ml    Solution: 100 ml    Total IN: 1200 ml  ---------------------------------------------  OUT:    Other: 1500 ml    Bulb: 105 ml    Total OUT: 1605 ml  ---------------------------------------------  Total NET: -405 ml    I&O's Summary    I & Os for current day (as of 20 May 2017 11:10)  =============================================  IN: 1200 ml / OUT: 1605 ml / NET: -405 ml      PHYSICAL EXAM:  EYES: wnl  NECK: bilateral central lines  CHEST/LUNG: decreased bs bases, no wheezes  HEART: tachy, no rub  ABDOMEN: soft  EXTREMITIES:  r++ edema     LABS:                        9.3    14.9  )-----------( 95       ( 20 May 2017 06:24 )             27.0     05-20    142  |  102  |  21.0<H>  ----------------------------<  213<H>  4.3   |  26.0  |  3.30<H>    Ca    8.9      20 May 2017 06:24  Phos  3.1     05-19  Mg     2.0     05-19      PT/INR - ( 19 May 2017 05:50 )   PT: 18.7 sec;   INR: 1.68 ratio                     RADIOLOGY & ADDITIONAL TESTS:

## 2017-05-20 NOTE — PROGRESS NOTE ADULT - PROBLEM SELECTOR PLAN 3
- ID following  - sensitive to meropenem (on currently)  - possible debridement, though unclear about patient and family wishes at this time

## 2017-05-20 NOTE — PROGRESS NOTE ADULT - SUBJECTIVE AND OBJECTIVE BOX
No Known Allergies                                                             Code Status:DNR    SHYAM LAL Patient is a 72y old  Female who presents with a chief complaint of mental status changes & anasarca. (12 May 2017 18:13)      BRIEF HOSPITAL COURSE: Septic shock with multiple organisms and multiple sources in the setting of acute on chronic renal failure and severe metabolic encephalopathy.    Events last 24 hours: none acute    Review of systems:     unable to assess, RASS -4/stupor       PAST MEDICAL & SURGICAL HISTORY:  Hypercholesterolemia  Deficiency of vitamin K  Chronic pain  COPD (chronic obstructive pulmonary disease)  Urine retention  Pressure ulcer  Atrial fibrillation  Constipation  Breast cancer  HLD (hyperlipidemia)  HTN (hypertension)  GERD (gastroesophageal reflux disease)  DM (diabetes mellitus)  Obesity  LIN on CPAP  History of incision and drainage: rt knee  S/p total knee replacement, bilateral  S/P knee replacement, right  S/P hysterectomy  S/P mastectomy: Left  Breast cancer        Medications:  meropenem IVPB 1000milliGRAM(s) IV Intermittent every 24 hours    midodrine 10milliGRAM(s) Oral every 8 hours  metoprolol Injectable 5milliGRAM(s) IV Push every 6 hours  digoxin     Tablet 0.125milliGRAM(s) Oral daily      oxyCODONE  5 mG/acetaminophen 325 mG 2Tablet(s) Oral every 6 hours PRN  fentaNYL    Injectable 25MICROGram(s) IV Push every 6 hours PRN        pantoprazole  Injectable 40milliGRAM(s) IV Push daily      dextrose Gel 1Dose(s) Oral once PRN  dextrose 50% Injectable 12.5Gram(s) IV Push once  dextrose 50% Injectable 25Gram(s) IV Push once  dextrose 50% Injectable 25Gram(s) IV Push once  glucagon  Injectable 1milliGRAM(s) IntraMuscular once PRN  insulin lispro (HumaLOG) corrective regimen sliding scale  SubCutaneous every 6 hours      epoetin melany Injectable 51935Esnx(s) IV Push <User Schedule>    collagenase Ointment 1Application(s) Topical daily  collagenase Ointment 1Application(s) Topical two times a day  cadexomer iodine Gel 1Application(s) Topical daily            Adult Advanced Hemodynamics Last 24 Hrs  CVP(mm Hg): --  CVP(cm H2O): --  CO: --  CI: --  PA: --  PA(mean): --  PCWP: --  SVR: --  SVRI: --  PVR: --  PVRI: --      ICU Vital Signs Last 24 Hrs  T(C): 35.8, Max: 36.6 (05-19 @ 20:00)  T(F): 96.4, Max: 97.9 (05-19 @ 20:00)  HR: 69 (66 - 103)  BP: 99/54 (79/53 - 112/55)  BP(mean): 74 (60 - 82)  ABP: --  ABP(mean): --  RR: 12 (11 - 20)  SpO2: 99% (95% - 99%)    CAPILLARY BLOOD GLUCOSE  177 (20 May 2017 06:00)  184 (20 May 2017 00:00)  150 (19 May 2017 18:00)            LABS:  CBC Full  -  ( 20 May 2017 06:24 )  WBC Count : 14.9 K/uL  Hemoglobin : 9.3 g/dL  Hematocrit : 27.0 %  Platelet Count - Automated : 95 K/uL  Mean Cell Volume : 86.8 fl  Mean Cell Hemoglobin : 29.9 pg  Mean Cell Hemoglobin Concentration : 34.4 g/dL  Auto Neutrophil # : x  Auto Lymphocyte # : x  Auto Monocyte # : x  Auto Eosinophil # : x  Auto Basophil # : x  Auto Neutrophil % : x  Auto Lymphocyte % : x  Auto Monocyte % : x  Auto Eosinophil % : x  Auto Basophil % : x    05-20    142  |  102  |  21.0<H>  ----------------------------<  213<H>  4.3   |  26.0  |  3.30<H>    Ca    8.9      20 May 2017 06:24  Phos  3.1     05-19  Mg     2.0     05-19             PT/INR - ( 19 May 2017 05:50 )   PT: 18.7 sec;   INR: 1.68 ratio                 CULTURES:  Culture Results:   No growth at 1 day.  Culture in progress (05-18 @ 17:12)  Culture Results:   No growth at 48 hours (05-17 @ 09:23)  Culture Results:   >100,000 CFU/ml Klebsiella pneumoniae ESBL  >100,000 CFU/ml Citrobacter werkmanii  .  TYPE: (C=Critical, N=Notification, A=Abnormal) c  TESTS:  _ K pneumo ESBL  DATE/TIME CALLED: _ 05/19/2017 12:22:01  CALLED TO: _ mikael brian rn  READ BACK (2 (05-16 @ 04:07)  Culture Results:   Growth in aerobic and anaerobic bottles: Proteus mirabilis  Anaerobic Bottle: 16:13 Hours to positivity  Aerobic Bottle: 1 day 01:14 Hours to positivity  .  TYPE: (C=Critical, N=Notification, A=Abnormal) C  TESTS:  _ GS: Bld culture  DATE/TIME SALCEDO (05-15 @ 16:02)  Culture Results:   Growth in aerobic bottle: Proteus mirabilis  Aerobic Bottle: 1 day 01:44 Hours to positivity  Anaerobic Bottle: No growth to date  .  TYPE: (C=Critical, N=Notification, A=Abnormal) C  TESTS:  _ GS: Bld Culture  DATE/TIME CALLED: _ 05/16/2017 18:30:0 (05-15 @ 16:02)  Culture Results:   Growth in anaerobic bottle: Bacteroides thetaiotaomicron (anaerobic  organism)  Anaerobic Bottle: 1 day; 20:29 Hours to positivity  Aerobic Bottle: No growth at 5 days.  ,  TYPE: (C=Critical, N=Notification, A=Abnormal) C  TESTS:  _ BLD GS  DATE/JESSE (05-14 @ 06:35)  Culture Results:   No growth at 5 days. (05-14 @ 06:35)  Culture Results:   Numerous Proteus mirabilis  Numerous Providencia stuartii (05-14 @ 01:11)      Physical Examination:    General: stupor    HEENT: Atraumatic, MMM, Pupils equal, sluggish reactive to light.  Symmetric. NGT     PULM: reduced at bases, no significant sputum production    CVS: Regular rate and rhythm, no murmurs, rubs; S1/S2.     ABD: Soft, nondistended, nontender, normoactive bowel sounds, no masses    EXT: 2-3+ pitting edema, nontender. Distal pulses 2+ equal bilaterally    SKIN: necrosis of heels, stage IV decubi.       RADIOLOGY:         CRITICAL CARE TIME SPENT: 45 minutes

## 2017-05-20 NOTE — PROGRESS NOTE ADULT - ASSESSMENT
72F a/w AMS, concerns for septic shock with various species of bacteria and multiple sites, KARMEN on CKD, metabolic encephalopathy.

## 2017-05-20 NOTE — PROGRESS NOTE ADULT - SUBJECTIVE AND OBJECTIVE BOX
Patient is a 72y old  Female who presents with a chief complaint of mental status changes & anasarca. (12 May 2017 18:13)    PAST MEDICAL & SURGICAL HISTORY:  Hypercholesterolemia  Deficiency of vitamin K  Chronic pain  COPD (chronic obstructive pulmonary disease)  Urine retention  Pressure ulcer  Atrial fibrillation  Constipation  Breast cancer  HLD (hyperlipidemia)  HTN (hypertension)  GERD (gastroesophageal reflux disease)  DM (diabetes mellitus)  Obesity  LIN on CPAP  History of incision and drainage: rt knee  S/p total knee replacement, bilateral  S/P knee replacement, right  S/P hysterectomy  S/P mastectomy: Left  Breast cancer    SHYAM LAL   72y    Female    BRIEF HOSPITAL COURSE:  71 yo female, PMHx COPD, AFib, DM, CKD, h/o breast CA s/p L mastectomy, morbid obesity, LIN on CPAP at home, multiple pressure ulcers, presented to ED on 05/12/17  with KARMEN AMS.  ICU 5/15 with hypotension. Shock polymicrobial bacteremia.  S/P perc young.  urosepsis  infected decubiti.  Off pressors.  encephalopathic  SVT's better contolled now on digoxin           Review of Systems:                    ICU Vital Signs Last 24 Hrs  T(C): 36.2, Max: 36.3 (05-20 @ 04:00)  T(F): 97.1, Max: 97.4 (05-20 @ 04:00)  HR: 71 (66 - 111)  BP: 128/56 (79/53 - 136/60)  BP(mean): 77 (60 - 86)  ABP: --  ABP(mean): --  RR: 18 (11 - 18)  SpO2: 100% (95% - 100%)        Physical Examination:    General: No respiratory distress, opens eyes to noxious stimuli    HEENT: Pupils equal, reactive to light.  Symmetric.    PULM: Clear to auscultation bilaterally, no significant sputum production    CVS: Regular rate and rhythm, no murmurs, rubs, or gallops    ABD: Tender throughout without rebound. perc young tube    EXT: anasarca    SKIN: sacral ulcer to bone  -- L foot wrapped    NEURO: Somnulent, arousable to pain.        LABS:                        9.3    14.9  )-----------( 95       ( 20 May 2017 06:24 )             27.0     05-20    142  |  102  |  21.0<H>  ----------------------------<  213<H>  4.3   |  26.0  |  3.30<H>    Ca    8.9      20 May 2017 06:24  Phos  3.1     05-19  Mg     2.0     05-19      PT/INR - ( 19 May 2017 05:50 )   PT: 18.7 sec;   INR: 1.68 ratio      CULTURES:  Culture Results:   No growth at 2 days.  Culture in progress (05-18 @ 17:12)  Culture Results:   No growth at 48 hours (05-17 @ 09:23)  Culture Results:   >100,000 CFU/ml Klebsiella pneumoniae ESBL  >100,000 CFU/ml Citrobacter werkmanii  .  TYPE: (C=Critical, N=Notification, A=Abnormal) c  TESTS:  _ K pneumo ESBL  DATE/TIME CALLED: _ 05/19/2017 12:22:01  CALLED TO: _ mikael brian  rn  READ BACK (2 (05-16 @ 04:07)  Culture Results:   Growth in aerobic and anaerobic bottles: Proteus mirabilis  Anaerobic Bottle: 16:13 Hours to positivity  Aerobic Bottle: 1 day 01:14 Hours to positivity  .  TYPE: (C=Critical, N=Notification, A=Abnormal) C  TESTS:  _ GS: Bld culture  DATE/TIME SALCEDO (05-15 @ 16:02)  Culture Results:   Growth in aerobic bottle: Proteus mirabilis  Aerobic Bottle: 1 day 01:44 Hours to positivity  Anaerobic Bottle: No growth to date  .  TYPE: (C=Critical, N=Notification, A=Abnormal) C  TESTS:  _ GS: Bld Culture  DATE/TIME CALLED: _ 05/16/2017 18:30:0 (05-15 @ 16:02)  Culture Results:   Growth in anaerobic bottle: Bacteroides thetaiotaomicron (anaerobic  organism)  Anaerobic Bottle: 1 day; 20:29 Hours to positivity  Aerobic Bottle: No growth at 5 days.  ,  TYPE: (C=Critical, N=Notification, A=Abnormal) C  TESTS:  _ BLD GS  DATE/JESSE (05-14 @ 06:35)  Culture Results:   No growth at 5 days. (05-14 @ 06:35)  Culture Results:   Numerous Proteus mirabilis  Numerous Providencia stuartii (05-14 @ 01:11)    Medications:  meropenem IVPB 1000milliGRAM(s) IV Intermittent every 24 hours  midodrine 10milliGRAM(s) Oral every 8 hours  digoxin     Tablet 0.125milliGRAM(s) Oral daily  oxyCODONE  5 mG/acetaminophen 325 mG 2Tablet(s) Oral every 6 hours PRN  fentaNYL    Injectable 25MICROGram(s) IV Push every 6 hours PRN  heparin  Injectable 7500Unit(s) SubCutaneous every 8 hours  pantoprazole  Injectable 40milliGRAM(s) IV Push daily  nsulin lispro (HumaLOG) corrective regimen sliding scale  SubCutaneous every 6 hours  epoetin melany Injectable 24636Snyi(s) IV Push <User Schedule>  collagenase Ointment 1Application(s) Topical daily  collagenase Ointment 1Application(s) Topical two times a day  cadexomer iodine Gel 1Application(s) Topical daily          I & Os for 24h ending 05-20 @ 07:00  =============================================  IN: 1200 ml / OUT: 1605 ml / NET: -405 ml    I & Os for current day (as of 05-20 @ 22:04)  =============================================  IN: 150 ml / OUT: 0 ml / NET: 150 ml      RADIOLOGY/IMAGING/ECHO  CT    Large sacral decubitus ulcer with medial buttock soft tissue gas and  probable sacral osteomyelitis, new finding since 5/12.    Distended gallbladder with cholelithiasis, correlate clinically for acute  cholecystitis. Small new ascites.        Assessment/Plan:    Sepsis s/p shock  midodrine added multiple sources (urine/GB/decubiti)  with polymicrobial bactermia  GN/anerobbic  s/p pec choleacute on chronic renal failure.  SVT's    HD stable now.   On meropenum      Awaiting family decision regarding sacral decubiti debridement.  If remains stable overnigh transfer to SDU

## 2017-05-20 NOTE — PROGRESS NOTE ADULT - SUBJECTIVE AND OBJECTIVE BOX
[facility] CARMENZA HARRIS MD FACP   DESIRE METZGER MD  Diplomates American Board of Internal Medicine and Infectious Diseases      SHYAM LAL  [id number]    73 yo female who  is s/p Bilateral TKA with the Right knee currently treated for a right knee infection. SHe initially had the prosthesis removed with a mobile spacer and iv antibiotics .  The infection returned and the mobile spacer was removed and an antibiotic spacer was placed.  Post op, the patient has had wound healing difficulties and has been followed by Dr Rey.  The anterior wound is not new and has been followed by Dr Rey and allowed to heal by secondary intention.       ICU  FOR HD  NO POS C/S  ALL PENDING  PT LETHARGIC MINIMALLY RESPONSIVE    Vital Signs Last 24 Hrs  T(C): 36.6, Max: 36.7 (05-16 @ 00:00)  T(F): 97.9, Max: 98.1 (05-16 @ 00:00)  HR: 67 (67 - 134)  BP: 87/51 (70/51 - 137/74)  BP(mean): 64 (55 - 97)  RR: 12 (12 - 21)  SpO2: 99% (95% - 99%)    ANTIBIOTICS  meropenem IVPB  IV Intermittent   meropenem IVPB 500milliGRAM(s) IV Intermittent every 24 hours  VANCO X 1    Allergies    No Known Allergies    Intolerances        REVIEW OF SYSTEMS:POORLY REPSONSIVE    PHYSICAL EXAM:  Vital Signs Last 24 Hrs  T(C): 36.6, Max: 36.7 (05-16 @ 00:00)  T(F): 97.9, Max: 98.1 (05-16 @ 00:00)  HR: 67 (67 - 134)  BP: 87/51 (70/51 - 137/74)  BP(mean): 64 (55 - 97)  RR: 12 (12 - 21)  SpO2: 99% (95% - 99%)      GEN: NAD, LETHARGIC  HEENT: normocephalic and atraumatic. EOMI. CHIDI. Moist mucosa. Clear Posterior pharynx.  NECK: Supple. No carotid bruits.  No lymphadenopathy or thyromegaly.  LUNGS: Clear to auscultation.  HEART: Regular rate and rhythm without murmur.  ABDOMEN: Soft, nontender, and nondistended.  Positive bowel sounds.  No hepatosplenomegaly was noted.  EXTREMITIES: Without any cyanosis, clubbing, rash, lesions or edema.  NEUROLOGIC: Cranial nerves II through XII are grossly intact.  MUSCULOSKELETAL:KNEE NO CHANGE  SKIN: No ulceration or induration present    LABS:                        8.5    20.4  )-----------( 188      ( 16 May 2017 06:51 )             24.3       05-16    144  |  105  |  67.0<H>  ----------------------------<  58<L>  3.8   |  21.0<L>  |  7.85<H>    Ca    8.9      16 May 2017 06:51  Phos  4.0     05-16  Mg     1.7     05-16    TPro  5.1<L>  /  Alb  3.1<L>  /  TBili  2.0  /  DBili  x   /  AST  13  /  ALT  <4  /  AlkPhos  85  05-16 05-16 @ 06:51  hct 24.3 % hgb 8.5 g/dL    05-16 @ 06:51  plat 188 K/uL wbc 20.4 K/uL    05-15 @ 06:27  hct 28.9 % hgb 9.8 g/dL    05-15 @ 06:27  plat 241 K/uL wbc 23.2 K/uL    05-14 @ 04:20  hct 31.7 % hgb 10.6 g/dL    05-14 @ 04:20  plat 285 K/uL wbc 17.6 K/uL      05-16-17  creat 7.85 mg/dL gfr 5 mL/min/1.73M2 bun 67.0 mg/dL k 3.8 mmol/L  05-15-17  creat 7.96 mg/dL gfr 5 mL/min/1.73M2 bun 65.0 mg/dL k 4.0 mmol/L  05-14-17  creat 7.77 mg/dL gfr 5 mL/min/1.73M2 bun 63.0 mg/dL k 4.1 mmol/L      MICROBIOLOGY:  Culture Results:   Numerous Gram Negative Rods Identification and susceptibility to follow.  Culture in progress (05-14 @ 01:11)  Culture Results:   Culture grew 3 or more types of organisms which indicate  collection contamination; consider recollection only if clinically  indicated.  .  TYPE: (C=Critical, N=Notification, A=Abnormal) N  TESTS:  _ Contaminated Urine Culture  DATE/TIME CALLED: _ (05-12 @ 19:10)  Culture Results:   No growth at 48 hours (05-12 @ 15:07)  Culture Results:   No growth at 48 hours (05-12 @ 15:06)        RADIOLOGY & ADDITIONAL STUDIES:

## 2017-05-20 NOTE — PROGRESS NOTE ADULT - ASSESSMENT
KARMEN on CKD   No hydro on CT 5-17  Anasarca , progressive FTT   No pressing need for acute HD today     Sepsis - H/O infected right knee hardware , s/p young tube, decub  Abx as per ID     Anemia - Added Epogen

## 2017-05-21 DIAGNOSIS — D69.6 THROMBOCYTOPENIA, UNSPECIFIED: ICD-10-CM

## 2017-05-21 LAB
ANION GAP SERPL CALC-SCNC: 8 MMOL/L — SIGNIFICANT CHANGE UP (ref 5–17)
BUN SERPL-MCNC: 24 MG/DL — HIGH (ref 8–20)
CALCIUM SERPL-MCNC: 8.7 MG/DL — SIGNIFICANT CHANGE UP (ref 8.6–10.2)
CHLORIDE SERPL-SCNC: 101 MMOL/L — SIGNIFICANT CHANGE UP (ref 98–107)
CO2 SERPL-SCNC: 30 MMOL/L — HIGH (ref 22–29)
CREAT SERPL-MCNC: 3.45 MG/DL — HIGH (ref 0.5–1.3)
CULTURE RESULTS: SIGNIFICANT CHANGE UP
DIGOXIN SERPL-MCNC: 1.4 NG/ML — SIGNIFICANT CHANGE UP (ref 0.8–2)
GLUCOSE SERPL-MCNC: 182 MG/DL — HIGH (ref 70–115)
HCT VFR BLD CALC: 26.2 % — LOW (ref 37–47)
HGB BLD-MCNC: 8.8 G/DL — LOW (ref 12–16)
MAGNESIUM SERPL-MCNC: 1.8 MG/DL — SIGNIFICANT CHANGE UP (ref 1.6–2.6)
MCHC RBC-ENTMCNC: 29.2 PG — SIGNIFICANT CHANGE UP (ref 27–31)
MCHC RBC-ENTMCNC: 33.6 G/DL — SIGNIFICANT CHANGE UP (ref 32–36)
MCV RBC AUTO: 87 FL — SIGNIFICANT CHANGE UP (ref 81–99)
ORGANISM # SPEC MICROSCOPIC CNT: SIGNIFICANT CHANGE UP
ORGANISM # SPEC MICROSCOPIC CNT: SIGNIFICANT CHANGE UP
PHOSPHATE SERPL-MCNC: 1.6 MG/DL — LOW (ref 2.4–4.7)
PLATELET # BLD AUTO: 76 K/UL — LOW (ref 150–400)
POTASSIUM SERPL-MCNC: 4.2 MMOL/L — SIGNIFICANT CHANGE UP (ref 3.5–5.3)
POTASSIUM SERPL-SCNC: 4.2 MMOL/L — SIGNIFICANT CHANGE UP (ref 3.5–5.3)
RBC # BLD: 3.01 M/UL — LOW (ref 4.4–5.2)
RBC # FLD: 17.7 % — HIGH (ref 11–15.6)
SODIUM SERPL-SCNC: 139 MMOL/L — SIGNIFICANT CHANGE UP (ref 135–145)
SPECIMEN SOURCE: SIGNIFICANT CHANGE UP
WBC # BLD: 14.1 K/UL — HIGH (ref 4.8–10.8)
WBC # FLD AUTO: 14.1 K/UL — HIGH (ref 4.8–10.8)

## 2017-05-21 PROCEDURE — 99291 CRITICAL CARE FIRST HOUR: CPT

## 2017-05-21 RX ORDER — MIDODRINE HYDROCHLORIDE 2.5 MG/1
15 TABLET ORAL EVERY 8 HOURS
Qty: 0 | Refills: 0 | Status: DISCONTINUED | OUTPATIENT
Start: 2017-05-21 | End: 2017-05-24

## 2017-05-21 RX ORDER — SODIUM,POTASSIUM PHOSPHATES 278-250MG
1 POWDER IN PACKET (EA) ORAL EVERY 6 HOURS
Qty: 0 | Refills: 0 | Status: DISCONTINUED | OUTPATIENT
Start: 2017-05-21 | End: 2017-05-21

## 2017-05-21 RX ORDER — SODIUM CHLORIDE 9 MG/ML
500 INJECTION INTRAMUSCULAR; INTRAVENOUS; SUBCUTANEOUS ONCE
Qty: 0 | Refills: 0 | Status: COMPLETED | OUTPATIENT
Start: 2017-05-21 | End: 2017-05-21

## 2017-05-21 RX ORDER — SODIUM,POTASSIUM PHOSPHATES 278-250MG
1 POWDER IN PACKET (EA) ORAL
Qty: 0 | Refills: 0 | Status: COMPLETED | OUTPATIENT
Start: 2017-05-21 | End: 2017-05-21

## 2017-05-21 RX ADMIN — HEPARIN SODIUM 7500 UNIT(S): 5000 INJECTION INTRAVENOUS; SUBCUTANEOUS at 15:08

## 2017-05-21 RX ADMIN — Medication 1 APPLICATION(S): at 05:14

## 2017-05-21 RX ADMIN — Medication 1 TABLET(S): at 17:12

## 2017-05-21 RX ADMIN — MIDODRINE HYDROCHLORIDE 10 MILLIGRAM(S): 2.5 TABLET ORAL at 05:18

## 2017-05-21 RX ADMIN — Medication 2: at 23:25

## 2017-05-21 RX ADMIN — MIDODRINE HYDROCHLORIDE 15 MILLIGRAM(S): 2.5 TABLET ORAL at 15:08

## 2017-05-21 RX ADMIN — Medication 1 TABLET(S): at 12:09

## 2017-05-21 RX ADMIN — Medication: at 15:08

## 2017-05-21 RX ADMIN — MIDODRINE HYDROCHLORIDE 15 MILLIGRAM(S): 2.5 TABLET ORAL at 23:25

## 2017-05-21 RX ADMIN — Medication 2: at 05:17

## 2017-05-21 RX ADMIN — Medication 1 APPLICATION(S): at 12:16

## 2017-05-21 RX ADMIN — HEPARIN SODIUM 7500 UNIT(S): 5000 INJECTION INTRAVENOUS; SUBCUTANEOUS at 05:17

## 2017-05-21 RX ADMIN — MEROPENEM 200 MILLIGRAM(S): 1 INJECTION INTRAVENOUS at 23:25

## 2017-05-21 RX ADMIN — SODIUM CHLORIDE 1000 MILLILITER(S): 9 INJECTION INTRAMUSCULAR; INTRAVENOUS; SUBCUTANEOUS at 06:29

## 2017-05-21 RX ADMIN — HEPARIN SODIUM 7500 UNIT(S): 5000 INJECTION INTRAVENOUS; SUBCUTANEOUS at 23:24

## 2017-05-21 RX ADMIN — Medication 0.12 MILLIGRAM(S): at 05:18

## 2017-05-21 NOTE — PROGRESS NOTE ADULT - PROBLEM SELECTOR PLAN 4
- septic shock, on midodrine PO  - fluid responsive, however with intermittent HD may require IV vasopressors. will increased midodrine 5/21 to 15mg TID

## 2017-05-21 NOTE — PROGRESS NOTE ADULT - PROBLEM SELECTOR PLAN 5
- as above.
-spoke to son Will at length regarding poor overall guarded prognosis. Explained that even if we proceed with debridement, it will likely be numerous trips to the OR, and healing will be difficult given debility, poor nutritional state, and area of OM. I also explained that we can continue to do all medical interventions but unfortunately we need to evaluate whether or not those interventions will be attaining the goals of care for her (currently sons goals are to try to see if she can be coherent enough to tell us what she wants, I explained that debridement is unlikely to achieve that goal). I also explained that we are looking at a best case scenario of being dependent on all ADLs, in a nursing home setting with recurrent trips back and forth to the hospital for infections, etc, assuming we are even able to get her out of the hospital. I told him we may be reaching a point soon of discussing transition to focusing on her comfort only, he understands, and just wants to give a little more time to see if she can be a little more coherent, he and his Aunt (Anisa HCP) have had numerous discussions regarding this.
Improving, s/p percutaneous cholecystostomy, continue Meropenem. Off Levophed.
Off medications given hypotension.
Secondary to cholecystitis. Surgery and IR evaluation.
Septic shock, on Meropenem. Follow up repeat cultures. CT abdomen today.
Septic shock, on Meropenem. Repeat blood cultures. ID following.
- as above.
Monitor BP, on IVF.
multifactorial, dialysis, avoid sedatives, will need pain control fentanyl prn
-very lengthy conversation with sister and son Will with Dr. Aguilar and Shimon KRAMER. Explained potential options, debridement, IR for gallbladder drain etc, but also explained our hesitation and concern that the overall picture looks guarded, given poor functional status, nutritional status, the likelihood is that even with sacral debridement , there will not be good wound healing. They would like every opportunity to see if she can better but realize that we will reach a point where we need to discuss quality of life and how far we are going to go.
Humalog sliding scale  Monitor bsl Q6 hours while npo  On d5

## 2017-05-21 NOTE — PROGRESS NOTE ADULT - PROBLEM SELECTOR PROBLEM 4
DM (diabetes mellitus)
Debility
Encephalopathy
Hypotension
Encephalopathy
Anaerobic bacteremia
DM (diabetes mellitus)
Debility
Encephalopathy
Gram negative sepsis
Hypotension
Proteus septicemia
Debility

## 2017-05-21 NOTE — PROGRESS NOTE ADULT - PROBLEM SELECTOR PLAN 6
- will trend, could be medication induced, however concerns for developing DIC and worsening sepsis nidus.
Secondary to hypoalbuminemia  Lungs are clear with no clear evidence of CHF

## 2017-05-21 NOTE — PROGRESS NOTE ADULT - ASSESSMENT
KARMEN on CKD   No hydro on CT 5-17  Anasarca , progressive FTT   No pressing need for acute HD today   Will reassess in am     Sepsis - H/O infected right knee hardware , s/p young tube, decub  Abx as per ID

## 2017-05-21 NOTE — PROGRESS NOTE ADULT - PROBLEM SELECTOR PLAN 3
- ID following  - sensitive to meropenem (on currently); multiple sources of polymicrobial infection(s)  - possible debridement, surgery following

## 2017-05-21 NOTE — PROGRESS NOTE ADULT - PROBLEM SELECTOR PROBLEM 2
Chronic atrial fibrillation
Decubitus ulcer of sacral region, stage 4
Encephalopathy
Gram negative sepsis
CKD (chronic kidney disease) stage 3, GFR 30-59 ml/min
Acute on chronic kidney failure
Chronic atrial fibrillation
Chronic atrial fibrillation
Decubitus ulcer of sacral region, stage 4
Decubitus ulcer of sacral region, stage 4
Encephalopathy
Encephalopathy, unspecified
Gram negative sepsis
PAF (paroxysmal atrial fibrillation)
Proteus septicemia
Gram negative sepsis

## 2017-05-21 NOTE — PROGRESS NOTE ADULT - PROBLEM SELECTOR PLAN 1
baseline Scr ~2.5-3  - new IHD per renal  - stopped epogen today as would not use in setting of polymicrobial infection/sepsis

## 2017-05-21 NOTE — PROGRESS NOTE ADULT - SUBJECTIVE AND OBJECTIVE BOX
NEPHROLOGY INTERVAL HPI/OVERNIGHT EVENTS:    Interim noted   ID and ICU team folow up noted     MEDICATIONS  (STANDING):  dextrose 50% Injectable 25Gram(s) IV Push once  dextrose 50% Injectable 25Gram(s) IV Push once  collagenase Ointment 1Application(s) Topical daily  collagenase Ointment 1Application(s) Topical two times a day  insulin lispro (HumaLOG) corrective regimen sliding scale  SubCutaneous every 6 hours  meropenem IVPB 1000milliGRAM(s) IV Intermittent every 24 hours  cadexomer iodine Gel 1Application(s) Topical daily  digoxin     Tablet 0.125milliGRAM(s) Oral daily  heparin  Injectable 7500Unit(s) SubCutaneous every 8 hours  midodrine 15milliGRAM(s) Oral every 8 hours    MEDICATIONS  (PRN):  fentaNYL    Injectable 25MICROGram(s) IV Push every 6 hours PRN Moderate Pain (4 - 6)      Allergies    No Known Allergies    Intolerances        Vital Signs Last 24 Hrs  T(C): 36, Max: 36.6 (05-21 @ 04:03)  T(F): 96.8, Max: 97.8 (05-21 @ 04:03)  HR: 94 (69 - 94)  BP: 94/55 (70/34 - 140/62)  BP(mean): 69 (47 - 89)  RR: 8 (8 - 23)  SpO2: 98% (94% - 100%)  Daily     Daily   I&O's Detail  I & Os for 24h ending 21 May 2017 07:00  =============================================  IN:    Nepro: 650 ml    Sodium Chloride 0.9% IV Bolus: 500 ml    Total IN: 1150 ml  ---------------------------------------------  OUT:    Bulb: 90 ml    Total OUT: 90 ml  ---------------------------------------------  Total NET: 1060 ml    I & Os for current day (as of 21 May 2017 20:14)  =============================================  IN:    Nepro: 450 ml    Total IN: 450 ml  ---------------------------------------------  OUT:    Bulb: 70 ml    Total OUT: 70 ml  ---------------------------------------------  Total NET: 380 ml    I&O's Summary  I & Os for 24h ending 21 May 2017 07:00  =============================================  IN: 1150 ml / OUT: 90 ml / NET: 1060 ml    I & Os for current day (as of 21 May 2017 20:14)  =============================================  IN: 450 ml / OUT: 70 ml / NET: 380 ml      PHYSICAL EXAM:    PHYSICAL EXAM:    NECK: bilateral central lines  CHEST/LUNG: decreased bs bases, no wheezes  HEART: tachy, no rub  ABDOMEN: soft  EXTREMITIES:  r++ edema         LABS:                        8.8    14.1  )-----------( 76       ( 21 May 2017 07:11 )             26.2     05-21    139  |  101  |  24.0<H>  ----------------------------<  182<H>  4.2   |  30.0<H>  |  3.45<H>    Ca    8.7      21 May 2017 07:11  Phos  1.6     05-21  Mg     1.8     05-21          Magnesium, Serum: 1.8 mg/dL (05-21 @ 07:11)  Phosphorus Level, Serum: 1.6 mg/dL (05-21 @ 07:11)          RADIOLOGY & ADDITIONAL TESTS:

## 2017-05-21 NOTE — PROGRESS NOTE ADULT - ATTENDING COMMENTS
Case discussed with PA.  IR aspiration right shoulder: no aspirate obtained.  Reviewed xrays of the left shoulder, also has degenerative changes, not as severe as the right.  Pt has urinary infection and lung atelectasis    a/p: Right shoulder infection less likely, observe, cont abx per ID.    Christina Ivy MD
Thank you for the opportunity to assist with the care of this patient.   [facility] [phone].
D/C iv fluid, CAT of Abdomin today and then HD with albumin.
HD tomorrow, antibiotics as per ID.
Thank you for the opportunity to assist with the care of this patient.   [facility] [phone].
Coordinated care with Dr Cobos, Dr Warner, podiatry services.  Requires continued central venous access due to lack of other IV sources.  Remains CAM positive from a metabolic encephalopathy.  Being fed at goal rate.
Care per ICU, will follow.
Plan of care discussed with ICU and son at bedside.
Plan of care discussed with patient's sister Brooklynn, son Juan Carlos, orthopedics, ID and nephrology.
Plan of care per ICU. Medicine team will follow.
Thank you for the opportunity to assist with the care of this patient.   [facility] [phone].
The patient is in critical condition and requires ICU care and monitoring. Total Critical Care time spent by attending physician was ___35  minutes, excluding procedure time.     Palliative/Ethics:  RASS -3 to 4  CAM-ICU marvel  Sedation: n/a  VENT Bundle Reviewed:   Glycemic Control: niss  DVT PPX: started heparin 7500 units today  Nutrition: NGT feeds  PT/OT: following    Invasive Lines/Gaytan:  right IJ tlc- infusion of vasopressors  left HD cath- IHD
Spoke with sister Anisa at length regarding plan of care. Palliative care evaluation in am.
Extended conversation with sister and son in presence of Dr Warner.  Answered all questions; DNR, continue with full aggressive treatment, with the understanding that there may come a time where they may want to hold off invasive procedures.
Plan of care discussed with son at bedside. Care as per ICU. Will follow.
The patient is in critical condition and requires ICU care and monitoring. Total Critical Care time spent by attending physician was ___45 minutes, excluding procedure time.     Palliative/Ethics: updated various family at bedside. remains unclear to me when she started her progressive decline. The same family member (identified himself as from [place] and her brother- one of 20 other siblings) stated that she's been non-verbal for 3 years, however "we were talking just last week eating chicken". Palliative is following.      RASS -4  CAM-ICU marvel  Sedation: n/a  VENT Bundle Reviewed:   Glycemic Control: niss  DVT PPX: started heparin 7500 units today  Nutrition: NGT feeds  PT/OT: following    Invasive Lines/Gaytan:  right IJ tlc- infusion of vasopressors  left HD cath- IHD

## 2017-05-21 NOTE — PROGRESS NOTE ADULT - SUBJECTIVE AND OBJECTIVE BOX
No Known Allergies                                                             Code Status:DNR    LAL, SHYAM Patient is a 72y old  Female who presents with a chief complaint of mental status changes & anasarca. (12 May 2017 18:13)      BRIEF HOSPITAL COURSE: Septic shock with multiple organisms and multiple sources in the setting of acute on chronic renal failure and severe metabolic encephalopathy.    Events last 24 hours: overnight and this morning had fluid responsive hypotension.     Review of systems:     unable to assess, RASS -3 to -1; responds to tactile stimuli with eyes open and moaning, no direct verbalization of information.       PAST MEDICAL & SURGICAL HISTORY:  Hypercholesterolemia  Deficiency of vitamin K  Chronic pain  COPD (chronic obstructive pulmonary disease)  Urine retention  Pressure ulcer  Atrial fibrillation  Constipation  Breast cancer  HLD (hyperlipidemia)  HTN (hypertension)  GERD (gastroesophageal reflux disease)  DM (diabetes mellitus)  Obesity  LIN on CPAP  History of incision and drainage: rt knee  S/p total knee replacement, bilateral  S/P knee replacement, right  S/P hysterectomy  S/P mastectomy: Left  Breast cancer      MEDICATIONS  (STANDING):  dextrose 50% Injectable 25Gram(s) IV Push once  dextrose 50% Injectable 25Gram(s) IV Push once  collagenase Ointment 1Application(s) Topical daily  collagenase Ointment 1Application(s) Topical two times a day  midodrine 10milliGRAM(s) Oral every 8 hours  insulin lispro (HumaLOG) corrective regimen sliding scale  SubCutaneous every 6 hours  meropenem IVPB 1000milliGRAM(s) IV Intermittent every 24 hours  cadexomer iodine Gel 1Application(s) Topical daily  digoxin     Tablet 0.125milliGRAM(s) Oral daily  heparin  Injectable 7500Unit(s) SubCutaneous every 8 hours  potassium acid phosphate/sodium acid phosphate tablet (K-PHOS No. 2) 1Tablet(s) Oral four times a day with meals    MEDICATIONS  (PRN):  fentaNYL    Injectable 25MICROGram(s) IV Push every 6 hours PRN Moderate Pain (4 - 6)                ICU Vital Signs Last 24 Hrs  T(C): 36.1, Max: 36.6 (05-21 @ 04:03)  T(F): 96.9, Max: 97.8 (05-21 @ 04:03)  HR: 70 (69 - 111)  BP: 94/48 (70/34 - 140/62)  BP(mean): 67 (47 - 89)  ABP: --  ABP(mean): --  RR: 15 (11 - 23)  SpO2: 98% (95% - 100%)  CAPILLARY BLOOD GLUCOSE  155 (21 May 2017 06:00)  132 (21 May 2017 00:00)  176 (20 May 2017 17:00)            LABS:                        8.8    14.1  )-----------( 76       ( 21 May 2017 07:11 )             26.2       05-21    139  |  101  |  24.0<H>  ----------------------------<  182<H>  4.2   |  30.0<H>  |  3.45<H>    Ca    8.7      21 May 2017 07:11  Phos  1.6     05-21  Mg     1.8     05-21                 CULTURES:  Culture Results:   No growth at 1 day.  Culture in progress (05-18 @ 17:12)  Culture Results:   No growth at 48 hours (05-17 @ 09:23)  Culture Results:   >100,000 CFU/ml Klebsiella pneumoniae ESBL  >100,000 CFU/ml Citrobacter werkmanii  .  TYPE: (C=Critical, N=Notification, A=Abnormal) c  TESTS:  _ K pneumo ESBL  DATE/TIME CALLED: _ 05/19/2017 12:22:01  CALLED TO: _ mikael brian  rn  READ BACK (2 (05-16 @ 04:07)  Culture Results:   Growth in aerobic and anaerobic bottles: Proteus mirabilis  Anaerobic Bottle: 16:13 Hours to positivity  Aerobic Bottle: 1 day 01:14 Hours to positivity  .  TYPE: (C=Critical, N=Notification, A=Abnormal) C  TESTS:  _ GS: Bld culture  DATE/TIME SALCEDO (05-15 @ 16:02)  Culture Results:   Growth in aerobic bottle: Proteus mirabilis  Aerobic Bottle: 1 day 01:44 Hours to positivity  Anaerobic Bottle: No growth to date  .  TYPE: (C=Critical, N=Notification, A=Abnormal) C  TESTS:  _ GS: Bld Culture  DATE/TIME CALLED: _ 05/16/2017 18:30:0 (05-15 @ 16:02)  Culture Results:   Growth in anaerobic bottle: Bacteroides thetaiotaomicron (anaerobic  organism)  Anaerobic Bottle: 1 day; 20:29 Hours to positivity  Aerobic Bottle: No growth at 5 days.  ,  TYPE: (C=Critical, N=Notification, A=Abnormal) C  TESTS:  _ BLD GS  DATE/JESSE (05-14 @ 06:35)  Culture Results:   No growth at 5 days. (05-14 @ 06:35)  Culture Results:   Numerous Proteus mirabilis  Numerous Providencia stuartii (05-14 @ 01:11)      Physical Examination:    General: stupor; opens eyes to tactile stimuli     HEENT: Atraumatic, MMM, Pupils equal, sluggish reactive to light.  Symmetric. NGT     PULM: reduced at bases, no significant sputum production    CVS: Regular rate and rhythm, no murmurs, rubs; S1/S2.     ABD: Soft, nondistended, nontender, normoactive bowel sounds, no masses; + choledrain (*of note present on 5/20 exam; not documented)     EXT: 2-3+ pitting edema; soft compartments . Distal pulses 2+ equal bilaterally    SKIN: necrosis of parts of heels, stage IV decubi.       RADIOLOGY:         CRITICAL CARE TIME SPENT: 35 minutes

## 2017-05-21 NOTE — PROGRESS NOTE ADULT - PROBLEM SELECTOR PROBLEM 3
Acute on chronic kidney failure
Acute on chronic renal failure
Encephalopathy
Polymicrobial sepsis
Skin ulcer of sacrum, with unspecified severity
Acute on chronic kidney failure
Acute on chronic kidney failure
Acute on chronic renal failure
Anaerobic bacteremia
CKD (chronic kidney disease) stage 3, GFR 30-59 ml/min
Encephalopathy
Polymicrobial sepsis
Proteus septicemia
Acute on chronic kidney failure
Preop cardiovascular exam

## 2017-05-21 NOTE — PROGRESS NOTE ADULT - PROBLEM SELECTOR PROBLEM 1
Acute on chronic kidney failure
Acute renal failure, unspecified acute renal failure type
Encephalopathy, unspecified
Polymicrobial sepsis
Acute renal failure, unspecified acute renal failure type
Acute on chronic kidney failure
Acute renal failure, unspecified acute renal failure type
Acute renal failure, unspecified acute renal failure type
Anaerobic bacteremia
Encephalopathy, unspecified
Gram negative sepsis
Gram negative sepsis
Polymicrobial sepsis
Polymicrobial sepsis
Sepsis
Encephalopathy, unspecified

## 2017-05-21 NOTE — PROGRESS NOTE ADULT - SUBJECTIVE AND OBJECTIVE BOX
Pt Name: SHYAM LAL    MRN: [id number]      Patient is a being followed for a right knee wound following an explant of a total knee arthroplasty with a cement spacer by Dr Ritchie. Wound is being followed by Dr. Rey. Patient is not verbal and unable to provide any history.       PAST MEDICAL & SURGICAL HISTORY:  PAST MEDICAL & SURGICAL HISTORY:  Hypercholesterolemia  Deficiency of vitamin K  Chronic pain  COPD (chronic obstructive pulmonary disease)  Urine retention  Pressure ulcer  Atrial fibrillation  Constipation  Breast cancer  HLD (hyperlipidemia)  HTN (hypertension)  GERD (gastroesophageal reflux disease)  DM (diabetes mellitus)  Obesity  LIN on CPAP  History of incision and drainage: rt knee  S/p total knee replacement, bilateral  S/P knee replacement, right  S/P hysterectomy  S/P mastectomy: Left  Breast cancer      Allergies: No Known Allergies      Medications: dextrose 50% Injectable 25Gram(s) IV Push once  dextrose 50% Injectable 25Gram(s) IV Push once  collagenase Ointment 1Application(s) Topical daily  collagenase Ointment 1Application(s) Topical two times a day  midodrine 10milliGRAM(s) Oral every 8 hours  insulin lispro (HumaLOG) corrective regimen sliding scale  SubCutaneous every 6 hours  meropenem IVPB 1000milliGRAM(s) IV Intermittent every 24 hours  fentaNYL    Injectable 25MICROGram(s) IV Push every 6 hours PRN  cadexomer iodine Gel 1Application(s) Topical daily  digoxin     Tablet 0.125milliGRAM(s) Oral daily  heparin  Injectable 7500Unit(s) SubCutaneous every 8 hours  potassium acid phosphate/sodium acid phosphate tablet (K-PHOS No. 2) 1Tablet(s) Oral four times a day with meals        Ambulation: Walking independently [ ] With Cane [ ] With Walker [ ]  Bedbound [x]                           8.8    14.1  )-----------( 76       ( 21 May 2017 07:11 )             26.2     05-21    139  |  101  |  24.0<H>  ----------------------------<  182<H>  4.2   |  30.0<H>  |  3.45<H>    Ca    8.7      21 May 2017 07:11  Phos  1.6     05-21  Mg     1.8     05-21        PHYSICAL EXAM:    Vital Signs Last 24 Hrs  T(C): 36.1, Max: 36.6 (05-21 @ 04:03)  T(F): 96.9, Max: 97.8 (05-21 @ 04:03)  HR: 70 (69 - 111)  BP: 94/48 (70/34 - 140/62)  BP(mean): 67 (47 - 89)  RR: 15 (11 - 23)  SpO2: 98% (95% - 100%)  Daily     Daily     Appearance: Alert, responsive, in no acute distress.    Neurological: Sensation is grossly intact to light touch. 5/5 motor function of all extremities. No focal deficits or weaknesses found.    Skin: no rash on visible skin. Skin is clean, dry and intact. No bleeding. No abrasions. No ulcerations.    Vascular: 2+ distal pulses. Cap refill < 2 sec. No signs of venous   insufficiency   or stasis. No extremity ulcerations. No cyanosis.    Musculoskeletal:         Left Upper Extremity:       Right Upper Extremity:       Left Lower Extremity:       Right Lower Extremity:      A/P:  Pt is a  72y Female with     PLAN:   * Pt Name: SHYAM LAL    MRN: [id number]      Patient is a being followed for a right knee wound following an explant of a total knee arthroplasty with a cement spacer by Dr Ritchie. Wound is being followed by Dr. Rey. Patient is not verbal and unable to provide any history.       PAST MEDICAL & SURGICAL HISTORY:  PAST MEDICAL & SURGICAL HISTORY:  Hypercholesterolemia  Deficiency of vitamin K  Chronic pain  COPD (chronic obstructive pulmonary disease)  Urine retention  Pressure ulcer  Atrial fibrillation  Constipation  Breast cancer  HLD (hyperlipidemia)  HTN (hypertension)  GERD (gastroesophageal reflux disease)  DM (diabetes mellitus)  Obesity  LIN on CPAP  History of incision and drainage: rt knee  S/p total knee replacement, bilateral  S/P knee replacement, right  S/P hysterectomy  S/P mastectomy: Left  Breast cancer      Allergies: No Known Allergies      Medications: dextrose 50% Injectable 25Gram(s) IV Push once  dextrose 50% Injectable 25Gram(s) IV Push once  collagenase Ointment 1Application(s) Topical daily  collagenase Ointment 1Application(s) Topical two times a day  midodrine 10milliGRAM(s) Oral every 8 hours  insulin lispro (HumaLOG) corrective regimen sliding scale  SubCutaneous every 6 hours  meropenem IVPB 1000milliGRAM(s) IV Intermittent every 24 hours  fentaNYL    Injectable 25MICROGram(s) IV Push every 6 hours PRN  cadexomer iodine Gel 1Application(s) Topical daily  digoxin     Tablet 0.125milliGRAM(s) Oral daily  heparin  Injectable 7500Unit(s) SubCutaneous every 8 hours  potassium acid phosphate/sodium acid phosphate tablet (K-PHOS No. 2) 1Tablet(s) Oral four times a day with meals        Ambulation: Walking independently [ ] With Cane [ ] With Walker [ ]  Bedbound [x]                           8.8    14.1  )-----------( 76       ( 21 May 2017 07:11 )             26.2     05-21    139  |  101  |  24.0<H>  ----------------------------<  182<H>  4.2   |  30.0<H>  |  3.45<H>    Ca    8.7      21 May 2017 07:11  Phos  1.6     05-21  Mg     1.8     05-21        PHYSICAL EXAM:    Vital Signs Last 24 Hrs  T(C): 36.1, Max: 36.6 (05-21 @ 04:03)  T(F): 96.9, Max: 97.8 (05-21 @ 04:03)  HR: 70 (69 - 111)  BP: 94/48 (70/34 - 140/62)  BP(mean): 67 (47 - 89)  RR: 15 (11 - 23)  SpO2: 98% (95% - 100%)  Daily     Daily     Appearance: Alert to painful stimuli only.     Neurological: unable to assess due to patient's condition.    Skin: large deep anterior knee wound with fibronuous tissue. No erythema associated.     Vascular: 1+ distal pulses. Cap refill < 2 sec.         A/P:  Pt is a  72y non-verbal Female with chronic anterior right knee wound following an explant / cement spacer of a right total knee  PLAN:   * dressing change performed - wet-to-dry dressing applied  * medical and palliative care notes reviewed

## 2017-05-22 LAB
CULTURE RESULTS: SIGNIFICANT CHANGE UP
SPECIMEN SOURCE: SIGNIFICANT CHANGE UP

## 2017-05-22 PROCEDURE — 99233 SBSQ HOSP IP/OBS HIGH 50: CPT

## 2017-05-22 PROCEDURE — 99231 SBSQ HOSP IP/OBS SF/LOW 25: CPT

## 2017-05-22 RX ORDER — MORPHINE SULFATE 50 MG/1
2 CAPSULE, EXTENDED RELEASE ORAL EVERY 4 HOURS
Qty: 0 | Refills: 0 | Status: DISCONTINUED | OUTPATIENT
Start: 2017-05-22 | End: 2017-05-24

## 2017-05-22 RX ADMIN — Medication 1 APPLICATION(S): at 11:47

## 2017-05-22 RX ADMIN — HEPARIN SODIUM 7500 UNIT(S): 5000 INJECTION INTRAVENOUS; SUBCUTANEOUS at 06:45

## 2017-05-22 RX ADMIN — Medication 4: at 06:44

## 2017-05-22 RX ADMIN — Medication 0.12 MILLIGRAM(S): at 06:44

## 2017-05-22 RX ADMIN — Medication 1 APPLICATION(S): at 06:44

## 2017-05-22 RX ADMIN — MIDODRINE HYDROCHLORIDE 15 MILLIGRAM(S): 2.5 TABLET ORAL at 21:26

## 2017-05-22 RX ADMIN — HEPARIN SODIUM 7500 UNIT(S): 5000 INJECTION INTRAVENOUS; SUBCUTANEOUS at 21:26

## 2017-05-22 RX ADMIN — MIDODRINE HYDROCHLORIDE 15 MILLIGRAM(S): 2.5 TABLET ORAL at 06:44

## 2017-05-22 RX ADMIN — MIDODRINE HYDROCHLORIDE 15 MILLIGRAM(S): 2.5 TABLET ORAL at 15:22

## 2017-05-22 RX ADMIN — Medication 1 APPLICATION(S): at 18:01

## 2017-05-22 RX ADMIN — HEPARIN SODIUM 7500 UNIT(S): 5000 INJECTION INTRAVENOUS; SUBCUTANEOUS at 15:21

## 2017-05-22 RX ADMIN — Medication 2: at 11:47

## 2017-05-22 NOTE — PROGRESS NOTE ADULT - ASSESSMENT
KARMEN on CKD   No hydro on CT 5-17  Anasarca , progressive FTT   No pressing need for acute HD today   Will reassess in am   No gross evidence of renal recovery yet     Sepsis - H/O infected right knee hardware , s/p young tube, decub  Abx as per ID

## 2017-05-22 NOTE — PROGRESS NOTE ADULT - SUBJECTIVE AND OBJECTIVE BOX
POD # 4 cholecystostomy tube    ALLERGIES:  No Known Allergies      SUBJECTIVE:  Seen and examined at bedside.  Patient is a 72y Female  No complaints at the present time  Nontoxic appearing    PAST MEDICAL AND SURGICAL HISTORY:  PAST MEDICAL & SURGICAL HISTORY:  Hypercholesterolemia  Deficiency of vitamin K  Chronic pain  COPD (chronic obstructive pulmonary disease)  Urine retention  Pressure ulcer  Atrial fibrillation  Constipation  Breast cancer  HLD (hyperlipidemia)  HTN (hypertension)  GERD (gastroesophageal reflux disease)  DM (diabetes mellitus)  Obesity  LIN on CPAP  History of incision and drainage: rt knee  S/p total knee replacement, bilateral  S/P knee replacement, right  S/P hysterectomy  S/P mastectomy: Left  Breast cancer      DIET:  [  x  ] NPO    [     ] Clears    [     ] Fulls    [    ]  Soft   [    ] Regular    [    ] DASH  [ x ] Tube feeds    PHYSICAL EXAM:    VITALS:  T(C): 36.3, Max: 36.4 (05-22 @ 00:00)  HR: 87 (74 - 102)  BP: 91/42 (84/46 - 114/56)  RR: 16 (8 - 18)  SpO2: 94% (93% - 98%)  Wt(kg): --    PE:    Chest: good air exchange  Abdomen:  soft, nontender,  cholecystostomy tube 190cc/24 hours  Calves:   soft, nontender      OUTPUT:    I & Os for current day (as of 05-22 @ 09:17)  =============================================  IN: 1200 ml / OUT: 190 ml / NET: 1010 ml      LABS:                        8.8    14.1  )-----------( 76       ( 21 May 2017 07:11 )             26.2     05-21    139  |  101  |  24.0<H>  ----------------------------<  182<H>  4.2   |  30.0<H>  |  3.45<H>    Ca    8.7      21 May 2017 07:11  Phos  1.6     05-21  Mg     1.8     05-21            MEDICATION:  dextrose 50% Injectable 25Gram(s) IV Push once  dextrose 50% Injectable 25Gram(s) IV Push once  collagenase Ointment 1Application(s) Topical daily  collagenase Ointment 1Application(s) Topical two times a day  insulin lispro (HumaLOG) corrective regimen sliding scale  SubCutaneous every 6 hours  meropenem IVPB 1000milliGRAM(s) IV Intermittent every 24 hours  fentaNYL    Injectable 25MICROGram(s) IV Push every 6 hours PRN  cadexomer iodine Gel 1Application(s) Topical daily  digoxin     Tablet 0.125milliGRAM(s) Oral daily  heparin  Injectable 7500Unit(s) SubCutaneous every 8 hours  midodrine 15milliGRAM(s) Oral every 8 hours      IMPRESSION:    S/P cholecystostomy tube    PLAN:  Continue current care management  Plan per MICU  Discussed with Dr Leblanc

## 2017-05-22 NOTE — PROGRESS NOTE ADULT - SUBJECTIVE AND OBJECTIVE BOX
NEPHROLOGY INTERVAL HPI/OVERNIGHT EVENTS:    Interim noted   Meds reviewed   Min UO     MEDICATIONS  (STANDING):  dextrose 50% Injectable 25Gram(s) IV Push once  dextrose 50% Injectable 25Gram(s) IV Push once  collagenase Ointment 1Application(s) Topical daily  collagenase Ointment 1Application(s) Topical two times a day  insulin lispro (HumaLOG) corrective regimen sliding scale  SubCutaneous every 6 hours  meropenem IVPB 1000milliGRAM(s) IV Intermittent every 24 hours  cadexomer iodine Gel 1Application(s) Topical daily  digoxin     Tablet 0.125milliGRAM(s) Oral daily  heparin  Injectable 7500Unit(s) SubCutaneous every 8 hours  midodrine 15milliGRAM(s) Oral every 8 hours    MEDICATIONS  (PRN):  morphine  - Injectable 2milliGRAM(s) SubCutaneous every 4 hours PRN Moderate Pain (4 - 6)      Allergies    No Known Allergies    Intolerances          Vital Signs Last 24 Hrs  T(C): 37.7, Max: 37.7 (05-22 @ 16:48)  T(F): 99.8, Max: 99.8 (05-22 @ 16:48)  HR: 84 (83 - 103)  BP: 91/48 (85/46 - 166/72)  BP(mean): 67 (60 - 104)  RR: 11 (0 - 21)  SpO2: 94% (92% - 98%)  Daily     Daily   I&O's Detail    I & Os for current day (as of 22 May 2017 17:23)  =============================================  IN:    Nepro: 1100 ml    Solution: 100 ml    Total IN: 1200 ml  ---------------------------------------------  OUT:    Bulb: 190 ml    Total OUT: 190 ml  ---------------------------------------------  Total NET: 1010 ml    I&O's Summary    I & Os for current day (as of 22 May 2017 17:23)  =============================================  IN: 1200 ml / OUT: 190 ml / NET: 1010 ml      PHYSICAL EXAM:    NECK: bilateral central lines  CHEST/LUNG: decreased bs bases, no wheezes  HEART: tachy, no rub  ABDOMEN: soft  EXTREMITIES:  r++ edema     LABS:                        8.8    14.1  )-----------( 76       ( 21 May 2017 07:11 )             26.2     05-21    139  |  101  |  24.0<H>  ----------------------------<  182<H>  4.2   |  30.0<H>  |  3.45<H>    Ca    8.7      21 May 2017 07:11  Phos  1.6     05-21  Mg     1.8     05-21                  RADIOLOGY & ADDITIONAL TESTS:

## 2017-05-22 NOTE — PROGRESS NOTE ADULT - SUBJECTIVE AND OBJECTIVE BOX
SHYAM LAL    [id number]    History: 72y Female is well known to orthopedic service as she had been a being followed by Dr Ritchie for previous infection of right total knee arthroplasty, with removal of hardware.  Dr Rey is  also involved in her care addressing the right knee wound dehiscence.  Patient is awake, and minimally responsive at this time.   Seen and examined with Dr Ritchie .   At this time there is no evidence of recurring infection to right lower extremity.  no discharge or drainage noted to right knee.                     8.8    14.1  )-----------( 76       ( 21 May 2017 07:11 )             26.2     05-21    139  |  101  |  24.0<H>  ----------------------------<  182<H>  4.2   |  30.0<H>  |  3.45<H>    Ca    8.7      21 May 2017 07:11  Phos  1.6     05-21  Mg     1.8     05-21        MEDICATIONS  (STANDING):  dextrose 50% Injectable 25Gram(s) IV Push once  dextrose 50% Injectable 25Gram(s) IV Push once  collagenase Ointment 1Application(s) Topical daily  collagenase Ointment 1Application(s) Topical two times a day  insulin lispro (HumaLOG) corrective regimen sliding scale  SubCutaneous every 6 hours  meropenem IVPB 1000milliGRAM(s) IV Intermittent every 24 hours  cadexomer iodine Gel 1Application(s) Topical daily  digoxin     Tablet 0.125milliGRAM(s) Oral daily  heparin  Injectable 7500Unit(s) SubCutaneous every 8 hours  midodrine 15milliGRAM(s) Oral every 8 hours    MEDICATIONS  (PRN):  morphine  - Injectable 2milliGRAM(s) SubCutaneous every 4 hours PRN Moderate Pain (4 - 6)      Physical exam: The right knee dressing is clean, dry and intact. No drainage or discharge noted. Right knee wound continues to improve slowly.  Peripheral wound with necrotic tissue.  Base with improved graulated  tissue.  no cellulitis noted.  compartments soft non tender .           Primary Orthopedic Assessment:  • s/p right knee wound dehiscence.      Secondary  Orthopedic Assessment(s):   •     Secondary  Medical Assessment(s):   •     Plan:   •  Pt is in the MICU.  Awake but minimally responsive.      Continue santyl to wound  Supportive care  Plan per MICU

## 2017-05-22 NOTE — PROGRESS NOTE ADULT - SUBJECTIVE AND OBJECTIVE BOX
71 yo female who  is s/p Bilateral TKA with the Right knee currently treated for a right knee infection. She initially had the prosthesis removed with a mobile spacer and iv antibiotics .  The infection returned and the mobile spacer was removed and an antibiotic spacer was placed.  Post op, the patient has had wound healing difficulties and has been followed by Dr Rey.  The anterior wound is not new and has been followed by Dr Rey and allowed to heal by secondary intention.    Course-  now admitted with septic shock. Septic shock with multiple organisms and multiple sources in the setting of acute on chronic renal failure and severe metabolic encephalopathy. Intially suspected GB source then ruled out. now has cholecystostomy. polymicrobial sepsis due to Urine + Sacral decub + knee wound culture. and bacteremia.  Now on midodrine and new HD and antibiotics with wound care    INTERVAL HPI/ OVERNIGHT EVENTS:  barely responsive, groans to pain. with NGT feeds, cholecystostomy +, HD access and TLC +, rectal tube for diarrhea and wound dressings +  denies chest pain, nausea, vomits, cough, SOB, fever, HA    MEDICATIONS  (STANDING):  dextrose 50% Injectable 25Gram(s) IV Push once  dextrose 50% Injectable 25Gram(s) IV Push once  collagenase Ointment 1Application(s) Topical daily  collagenase Ointment 1Application(s) Topical two times a day  insulin lispro (HumaLOG) corrective regimen sliding scale  SubCutaneous every 6 hours  meropenem IVPB 1000milliGRAM(s) IV Intermittent every 24 hours  cadexomer iodine Gel 1Application(s) Topical daily  digoxin     Tablet 0.125milliGRAM(s) Oral daily  heparin  Injectable 7500Unit(s) SubCutaneous every 8 hours  midodrine 15milliGRAM(s) Oral every 8 hours  sodium phosphate IVPB 15milliMole(s) IV Intermittent every 4 hours    MEDICATIONS  (PRN):  morphine  - Injectable 2milliGRAM(s) SubCutaneous every 4 hours PRN Moderate Pain (4 - 6)      Allergies    No Known Allergies    Intolerances        Vital Signs Last 24 Hrs  T(C): 37, Max: 37.7 (05-22 @ 16:48)  T(F): 98.6, Max: 99.8 (05-22 @ 16:48)  HR: 83 (83 - 106)  BP: 103/55 (91/48 - 148/67)  BP(mean): 75 (67 - 96)  RR: 16 (0 - 27)  SpO2: 95% (89% - 95%)    ACCUCHECKS    PHYSICAL EXAM-  with hd access, ngt, tlc, rectal tube, cholecystostomy drain   GENERAL: NAD, well-groomed, well-developed  HEAD:  Atraumatic, Normocephalic  EYES: EOMI, PERRLA, conjunctiva and sclera clear  ENMT: No tonsillar erythema, exudates, or enlargement; Moist mucous membranes, Good dentition, No lesions  NECK: Supple, No JVD, Normal thyroid  NERVOUS SYSTEM:  Alert & Oriented X3, Motor Strength 5/5 B/L upper and lower extremities; DTRs 2+ intact and symmetric  CHEST/LUNG: Clear to percussion bilaterally; No rales, rhonchi, wheezing, or rubs  HEART: Regular rate and rhythm; No murmurs, rubs, or gallops  ABDOMEN: Soft, Nontender, Nondistended; Bowel sounds present; no masses; + choledrain   EXTREMITIES:  2+ Peripheral Pulses, No clubbing, cyanosis, 2-3+ pitting edema; large deep right anterior knee wound with fibronuous tissue. No erythema associated. left Posterior heel ulcerations with necrotic eschar and normal borders.  Eschar is dry w/ no underlying fluctuance.  No purulence, no mal odor, no drainage.  LE is severely edematous.  Medial 1st MPJ hematoma formation with no open lesions.  No erythema.  LYMPH: No lymphadenopathy noted  SKIN: sacrum to buttock pressure ulcer unstageable;     LABS:                        9.2    17.4  )-----------( x        ( 23 May 2017 07:17 )             26.8     05-23    139  |  99  |  34.0<H>  ----------------------------<  194<H>  4.7   |  28.0  |  4.26<H>    Ca    9.3      23 May 2017 07:17  Phos  1.0     05-23  Mg     1.8     05-23          RADIOLOGY & ADDITIONAL TESTS:    Assessment and Plan  DVT Prophylaxis    Discussed with: Patient, family, RN, CM, Consultants  Plan of care/ Discharge planning discussed.    Visit Time:

## 2017-05-22 NOTE — PROGRESS NOTE ADULT - SUBJECTIVE AND OBJECTIVE BOX
[facility] [place] CARMENZA HARRIS MD FACP   DESIRE METZGER MD  Diplomates American Board of Internal Medicine and Infectious Diseases      SHYAM LAL  MRN-[id number]    71 yo female who  is s/p Bilateral TKA with the Right knee currently treated for a right knee infection. SHe initially had the prosthesis removed with a mobile spacer and iv antibiotics .  The infection returned and the mobile spacer was removed and an antibiotic spacer was placed.  Post op, the patient has had wound healing difficulties and has been followed by Dr Rey.  The anterior wound is not new and has been followed by Dr Rey and allowed to heal by secondary intention.     PT SEEN EARLIER TODAY MINIMALLY RESPONSIVE  ICU  FOR HD  NO POS C/S  REPEAT BLOOD CX NEG       Vital Signs Last 24 Hrs  T(C): 36.6, Max: 36.7 (05-16 @ 00:00)  T(F): 97.9, Max: 98.1 (05-16 @ 00:00)  HR: 67 (67 - 134)  BP: 87/51 (70/51 - 137/74)  BP(mean): 64 (55 - 97)  RR: 12 (12 - 21)  SpO2: 99% (95% - 99%)    ANTIBIOTICS  meropenem IVPB  IV Intermittent   meropenem IVPB 500milliGRAM(s) IV Intermittent every 24 hours  VANCO X 1    Allergies    No Known Allergies    Intolerances        REVIEW OF SYSTEMS:POORLY REPSONSIVE    PHYSICAL EXAM:  Vital Signs Last 24 Hrs  T(C): 36.6, Max: 36.7 (05-16 @ 00:00)  T(F): 97.9, Max: 98.1 (05-16 @ 00:00)  HR: 67 (67 - 134)  BP: 87/51 (70/51 - 137/74)  BP(mean): 64 (55 - 97)  RR: 12 (12 - 21)  SpO2: 99% (95% - 99%)      GEN: NAD, LETHARGIC  HEENT: normocephalic and atraumatic. EOMI. CHIDI. Moist mucosa. Clear Posterior pharynx.  NECK: Supple. No carotid bruits.  No lymphadenopathy or thyromegaly.  LUNGS: Clear to auscultation.  HEART: Regular rate and rhythm without murmur.  ABDOMEN: Soft, nontender, and nondistended.  Positive bowel sounds.  No hepatosplenomegaly was noted.  EXTREMITIES: Without any cyanosis, clubbing, rash, lesions or edema.  NEUROLOGIC: Cranial nerves II through XII are grossly intact.  MUSCULOSKELETAL:KNEE NO CHANGE  SKIN: No ulceration or induration present    LABS:                        8.5    20.4  )-----------( 188      ( 16 May 2017 06:51 )             24.3       05-16    144  |  105  |  67.0<H>  ----------------------------<  58<L>  3.8   |  21.0<L>  |  7.85<H>    Ca    8.9      16 May 2017 06:51  Phos  4.0     05-16  Mg     1.7     05-16    TPro  5.1<L>  /  Alb  3.1<L>  /  TBili  2.0  /  DBili  x   /  AST  13  /  ALT  <4  /  AlkPhos  85  05-16 05-16 @ 06:51  hct 24.3 % hgb 8.5 g/dL    05-16 @ 06:51  plat 188 K/uL wbc 20.4 K/uL    05-15 @ 06:27  hct 28.9 % hgb 9.8 g/dL    05-15 @ 06:27  plat 241 K/uL wbc 23.2 K/uL    05-14 @ 04:20  hct 31.7 % hgb 10.6 g/dL    05-14 @ 04:20  plat 285 K/uL wbc 17.6 K/uL      05-16-17  creat 7.85 mg/dL gfr 5 mL/min/1.73M2 bun 67.0 mg/dL k 3.8 mmol/L  05-15-17  creat 7.96 mg/dL gfr 5 mL/min/1.73M2 bun 65.0 mg/dL k 4.0 mmol/L  05-14-17  creat 7.77 mg/dL gfr 5 mL/min/1.73M2 bun 63.0 mg/dL k 4.1 mmol/L      MICROBIOLOGY:  Culture Results:   Numerous Gram Negative Rods Identification and susceptibility to follow.  Culture in progress (05-14 @ 01:11)  Culture Results:   Culture grew 3 or more types of organisms which indicate  collection contamination; consider recollection only if clinically  indicated.  .  TYPE: (C=Critical, N=Notification, A=Abnormal) N  TESTS:  _ Contaminated Urine Culture  DATE/TIME CALLED: _ (05-12 @ 19:10)  Culture Results:   No growth at 48 hours (05-12 @ 15:07)  Culture Results:   No growth at 48 hours (05-12 @ 15:06)        RADIOLOGY & ADDITIONAL STUDIES:

## 2017-05-22 NOTE — PROGRESS NOTE ADULT - ASSESSMENT
PT WITH POLYMICROBIAL BACTEREMIA WITH PROTEUS AND BACTEROIDES  REPEAT BLOOD CX NEGATIVE  URINE WITH ESBL ON MERREM  S/P CHOLECYSTOTOMY BY IR  SACRAL DECUBITUS SURGERY FOLLOWING  CONTINUE PRESENT REGIMEN   PROGNOSIS GUARDED

## 2017-05-22 NOTE — PROGRESS NOTE ADULT - SUBJECTIVE AND OBJECTIVE BOX
72F seen at bedside resting comfortably for left heel wound.  Patient is non-verbal and does not respond to tactile sensation                          8.8    14.1  )-----------( 76       ( 21 May 2017 07:11 )             26.2       PHYSICAL EXAM  GEN: SHYAM LAL is a pleasant well-nourished, well developed 72y Female in no acute distress, alert awake, and oriented to person, place and time.   LE Focused:  Left foot  Vasc: pedal pulses non palpable, CFT 2 sec x 3, TG WNL   Derm: Posterior heel ulcerations with necrotic eschar and normal borders.  Eschar is dry w/ no underlying fluctuance.  No purulence, no mal odor, no drainage.  LE is severely edematous.  Medial 1st MPJ hematoma formation with no open lesions.  No erythema.  Neuro: could not assess   MSK: could not assess  Imaging: pending        A  Left heel ulceration  Left 1st MPJ superficial hematoma    P  Patient evaluated and chart reviewed  Left foot x-rays  betadine DSD applied  application of Z-flex boots  Iodosorb ordered  Nsg wound instructions placed  patient to c/w offloading z-flex boots    Nursing dressing instructions left heel QD  Remove dressing  Cleanse wound with normal saline  apply iodosorb  apply DSD  Apply allyvn to 1st mpj   apply Z-flex boots

## 2017-05-22 NOTE — PROGRESS NOTE ADULT - ASSESSMENT
1) polymicrobial sepsis from UTI + pressure ulcers + knee wound- per ID antibiotics  2) Hypotension- Midodrine  3) KARMEN on CKD stg 4 now HD dependent- HD per renal  4) choelcystitis- with cholecystostomy drain- in place for min 6 weeks.  5) wound care- per ortho, wound care RN, podiatry for heel. Plastics following for knee. Surg following and will consider debridement of sacral wound when pt stable  6) hypophosphatemia- replete  7) metabolic encephalopathy- due to all of the above  8) Heparin 1) polymicrobial sepsis from UTI + pressure ulcers + knee wound- per ID antibiotics  2) Hypotension- Midodrine  3) KARMEN on CKD stg 4 now HD dependent- HD per renal  4) choelcystitis- with cholecystostomy drain- in place for min 6 weeks.  5) wound care- per ortho, wound care RN, podiatry for heel. Plastics following for knee. Surg following and will consider debridement of sacral wound when pt stable  7) metabolic encephalopathy- due to all of the above  8) Heparin

## 2017-05-22 NOTE — PROGRESS NOTE ADULT - SUBJECTIVE AND OBJECTIVE BOX
Pt is in the MICU.  Awake but minimally responsive.    VSS  Right knee wound continues to improve slowly.  Peripheral wound with necrotic tissue.  Base with improved grnaulation tissue.  no cellulitis.        A/P:  Continue santyl to wound  Supportive care  Plan per MICU

## 2017-05-23 LAB
ANION GAP SERPL CALC-SCNC: 12 MMOL/L — SIGNIFICANT CHANGE UP (ref 5–17)
BUN SERPL-MCNC: 34 MG/DL — HIGH (ref 8–20)
CALCIUM SERPL-MCNC: 9.3 MG/DL — SIGNIFICANT CHANGE UP (ref 8.6–10.2)
CHLORIDE SERPL-SCNC: 99 MMOL/L — SIGNIFICANT CHANGE UP (ref 98–107)
CO2 SERPL-SCNC: 28 MMOL/L — SIGNIFICANT CHANGE UP (ref 22–29)
CREAT SERPL-MCNC: 4.26 MG/DL — HIGH (ref 0.5–1.3)
CULTURE RESULTS: SIGNIFICANT CHANGE UP
GLUCOSE SERPL-MCNC: 194 MG/DL — HIGH (ref 70–115)
HCT VFR BLD CALC: 26.8 % — LOW (ref 37–47)
HGB BLD-MCNC: 9.2 G/DL — LOW (ref 12–16)
MAGNESIUM SERPL-MCNC: 1.8 MG/DL — SIGNIFICANT CHANGE UP (ref 1.8–2.6)
MCHC RBC-ENTMCNC: 29.5 PG — SIGNIFICANT CHANGE UP (ref 27–31)
MCHC RBC-ENTMCNC: 34.3 G/DL — SIGNIFICANT CHANGE UP (ref 32–36)
MCV RBC AUTO: 85.9 FL — SIGNIFICANT CHANGE UP (ref 81–99)
PHOSPHATE SERPL-MCNC: 1 MG/DL — CRITICAL LOW (ref 2.4–4.7)
PLATELET # BLD AUTO: 89 K/UL — LOW (ref 150–400)
POTASSIUM SERPL-MCNC: 4.7 MMOL/L — SIGNIFICANT CHANGE UP (ref 3.5–5.3)
POTASSIUM SERPL-SCNC: 4.7 MMOL/L — SIGNIFICANT CHANGE UP (ref 3.5–5.3)
RBC # BLD: 3.12 M/UL — LOW (ref 4.4–5.2)
RBC # FLD: 17.2 % — HIGH (ref 11–15.6)
SODIUM SERPL-SCNC: 139 MMOL/L — SIGNIFICANT CHANGE UP (ref 135–145)
SPECIMEN SOURCE: SIGNIFICANT CHANGE UP
WBC # BLD: 17.4 K/UL — HIGH (ref 4.8–10.8)
WBC # FLD AUTO: 17.4 K/UL — HIGH (ref 4.8–10.8)

## 2017-05-23 PROCEDURE — 99233 SBSQ HOSP IP/OBS HIGH 50: CPT

## 2017-05-23 RX ORDER — ALBUMIN HUMAN 25 %
100 VIAL (ML) INTRAVENOUS ONCE
Qty: 0 | Refills: 0 | Status: COMPLETED | OUTPATIENT
Start: 2017-05-23 | End: 2017-05-23

## 2017-05-23 RX ADMIN — Medication 2: at 17:52

## 2017-05-23 RX ADMIN — MORPHINE SULFATE 2 MILLIGRAM(S): 50 CAPSULE, EXTENDED RELEASE ORAL at 01:30

## 2017-05-23 RX ADMIN — Medication 2: at 05:44

## 2017-05-23 RX ADMIN — MORPHINE SULFATE 2 MILLIGRAM(S): 50 CAPSULE, EXTENDED RELEASE ORAL at 00:57

## 2017-05-23 RX ADMIN — Medication 1 APPLICATION(S): at 12:24

## 2017-05-23 RX ADMIN — MIDODRINE HYDROCHLORIDE 15 MILLIGRAM(S): 2.5 TABLET ORAL at 14:29

## 2017-05-23 RX ADMIN — Medication 1 APPLICATION(S): at 05:43

## 2017-05-23 RX ADMIN — Medication 63.75 MILLIMOLE(S): at 10:24

## 2017-05-23 RX ADMIN — Medication 50 MILLILITER(S): at 19:30

## 2017-05-23 RX ADMIN — Medication 0.12 MILLIGRAM(S): at 05:43

## 2017-05-23 RX ADMIN — HEPARIN SODIUM 7500 UNIT(S): 5000 INJECTION INTRAVENOUS; SUBCUTANEOUS at 14:29

## 2017-05-23 RX ADMIN — Medication 4: at 12:23

## 2017-05-23 RX ADMIN — Medication 63.75 MILLIMOLE(S): at 14:29

## 2017-05-23 RX ADMIN — Medication 1 APPLICATION(S): at 17:51

## 2017-05-23 RX ADMIN — MEROPENEM 200 MILLIGRAM(S): 1 INJECTION INTRAVENOUS at 12:23

## 2017-05-23 RX ADMIN — MIDODRINE HYDROCHLORIDE 15 MILLIGRAM(S): 2.5 TABLET ORAL at 05:43

## 2017-05-23 RX ADMIN — HEPARIN SODIUM 7500 UNIT(S): 5000 INJECTION INTRAVENOUS; SUBCUTANEOUS at 05:43

## 2017-05-23 RX ADMIN — Medication 2: at 00:11

## 2017-05-23 NOTE — PROGRESS NOTE ADULT - SUBJECTIVE AND OBJECTIVE BOX
Patient seen and examined at bedside. Patient does not respond to verbal command/questions. Appears comfortable in bed.    Vital Signs Last 24 Hrs  T(C): 37.4, Max: 37.7 (05-22 @ 16:48)  T(F): 99.3, Max: 99.8 (05-22 @ 16:48)  HR: 93 (84 - 106)  BP: 112/60 (91/48 - 148/67)  BP(mean): 78 (67 - 96)  RR: 18 (0 - 27)  SpO2: 93% (89% - 96%)    RLE: Dressing C/D/I. S/W RN who states performed dressing change with collagenase at 6 AM this morning. anterior knee wound no erythema. + granulation tissue. no active drainage. no fluctuance or induration. Ext warm, cap refill brisk. compartments soft, NT. Calf soft NT B/L. Limited exam secondary to patient's mental status.    A/P: 72 y.o F with chronic right knee wound  - dressing changed, continue colleganse daily dressing changes  - continue care primary team

## 2017-05-23 NOTE — PROGRESS NOTE ADULT - SUBJECTIVE AND OBJECTIVE BOX
NEPHROLOGY INTERVAL HPI/OVERNIGHT EVENTS:    Interim noted   ID follow up noted   Med reviewed   Not on pressors   + midodrine       MEDICATIONS  (STANDING):  dextrose 50% Injectable 25Gram(s) IV Push once  dextrose 50% Injectable 25Gram(s) IV Push once  collagenase Ointment 1Application(s) Topical daily  collagenase Ointment 1Application(s) Topical two times a day  insulin lispro (HumaLOG) corrective regimen sliding scale  SubCutaneous every 6 hours  meropenem IVPB 1000milliGRAM(s) IV Intermittent every 24 hours  cadexomer iodine Gel 1Application(s) Topical daily  digoxin     Tablet 0.125milliGRAM(s) Oral daily  heparin  Injectable 7500Unit(s) SubCutaneous every 8 hours  midodrine 15milliGRAM(s) Oral every 8 hours  albumin human 25% IVPB 100milliLiter(s) IV Intermittent once    MEDICATIONS  (PRN):  morphine  - Injectable 2milliGRAM(s) SubCutaneous every 4 hours PRN Moderate Pain (4 - 6)      Allergies    No Known Allergies    Intolerances          Vital Signs Last 24 Hrs  T(C): 37.2, Max: 37.7 (05-22 @ 16:48)  T(F): 99, Max: 99.8 (05-22 @ 16:48)  HR: 100 (83 - 106)  BP: 123/59 (95/51 - 148/67)  BP(mean): 84 (70 - 96)  RR: 20 (9 - 27)  SpO2: 94% (89% - 95%)  Daily     Daily Weight in k (23 May 2017 08:00)  I&O's Detail  I & Os for 24h ending 23 May 2017 07:00  =============================================  IN:    Nepro: 650 ml    Total IN: 650 ml  ---------------------------------------------  OUT:    Rectal Tube: 250 ml    Bulb: 120 ml    Total OUT: 370 ml  ---------------------------------------------  Total NET: 280 ml    I & Os for current day (as of 23 May 2017 15:22)  =============================================  IN:    Nepro: 300 ml    Solution: 250 ml    Solution: 100 ml    Total IN: 650 ml  ---------------------------------------------  OUT:    Bulb: 60 ml    Total OUT: 60 ml  ---------------------------------------------  Total NET: 590 ml    I&O's Summary  I & Os for 24h ending 23 May 2017 07:00  =============================================  IN: 650 ml / OUT: 370 ml / NET: 280 ml    I & Os for current day (as of 23 May 2017 15:22)  =============================================  IN: 650 ml / OUT: 60 ml / NET: 590 ml      PHYSICAL EXAM:  NECK: bilateral central lines  CHEST/LUNG: decreased bs bases, no wheezes  HEART: tachy, no rub  ABDOMEN: soft  EXTREMITIES:  r++ edema     LABS:                        9.2    17.4  )-----------( 89       ( 23 May 2017 07:17 )             26.8     05-    139  |  99  |  34.0<H>  ----------------------------<  194<H>  4.7   |  28.0  |  4.26<H>    Ca    9.3      23 May 2017 07:17  Phos  1.0     05-23  Mg     1.8               Phosphorus Level, Serum: 1.0 mg/dL ( @ 07:17)  Magnesium, Serum: 1.8 mg/dL ( @ 07:17)          RADIOLOGY & ADDITIONAL TESTS:

## 2017-05-23 NOTE — PROGRESS NOTE ADULT - ASSESSMENT
1) polymicrobial sepsis from UTI + pressure ulcers + knee wound- per ID antibiotics  2) Hypotension- Midodrine  3) KARMEN on CKD stg 4 now HD dependent- HD per renal  4) choelcystitis- with cholecystostomy drain- in place for min 6 weeks.  5) wound care- per ortho, wound care RN, podiatry for heel. Plastics following for knee. Surg following and will consider debridement of sacral wound when pt stable  6) hypophosphatemia- replete  7) metabolic encephalopathy- due to all of the above  8) Heparin

## 2017-05-23 NOTE — PROGRESS NOTE ADULT - ASSESSMENT
KARMEN on CKD   No hydro on CT 5-17  Anasarca , progressive FTT   No renal recovery yet   HD today   See orders for HD         Sepsis - H/O infected right knee hardware , s/p young tube, decub  Abx as per ID     Anemia - Add epogen     Hypophos - Na Phos x 1 ( see orders ) KARMEN on CKD   No hydro on CT 5-17  Anasarca , progressive FTT   No renal recovery yet   HD today   See orders for HD         Sepsis - H/O infected right knee hardware , s/p young tube, decub  Abx as per ID     Anemia - Critical care stopped epogen 5-21     Hypophos - Na Phos x 1 ( see orders )

## 2017-05-23 NOTE — PROVIDER CONTACT NOTE (CRITICAL VALUE NOTIFICATION) - ACTION/TREATMENT ORDERED:
received new orders
Awaiting further orders.
awaiting orders for phos supplementation
As recommended.

## 2017-05-23 NOTE — PROGRESS NOTE ADULT - SUBJECTIVE AND OBJECTIVE BOX
HEALTH ISSUES - PROBLEM Dx:  HPI:  71 yr old female with Severe dementia non verbal bed bound, hypertension, hyperlipidemia, LIN, CKD, COPD, paroxysmal A fib, septic arthritis, CKD baseline Cr 2.8 in April '17 sent in from [facility] due to mental status changes & anasarca. No information could be obtained from patient due to mental status. Tried calling [facility] & left VM. Pt's Cr increased from baseline 2.8 to 8 today. Not known if pt making urine. Will insert anna for more information. Pt is currently lethargic barely arousable. Unable to answer any questions. Brother at bedside but does not know about pt's PMH or meds.     Addendum :  pt's ct chest showed Unchanged 6 mm nodule along the right minor fissure compared with prior CT  of the chest from September 2016. Upon discharge discharging team will make recommendation for pt. to follow up on 6 mm nodule. Dr. Durand 5/13/17. 5:20 am. (12 May 2017 18:13)    NOW  polymicrobial infection, bacteremia, UTI, midodrine dependent hypotension,     INTERVAL HPI/ OVERNIGHT EVENTS:  barely responsive, groans to pain. with NGT feeds, cholecystostomy +, HD access and TLC +, rectal tube for diarrhea and wound dressings +  denies chest pain, nausea, vomits, cough, SOB, fever, HA    MEDICATIONS  (STANDING):  dextrose 50% Injectable 25Gram(s) IV Push once  dextrose 50% Injectable 25Gram(s) IV Push once  collagenase Ointment 1Application(s) Topical daily  collagenase Ointment 1Application(s) Topical two times a day  insulin lispro (HumaLOG) corrective regimen sliding scale  SubCutaneous every 6 hours  meropenem IVPB 1000milliGRAM(s) IV Intermittent every 24 hours  cadexomer iodine Gel 1Application(s) Topical daily  digoxin     Tablet 0.125milliGRAM(s) Oral daily  heparin  Injectable 7500Unit(s) SubCutaneous every 8 hours  midodrine 15milliGRAM(s) Oral every 8 hours  albumin human 25% IVPB 100milliLiter(s) IV Intermittent once  sodium phosphate IVPB 15milliMole(s) IV Intermittent once    MEDICATIONS  (PRN):  morphine  - Injectable 2milliGRAM(s) SubCutaneous every 4 hours PRN Moderate Pain (4 - 6)      Allergies    No Known Allergies    Intolerances        Vital Signs Last 24 Hrs  T(C): 37.2, Max: 37.7 (05-22 @ 16:48)  T(F): 99, Max: 99.8 (05-22 @ 16:48)  HR: 100 (83 - 106)  BP: 135/66 (95/51 - 148/67)  BP(mean): 94 (70 - 96)  RR: 20 (9 - 27)  SpO2: 94% (89% - 95%)    ACCUCHECKS    PHYSICAL EXAM-  with hd access, ngt, tlc, rectal tube, cholecystostomy drain   GENERAL: NAD, well-groomed, well-developed  HEAD:  Atraumatic, Normocephalic  EYES: EOMI, PERRLA, conjunctiva and sclera clear  ENMT: No tonsillar erythema, exudates, or enlargement; Moist mucous membranes, Good dentition, No lesions  NECK: Supple, No JVD, Normal thyroid  NERVOUS SYSTEM:  Alert & Oriented X3, Motor Strength 5/5 B/L upper and lower extremities; DTRs 2+ intact and symmetric  CHEST/LUNG: Clear to percussion bilaterally; No rales, rhonchi, wheezing, or rubs  HEART: Regular rate and rhythm; No murmurs, rubs, or gallops  ABDOMEN: Soft, Nontender, Nondistended; Bowel sounds present; no masses; + young drain   EXTREMITIES:  2+ Peripheral Pulses, No clubbing, cyanosis, 2-3+ pitting edema; large deep right anterior knee wound with fibronuous tissue. No erythema associated. left Posterior heel ulcerations with necrotic eschar and normal borders.  Eschar is dry w/ no underlying fluctuance.  No purulence, no mal odor, no drainage.  LE is severely edematous.  Medial 1st MPJ hematoma formation with no open lesions.  No erythema.  LYMPH: No lymphadenopathy noted  SKIN: sacrum to buttock pressure ulcer unstageable;       LABS:                        9.2    17.4  )-----------( 89       ( 23 May 2017 07:17 )             26.8     05-23    139  |  99  |  34.0<H>  ----------------------------<  194<H>  4.7   |  28.0  |  4.26<H>    Ca    9.3      23 May 2017 07:17  Phos  1.0     05-23  Mg     1.8     05-23          RADIOLOGY & ADDITIONAL TESTS:    Assessment and Plan  DVT Prophylaxis    Discussed with: Patient, family, RN, CM, Consultants  Plan of care/ Discharge planning discussed.    Visit Time:

## 2017-05-24 ENCOUNTER — APPOINTMENT (OUTPATIENT)
Dept: ORTHOPEDIC SURGERY | Facility: CLINIC | Age: 72
End: 2017-05-24

## 2017-05-24 VITALS
DIASTOLIC BLOOD PRESSURE: 62 MMHG | OXYGEN SATURATION: 96 % | SYSTOLIC BLOOD PRESSURE: 122 MMHG | HEART RATE: 86 BPM | RESPIRATION RATE: 20 BRPM | TEMPERATURE: 98 F

## 2017-05-24 LAB
ANION GAP SERPL CALC-SCNC: 8 MMOL/L — SIGNIFICANT CHANGE UP (ref 5–17)
BUN SERPL-MCNC: 32 MG/DL — HIGH (ref 8–20)
CALCIUM SERPL-MCNC: 8.7 MG/DL — SIGNIFICANT CHANGE UP (ref 8.6–10.2)
CHLORIDE SERPL-SCNC: 100 MMOL/L — SIGNIFICANT CHANGE UP (ref 98–107)
CO2 SERPL-SCNC: 30 MMOL/L — HIGH (ref 22–29)
CREAT SERPL-MCNC: 3.43 MG/DL — HIGH (ref 0.5–1.3)
GLUCOSE SERPL-MCNC: 142 MG/DL — HIGH (ref 70–115)
HCT VFR BLD CALC: 24.7 % — LOW (ref 37–47)
HGB BLD-MCNC: 8.4 G/DL — LOW (ref 12–16)
MCHC RBC-ENTMCNC: 29.5 PG — SIGNIFICANT CHANGE UP (ref 27–31)
MCHC RBC-ENTMCNC: 34 G/DL — SIGNIFICANT CHANGE UP (ref 32–36)
MCV RBC AUTO: 86.7 FL — SIGNIFICANT CHANGE UP (ref 81–99)
PHOSPHATE SERPL-MCNC: 1.5 MG/DL — LOW (ref 2.4–4.7)
PLATELET # BLD AUTO: 120 K/UL — LOW (ref 150–400)
POTASSIUM SERPL-MCNC: 4.4 MMOL/L — SIGNIFICANT CHANGE UP (ref 3.5–5.3)
POTASSIUM SERPL-SCNC: 4.4 MMOL/L — SIGNIFICANT CHANGE UP (ref 3.5–5.3)
RBC # BLD: 2.85 M/UL — LOW (ref 4.4–5.2)
RBC # FLD: 16.7 % — HIGH (ref 11–15.6)
SODIUM SERPL-SCNC: 138 MMOL/L — SIGNIFICANT CHANGE UP (ref 135–145)
WBC # BLD: 16.9 K/UL — HIGH (ref 4.8–10.8)
WBC # FLD AUTO: 16.9 K/UL — HIGH (ref 4.8–10.8)

## 2017-05-24 PROCEDURE — 76705 ECHO EXAM OF ABDOMEN: CPT

## 2017-05-24 PROCEDURE — 87070 CULTURE OTHR SPECIMN AEROBIC: CPT

## 2017-05-24 PROCEDURE — 93970 EXTREMITY STUDY: CPT

## 2017-05-24 PROCEDURE — 83874 ASSAY OF MYOGLOBIN: CPT

## 2017-05-24 PROCEDURE — P9047: CPT

## 2017-05-24 PROCEDURE — 82962 GLUCOSE BLOOD TEST: CPT

## 2017-05-24 PROCEDURE — 99285 EMERGENCY DEPT VISIT HI MDM: CPT | Mod: 25

## 2017-05-24 PROCEDURE — 87186 SC STD MICRODIL/AGAR DIL: CPT

## 2017-05-24 PROCEDURE — 86704 HEP B CORE ANTIBODY TOTAL: CPT

## 2017-05-24 PROCEDURE — 73060 X-RAY EXAM OF HUMERUS: CPT

## 2017-05-24 PROCEDURE — P9059: CPT

## 2017-05-24 PROCEDURE — 74176 CT ABD & PELVIS W/O CONTRAST: CPT

## 2017-05-24 PROCEDURE — 96374 THER/PROPH/DIAG INJ IV PUSH: CPT | Mod: XU

## 2017-05-24 PROCEDURE — 80048 BASIC METABOLIC PNL TOTAL CA: CPT

## 2017-05-24 PROCEDURE — 83880 ASSAY OF NATRIURETIC PEPTIDE: CPT

## 2017-05-24 PROCEDURE — 84560 ASSAY OF URINE/URIC ACID: CPT

## 2017-05-24 PROCEDURE — 73030 X-RAY EXAM OF SHOULDER: CPT

## 2017-05-24 PROCEDURE — 93971 EXTREMITY STUDY: CPT

## 2017-05-24 PROCEDURE — 36430 TRANSFUSION BLD/BLD COMPNT: CPT

## 2017-05-24 PROCEDURE — 84100 ASSAY OF PHOSPHORUS: CPT

## 2017-05-24 PROCEDURE — 80053 COMPREHEN METABOLIC PANEL: CPT

## 2017-05-24 PROCEDURE — 99261: CPT

## 2017-05-24 PROCEDURE — 96375 TX/PRO/DX INJ NEW DRUG ADDON: CPT | Mod: XU

## 2017-05-24 PROCEDURE — 86706 HEP B SURFACE ANTIBODY: CPT

## 2017-05-24 PROCEDURE — 73560 X-RAY EXAM OF KNEE 1 OR 2: CPT

## 2017-05-24 PROCEDURE — 84484 ASSAY OF TROPONIN QUANT: CPT

## 2017-05-24 PROCEDURE — C1769: CPT

## 2017-05-24 PROCEDURE — 80162 ASSAY OF DIGOXIN TOTAL: CPT

## 2017-05-24 PROCEDURE — C1887: CPT

## 2017-05-24 PROCEDURE — 76942 ECHO GUIDE FOR BIOPSY: CPT

## 2017-05-24 PROCEDURE — 87040 BLOOD CULTURE FOR BACTERIA: CPT

## 2017-05-24 PROCEDURE — 86803 HEPATITIS C AB TEST: CPT

## 2017-05-24 PROCEDURE — 96372 THER/PROPH/DIAG INJ SC/IM: CPT | Mod: XU

## 2017-05-24 PROCEDURE — 99284 EMERGENCY DEPT VISIT MOD MDM: CPT | Mod: 25

## 2017-05-24 PROCEDURE — 99233 SBSQ HOSP IP/OBS HIGH 50: CPT

## 2017-05-24 PROCEDURE — 87205 SMEAR GRAM STAIN: CPT

## 2017-05-24 PROCEDURE — 74177 CT ABD & PELVIS W/CONTRAST: CPT

## 2017-05-24 PROCEDURE — 83735 ASSAY OF MAGNESIUM: CPT

## 2017-05-24 PROCEDURE — 73700 CT LOWER EXTREMITY W/O DYE: CPT

## 2017-05-24 PROCEDURE — 93005 ELECTROCARDIOGRAM TRACING: CPT

## 2017-05-24 PROCEDURE — 83605 ASSAY OF LACTIC ACID: CPT

## 2017-05-24 PROCEDURE — 87340 HEPATITIS B SURFACE AG IA: CPT

## 2017-05-24 PROCEDURE — 71045 X-RAY EXAM CHEST 1 VIEW: CPT

## 2017-05-24 PROCEDURE — 70450 CT HEAD/BRAIN W/O DYE: CPT

## 2017-05-24 PROCEDURE — 85652 RBC SED RATE AUTOMATED: CPT

## 2017-05-24 PROCEDURE — 87086 URINE CULTURE/COLONY COUNT: CPT

## 2017-05-24 PROCEDURE — 82550 ASSAY OF CK (CPK): CPT

## 2017-05-24 PROCEDURE — 84145 PROCALCITONIN (PCT): CPT

## 2017-05-24 PROCEDURE — 86850 RBC ANTIBODY SCREEN: CPT

## 2017-05-24 PROCEDURE — 93306 TTE W/DOPPLER COMPLETE: CPT

## 2017-05-24 PROCEDURE — 86901 BLOOD TYPING SEROLOGIC RH(D): CPT

## 2017-05-24 PROCEDURE — 36556 INSERT NON-TUNNEL CV CATH: CPT

## 2017-05-24 PROCEDURE — 86160 COMPLEMENT ANTIGEN: CPT

## 2017-05-24 PROCEDURE — 86900 BLOOD TYPING SEROLOGIC ABO: CPT

## 2017-05-24 PROCEDURE — 81001 URINALYSIS AUTO W/SCOPE: CPT

## 2017-05-24 PROCEDURE — 77002 NEEDLE LOCALIZATION BY XRAY: CPT

## 2017-05-24 PROCEDURE — C1894: CPT

## 2017-05-24 PROCEDURE — 36600 WITHDRAWAL OF ARTERIAL BLOOD: CPT

## 2017-05-24 PROCEDURE — 84105 ASSAY OF URINE PHOSPHORUS: CPT

## 2017-05-24 PROCEDURE — C1729: CPT

## 2017-05-24 PROCEDURE — 71250 CT THORAX DX C-: CPT

## 2017-05-24 PROCEDURE — 71010: CPT | Mod: 26

## 2017-05-24 PROCEDURE — 84156 ASSAY OF PROTEIN URINE: CPT

## 2017-05-24 PROCEDURE — 85730 THROMBOPLASTIN TIME PARTIAL: CPT

## 2017-05-24 PROCEDURE — 36415 COLL VENOUS BLD VENIPUNCTURE: CPT

## 2017-05-24 PROCEDURE — 86705 HEP B CORE ANTIBODY IGM: CPT

## 2017-05-24 PROCEDURE — 85610 PROTHROMBIN TIME: CPT

## 2017-05-24 PROCEDURE — 87075 CULTR BACTERIA EXCEPT BLOOD: CPT

## 2017-05-24 PROCEDURE — 85027 COMPLETE CBC AUTOMATED: CPT

## 2017-05-24 PROCEDURE — 82803 BLOOD GASES ANY COMBINATION: CPT

## 2017-05-24 PROCEDURE — 83036 HEMOGLOBIN GLYCOSYLATED A1C: CPT

## 2017-05-24 PROCEDURE — 76775 US EXAM ABDO BACK WALL LIM: CPT

## 2017-05-24 PROCEDURE — 82040 ASSAY OF SERUM ALBUMIN: CPT

## 2017-05-24 RX ORDER — SODIUM,POTASSIUM PHOSPHATES 278-250MG
1 POWDER IN PACKET (EA) ORAL THREE TIMES A DAY
Qty: 0 | Refills: 0 | Status: DISCONTINUED | OUTPATIENT
Start: 2017-05-24 | End: 2017-05-24

## 2017-05-24 RX ORDER — ERYTHROPOIETIN 10000 [IU]/ML
10000 INJECTION, SOLUTION INTRAVENOUS; SUBCUTANEOUS
Qty: 0 | Refills: 0 | Status: DISCONTINUED | OUTPATIENT
Start: 2017-05-24 | End: 2017-05-24

## 2017-05-24 RX ADMIN — MEROPENEM 200 MILLIGRAM(S): 1 INJECTION INTRAVENOUS at 00:59

## 2017-05-24 RX ADMIN — HEPARIN SODIUM 7500 UNIT(S): 5000 INJECTION INTRAVENOUS; SUBCUTANEOUS at 00:59

## 2017-05-24 RX ADMIN — MORPHINE SULFATE 2 MILLIGRAM(S): 50 CAPSULE, EXTENDED RELEASE ORAL at 08:42

## 2017-05-24 RX ADMIN — MIDODRINE HYDROCHLORIDE 15 MILLIGRAM(S): 2.5 TABLET ORAL at 00:57

## 2017-05-24 RX ADMIN — Medication 1 APPLICATION(S): at 14:08

## 2017-05-24 RX ADMIN — HEPARIN SODIUM 7500 UNIT(S): 5000 INJECTION INTRAVENOUS; SUBCUTANEOUS at 07:00

## 2017-05-24 RX ADMIN — Medication 1 APPLICATION(S): at 06:44

## 2017-05-24 RX ADMIN — MORPHINE SULFATE 2 MILLIGRAM(S): 50 CAPSULE, EXTENDED RELEASE ORAL at 07:08

## 2017-05-24 NOTE — PROGRESS NOTE ADULT - SUBJECTIVE AND OBJECTIVE BOX
CC: pt  earlier today.. pt family requested Autopsy / consent obtained by Sister next of kin Anisa Mars   admitting notified and consent faxed.         ASSESSMENT: family request for Autopsy. obtained Dr.. Channing Luis

## 2017-05-24 NOTE — PROGRESS NOTE ADULT - SUBJECTIVE AND OBJECTIVE BOX
Ortho Post Op Check    Name: SHYAM LAL    MR #: [id number]    Current Ortho history: 72y Female is well known to orthopedic service as she had been a being followed by Dr Ritchie for previous infection of right total knee arthroplasty, with removal of hardware.  Dr Rey is  also involved in her care addressing the right knee wound dehiscence.  Patient is awake, and minimally responsive at this time.   Seen and examined with Dr Ritchie . Surgeon: Dr Ritchie    Pt comfortable without complaints, pain controlled  Denies CP, SOB, N/V, numbness/tingling     General Exam:  Vital Signs Last 24 Hrs  T(C): 36.6, Max: 36.7 (05-24 @ 06:50)  T(F): 97.9, Max: 98 (05-24 @ 06:50)  HR: 92 (90 - 92)  BP: 132/60 (128/64 - 132/60)  BP(mean): --  RR: 20 (18 - 20)  SpO2: 97% (97% - 97%)  Wt(kg): --    General: At this time there is no evidence of recurring infection to right lower extremity.    RLE: Dressing removed to reveal anterior knee wound no erythema. + granulation tissue. no active drainage. no fluctuance or induration. Ext warm, cap refill brisk. compartments soft, NT. Calf soft NT B/L. Pulses: 2+ dorsalis pedis pulse. Cap refill < 2 sec.                              9.2    17.4  )-----------( 89       ( 23 May 2017 07:17 )             26.8   23 May 2017 07:17    139    |  99     |  34.0   ----------------------------<  194    4.7     |  28.0   |  4.26     Phos  1.0       23 May 2017 07:17  Mg     1.8       23 May 2017 07:17    MEDICATIONS  (STANDING):  dextrose 50% Injectable 25Gram(s) IV Push once  dextrose 50% Injectable 25Gram(s) IV Push once  collagenase Ointment 1Application(s) Topical daily  collagenase Ointment 1Application(s) Topical two times a day  insulin lispro (HumaLOG) corrective regimen sliding scale  SubCutaneous every 6 hours  meropenem IVPB 1000milliGRAM(s) IV Intermittent every 24 hours  cadexomer iodine Gel 1Application(s) Topical daily  digoxin     Tablet 0.125milliGRAM(s) Oral daily  heparin  Injectable 7500Unit(s) SubCutaneous every 8 hours  midodrine 15milliGRAM(s) Oral every 8 hours  epoetin melany Injectable 50733Clrm(s) IV Push <User Schedule>  sodium phosphate IVPB 15milliMole(s) IV Intermittent two times a day  potassium acid phosphate/sodium acid phosphate tablet (K-PHOS No. 2) 1Tablet(s) Oral three times a day    MEDICATIONS  (PRN):  morphine  - Injectable 2milliGRAM(s) SubCutaneous every 4 hours PRN Moderate Pain (4 - 6)      A/P: 72 y.o F with chronic right knee wound  - dressing changed, continue colleganse wet to dry daily dressing changes  - continue care primary team

## 2017-05-24 NOTE — DISCHARGE NOTE FOR THE EXPIRED PATIENT - SECONDARY DIAGNOSIS.
Acute on chronic kidney failure Atrial fibrillation, unspecified type CHF (congestive heart failure) COPD (chronic obstructive pulmonary disease) DM (diabetes mellitus) Encephalopathy, unspecified HTN (hypertension) Obesity Septic shock

## 2017-05-24 NOTE — PROGRESS NOTE ADULT - ASSESSMENT
KARMEN on CKD==> no signs of renal recovery  - HD planned for tomorrow AM  - monitor labs  - midodrine for hypotension    Anemia: add SIDRA; check Fe stores            Sepsis - H/O infected right knee hardware , s/p young tube, decub  Abx as per ID     Anemia - Critical care stopped epogen 5-21     Hypophos - Na Phos x 1 ( see orders )

## 2017-05-24 NOTE — CHART NOTE - NSCHARTNOTEFT_GEN_A_CORE
Rapid response called on the patient - originally code blue (pt DNR). Went to assess patient staff stated she was agonally breathing with a HR in the 50's.  Assessed patient she was pulseless in PEA arrest. Patient is DNR no CPR performedCalled sister Charline notified her of patients death, she stated she will be coming in and she will notify patients son's. Rapid response called on the patient - originally code blue (pt DNR). Went to assess patient staff stated she was agonally breathing with a HR in the 50's.  Assessed patient she was pulseless in PEA arrest. Patient is DNR no CPR performed. Called sister Charline notified her of patients death, she stated she will be coming in and she will notify patients son's. Patient pronounced 14:44.

## 2017-05-24 NOTE — PROGRESS NOTE ADULT - SUBJECTIVE AND OBJECTIVE BOX
Patients wound evaluated and progress noted in regards to granulation tissue and appearance.  No significant surrounding erythema and despite limitations, patient does not appear with significant pain on palpation around the knee. No purulence can be expressed on exam and exploration of the wound.    Clinically, the patient is s/p an infected TKA. The cement spacer remains in acceptable position and is in place.The wound, followed by Dr Rey is progressing well.    The knee infection appears under control.Prior to admission, the antibiotics were discontinued by Dr Chamberlain.Clinicallu the knee infection does not appear to have returned    Patient has had other sources of infection currently being treated with subsequent positive blood cultures.    While at this time, there does not appear to be an infection in hte knee, we will continue to monitor clinically to see if the joint was seeded by these other infections.    Ms Brush's sister was in attendance today and questions were answered.    While clinically no infection at this time, seeding could still occur and further knee treatments may nned to be required

## 2017-05-24 NOTE — PROGRESS NOTE ADULT - SUBJECTIVE AND OBJECTIVE BOX
NEPHROLOGY INTERVAL HPI/OVERNIGHT EVENTS:  pt lethargic when seen earlier today  no acute distress noted  tolerated HD yesterday    MEDICATIONS  (STANDING):  dextrose 50% Injectable 25Gram(s) IV Push once  dextrose 50% Injectable 25Gram(s) IV Push once  collagenase Ointment 1Application(s) Topical daily  collagenase Ointment 1Application(s) Topical two times a day  insulin lispro (HumaLOG) corrective regimen sliding scale  SubCutaneous every 6 hours  meropenem IVPB 1000milliGRAM(s) IV Intermittent every 24 hours  cadexomer iodine Gel 1Application(s) Topical daily  digoxin     Tablet 0.125milliGRAM(s) Oral daily  heparin  Injectable 7500Unit(s) SubCutaneous every 8 hours  midodrine 15milliGRAM(s) Oral every 8 hours    MEDICATIONS  (PRN):  morphine  - Injectable 2milliGRAM(s) SubCutaneous every 4 hours PRN Moderate Pain (4 - 6)      Allergies    No Known Allergies        Vital Signs Last 24 Hrs  T(C): 36.7, Max: 37.4 (05-23 @ 16:00)  T(F): 98, Max: 99.4 (05-23 @ 16:00)  HR: 90 (79 - 100)  BP: 128/64 (106/59 - 153/74)  BP(mean): 97 (75 - 97)  RR: 18 (14 - 21)  SpO2: 97% (92% - 98%)    PHYSICAL EXAM:  HEENT: NGT out  NECK: no jvd  CHEST/LUNG: decreased bs bases, no wheezes  HEART:  no rub  ABDOMEN: soft; non tender  EXTREMITIES: + edema     LABS:                        9.2    17.4  )-----------( 89       ( 23 May 2017 07:17 )             26.8     05-23    139  |  99  |  34.0<H>  ----------------------------<  194<H>  4.7   |  28.0  |  4.26<H>    Ca    9.3      23 May 2017 07:17  Phos  1.0     05-23  Mg     1.8     05-23              RADIOLOGY & ADDITIONAL TESTS:

## 2017-05-24 NOTE — DISCHARGE NOTE FOR THE EXPIRED PATIENT - HOSPITAL COURSE
admitted with septic shock, polymicrobial infection, pressure ulcer, encephalopathy. pt made DNR by family/ HCP. Pt  on 17 at 14.44 PM. Family informed.

## 2017-05-24 NOTE — PROGRESS NOTE ADULT - ASSESSMENT
1) polymicrobial sepsis from UTI + pressure ulcers + knee wound- per ID antibiotics  2) Hypotension- Midodrine  3) KARMEN on CKD stg 4 now HD dependent- HD per renal  4) choelcystitis- with cholecystostomy drain- in place for min 6 weeks.  5) wound care- per ortho, wound care RN, podiatry for heel. Plastics following for knee. Surg following and will consider debridement of sacral wound when pt stable  6) hypophosphatemia- replete  7) metabolic encephalopathy- due to all of the above  8) Heparin  Palliative and family meeting tomorrow.

## 2017-05-24 NOTE — PROGRESS NOTE ADULT - SUBJECTIVE AND OBJECTIVE BOX
HEALTH ISSUES - PROBLEM Dx:  HPI:  71 yr old female with Severe dementia non verbal bed bound, hypertension, hyperlipidemia, LIN, CKD, COPD, paroxysmal A fib, septic arthritis, CKD baseline Cr 2.8 in April '17 sent in from [facility] due to mental status changes & anasarca. No information could be obtained from patient due to mental status. Tried calling [facility] & left VM. Pt's Cr increased from baseline 2.8 to 8 today. Not known if pt making urine. Will insert anna for more information. Pt is currently lethargic barely arousable. Unable to answer any questions. Brother at bedside but does not know about pt's PMH or meds.     Addendum :  pt's ct chest showed Unchanged 6 mm nodule along the right minor fissure compared with prior CT  of the chest from September 2016. Upon discharge discharging team will make recommendation for pt. to follow up on 6 mm nodule. Dr. Durand 5/13/17. 5:20 am. (12 May 2017 18:13)    NOW  polymicrobial infection, bacteremia, UTI, midodrine dependent hypotension,     INTERVAL HPI/ OVERNIGHT EVENTS:  no acute events sister jeniffer at bedside. answered all her questions to best of my capabilities. she wants to know when surgery will operate on the sacral decubitii. wants to know the prognosis with encephalopathy. and the cause. she wants transfer to ICU and if not then to stepdown unit.   denies chest pain, nausea, vomits, cough, SOB, fever, HA    MEDICATIONS  (STANDING):  dextrose 50% Injectable 25Gram(s) IV Push once  dextrose 50% Injectable 25Gram(s) IV Push once  collagenase Ointment 1Application(s) Topical daily  collagenase Ointment 1Application(s) Topical two times a day  insulin lispro (HumaLOG) corrective regimen sliding scale  SubCutaneous every 6 hours  meropenem IVPB 1000milliGRAM(s) IV Intermittent every 24 hours  cadexomer iodine Gel 1Application(s) Topical daily  digoxin     Tablet 0.125milliGRAM(s) Oral daily  heparin  Injectable 7500Unit(s) SubCutaneous every 8 hours  midodrine 15milliGRAM(s) Oral every 8 hours  epoetin melany Injectable 75018Bhng(s) IV Push <User Schedule>  sodium phosphate IVPB 15milliMole(s) IV Intermittent two times a day  potassium acid phosphate/sodium acid phosphate tablet (K-PHOS No. 2) 1Tablet(s) Oral three times a day    MEDICATIONS  (PRN):  morphine  - Injectable 2milliGRAM(s) SubCutaneous every 4 hours PRN Moderate Pain (4 - 6)      Allergies    No Known Allergies    Intolerances        Vital Signs Last 24 Hrs  T(C): 36.6, Max: 37.4 (05-23 @ 16:00)  T(F): 97.9, Max: 99.4 (05-23 @ 16:00)  HR: 92 (79 - 100)  BP: 132/60 (123/59 - 153/74)  BP(mean): 97 (84 - 97)  RR: 20 (17 - 21)  SpO2: 97% (92% - 98%)    ACCUCHECKS    with hd access, ngt, tlc, rectal tube, cholecystostomy drain   GENERAL: NAD, well-groomed, well-developed  HEAD:  Atraumatic, Normocephalic  EYES: EOMI, PERRLA, conjunctiva and sclera clear  ENMT: No tonsillar erythema, exudates, or enlargement; Moist mucous membranes, Good dentition, No lesions  NECK: Supple, No JVD, Normal thyroid  NERVOUS SYSTEM:  Alert & Oriented X3, Motor Strength 5/5 B/L upper and lower extremities; DTRs 2+ intact and symmetric  CHEST/LUNG: Clear to percussion bilaterally; No rales, rhonchi, wheezing, or rubs  HEART: Regular rate and rhythm; No murmurs, rubs, or gallops  ABDOMEN: Soft, Nontender, Nondistended; Bowel sounds present; no masses; + young drain   EXTREMITIES:  2+ Peripheral Pulses, No clubbing, cyanosis, 2-3+ pitting edema; large deep right anterior knee wound with fibronuous tissue. No erythema associated. left Posterior heel ulcerations with necrotic eschar and normal borders.  Eschar is dry w/ no underlying fluctuance.  No purulence, no mal odor, no drainage.  LE is severely edematous.  Medial 1st MPJ hematoma formation with no open lesions.  No erythema.  LYMPH: No lymphadenopathy noted  SKIN: sacrum to buttock pressure ulcer unstageable;       LABS:                        9.2    17.4  )-----------( 89       ( 23 May 2017 07:17 )             26.8     05-23    139  |  99  |  34.0<H>  ----------------------------<  194<H>  4.7   |  28.0  |  4.26<H>    Ca    9.3      23 May 2017 07:17  Phos  1.0     05-23  Mg     1.8     05-23        LABS:                        8.4    16.9  )-----------( 120      ( 24 May 2017 11:32 )             24.7     05-24    138  |  100  |  32.0<H>  ----------------------------<  142<H>  4.4   |  30.0<H>  |  3.43<H>    Ca    8.7      24 May 2017 11:32  Phos  1.5     05-24  Mg     1.8     05-23          RADIOLOGY & ADDITIONAL TESTS:    Assessment and Plan  DVT Prophylaxis    Discussed with: Patient, family, RN, CM, Consultants  Plan of care/ Discharge planning discussed.    Visit Time:

## 2017-05-24 NOTE — PROGRESS NOTE ADULT - PROVIDER SPECIALTY LIST ADULT
Cardiology
Critical Care
Hospitalist
Infectious Disease
Internal Medicine
Intervent Radiology
Intervent Radiology
Nephrology
Orthopedics
Palliative Care
Plastic Surgery
Plastic Surgery
Podiatry
Vascular Surgery
Vascular Surgery
Infectious Disease
Orthopedics

## 2017-05-26 ENCOUNTER — RESULT REVIEW (OUTPATIENT)
Age: 72
End: 2017-05-26

## 2017-08-24 ENCOUNTER — OTHER (OUTPATIENT)
Age: 72
End: 2017-08-24

## 2017-10-09 NOTE — ED ADULT NURSE NOTE - NS ED NURSE DISCH DISPOSITION
Patient: Lacho Ervin Date: 10/9/2017   : 1966 Attending: Macho Upton MD   51 year old male        Chiefcomplaint: Hyponatremia.    Subjective: stable, no new complaints except for pain.    Reviewed: Allergies, Medical History, Surgical History, Social History and Family History.    Vital Last Value 24 HourRange   Temperature 96.8 °F (36 °C) Temp  Min: 96.8 °F (36 °C)  Max: 97.3 °F (36.3 °C)   Pulse 81 Pulse  Min: 68  Max: 81   Respiratory 16 Resp  Min: 16  Max: 16   Blood Pressure 104/60 BP  Min: 100/50  Max: 104/60   ArtBP   No Data Recorded   Pulse Oximetry 97 % SpO2  Min: 95 %  Max: 97 %     Vital Today Admitted   Weight 48.2 kg Weight: 56.7 kg   Height N/A Height: 5' 6\" (167.6 cm)   BMI N/A BMI (Calculated): 20.22     Weight over the past 48 Hours:  No data found.       Intake/Output:    Last StoolOccurrence: 3 (green liquid, watery) (10/07/17 1113)    No intake/output data recorded.    I/O last 3 completed shifts:  In: 3065 [P.O.:1720; I.V.:1345]  Out: 2400 [Urine:1200; Stool:1200]      Intake/Output Summary (Last 24 hours) at 10/09/17 1134  Last data filed at 10/09/17 0659   Gross per 24 hour   Intake             2825 ml   Output             2250 ml   Net              575 ml       Medications/Infusions:  Scheduled:   • nitrofurantoin (macrocrystal-monohydrate)  100 mg Oral 2 times per day   • amitriptyline  25 mg Oral Nightly   • celecoxib  200 mg Oral Daily   • folic acid  1 mg Oral Daily   • venlafaxine XR  150 mg Oral Daily   • tamsulosin  0.8 mg Oral Daily PC   • pregabalin  25 mg Oral TID   • loratadine  10 mg Oral Daily   • sodium chloride (PF)  2 mL Injection 2 times per day   • enoxaparin (LOVENOX) injection  40 mg Subcutaneous Q24H   • divalproex  1,000 mg Oral Nightly   • traZODone  100 mg Oral Nightly   • influenza virus quadrivalent vaccine inactivated (PRESERVATIVE FREE)  0.5 mL Intramuscular Once   • pneumococcal 23-valent vaccine  0.5 mL Intramuscular Once       Continuous  Infusions:      Physical Exam:  GEN: ALERT and ORIENTED, NAD  HEENT: NO PALLOR, NO ICTERUS  NECK: SUPPPLE, NO MASS, NO JVD  LUNGS: SYMMETRIC AIR ENTRY, CTA  CVS: S1 S2, NO RUB  ABDO: Soft, Not Distended, No Tenderness  EXT: NO C/C/EDEMA    LaboratoryResults:    Lab Results   Component Value Date    SODIUM 139 10/09/2017    POTASSIUM 4.5 10/09/2017    CHLORIDE 108 (H) 10/09/2017    CO2 24 10/09/2017    ANIONGAP 12 10/09/2017    BUN 5 (L) 10/09/2017    CREATININE 0.63 (L) 10/09/2017    CALCIUM 8.3 (L) 10/09/2017    URIC 4.5 10/09/2017    GLUCOSE 89 10/09/2017     10/06/2017    LIPA 390 06/04/2016    WBC 6.2 10/09/2017    HCT 35.9 (L) 10/09/2017     Lab Results   Component Value Date    HGB 11.5 (L) 10/09/2017     10/09/2017    INR 1.0 10/02/2017    MG 1.5 (L) 10/09/2017    PHOS 3.2 10/09/2017    ALKPT 68 10/09/2017    VB12 493 10/03/2017    BILIRUBIN 0.1 (L) 10/09/2017    AST 15 10/09/2017    GPT 15 10/09/2017    ALBUMIN 2.3 (L) 10/09/2017    GFRA >90 10/09/2017    GFRNA >90 10/09/2017    LACTA 1.5 10/01/2017    PST 11 (L) 10/03/2017    FERR 225 10/03/2017     Creatinine   Date Value Ref Range Status   10/09/2017 0.63 (L) 0.67 - 1.17 mg/dL Final   10/08/2017 0.73 0.67 - 1.17 mg/dL Final   10/08/2017 0.77 0.67 - 1.17 mg/dL Final   10/07/2017 0.74 0.67 - 1.17 mg/dL Final   10/07/2017 0.73 0.67 - 1.17 mg/dL Final     BUN   Date Value Ref Range Status   10/09/2017 5 (L) 6 - 20 mg/dL Final   10/08/2017 11 6 - 20 mg/dL Final   10/08/2017 13 6 - 20 mg/dL Final   10/07/2017 16 6 - 20 mg/dL Final   10/07/2017 14 6 - 20 mg/dL Final     Potassium   Date Value Ref Range Status   10/09/2017 4.5 3.4 - 5.1 mmol/L Final   10/08/2017 4.5 3.4 - 5.1 mmol/L Final   10/08/2017 4.1 3.4 - 5.1 mmol/L Final   10/07/2017 4.0 3.4 - 5.1 mmol/L Final   10/07/2017 3.4 3.4 - 5.1 mmol/L Final     HGB   Date Value Ref Range Status   10/09/2017 11.5 (L) 13.0 - 17.0 g/dL Final   10/08/2017 12.2 (L) 13.0 - 17.0 g/dL Final   10/06/2017 14.0  13.0 - 17.0 g/dL Final   10/03/2017 10.5 (L) 13.0 - 17.0 g/dL Final   10/02/2017 10.5 (L) 13.0 - 17.0 g/dL Final     MAGNESIUM   Date Value Ref Range Status   10/09/2017 1.5 (L) 1.7 - 2.4 mg/dL Final   10/08/2017 1.6 (L) 1.7 - 2.4 mg/dL Final   10/08/2017 1.9 1.7 - 2.4 mg/dL Final   10/08/2017 2.2 1.7 - 2.4 mg/dL Final   10/07/2017 1.5 (L) 1.7 - 2.4 mg/dL Final     PHOSPHORUS   Date Value Ref Range Status   10/09/2017 3.2 2.4 - 4.7 mg/dL Final     Urine Panel  Lab Results   Component Value Date    URBC LARGE (A) 10/07/2017    UKET 40 (A) 10/07/2017    USPG 1.025 10/07/2017    UPROT >300 (A) 10/07/2017    UWBC MODERATE (A) 10/07/2017    UBILI POSITIVE (A) 10/07/2017    UPH 6.0 10/07/2017    UBACTR FEW (A) 10/07/2017       Diagnosis/Plan:  Hyponatremia  Hypotension  Bladder spasms  Recent UTI  Anemia  Cocaine abuse    S/Na is normal now  Continue same meds  Supplement Mg.  BMP, CBC in am      Anson Garcia MD.  Nephrology.     Admitted

## 2018-10-03 NOTE — PROGRESS NOTE ADULT - ASSESSMENT
s/p op right knee, DM 2, Anemia, ARF better plus CRF Complex Repair And Z Plasty Text: The defect edges were debeveled with a #15 scalpel blade.  The primary defect was closed partially with a complex linear closure.  Given the location of the remaining defect, shape of the defect and the proximity to free margins a Z plasty was deemed most appropriate for complete closure of the defect.  Using a sterile surgical marker, an appropriate advancement flap was drawn incorporating the defect and placing the expected incisions within the relaxed skin tension lines where possible.    The area thus outlined was incised deep to adipose tissue with a #15 scalpel blade.  The skin margins were undermined to an appropriate distance in all directions utilizing iris scissors.

## 2018-12-20 NOTE — PROGRESS NOTE ADULT - SUBJECTIVE AND OBJECTIVE BOX
Pt is now 5 weeks post op.    VAC d/c'd.  Drainage down to 50cc/day  Anterior eschar is stable.  Wound remains in tact.  No erythema, no drainage.    No collections.        A/P: Continue local wound care and compression wrap  OK to discharge to rehab  F/u as outpt next week. never

## 2019-10-01 NOTE — PROGRESS NOTE ADULT - PROBLEM SELECTOR PLAN 2
Impression: Age-related nuclear cataract, bilateral: H25.13. Plan: Cataracts account for the patient's complaints. No treatment currently recommended. The patient will monitor vision changes and contact us with any decrease in vision. Cefepime as above

## 2019-11-07 NOTE — ED PROVIDER NOTE - MUSCULOSKELETAL, MLM
Spine appears normal, range of motion is limited due to generalized weakness , no muscle or joint tenderness independent

## 2020-06-02 NOTE — PROGRESS NOTE ADULT - PROBLEM SELECTOR PLAN 5
[facility] Medicine Services  INPATIENT PROGRESS NOTE    Length of Stay: 2  Date of Admission: 5/31/2020  Primary Care Physician: Provider, No Known    Subjective   Chief Complaint: follow-up shortness of breath  HPI   Patient sitting up on side of the bed eating lunch.  She states she is feeling better overall in comparison to admission.  Her breathing continues to improve.  She has not been able to cough up any sputum, but feels as though she is closer to being able to.  She denies chest pain.  She is eating and drinking well.    Review of Systems   All pertinent negatives and positives are as above. All other systems have been reviewed and are negative unless otherwise stated.     Objective    Temp:  [97.3 °F (36.3 °C)-98.4 °F (36.9 °C)] 97.8 °F (36.6 °C)  Heart Rate:  [100-134] 114  Resp:  [16-29] 20  BP: ()/(45-73) 134/59  Physical Exam  Constitutional: She is oriented to person, place, and time. She appears well-developed and well-nourished. No distress.   HENT:   Head: Normocephalic and atraumatic.   Neck: Normal range of motion. Neck supple. No JVD present. No tracheal deviation present.   Cardiovascular: Normal rate, regular rhythm and intact distal pulses. Exam reveals no gallop and no friction rub.   Sinus-sinus tach sinus tach 101-135   Pulmonary/Chest: Effort normal. She has wheezes. She has no rales.   Tight lung sounds  Abdominal: Soft. Bowel sounds are normal. She exhibits no distension. There is no tenderness. There is no guarding.   Musculoskeletal: Normal range of motion. She exhibits no edema or tenderness.   Neurological: She is alert and oriented to person, place, and time. No cranial nerve deficit.   Skin: Skin is warm and dry. No rash noted. No erythema.   Psychiatric: She has a normal mood and affect. Her behavior is normal. Judgment and thought content normal.   Vitals reviewed.    Results Review:  I have reviewed the labs, radiology results, and  diagnostic studies.    Laboratory Data:   Results from last 7 days   Lab Units 05/31/20  1236   WBC 10*3/mm3 11.22*   HEMOGLOBIN g/dL 12.4   HEMATOCRIT % 41.2   PLATELETS 10*3/mm3 307     Results from last 7 days   Lab Units 05/31/20  1255 05/31/20  1236   SODIUM mmol/L  --  141   SODIUM, ARTERIAL mmol/L 141  --    POTASSIUM mmol/L  --  4.4   CHLORIDE mmol/L  --  98   CO2 mmol/L  --  27.0   BUN mg/dL  --  16   CREATININE mg/dL  --  0.65   CALCIUM mg/dL  --  9.5   BILIRUBIN mg/dL  --  0.2   ALK PHOS U/L  --  95   ALT (SGPT) U/L  --  11   AST (SGOT) U/L  --  19   GLUCOSE mg/dL  --  216*     I have reviewed the patient current medications.     Assessment/Plan     Active Hospital Problems    Diagnosis   • COPD exacerbation (CMS/HCC)   • Acute on chronic respiratory failure with hypoxia and hypercapnia (CMS/[facility])   • CAD (coronary artery disease)   • Diabetes mellitus (CMS/[facility])     Plan:  1.  Patient admitted 5/31/2020 with complaints of shortness of breath/cough.  Chest x-ray showed no acute process.  Patient was admitted for treatment of acute exacerbation of chronic restrictive pulmonary disease.  On arrival to the floor, patient's oxygen saturation was found to be in the low 80s on 4 L.  Patient was placed on BiPAP with improvement of her symptoms.  2.  Noted worsening tachycardia overnight, with heart rates up to 135.  Continue Atrovent, will change albuterol to Xopenex and monitor tachycardia closely.  Continue home nebulizers Pulmicort and Brovana.  Continue Mucinex and encouraged use of flutter valve.  Continue antitussives as needed.  3.  Lung sounds still quite tight today.  Would continue Solu-Medrol at current dosage and taper as able.  4.  Continue azithromycin.  Respiratory PCR panel negative.  COVID-19 testing negative.  5.  Discussed with respiratory and .  Patient's [employer] company to come adjust settings on patient's Astral Trilogy for patient's comfort as she felt as though there was not  enough pressure on her machine as compared to the hospital's BiPAP.  6.  Continue PT/OT.  Recommended discharge home with assist and home health-patient would need local physician to follow for home health.   7.  Last blood glucoses-252, 276, 263.  Will increase to moderate-high-dose sliding scale for better control while on IV steroid therapy.  8.  Lab holiday in a.m.    Discharge Planning: I expect the patient to be discharged to home in 2-3 days.    Leatha Mobley, TERRENCE   06/02/20   13:02     Continue Lopressor.

## 2020-06-25 NOTE — PROGRESS NOTE ADULT - PROBLEM SELECTOR PLAN 1
Neuro check completed, see flowsheets. Patient has no c/o pain. Patient left to rest, no other needs at this time.    ZYVOX RELATED RECHECK AND ADJ IF NEEDED

## 2020-08-31 NOTE — ED ADULT NURSE NOTE - PMH
Pls advise   Atrial fibrillation    Breast cancer    Chronic pain    Constipation    COPD (chronic obstructive pulmonary disease)    Deficiency of vitamin K    DM (diabetes mellitus)    GERD (gastroesophageal reflux disease)    HLD (hyperlipidemia)    HTN (hypertension)    Hypercholesterolemia    Obesity    LIN on CPAP    Pressure ulcer    Urine retention

## 2021-01-07 NOTE — INPATIENT CERTIFICATION FOR MEDICARE PATIENTS - NS ICMP TWO DAYS INPATIENT
1/7/2021  Marisa Marshall    DIABETES HOME INSTRUCTIONS    Meal Planning:   Eat regularly during the day, every 4-5 hours.    See meal plan provided today - make sure not to eat more than 30 grams carbohydrate per day.    Avoid Built Bar and choose Premiere Protein -   Increase vegetables at lunch - try cooked/canned - no salt added for work  After 2-3 weeks, try very small portion of raw vegetables  Snack for drive home - avoid Built Bar and have 1 string cheese or nuts  Increase vegetables at supper - cooked first and then try raw  Continue to drink water intakes  Avoid artificial sweeteners  Pure Tea - iced/hot tea with lemon  Flavored coffee without the flavored creamer    Physical Activity:  Continue to be as active as possible.  Best if after meals.     Diabetes Medication:   As prescribed.       Blood Sugar Monitoring:   Check your blood sugar as suggested.  Change battery in meter - call company/pharmacy    Miscellaneous:  Track intakes - when/what/how much you eat and drink, blood sugar levels, activity/exercise, symptoms and bring to all diabetes-related appointments   [email or web address]  [email or web address]    Follow up with Educator:    Please call me if you have any questions or concerns.  [phone] and Mushtaq Lowe RD,CDE   Diabetes and Nutrition Education          Yes

## 2022-04-19 NOTE — H&P ADULT - NECK DETAILS
----- Message from Angelica Ludwig RN sent at 3/18/2022  3:53 PM CDT -----  Regarding: Weight check  Please watch for an updated weight on patient.  Atiya Walters RD asked that Tejas get a weight check in 1 month (from 3/18/22). Please follow up with Mom if she hasn't called yet with that weight and please send along to Atiya and Dr. Sparks for review.      JVD/supple

## 2022-06-28 NOTE — ED ADULT TRIAGE NOTE - CADM TRG TX PRIOR TO ARRIVAL
PICC LINE Tissue Cultured Epidermal Autograft Text: The defect edges were debeveled with a #15 scalpel blade.  Given the location of the defect, shape of the defect and the proximity to free margins a tissue cultured epidermal autograft was deemed most appropriate.  The graft was then trimmed to fit the size of the defect.  The graft was then placed in the primary defect and oriented appropriately.

## 2022-12-09 NOTE — PATIENT PROFILE ADULT. - ANESTHESIA, PREVIOUS REACTION, PROFILE
Head , normocephalic , atraumatic , Face , Face within normal limits , Ears , External ears within normal limits , Nose/Nasopharynx , External nose  normal appearance , Mouth and Throat , Oral cavity appearance normal
none

## 2023-04-24 NOTE — INPATIENT CERTIFICATION FOR MEDICARE PATIENTS - NS ICMP CERT SIG IND
Pt is on the schedule for labs tomorrow however no orders in system.  Please enter orders.  If pt is not needing labs drawn please let me know so pt can be contacted and appointment cancelled.   I certify as stated above.

## 2023-05-18 NOTE — PROGRESS NOTE ADULT - PROBLEM SELECTOR PROBLEM 8
Severe protein-calorie malnutrition
Severe protein-calorie malnutrition
Yes - the patient is able to be screened

## 2023-09-06 NOTE — PROGRESS NOTE ADULT - ASSESSMENT
FOLLOW UP WITH  CLEMENTSHOWS IN 3 MONTHS   71 yr old female with hypertension, hyperlipidemia, LIN, CKD, COPD, paroxysmal A fib sent from rehab for right knee wound dehiscence. She was seen by orthopedics, ID. Local wound cultures growing resistant and Pseudomonas and VRE. She was started on Meropenem and Zyvox. Her renal function was slightly worse from baseline and hence started on IVF. No obstructive disease on renal ultrasound. Cardiology consulted for clearance. Renal was consulted.  She is scheduled for OR I&D, she underwent wound closure and insertion of antibiotic spacer. RRT was called on 3/2 for hypotension and lethargy. Labs revealed elevated lactate. She was transferred to ICU for fluid resuscitation and monitoring. Narcotics were discontinued. She was noted to be anemic 6.9, she was ordered for PRBC transfusion and transferred out of ICU. ID follow up requested for medication adjustment, advised to stop Linezolid and start Daptomycin. GI consulted for anemia, no endoscopic intervention advised at this time. Hb remained stable. Noted to have lethargy, hence narcotics stopped. ABG done, which revealed normal pH and CO2. CT brain showed no stroke. Mental status improved. Hb remained stable. After discussion with orthopedics, coumadin was started for atrial fibrillation. She was noted to have bilateral upper extremity swelling, hence ultrasound was done on 3/9, negative for DVT. Given low platelets, Linezolid was discontinued by ID and Daptomycin was started. Heparin antibody negative. Plastics and orthopedic follow up was done, felt need for repeat I&D. Coumadin was held in view of procedure. Noted to be anemic, improved with 2 units of PRBC on 3/17/17. She was noted to have right arm swelling, doppler was repeated and showed subclavian vein DVT. She had PICC line placed on 3/18/17. Orthopedic and plastic surgery follow up done, given wound healing, further I&D deferred for now. Discussed with IR regarding DVT and possible need for PICC line removal, advised to maintain line for now and continue with anticoagulation. Plastics following for SIGRID drain.

## 2024-11-12 NOTE — ED PROVIDER NOTE - EXITCARE/DISCHARGE INSTRUCTIONS
PROGRESS NOTE    Name: Shikha Murphy  Age: 73 year old  Gender: female      Chief Complaint   Patient presents with    Office Visit    Establish Care     HPI    Patient is here to establish care.  Daughter is present.  We reviewed problem list, medication list, histories.  Patient has been non-compliant with follow-up with primary care physician or endocrinologist.  She has not gotten blood work in a long time.  She reports compliance with amlodipine, aspirin and metformin as listed.  She did go for diabetic eye exam last year.  She reports not needing refills at this time.      Review Flowsheet  More data may exist         11/12/2024   PHQ 2/9 Score   Adult PHQ 2 Score 0 0   Adult PHQ 2 Interpretation No further screening needed No further screening needed   Little interest or pleasure in activity? Not at all Not at all   Feeling down, depressed or hopeless? Not at all Not at all      Details          Multiple values from one day are sorted in reverse-chronological order               DEPRESSION ASSESSMENT/PLAN:  Depression screening is negative no further plan needed.      Current Outpatient Medications   Medication Sig Dispense Refill    amLODIPine (NORVASC) 10 MG tablet amLODIPine Besylate 10 MG Oral Tablet RxNorm: 892158 Amlodipine Besylate 10 MG 2021-05-04 Not Indicated Take 1 tablet (10 mg) by mouth daily Dr Scott Carreon MD  [facility] Elect[facility]iology  1/16/2023      aspirin (ECOTRIN) 81 MG EC tablet Take 81 mg by mouth daily.      metformin (GLUCOPHAGE) 1000 MG tablet Take 1,000 mg by mouth in the morning and 1,000 mg in the evening. Take with meals.       No current facility-administered medications for this visit.       ALLERGIES:  Patient has no known allergies.    Past Medical History:   Diagnosis Date    Diabetes mellitus  (CMD)     Essential (primary) hypertension        Past Surgical History:   Procedure Laterality Date    Bladder repair          Family History   Problem Relation  Age of Onset    Diabetes Mother     Heart Father        Social History     Socioeconomic History    Marital status: /Civil Union     Spouse name: Not on file    Number of children: Not on file    Years of education: Not on file    Highest education level: Not on file   Occupational History    Not on file   Tobacco Use    Smoking status: Never    Smokeless tobacco: Never   Substance and Sexual Activity    Alcohol use: Yes     Comment: rarely    Drug use: Never    Sexual activity: Not on file   Other Topics Concern    Not on file   Social History Narrative    Not on file     Social Determinants of Health     Financial Resource Strain: Not on file   Food Insecurity: Not on file   Transportation Needs: Not on file   Physical Activity: Not on file   Stress: Not on file   Social Connections: Not on file   Interpersonal Safety: Not on file         Review of Systems   All other systems reviewed and are negative.      /59   Pulse 95   Temp 98.3 °F (36.8 °C)   Resp 18   Ht 5' 0.5\" (1.537 m)   Wt 65.5 kg (144 lb 4.7 oz)   BMI 27.72 kg/m²   BSA 1.64 m²   Physical Exam  Constitutional:       Appearance: Normal appearance.   HENT:      Head: Normocephalic and atraumatic.   Cardiovascular:      Rate and Rhythm: Normal rate and regular rhythm.      Heart sounds: No murmur heard.  Pulmonary:      Effort: Pulmonary effort is normal.      Breath sounds: Normal breath sounds.   Neurological:      Mental Status: She is alert.   Psychiatric:         Mood and Affect: Mood normal.       Diabetic foot exam: Varicose vein with hemosiderin deposits.  Intact skin, pulses and sensation.      Assessment and Plan  Encounter to establish care  -Reviewed history    Essential (primary) hypertension  - Controlled  - Continue current medication  - CBC with Automated Differential; Future  - Comprehensive Metabolic Panel; Future  - Lipid Panel With Reflex; Future  - Thyroid Stimulating Hormone Reflex; Future    Type 2 diabetes  mellitus with hyperglycemia, without long-term current use of insulin (CMD)  - CBC with Automated Differential; Future  - Comprehensive Metabolic Panel; Future  - Lipid Panel With Reflex; Future  - Thyroid Stimulating Hormone Reflex; Future  - Glycohemoglobin; Future  - Microalbumin Urine Random; Future  - SERVICE TO OPHTHALMOLOGY    Need for COVID-19 vaccine  Need for influenza vaccination  - Declined today  - Reports that she will no longer be doing vaccines.  She does not believe in them any longer.    Varicose veins   -Advised on compression stockings    Follow up appointment: 2 weeks   Launch [employer] and print the 'Prescriptions from this Visit' Report

## 2025-05-29 NOTE — H&P ADULT - EXTREMITIES
To ER if acutely worse Follow up with your PCP in 3 - 4 days if worsening or not improving:    Rosaura Salvador MD  [address] [facility] [address] [phone]    detailed exam